# Patient Record
Sex: FEMALE | Race: WHITE | NOT HISPANIC OR LATINO | Employment: OTHER | URBAN - METROPOLITAN AREA
[De-identification: names, ages, dates, MRNs, and addresses within clinical notes are randomized per-mention and may not be internally consistent; named-entity substitution may affect disease eponyms.]

---

## 2017-02-16 ENCOUNTER — OFFICE VISIT (OUTPATIENT)
Dept: INTERNAL MEDICINE | Facility: CLINIC | Age: 62
End: 2017-02-16
Payer: COMMERCIAL

## 2017-02-16 VITALS
DIASTOLIC BLOOD PRESSURE: 64 MMHG | BODY MASS INDEX: 23.47 KG/M2 | HEIGHT: 61 IN | SYSTOLIC BLOOD PRESSURE: 117 MMHG | HEART RATE: 72 BPM | WEIGHT: 124.31 LBS

## 2017-02-16 DIAGNOSIS — H60.93 OTITIS EXTERNA OF BOTH EARS, UNSPECIFIED CHRONICITY, UNSPECIFIED TYPE: Primary | ICD-10-CM

## 2017-02-16 PROCEDURE — 3074F SYST BP LT 130 MM HG: CPT | Mod: S$GLB,,, | Performed by: PHYSICIAN ASSISTANT

## 2017-02-16 PROCEDURE — 99213 OFFICE O/P EST LOW 20 MIN: CPT | Mod: S$GLB,,, | Performed by: PHYSICIAN ASSISTANT

## 2017-02-16 PROCEDURE — 3078F DIAST BP <80 MM HG: CPT | Mod: S$GLB,,, | Performed by: PHYSICIAN ASSISTANT

## 2017-02-16 PROCEDURE — 99999 PR PBB SHADOW E&M-EST. PATIENT-LVL III: CPT | Mod: PBBFAC,,, | Performed by: PHYSICIAN ASSISTANT

## 2017-02-16 RX ORDER — NEOMYCIN SULFATE, POLYMYXIN B SULFATE, HYDROCORTISONE 3.5; 10000; 1 MG/ML; [USP'U]/ML; MG/ML
4 SOLUTION/ DROPS AURICULAR (OTIC) 4 TIMES DAILY
Qty: 10 ML | Refills: 0 | Status: SHIPPED | OUTPATIENT
Start: 2017-02-16 | End: 2017-02-26

## 2017-02-16 NOTE — MR AVS SNAPSHOT
[facility] Internal Medicine  [address]  [facility] [address]  Phone: [phone]  Fax: [phone]                  Rehana Bentonartie   2017 10:30 AM   Office Visit    Description:  Female : 1955   Provider:  Katelynn Celestin PA-C   Department:  [facility] Internal Medicine           Reason for Visit     Ear Drainage           Diagnoses this Visit        Comments    Otitis externa of both ears, unspecified chronicity, unspecified type    -  Primary            To Do List           Future Appointments        Provider Department Dept Phone    3/24/2017 4:00 PM Dawna Maradiaga MD [facility] - Dermatology [phone]    3/31/2017 8:00 AM Bernie Valverde MD [facility] Internal Medicine [phone]      Goals (5 Years of Data)     None       These Medications        Disp Refills Start End    neomycin-polymyxin-hydrocortisone (CORTISPORIN) otic solution 10 mL 0 2017    Place 4 drops into both ears 4 (four) times daily. - Both Ears    Pharmacy: [facility] - [place]IN [place]GDEN - [address] [place] [address].  #: [phone]         [facility] On Call     [facility] On Call Nurse Care Line -  Assistance  Registered nurses in the [facility] On Call Center provide clinical advisement, health education, appointment booking, and other advisory services.  Call for this free service at [phone].             Medications           Message regarding Medications     Verify the changes and/or additions to your medication regime listed below are the same as discussed with your clinician today.  If any of these changes or additions are incorrect, please notify your healthcare provider.        START taking these NEW medications        Refills    neomycin-polymyxin-hydrocortisone (CORTISPORIN) otic solution 0    Sig: Place 4 drops into both ears 4 (four) times daily.    Class: Normal    Route: Both Ears           Verify that the below list of medications is an accurate  "representation of the medications you are currently taking.  If none reported, the list may be blank. If incorrect, please contact your healthcare provider. Carry this list with you in case of emergency.           Current Medications     citalopram (CELEXA) 20 MG tablet Take 1 tablet (20 mg total) by mouth once daily.    fluocinolone (SYNALAR) 0.01 % external solution Apply topically 2 (two) times daily.    levothyroxine (SYNTHROID) 88 MCG tablet Take 1 tablet (88 mcg total) by mouth once daily.    lisinopril (PRINIVIL,ZESTRIL) 5 MG tablet Take 1 tablet (5 mg total) by mouth once daily.    meclizine (ANTIVERT) 25 mg tablet take 1 tablet by mouth twice a day if needed for dizziness    ondansetron (ZOFRAN-ODT) 4 MG TbDL Take 1 tablet (4 mg total) by mouth every 8 (eight) hours as needed.    triamterene-hydrochlorothiazide 37.5-25 mg (DYAZIDE) 37.5-25 mg per capsule Take 1 capsule by mouth every morning.    valacyclovir (VALTREX) 1000 MG tablet Take 2 pills po bid x 2 days prn first signs of outbreak    neomycin-polymyxin-hydrocortisone (CORTISPORIN) otic solution Place 4 drops into both ears 4 (four) times daily.           Clinical Reference Information           Your Vitals Were     BP Pulse Height Weight Last Period BMI    117/64 (BP Location: Left arm, Patient Position: Sitting, BP Method: Manual) 72 5' 1" (1.549 m) 56.4 kg (124 lb 5.4 oz) (LMP Unknown) 23.49 kg/m2      Blood Pressure          Most Recent Value    BP  117/64      Allergies as of 2/16/2017     No Known Allergies      Immunizations Administered on Date of Encounter - 2/16/2017     None      Instructions      External Ear Infection (Adult)    External otitis (also called swimmers ear) is an infection in the ear canal. It is often caused by bacteria or fungus. It can occur a few days after water gets trapped in the ear canal (from swimming or bathing). It can also occur after cleaning too deeply in the ear canal with a cotton swab or other object. " Sometimes, hair care products get into the ear canal and cause this problem.  Symptoms can include pain, fever, itching, redness, drainage, or swelling of the ear canal. Temporary hearing loss may also occur.  Home care  · Do not try to clean the ear canal. This can push pus and bacteria deeper into the canal.  · Use prescribed ear drops as directed. These help reduce swelling and fight the infection. If an ear wick was placed in the ear canal, apply drops right onto the end of the wick. The wick will draw the medication into the ear canal even if it is swollen closed.  · A cotton ball may be loosely placed in the outer ear to absorb any drainage.  · You may use acetaminophen or ibuprofen to control pain, unless another medication was prescribed. Note: If you have chronic liver or kidney disease or ever had a stomach ulcer or GI bleeding, talk to your health care provider before taking any of these medications.  · Do not allow water to get into your ear when bathing. Also, avoid swimming until the infection has cleared.  Prevention  · Keep your ears dry. This helps lower the risk of infection. Dry your ears with a towel or hair dryer after getting wet. Also, use ear plugs when swimming.  · Do not stick any objects in the ear to remove wax.  · If you feel water trapped in your ear, use ear drops right away. You can get these drops over the counter at most drugstores. They work by removing water from the ear canal.  Follow-up care  Follow up with your health care provider in one week, or as advised.  When to seek medical advice  Call your health care provider right away if any of these occur:  · Ear pain becomes worse or doesnt improve after 3 days of treatment  · Redness or swelling of the outer ear occurs or gets worse  · Headache  · Painful or stiff neck  · Drowsiness or confusion  · Fever of 100.4ºF (38ºC) or higher, or as directed by your health care provider  · Seizure  Date Last Reviewed: 3/22/2015  ©  4501-0004 [employer]. [address] [place] [address], [place] [address]. All rights reserved. This information is not intended as a substitute for professional medical care. Always follow your healthcare professional's instructions.             Language Assistance Services     ATTENTION: Language assistance services are available, free of charge. Please call [phone].      ATENCIÓN: Si habla español, tiene a torres disposición servicios gratuitos de asistencia lingüística. Llame al [phone].     FERMÍN Ý: N?u b?n nói Ti?ng Vi?t, có các d?ch v? h? tr? ngôn ng? mi?n phí dành cho b?n. G?i s? [phone].         [facility] complies with applicable Federal civil rights laws and does not discriminate on the basis of race, color, national origin, age, disability, or sex.

## 2017-02-16 NOTE — PROGRESS NOTES
Subjective:       Patient ID: Rehana Polanco is a 61 y.o. female.        Chief Complaint: Ear Drainage    HPI Comments: Rehana Polanco is an established patient of Bernie Valverde MD here today for urgent care visit.    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      Itchy ears with clear to yellow drainage.  Saw dermatology previously for this.  Given Synalar.  Gets crusting and itching.  This started 2-3 weeks ago.  The discharge is new.  Previously just with pruritus of the ears.  Ear pain on right but now feeling better.  No fever.  No URI symptoms.           Review of Systems   Constitutional: Negative for chills, diaphoresis, fatigue and fever.   HENT: Positive for ear discharge. Negative for congestion and sore throat.    Eyes: Negative for visual disturbance.   Respiratory: Negative for cough, chest tightness and shortness of breath.    Cardiovascular: Negative for chest pain, palpitations and leg swelling.   Gastrointestinal: Negative for abdominal pain, blood in stool, constipation, diarrhea, nausea and vomiting.   Genitourinary: Negative for dysuria, frequency, hematuria and urgency.   Musculoskeletal: Negative for arthralgias and back pain.   Skin: Negative for rash.   Neurological: Negative for dizziness, syncope, weakness and headaches.   Psychiatric/Behavioral: Negative for dysphoric mood and sleep disturbance. The patient is not nervous/anxious.        Objective:      Physical Exam   Constitutional: She appears well-developed and well-nourished.   HENT:   Head: Normocephalic.   Right Ear: External ear normal. There is swelling (mild). Right ear drainage: yellow crusting outer canal. Tympanic membrane is not perforated and not erythematous.   Left Ear: External ear normal. There is swelling (mild). Left ear drainage: yellow crusting outer canal. Tympanic membrane is not perforated and not erythematous.   Mouth/Throat: Oropharynx is clear and moist.  "  Eyes: Pupils are equal, round, and reactive to light.   Cardiovascular: Normal rate, regular rhythm and normal heart sounds.  Exam reveals no gallop and no friction rub.    No murmur heard.  Pulmonary/Chest: Effort normal and breath sounds normal. No respiratory distress.   Abdominal: Soft. Normal appearance. There is no tenderness.   Musculoskeletal: She exhibits no edema.   Neurological: She is alert.   Skin: Skin is warm and dry.   Psychiatric: She has a normal mood and affect.   Nursing note and vitals reviewed.      Assessment:       1. Otitis externa of both ears, unspecified chronicity, unspecified type        Plan:       Rehana was seen today for ear drainage.    Diagnoses and all orders for this visit:    Otitis externa of both ears, unspecified chronicity, unspecified type  -     neomycin-polymyxin-hydrocortisone (CORTISPORIN) otic solution; Place 4 drops into both ears 4 (four) times daily.    If not resolving, will refer to ENT.  No water in the ear for the next 1-2 weeks.    Pt has been given instructions populated from [employer] database and has verbalized understanding of the after visit summary and information contained wherein.    Follow up with a primary care provider. May go to ER for acute shortness of breath, lightheadedness, fever, or any other emergent complaints or changes in condition.    "This note will be shared with the patient"    Future Appointments  Date Time Provider Department Center   3/24/2017 4:00 PM Dawna Maradiaga MD [facility] DERM [address]   3/31/2017 8:00 AM Bernie Valverde MD [facility]               "

## 2017-02-16 NOTE — PATIENT INSTRUCTIONS

## 2017-03-16 DIAGNOSIS — E03.9 HYPOTHYROIDISM: ICD-10-CM

## 2017-03-16 RX ORDER — LEVOTHYROXINE SODIUM 88 UG/1
TABLET ORAL
Qty: 90 TABLET | Refills: 3 | Status: SHIPPED | OUTPATIENT
Start: 2017-03-16 | End: 2018-03-09 | Stop reason: SDUPTHER

## 2017-03-18 DIAGNOSIS — I10 ESSENTIAL HYPERTENSION: ICD-10-CM

## 2017-03-20 RX ORDER — TRIAMTERENE AND HYDROCHLOROTHIAZIDE 37.5; 25 MG/1; MG/1
CAPSULE ORAL
Qty: 90 CAPSULE | Refills: 1 | Status: SHIPPED | OUTPATIENT
Start: 2017-03-20 | End: 2017-09-16 | Stop reason: SDUPTHER

## 2017-03-31 ENCOUNTER — OFFICE VISIT (OUTPATIENT)
Dept: INTERNAL MEDICINE | Facility: CLINIC | Age: 62
End: 2017-03-31
Payer: COMMERCIAL

## 2017-03-31 ENCOUNTER — HOSPITAL ENCOUNTER (OUTPATIENT)
Dept: RADIOLOGY | Facility: HOSPITAL | Age: 62
Discharge: HOME | End: 2017-03-31
Attending: INTERNAL MEDICINE
Payer: COMMERCIAL

## 2017-03-31 ENCOUNTER — TELEPHONE (OUTPATIENT)
Dept: INTERNAL MEDICINE | Facility: CLINIC | Age: 62
End: 2017-03-31

## 2017-03-31 VITALS
SYSTOLIC BLOOD PRESSURE: 104 MMHG | BODY MASS INDEX: 22.86 KG/M2 | HEART RATE: 70 BPM | HEIGHT: 61 IN | TEMPERATURE: 98 F | WEIGHT: 121.06 LBS | DIASTOLIC BLOOD PRESSURE: 80 MMHG | OXYGEN SATURATION: 96 %

## 2017-03-31 DIAGNOSIS — H60.92 OTITIS EXTERNA OF LEFT EAR, UNSPECIFIED CHRONICITY, UNSPECIFIED TYPE: Primary | ICD-10-CM

## 2017-03-31 DIAGNOSIS — M79.671 RIGHT FOOT PAIN: ICD-10-CM

## 2017-03-31 DIAGNOSIS — I10 ESSENTIAL HYPERTENSION: ICD-10-CM

## 2017-03-31 DIAGNOSIS — E03.9 HYPOTHYROIDISM, UNSPECIFIED TYPE: ICD-10-CM

## 2017-03-31 DIAGNOSIS — Z00.00 ANNUAL PHYSICAL EXAM: Primary | ICD-10-CM

## 2017-03-31 DIAGNOSIS — H60.92 OTITIS EXTERNA OF LEFT EAR, UNSPECIFIED CHRONICITY, UNSPECIFIED TYPE: ICD-10-CM

## 2017-03-31 DIAGNOSIS — F41.9 ANXIETY: ICD-10-CM

## 2017-03-31 DIAGNOSIS — F32.A DEPRESSION, UNSPECIFIED DEPRESSION TYPE: ICD-10-CM

## 2017-03-31 DIAGNOSIS — Z23 NEED FOR TDAP VACCINATION: ICD-10-CM

## 2017-03-31 DIAGNOSIS — Z12.31 ENCOUNTER FOR SCREENING MAMMOGRAM FOR BREAST CANCER: ICD-10-CM

## 2017-03-31 PROCEDURE — 73630 X-RAY EXAM OF FOOT: CPT | Mod: TC,RT

## 2017-03-31 PROCEDURE — 90471 IMMUNIZATION ADMIN: CPT | Mod: S$GLB,,, | Performed by: INTERNAL MEDICINE

## 2017-03-31 PROCEDURE — 99396 PREV VISIT EST AGE 40-64: CPT | Mod: 25,S$GLB,, | Performed by: INTERNAL MEDICINE

## 2017-03-31 PROCEDURE — 90715 TDAP VACCINE 7 YRS/> IM: CPT | Mod: S$GLB,,, | Performed by: INTERNAL MEDICINE

## 2017-03-31 PROCEDURE — 3079F DIAST BP 80-89 MM HG: CPT | Mod: S$GLB,,, | Performed by: INTERNAL MEDICINE

## 2017-03-31 PROCEDURE — 73630 X-RAY EXAM OF FOOT: CPT | Mod: 26,RT,, | Performed by: RADIOLOGY

## 2017-03-31 PROCEDURE — 3074F SYST BP LT 130 MM HG: CPT | Mod: S$GLB,,, | Performed by: INTERNAL MEDICINE

## 2017-03-31 PROCEDURE — 99999 PR PBB SHADOW E&M-EST. PATIENT-LVL IV: CPT | Mod: PBBFAC,,, | Performed by: INTERNAL MEDICINE

## 2017-03-31 RX ORDER — CITALOPRAM 20 MG/1
20 TABLET, FILM COATED ORAL DAILY
Qty: 90 TABLET | Refills: 3 | Status: SHIPPED | OUTPATIENT
Start: 2017-03-31 | End: 2018-03-08 | Stop reason: SDUPTHER

## 2017-03-31 RX ORDER — NEOMYCIN SULFATE, POLYMYXIN B SULFATE, HYDROCORTISONE 3.5; 10000; 1 MG/ML; [USP'U]/ML; MG/ML
3 SOLUTION/ DROPS AURICULAR (OTIC) EVERY 6 HOURS
Qty: 10 ML | Refills: 0 | Status: SHIPPED | OUTPATIENT
Start: 2017-03-31 | End: 2017-04-10

## 2017-03-31 RX ORDER — MELOXICAM 15 MG/1
15 TABLET ORAL DAILY
Qty: 30 TABLET | Refills: 0 | Status: SHIPPED | OUTPATIENT
Start: 2017-03-31 | End: 2018-03-08

## 2017-03-31 NOTE — PROGRESS NOTES
INTERNAL MEDICINE ANNUAL VISIT NOTE      CHIEF COMPLAINT     Chief Complaint   Patient presents with    Annual Exam     HPI     Rehana Polanco is a 61 y.o. C female who presents for annual exam.    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 On synthroid 88mc gdaily.     Anxiety/depression - on celexa 20mg daily. Reports stable on celexa.     HTN - triamterene-HCTZ 37.5-25mg daily.    C/o L ear being crusty. Given synalar 11/16/16 w/ derm - didn't help w/ the ear. Seen w/ Katelynn Celestin 2/16/17 - improved on the cortisporin x 7-10 days. However after stopping, worsening. Previously w/ draining in the L ear. Itchy ears all the time. No ear pain. No tinnitus or hearing loss.     Walks 10k steps every day at work. Starting to inc upper body regimens. Over the last month, lost about 3lbs.     L foot used to have pain, which improved w/ cortisone. R foot by the balls of the foot pain after she wore some low heels in Feb. Became consistent pain over the course of the pain. Iced the area and takes occasional ibuprofen (helps when she takes it). Tried different shoes. Worst when she wakes up in the morning (feels like she's walking on a stone all the time) but improves when she starts walking.     Past Medical History:  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    Hypertension     Hypothyroidism     hypothyroidism    Insomnia     previously on restoril    Vertigo        Past Surgical History:  Past Surgical History:   Procedure Laterality Date    LYMPH NODE BIOPSY      NASAL SINUS SURGERY      PARATHYROIDECTOMY      TUBAL LIGATION         Allergies:  Review of patient's allergies indicates:  No Known Allergies    Home Medications:  Prior to Admission medications    Medication Sig Start Date End Date  Taking? Authorizing Provider   citalopram (CELEXA) 20 MG tablet Take 1 tablet (20 mg total) by mouth once daily. 3/9/16  Yes Bernie Valverde MD   SYNTHROID 88 mcg tablet take 1 tablet by mouth once daily 3/16/17  Yes Bernie Valverde MD   triamterene-hydrochlorothiazide 37.5-25 mg (DYAZIDE) 37.5-25 mg per capsule take 1 capsule by mouth every morning 3/20/17  Yes Bernie Valverde MD   fluocinolone (SYNALAR) 0.01 % external solution Apply topically 2 (two) times daily. 11/16/16   Dawna Maradiaga MD   meclizine (ANTIVERT) 25 mg tablet take 1 tablet by mouth twice a day if needed for dizziness 6/15/16   Bernie Valverde MD   ondansetron (ZOFRAN-ODT) 4 MG TbDL Take 1 tablet (4 mg total) by mouth every 8 (eight) hours as needed. 9/21/16   Bernie Valverde MD   valacyclovir (VALTREX) 1000 MG tablet Take 2 pills po bid x 2 days prn first signs of outbreak 6/22/16   Dawna Maradiaga MD       Family History:  Family History   Problem Relation Age of Onset    Heart disease Mother 67     MI    Depression Mother     Heart disease Father 56     CAD    Hypertension Father     No Known Problems Sister     Hyperlipidemia Sister     Hypertension Sister     Melanoma Neg Hx        Social History:  Social History   Substance Use Topics    Smoking status: Former Smoker     Packs/day: 0.50     Years: 10.00     Quit date: 3/17/1989    Smokeless tobacco: Never Used    Alcohol use 4.8 oz/week     8 Glasses of wine per week      Comment: soc       Review of Systems:  Review of Systems Comprehensive review of systems otherwise negative. See history/subjective section for more details.    Health Maintainence:   Immunizations:   TDap is not up to date, Influenza is up to date, Zostavax is UTD.       Cancer Screening:  PAP: is up to date. 1/2017  Mammogram: is up to date.   Colonoscopy: is up to date.   Screening:  Hepatitis C is up to date in pts born between 4035-7758.     PHYSICAL EXAM     /80 (BP Location: Left arm, Patient Position: Sitting, BP Method: Manual)  " Pulse 70  Temp 97.8 °F (36.6 °C) (Oral)   Ht 5' 1" (1.549 m)  Wt 54.9 kg (121 lb 0.5 oz)  LMP  (LMP Unknown)  SpO2 96%  BMI 22.87 kg/m2    GEN - A+OX4, NAD   HEENT - PERRL, EOMI, OP clear. R ext auditory canal w/ some scaliness. L TM w/ some erythema, thick drainage and scaliness. TM on the L appears normal.   Neck - No cervical LAD. Thyroid scar is well healed.   CV - RRR, no m/r   Chest - CTAB, no wheezing or rhonchi  Abd - S/NT/ND/+BS.   Ext - 2+BDP and radial pulses. No LE edema.   Neuro - PERRL, EOMI, no nystagmus, eyebrow raise, facial sensation, hearing, m of mastication, smile, palatal raise, shoulder shrug, tongue protrusion symmetric and intact. 5/5 BUE and BLE strength. Sensation to light touch intact throughout. 2+ DTRs. Normal gait.   MSK - no spinal tenderness to palpation. R foot by the base of 2nd and 3rd MTP is painful on palpation.   Skin - No rash.      LABS     Labs reviewed.     ASSESSMENT/PLAN     Rehana Polanco is a 61 y.o. female with  Rehana was seen today for annual exam.    Diagnoses and all orders for this visit:    Annual physical exam  -     CBC auto differential; Future; Expected date: 3/31/17  -     Comprehensive metabolic panel; Future; Expected date: 3/31/17  -     Lipid panel; Future; Expected date: 3/31/17  -     TSH; Future; Expected date: 3/31/17  -     T4, free; Future; Expected date: 3/31/17  -     Vitamin D; Future; Expected date: 3/31/17    Depression, unspecified depression type - controlled. No SI/HI.  -     Comprehensive metabolic panel; Future; Expected date: 3/31/17  -     citalopram (CELEXA) 20 MG tablet; Take 1 tablet (20 mg total) by mouth once daily.    Anxiety - controlled.   -     Comprehensive metabolic panel; Future; Expected date: 3/31/17  -     citalopram (CELEXA) 20 MG tablet; Take 1 tablet (20 mg total) by mouth once daily.    Essential hypertension - Stable and controlled. Continue current medications.  -     Comprehensive metabolic panel; " Future; Expected date: 3/31/17  -     Lipid panel; Future; Expected date: 3/31/17    Hypothyroidism, unspecified type - on synthroid 88mcg daily.   -     TSH; Future; Expected date: 3/31/17  -     T4, free; Future; Expected date: 3/31/17    Encounter for screening mammogram for breast cancer  -     Mammo Digital Screening Bilateral With CAD; Future; Expected date: 3/31/17    Need for Tdap vaccination  -     Tdap Vaccine    Right foot pain - mobic 15mg daily. If no improvement, then consider referral to ortho.  -     X-Ray Foot Complete 3 view Right; Future; Expected date: 3/31/17  -     meloxicam (MOBIC) 15 MG tablet; Take 1 tablet (15 mg total) by mouth once daily.    Otitis externa of left ear, unspecified chronicity, unspecified type - refer to ENT. Drop of mineral oil prn on the R.  -     ciprofloxacin-hydrocortisone 0.2-1% (CIPRO HC OTIC) otic suspension; 3 drops twice daily into L ear x 10 days.  -     Ambulatory Referral to ENT    RTC in 12 months, sooner if needed and depending on labs.    Bernie Valverde MD  Department of Internal Medicine - [facility]  8:10 AM

## 2017-03-31 NOTE — MR AVS SNAPSHOT
[facility]  [address]  [place] [address]  Phone: [phone]  Fax: [phone]                  Rehana Polanco   3/31/2017 8:00 AM   Office Visit    Description:  Female : 1955   Provider:  Bernie Valverde MD   Department:  [facility]           Reason for Visit     Annual Exam           Diagnoses this Visit        Comments    Annual physical exam    -  Primary     Depression, unspecified depression type         Anxiety         Essential hypertension         Hypothyroidism, unspecified type         Encounter for screening mammogram for breast cancer         Need for Tdap vaccination         Right foot pain         Otitis externa of left ear, unspecified chronicity, unspecified type                To Do List           Goals (5 Years of Data)     None      Follow-Up and Disposition     Return in about 1 year (around 3/31/2018).       These Medications        Disp Refills Start End    citalopram (CELEXA) 20 MG tablet 90 tablet 3 3/31/2017     Take 1 tablet (20 mg total) by mouth once daily. - Oral    [address]  [facility] [address] Ph #: [phone]       meloxicam (MOBIC) 15 MG tablet 30 tablet 0 3/31/2017     Take 1 tablet (15 mg total) by mouth once daily. - Oral    Pharmacy: [address] [place] [address] Ph #: [phone]       ciprofloxacin-hydrocortisone 0.2-1% (CIPRO HC OTIC) otic suspension 10 mL 0 3/31/2017     3 drops twice daily into L ear x 10 days.    Pharmacy: [address] [facility] [address]. Ph #: [phone]         [facility] On Call     [facility] On Call Nurse Care Line -  Assistance  Unless otherwise directed by your provider, please contact [facility] On-Call, our nurse care line that is available for  assistance.     Registered nurses in the [facility] On Call Center provide: appointment  scheduling, clinical advisement, health education, and other advisory services.  Call: [phone] (toll free)               Medications           Message regarding Medications     Verify the changes and/or additions to your medication regime listed below are the same as discussed with your clinician today.  If any of these changes or additions are incorrect, please notify your healthcare provider.        START taking these NEW medications        Refills    meloxicam (MOBIC) 15 MG tablet 0    Sig: Take 1 tablet (15 mg total) by mouth once daily.    Class: Normal    Route: Oral    ciprofloxacin-hydrocortisone 0.2-1% (CIPRO HC OTIC) otic suspension 0    Sig: 3 drops twice daily into L ear x 10 days.    Class: Normal      STOP taking these medications     influenza vaccine 60 mcg/0.5 mL injection     fluocinolone (SYNALAR) 0.01 % external solution Apply topically 2 (two) times daily.           Verify that the below list of medications is an accurate representation of the medications you are currently taking.  If none reported, the list may be blank. If incorrect, please contact your healthcare provider. Carry this list with you in case of emergency.           Current Medications     citalopram (CELEXA) 20 MG tablet Take 1 tablet (20 mg total) by mouth once daily.    SYNTHROID 88 mcg tablet take 1 tablet by mouth once daily    triamterene-hydrochlorothiazide 37.5-25 mg (DYAZIDE) 37.5-25 mg per capsule take 1 capsule by mouth every morning    ciprofloxacin-hydrocortisone 0.2-1% (CIPRO HC OTIC) otic suspension 3 drops twice daily into L ear x 10 days.    meclizine (ANTIVERT) 25 mg tablet take 1 tablet by mouth twice a day if needed for dizziness    meloxicam (MOBIC) 15 MG tablet Take 1 tablet (15 mg total) by mouth once daily.    ondansetron (ZOFRAN-ODT) 4 MG TbDL Take 1 tablet (4 mg total) by mouth every 8 (eight) hours as needed.    valacyclovir (VALTREX) 1000 MG tablet Take 2 pills po bid x 2 days prn first signs of  "outbreak           Clinical Reference Information           Your Vitals Were     BP Pulse Temp Height Weight Last Period    104/80 (BP Location: Left arm, Patient Position: Sitting, BP Method: Manual) 70 97.8 °F (36.6 °C) (Oral) 5' 1" (1.549 m) 54.9 kg (121 lb 0.5 oz) (LMP Unknown)    SpO2 BMI             96% 22.87 kg/m2         Blood Pressure          Most Recent Value    BP  104/80      Allergies as of 3/31/2017     No Known Allergies      Immunizations Administered on Date of Encounter - 3/31/2017     Name Date Dose VIS Date Route    TDAP  Incomplete 0.5 mL 2/24/2015 Intramuscular      Orders Placed During Today's Visit      Normal Orders This Visit    Ambulatory Referral to ENT     Tdap Vaccine     Future Labs/Procedures Expected by Expires    CBC auto differential  3/31/2017 5/30/2018    Comprehensive metabolic panel  3/31/2017 5/30/2018    Lipid panel  3/31/2017 5/30/2018    Mammo Digital Screening Bilateral With CAD  3/31/2017 5/31/2018    T4, free  3/31/2017 5/30/2018    TSH  3/31/2017 5/30/2018    Vitamin D  3/31/2017 5/30/2018    X-Ray Foot Complete 3 view Right  3/31/2017 3/31/2018      Language Assistance Services     ATTENTION: Language assistance services are available, free of charge. Please call [phone].      ATENCIÓN: Si habla español, tiene a torres disposición servicios gratuitos de asistencia lingüística. Llame al [phone].     CHÚ Ý: N?u b?n nói Ti?ng Vi?t, có các d?ch v? h? tr? ngôn ng? mi?n phí dành cho b?n. G?i s? [phone].         [facility] complies with applicable Federal civil rights laws and does not discriminate on the basis of race, color, national origin, age, disability, or sex.        "

## 2017-03-31 NOTE — TELEPHONE ENCOUNTER
----- Message from Roni Orta sent at 3/31/2017  9:25 AM CDT -----  Contact: self [phone]  [facility] pharmacy stated hey don't have medication ciprofloxacin-hydrocortisone 0.2-1% (CIPRO HC OTIC) otic suspension in stock , wondering  if MD can call in something else  . Please call and advise, Thanks  !

## 2017-04-07 ENCOUNTER — PATIENT MESSAGE (OUTPATIENT)
Dept: INTERNAL MEDICINE | Facility: CLINIC | Age: 62
End: 2017-04-07

## 2017-04-12 ENCOUNTER — HOSPITAL ENCOUNTER (OUTPATIENT)
Dept: RADIOLOGY | Facility: HOSPITAL | Age: 62
Discharge: HOME | End: 2017-04-12
Attending: INTERNAL MEDICINE
Payer: COMMERCIAL

## 2017-04-12 DIAGNOSIS — Z12.31 ENCOUNTER FOR SCREENING MAMMOGRAM FOR BREAST CANCER: ICD-10-CM

## 2017-04-12 PROCEDURE — 77067 SCR MAMMO BI INCL CAD: CPT | Mod: TC

## 2017-04-12 PROCEDURE — 77067 SCR MAMMO BI INCL CAD: CPT | Mod: 26,,, | Performed by: RADIOLOGY

## 2017-05-02 ENCOUNTER — PATIENT MESSAGE (OUTPATIENT)
Dept: INTERNAL MEDICINE | Facility: CLINIC | Age: 62
End: 2017-05-02

## 2017-05-03 DIAGNOSIS — I10 ESSENTIAL HYPERTENSION: ICD-10-CM

## 2017-05-03 RX ORDER — LISINOPRIL 5 MG/1
TABLET ORAL
Qty: 90 TABLET | Refills: 3 | OUTPATIENT
Start: 2017-05-03

## 2017-05-03 NOTE — TELEPHONE ENCOUNTER
Please call pharmacy to let them know that pt is on triamterene-hctz and not lisinopril. This is discontinued in our chart over a yr ago. Please confirm w/ pt.

## 2017-05-20 DIAGNOSIS — I10 ESSENTIAL HYPERTENSION: ICD-10-CM

## 2017-05-20 RX ORDER — LISINOPRIL 5 MG/1
TABLET ORAL
Qty: 90 TABLET | Refills: 3 | Status: SHIPPED | OUTPATIENT
Start: 2017-05-20 | End: 2018-03-08 | Stop reason: SDUPTHER

## 2017-08-28 ENCOUNTER — OFFICE VISIT (OUTPATIENT)
Dept: DERMATOLOGY | Facility: CLINIC | Age: 62
End: 2017-08-28
Payer: COMMERCIAL

## 2017-08-28 DIAGNOSIS — L98.8 RHYTIDES: ICD-10-CM

## 2017-08-28 DIAGNOSIS — L81.4 LENTIGINES: ICD-10-CM

## 2017-08-28 DIAGNOSIS — L57.0 ACTINIC KERATOSIS: Primary | ICD-10-CM

## 2017-08-28 DIAGNOSIS — Z85.828 PERSONAL HISTORY OF MALIGNANT NEOPLASM OF SKIN: ICD-10-CM

## 2017-08-28 PROCEDURE — 17000 DESTRUCT PREMALG LESION: CPT | Mod: S$GLB,,, | Performed by: DERMATOLOGY

## 2017-08-28 PROCEDURE — 99999 PR PBB SHADOW E&M-EST. PATIENT-LVL II: CPT | Mod: PBBFAC,,, | Performed by: DERMATOLOGY

## 2017-08-28 PROCEDURE — 3008F BODY MASS INDEX DOCD: CPT | Mod: S$GLB,,, | Performed by: DERMATOLOGY

## 2017-08-28 PROCEDURE — 99213 OFFICE O/P EST LOW 20 MIN: CPT | Mod: 25,S$GLB,, | Performed by: DERMATOLOGY

## 2017-08-28 RX ORDER — IBUPROFEN AND FAMOTIDINE 800; 26.6 MG/1; MG/1
1 TABLET, COATED ORAL 3 TIMES DAILY
Refills: 3 | COMMUNITY
Start: 2017-07-15 | End: 2019-06-11

## 2017-08-28 RX ORDER — DICLOFENAC SODIUM 30 MG/G
GEL TOPICAL
COMMUNITY
Start: 2017-07-17 | End: 2019-06-11

## 2017-08-28 RX ORDER — TRETINOIN 0.25 MG/G
CREAM TOPICAL NIGHTLY
Qty: 45 G | Refills: 1 | Status: SHIPPED | OUTPATIENT
Start: 2017-08-28 | End: 2023-01-13 | Stop reason: ALTCHOICE

## 2017-08-28 RX ORDER — LIDOCAINE 50 MG/G
OINTMENT TOPICAL
COMMUNITY
Start: 2017-07-17 | End: 2019-06-11

## 2017-08-28 NOTE — PROGRESS NOTES
Subjective:       Patient ID:  Rehana Polanco is a 62 y.o. female who presents for   Chief Complaint   Patient presents with    Skin Check     hands, x months, scaly, no tx    Spot     face, inquiring about retina     Spot  - Initial  Affected locations: left hand and right hand  Duration: at least several months.  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   Skin: Positive for activity-related sunscreen use. Negative for daily sunscreen use and recent sunburn.   Hematologic/Lymphatic: Does not bruise/bleed easily.        Objective:    Physical Exam   Constitutional: She appears well-developed and well-nourished. No distress.   Neurological: She is alert and oriented to person, place, and time. She is not disoriented.   Psychiatric: She has a normal mood and affect.   Skin:   Areas Examined (abnormalities noted in diagram):   Head / Face Inspection Performed  Neck Inspection Performed  Chest / Axilla Inspection Performed  Back Inspection Performed  RUE Inspected  LUE Inspection Performed                       Diagram Legend     Erythematous scaling macule/papule c/w actinic keratosis       Vascular papule c/w angioma      Pigmented verrucoid papule/plaque c/w seborrheic keratosis      Yellow umbilicated papule c/w sebaceous hyperplasia      Irregularly shaped tan macule c/w lentigo     1-2 mm smooth white papules consistent with Milia      Movable subcutaneous cyst with punctum c/w epidermal inclusion cyst       Subcutaneous movable cyst c/w pilar cyst      Firm pink to brown papule c/w dermatofibroma      Pedunculated fleshy papule(s) c/w skin tag(s)      Evenly pigmented macule c/w junctional nevus     Mildly variegated pigmented, slightly irregular-bordered macule c/w mildly atypical nevus      Flesh colored to evenly pigmented papule c/w intradermal nevus       Pink pearly papule/plaque c/w basal cell carcinoma      Erythematous hyperkeratotic cursted plaque c/w SCC      Surgical scar with no sign of skin cancer recurrence      Open and closed comedones      Inflammatory papules and pustules      Verrucoid papule consistent consistent with wart     Erythematous eczematous patches and plaques     Dystrophic onycholytic nail with subungual debris c/w onychomycosis     Umbilicated papule    Erythematous-base heme-crusted tan verrucoid plaque consistent with inflamed seborrheic keratosis     Erythematous Silvery Scaling Plaque c/w Psoriasis     See annotation      Assessment / Plan:        Actinic keratosis  Cryosurgery Procedure Note    Verbal consent from the patient is obtained and the patient is aware of the precancerous quality and need for treatment of these lesions. Liquid nitrogen cryosurgery is applied to the 3 actinic keratoses, as detailed in the physical exam, to produce a freeze injury. The patient is aware that blisters may form and is instructed on wound care with gentle cleansing and use of vaseline ointment to keep moist until healed. The patient is supplied a handout on cryosurgery and is instructed to call if lesions do not completely resolve.    Lentigines  These are benign hyperpigmented sun induced lesions. Daily sun protection will reduce the number of new lesions.    Personal history of malignant neoplasm of skin  Area(s) of previous NMSC evaluated with no signs of recurrence.  Upper body skin examination performed today including at least 6 points as noted in physical examination. No lesions  suspicious for malignancy noted.    Rhytides  -     tretinoin (RETIN-A) 0.025 % cream; Apply topically every evening.  Dispense: 45 g; Refill: 1  Discussed using pea-sized drop to entire face qhs.  Start applying every 2-3 nights then gradually increase to nightly application as tolerated.  May notice mild redness and irritation         No Follow-up on file.

## 2017-09-16 DIAGNOSIS — I10 ESSENTIAL HYPERTENSION: ICD-10-CM

## 2017-09-16 RX ORDER — TRIAMTERENE AND HYDROCHLOROTHIAZIDE 37.5; 25 MG/1; MG/1
CAPSULE ORAL
Qty: 90 CAPSULE | Refills: 1 | Status: SHIPPED | OUTPATIENT
Start: 2017-09-16 | End: 2018-03-08 | Stop reason: SDUPTHER

## 2018-01-06 ENCOUNTER — PATIENT MESSAGE (OUTPATIENT)
Dept: DERMATOLOGY | Facility: CLINIC | Age: 63
End: 2018-01-06

## 2018-01-09 ENCOUNTER — PATIENT MESSAGE (OUTPATIENT)
Dept: DERMATOLOGY | Facility: CLINIC | Age: 63
End: 2018-01-09

## 2018-01-10 ENCOUNTER — HOSPITAL ENCOUNTER (OUTPATIENT)
Dept: RADIOLOGY | Facility: HOSPITAL | Age: 63
Discharge: HOME | End: 2018-01-10
Attending: NURSE PRACTITIONER
Payer: COMMERCIAL

## 2018-01-10 ENCOUNTER — OFFICE VISIT (OUTPATIENT)
Dept: INTERNAL MEDICINE | Facility: CLINIC | Age: 63
End: 2018-01-10
Payer: COMMERCIAL

## 2018-01-10 VITALS
DIASTOLIC BLOOD PRESSURE: 50 MMHG | SYSTOLIC BLOOD PRESSURE: 120 MMHG | OXYGEN SATURATION: 98 % | HEART RATE: 87 BPM | HEIGHT: 61 IN | WEIGHT: 128.75 LBS | TEMPERATURE: 98 F | BODY MASS INDEX: 24.31 KG/M2

## 2018-01-10 DIAGNOSIS — R05.9 COUGH: ICD-10-CM

## 2018-01-10 DIAGNOSIS — R53.83 FATIGUE, UNSPECIFIED TYPE: ICD-10-CM

## 2018-01-10 DIAGNOSIS — R05.9 COUGH: Primary | ICD-10-CM

## 2018-01-10 PROCEDURE — 71046 X-RAY EXAM CHEST 2 VIEWS: CPT | Mod: 26,,, | Performed by: RADIOLOGY

## 2018-01-10 PROCEDURE — 99999 PR PBB SHADOW E&M-EST. PATIENT-LVL V: CPT | Mod: PBBFAC,,, | Performed by: NURSE PRACTITIONER

## 2018-01-10 PROCEDURE — 99214 OFFICE O/P EST MOD 30 MIN: CPT | Mod: S$GLB,,, | Performed by: NURSE PRACTITIONER

## 2018-01-10 PROCEDURE — 71046 X-RAY EXAM CHEST 2 VIEWS: CPT | Mod: TC

## 2018-01-10 RX ORDER — DOXYCYCLINE 100 MG/1
100 CAPSULE ORAL EVERY 12 HOURS
Qty: 20 CAPSULE | Refills: 0 | Status: SHIPPED | OUTPATIENT
Start: 2018-01-10 | End: 2018-01-20

## 2018-01-10 RX ORDER — ALBUTEROL SULFATE 90 UG/1
2 AEROSOL, METERED RESPIRATORY (INHALATION) EVERY 4 HOURS PRN
Qty: 1 INHALER | Refills: 11 | Status: SHIPPED | OUTPATIENT
Start: 2018-01-10 | End: 2018-12-05 | Stop reason: SDUPTHER

## 2018-01-10 NOTE — PATIENT INSTRUCTIONS
Take Doxycycline twice a day for 10 days    Use inhaler with spacer for cough    I will send you a message with your xray results    To ER for any shortness of breath or fever

## 2018-01-10 NOTE — PROGRESS NOTES
"Subjective:       Patient ID: Rehana Polanco is a 62 y.o. female.    Chief Complaint: Cough and Nasal Congestion    Pt here c/o cough for a month.  Pt states that she had the flu about a month ago, took Tamiflu.  Cough has lingered.  Has nasal congestion.  Still tired.  Deneis n/v/d no fever past week        Cough   This is a new problem. The current episode started more than 1 month ago. The problem has been waxing and waning. The problem occurs hourly. The cough is non-productive. Associated symptoms include ear congestion, headaches, nasal congestion, postnasal drip and shortness of breath. Pertinent negatives include no chest pain, chills, myalgias or rash. The symptoms are aggravated by lying down. She has tried OTC cough suppressant for the symptoms. The treatment provided mild relief. Her past medical history is significant for bronchitis.     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    Hypertension     Hypothyroidism     hypothyroidism    Insomnia     previously on restoril    Vertigo      Past Surgical History:   Procedure Laterality Date    LYMPH NODE BIOPSY      NASAL SINUS SURGERY      PARATHYROIDECTOMY      TUBAL LIGATION       Social History     Social History Narrative    No narrative on file     Family History   Problem Relation Age of Onset    Heart disease Mother 67     MI    Depression Mother     Heart disease Father 56     CAD    Hypertension Father     No Known Problems Sister     Hyperlipidemia Sister     Hypertension Sister     Melanoma Neg Hx      Vitals:    01/10/18 1317   BP: (!) 120/50   Pulse: 87   Temp: 97.7 °F (36.5 °C)   SpO2: 98%   Weight: 58.4 kg (128 lb 12 oz)   Height: 5' 1" (1.549 m)   PainSc: 0-No pain     Outpatient Encounter Prescriptions as of 1/10/2018 " "  Medication Sig Dispense Refill    citalopram (CELEXA) 20 MG tablet Take 1 tablet (20 mg total) by mouth once daily. 90 tablet 3    diclofenac sodium (SOLARAZE) 3 % gel       DUEXIS 800-26.6 mg Tab Take 1 tablet by mouth 3 (three) times daily.  3    lidocaine (XYLOCAINE) 5 % Oint ointment       lisinopril (PRINIVIL,ZESTRIL) 5 MG tablet take 1 tablet by mouth once daily 90 tablet 3    meclizine (ANTIVERT) 25 mg tablet take 1 tablet by mouth twice a day if needed for dizziness 15 tablet 0    meloxicam (MOBIC) 15 MG tablet Take 1 tablet (15 mg total) by mouth once daily. 30 tablet 0    ondansetron (ZOFRAN-ODT) 4 MG TbDL Take 1 tablet (4 mg total) by mouth every 8 (eight) hours as needed. 10 tablet 0    SYNTHROID 88 mcg tablet take 1 tablet by mouth once daily 90 tablet 3    tretinoin (RETIN-A) 0.025 % cream Apply topically every evening. 45 g 1    triamterene-hydrochlorothiazide 37.5-25 mg (DYAZIDE) 37.5-25 mg per capsule take 1 capsule by mouth every morning 90 capsule 1    valacyclovir (VALTREX) 1000 MG tablet Take 2 pills po bid x 2 days prn first signs of outbreak 8 tablet 5    albuterol 90 mcg/actuation inhaler Inhale 2 puffs into the lungs every 4 (four) hours as needed for Wheezing. Use with spacer 1 Inhaler 11    doxycycline (VIBRAMYCIN) 100 MG Cap Take 1 capsule (100 mg total) by mouth every 12 (twelve) hours. 20 capsule 0    inhalation spacing device Use with inhaler dispense with mouthpiece 1 Device 1     No facility-administered encounter medications on file as of 1/10/2018.      Wt Readings from Last 3 Encounters:   01/10/18 58.4 kg (128 lb 12 oz)   03/31/17 54.9 kg (121 lb 0.5 oz)   02/16/17 56.4 kg (124 lb 5.4 oz)     Last 3 sets of Vitals    Vitals - 1 value per visit 3/31/2017 8/28/2017 1/10/2018   SYSTOLIC 104 - 120   DIASTOLIC 80 - 50   PULSE 70 - 87   TEMPERATURE 97.8 - 97.7   RESPIRATIONS - - -   SPO2 96 - 98   Weight (lb) 121.03 - 128.75   Weight (kg) 54.9 - 58.4   HEIGHT 5' 1" - " "5' 1"   BODY MASS INDEX 22.87 - 24.33   VISIT REPORT - - -   Pain Score  5 0 0   Some recent data might be hidden     No results found for: CBC  Review of Systems   Constitutional: Positive for fatigue. Negative for chills.   HENT: Positive for congestion and postnasal drip. Negative for drooling.    Respiratory: Positive for cough and shortness of breath.    Cardiovascular: Negative for chest pain.   Gastrointestinal: Negative for abdominal pain.   Musculoskeletal: Negative for myalgias, neck pain and neck stiffness.   Skin: Negative for rash.   Neurological: Positive for headaches.   Psychiatric/Behavioral: Negative for sleep disturbance.       Objective:      Physical Exam   Constitutional: She is oriented to person, place, and time. She appears well-developed and well-nourished. No distress.   HENT:   Head: Normocephalic and atraumatic.   Right Ear: External ear normal.   Left Ear: External ear normal.   Nose: Mucosal edema and rhinorrhea present. Right sinus exhibits maxillary sinus tenderness and frontal sinus tenderness. Left sinus exhibits maxillary sinus tenderness and frontal sinus tenderness.   Mouth/Throat: Uvula is midline, oropharynx is clear and moist and mucous membranes are normal. No tonsillar exudate.   Eyes: Conjunctivae are normal. Pupils are equal, round, and reactive to light. Right eye exhibits no discharge. Left eye exhibits no discharge.   Neck: Normal range of motion.   Cardiovascular: Normal rate, regular rhythm, normal heart sounds and intact distal pulses.    Pulmonary/Chest: Effort normal and breath sounds normal. No stridor. No respiratory distress.   Reactive cough noted     Lymphadenopathy:     She has no cervical adenopathy.   Neurological: She is alert and oriented to person, place, and time.   Skin: Skin is warm and dry. Capillary refill takes less than 2 seconds. No rash noted. She is not diaphoretic. No erythema. No pallor.   Psychiatric: She has a normal mood and affect. Her " behavior is normal. Judgment and thought content normal.   Nursing note and vitals reviewed.      Assessment:       1. Cough    2. Fatigue, unspecified type        Plan:       Rehana was seen today for cough and nasal congestion.    Diagnoses and all orders for this visit:    Cough  -     albuterol 90 mcg/actuation inhaler; Inhale 2 puffs into the lungs every 4 (four) hours as needed for Wheezing. Use with spacer  -     inhalation spacing device; Use with inhaler dispense with mouthpiece  -     doxycycline (VIBRAMYCIN) 100 MG Cap; Take 1 capsule (100 mg total) by mouth every 12 (twelve) hours.  -     X-Ray Chest PA And Lateral; Future    Fatigue, unspecified type  -     doxycycline (VIBRAMYCIN) 100 MG Cap; Take 1 capsule (100 mg total) by mouth every 12 (twelve) hours.      Patient Instructions   Take Doxycycline twice a day for 10 days    Use inhaler with spacer for cough    I will send you a message with your xray results    To ER for any shortness of breath or fever

## 2018-03-08 ENCOUNTER — OFFICE VISIT (OUTPATIENT)
Dept: INTERNAL MEDICINE | Facility: CLINIC | Age: 63
End: 2018-03-08
Payer: COMMERCIAL

## 2018-03-08 ENCOUNTER — LAB VISIT (OUTPATIENT)
Dept: LAB | Facility: HOSPITAL | Age: 63
End: 2018-03-08
Attending: INTERNAL MEDICINE
Payer: COMMERCIAL

## 2018-03-08 VITALS
BODY MASS INDEX: 24.19 KG/M2 | DIASTOLIC BLOOD PRESSURE: 60 MMHG | SYSTOLIC BLOOD PRESSURE: 110 MMHG | HEART RATE: 68 BPM | WEIGHT: 128.13 LBS | HEIGHT: 61 IN

## 2018-03-08 DIAGNOSIS — I10 ESSENTIAL HYPERTENSION: ICD-10-CM

## 2018-03-08 DIAGNOSIS — Z00.00 ANNUAL PHYSICAL EXAM: Primary | ICD-10-CM

## 2018-03-08 DIAGNOSIS — E03.9 HYPOTHYROIDISM, UNSPECIFIED TYPE: ICD-10-CM

## 2018-03-08 DIAGNOSIS — Z00.00 ANNUAL PHYSICAL EXAM: ICD-10-CM

## 2018-03-08 DIAGNOSIS — F41.9 ANXIETY: ICD-10-CM

## 2018-03-08 DIAGNOSIS — F32.A DEPRESSION, UNSPECIFIED DEPRESSION TYPE: ICD-10-CM

## 2018-03-08 LAB
ALBUMIN SERPL BCP-MCNC: 4.2 G/DL
ALP SERPL-CCNC: 93 U/L
ALT SERPL W/O P-5'-P-CCNC: 18 U/L
ANION GAP SERPL CALC-SCNC: 11 MMOL/L
AST SERPL-CCNC: 20 U/L
BASOPHILS # BLD AUTO: 0.03 K/UL
BASOPHILS NFR BLD: 0.7 %
BILIRUB SERPL-MCNC: 1.1 MG/DL
BILIRUB UR QL STRIP: NEGATIVE
BUN SERPL-MCNC: 16 MG/DL
CALCIUM SERPL-MCNC: 9.5 MG/DL
CHLORIDE SERPL-SCNC: 103 MMOL/L
CHOLEST SERPL-MCNC: 269 MG/DL
CHOLEST/HDLC SERPL: 3.3 {RATIO}
CLARITY UR REFRACT.AUTO: CLEAR
CO2 SERPL-SCNC: 26 MMOL/L
COLOR UR AUTO: YELLOW
CREAT SERPL-MCNC: 0.7 MG/DL
DIFFERENTIAL METHOD: ABNORMAL
EOSINOPHIL # BLD AUTO: 0.2 K/UL
EOSINOPHIL NFR BLD: 5.3 %
ERYTHROCYTE [DISTWIDTH] IN BLOOD BY AUTOMATED COUNT: 13.6 %
EST. GFR  (AFRICAN AMERICAN): >60 ML/MIN/1.73 M^2
EST. GFR  (NON AFRICAN AMERICAN): >60 ML/MIN/1.73 M^2
ESTIMATED AVG GLUCOSE: 103 MG/DL
GLUCOSE SERPL-MCNC: 101 MG/DL
GLUCOSE UR QL STRIP: NEGATIVE
HBA1C MFR BLD HPLC: 5.2 %
HCT VFR BLD AUTO: 39.7 %
HDLC SERPL-MCNC: 82 MG/DL
HDLC SERPL: 30.5 %
HGB BLD-MCNC: 12.5 G/DL
HGB UR QL STRIP: NEGATIVE
KETONES UR QL STRIP: NEGATIVE
LDLC SERPL CALC-MCNC: 162.8 MG/DL
LEUKOCYTE ESTERASE UR QL STRIP: NEGATIVE
LYMPHOCYTES # BLD AUTO: 1.3 K/UL
LYMPHOCYTES NFR BLD: 32 %
MCH RBC QN AUTO: 29.9 PG
MCHC RBC AUTO-ENTMCNC: 31.5 G/DL
MCV RBC AUTO: 95 FL
MICROSCOPIC COMMENT: NORMAL
MONOCYTES # BLD AUTO: 0.3 K/UL
MONOCYTES NFR BLD: 7 %
NEUTROPHILS # BLD AUTO: 2.3 K/UL
NEUTROPHILS NFR BLD: 54.8 %
NITRITE UR QL STRIP: NEGATIVE
NONHDLC SERPL-MCNC: 187 MG/DL
PH UR STRIP: 6 [PH] (ref 5–8)
PLATELET # BLD AUTO: 247 K/UL
PMV BLD AUTO: 11.4 FL
POTASSIUM SERPL-SCNC: 4.2 MMOL/L
PROT SERPL-MCNC: 7.1 G/DL
PROT UR QL STRIP: NEGATIVE
RBC # BLD AUTO: 4.18 M/UL
SODIUM SERPL-SCNC: 140 MMOL/L
SP GR UR STRIP: 1.01 (ref 1–1.03)
SQUAMOUS #/AREA URNS AUTO: 1 /HPF
T4 FREE SERPL-MCNC: 1.2 NG/DL
TRIGL SERPL-MCNC: 121 MG/DL
TSH SERPL DL<=0.005 MIU/L-ACNC: 1.39 UIU/ML
URN SPEC COLLECT METH UR: NORMAL
UROBILINOGEN UR STRIP-ACNC: NEGATIVE EU/DL
WBC # BLD AUTO: 4.16 K/UL

## 2018-03-08 PROCEDURE — 36415 COLL VENOUS BLD VENIPUNCTURE: CPT

## 2018-03-08 PROCEDURE — 80053 COMPREHEN METABOLIC PANEL: CPT

## 2018-03-08 PROCEDURE — 84443 ASSAY THYROID STIM HORMONE: CPT

## 2018-03-08 PROCEDURE — 83036 HEMOGLOBIN GLYCOSYLATED A1C: CPT

## 2018-03-08 PROCEDURE — 81001 URINALYSIS AUTO W/SCOPE: CPT

## 2018-03-08 PROCEDURE — 80061 LIPID PANEL: CPT

## 2018-03-08 PROCEDURE — 99396 PREV VISIT EST AGE 40-64: CPT | Mod: S$GLB,,, | Performed by: INTERNAL MEDICINE

## 2018-03-08 PROCEDURE — 84439 ASSAY OF FREE THYROXINE: CPT

## 2018-03-08 PROCEDURE — 99999 PR PBB SHADOW E&M-EST. PATIENT-LVL III: CPT | Mod: PBBFAC,,, | Performed by: INTERNAL MEDICINE

## 2018-03-08 PROCEDURE — 85025 COMPLETE CBC W/AUTO DIFF WBC: CPT

## 2018-03-08 RX ORDER — CITALOPRAM 20 MG/1
20 TABLET, FILM COATED ORAL DAILY
Qty: 90 TABLET | Refills: 3 | Status: SHIPPED | OUTPATIENT
Start: 2018-03-08 | End: 2019-05-30 | Stop reason: SDUPTHER

## 2018-03-08 RX ORDER — LISINOPRIL 5 MG/1
5 TABLET ORAL DAILY
Qty: 90 TABLET | Refills: 3 | Status: SHIPPED | OUTPATIENT
Start: 2018-03-08 | End: 2019-05-30 | Stop reason: SDUPTHER

## 2018-03-08 RX ORDER — TRIAMTERENE AND HYDROCHLOROTHIAZIDE 37.5; 25 MG/1; MG/1
1 CAPSULE ORAL EVERY MORNING
Qty: 90 CAPSULE | Refills: 3 | Status: SHIPPED | OUTPATIENT
Start: 2018-03-08 | End: 2019-03-18 | Stop reason: SDUPTHER

## 2018-03-08 NOTE — PROGRESS NOTES
"INTERNAL MEDICINE ANNUAL VISIT NOTE      CHIEF COMPLAINT     Chief Complaint   Patient presents with    Hypertension    Hypothyroidism   annual    HPI     Rehana Polanco is a 62 y.o. C female who presents for her annual.    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 On synthroid 88mcg daily.   TSH 3/31/17 WNL    Anxiety/depression - on celexa 20mg daily. Reports stable on celexa.      HTN - triamterene-HCTZ 37.5-25mg daily, lisinopril 5mg daily.    B feet pain. Podiatrist is Dr. Maria - at [facility]. Reports may be so bad that she'll need surgery soon - uncertain if it's bad enough for her to go through with it. Unfortunately due to this, exercise on a hiatus. On diclofenac gel, duexis about once a week- helps.    Pt reports drinking "tons of water" every day but then doesn't urinate as much as she drinks.     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 Authorizing Provider   albuterol 90 mcg/actuation inhaler Inhale 2 puffs into the lungs every 4 (four) hours as needed for Wheezing. Use with spacer 1/10/18  Yes RUSH Guy   citalopram (CELEXA) 20 MG tablet Take 1 tablet (20 mg total) by mouth once daily. 3/31/17  Yes Bernie Valverde MD   diclofenac sodium (SOLARAZE) 3 % gel  7/17/17  Yes Historical Provider, MD "   inhalation spacing device Use with inhaler dispense with mouthpiece 1/10/18  Yes Joanna Dalal, DANNIEP-C   lidocaine (XYLOCAINE) 5 % Oint ointment  7/17/17  Yes Historical Provider, MD   lisinopril (PRINIVIL,ZESTRIL) 5 MG tablet take 1 tablet by mouth once daily 5/20/17  Yes Bernie Valverde MD   meclizine (ANTIVERT) 25 mg tablet take 1 tablet by mouth twice a day if needed for dizziness 6/15/16  Yes Bernie Valverde MD   ondansetron (ZOFRAN-ODT) 4 MG TbDL Take 1 tablet (4 mg total) by mouth every 8 (eight) hours as needed. 9/21/16  Yes Bernie Valverde MD   SYNTHROID 88 mcg tablet take 1 tablet by mouth once daily 3/16/17  Yes Bernie Valverde MD   tretinoin (RETIN-A) 0.025 % cream Apply topically every evening. 8/28/17  Yes Dawna Maradiaga MD   triamterene-hydrochlorothiazide 37.5-25 mg (DYAZIDE) 37.5-25 mg per capsule take 1 capsule by mouth every morning 9/16/17  Yes Bernie Valverde MD   valacyclovir (VALTREX) 1000 MG tablet Take 2 pills po bid x 2 days prn first signs of outbreak 6/22/16  Yes Dawna Maradiaga MD   meloxicam (MOBIC) 15 MG tablet Take 1 tablet (15 mg total) by mouth once daily. 3/31/17 3/8/18 Yes Bernie Valverde MD   DUEXIS 800-26.6 mg Tab Take 1 tablet by mouth 3 (three) times daily. 7/15/17   Historical Provider, MD       Family History:  Family History   Problem Relation Age of Onset    Heart disease Mother 67     MI    Depression Mother     Heart disease Father 56     CAD    Hypertension Father     No Known Problems Sister     Hyperlipidemia Sister     Hypertension Sister     Melanoma Neg Hx        Social History:  Social History   Substance Use Topics    Smoking status: Former Smoker     Packs/day: 0.50     Years: 10.00     Quit date: 3/17/1989    Smokeless tobacco: Never Used    Alcohol use 4.8 oz/week     8 Glasses of wine per week      Comment: soc       Review of Systems:  Review of Systems   Constitutional: Positive for activity change and unexpected weight change.   HENT: Positive for rhinorrhea. Negative for  "hearing loss and trouble swallowing.    Eyes: Negative for discharge and visual disturbance.   Respiratory: Negative for chest tightness and wheezing.    Cardiovascular: Negative for chest pain and palpitations.   Gastrointestinal: Negative for blood in stool, constipation, diarrhea and vomiting.   Endocrine: Negative for polydipsia and polyuria.   Genitourinary: Positive for difficulty urinating. Negative for dysuria, hematuria and menstrual problem.   Musculoskeletal: Positive for arthralgias. Negative for joint swelling and neck pain.   Neurological: Negative for weakness and headaches.   Psychiatric/Behavioral: Negative for confusion and dysphoric mood.       Health Maintainence:   Td 3/31/17  Flu - declined  Pap 1/16/17  Hep C screening 7/8/05  Zostavax 11/29/16  MMG - 4/13/17 - repeat in 2 yrs.  Cscope 2010    PHYSICAL EXAM     /60 (BP Location: Left arm, Patient Position: Sitting, BP Method: Medium (Manual))   Pulse 68   Ht 5' 1" (1.549 m)   Wt 58.1 kg (128 lb 1.6 oz)   LMP  (LMP Unknown)   BMI 24.20 kg/m²     GEN - A+OX4, NAD   HEENT - PERRL, EOMI, OP clear. MMM.   Neck - No thyromegaly or cervical LAD. No thyroid masses felt.  CV - RRR, no m/r   Chest - CTAB, no wheezing or rhonchi  Abd - S/NT/ND/+BS.   Ext - 2+BDP and radial pulses. No LE edema.   Neuro - 5/5 BUE and BLE strength. Sensation to light touch intact throughout. 3+ DTRs. Normal gait.   MSK - No spinal tenderness to palpation. Normal gait.   Skin - No rash.    LABS     Previous labs reviewed.    ASSESSMENT/PLAN     Rehana Polanco is a 62 y.o. female with  Rehana was seen today for hypertension and hypothyroidism.    Diagnoses and all orders for this visit:    Annual physical exam  -     Comprehensive metabolic panel; Future; Expected date: 03/08/2018  -     CBC auto differential; Future; Expected date: 03/08/2018  -     Hemoglobin A1c; Future; Expected date: 03/08/2018  -     Lipid panel; Future; Expected date: 03/08/2018  - "     TSH; Future; Expected date: 03/08/2018  -     T4, free; Future; Expected date: 03/08/2018  -     Urinalysis  -     Urinalysis Microscopic    Anxiety  -     citalopram (CELEXA) 20 MG tablet; Take 1 tablet (20 mg total) by mouth once daily.    Depression, unspecified depression type  -     citalopram (CELEXA) 20 MG tablet; Take 1 tablet (20 mg total) by mouth once daily.    Essential hypertension - Stable and controlled. Continue current medications.  -     Comprehensive metabolic panel; Future; Expected date: 03/08/2018  -     triamterene-hydrochlorothiazide 37.5-25 mg (DYAZIDE) 37.5-25 mg per capsule; Take 1 capsule by mouth every morning.  -     lisinopril (PRINIVIL,ZESTRIL) 5 MG tablet; Take 1 tablet (5 mg total) by mouth once daily.    Hypothyroidism, unspecified type - will refill synthroid if TFT WNL.  -     TSH; Future; Expected date: 03/08/2018  -     T4, free; Future; Expected date: 03/08/2018    RTC in 12 months, sooner if needed and depending on labs.    Bernie Valverde MD  Department of Internal Medicine - [facility]  7:14 AM

## 2018-03-09 ENCOUNTER — TELEPHONE (OUTPATIENT)
Dept: INTERNAL MEDICINE | Facility: CLINIC | Age: 63
End: 2018-03-09

## 2018-03-09 DIAGNOSIS — E78.5 HYPERLIPIDEMIA, UNSPECIFIED HYPERLIPIDEMIA TYPE: Primary | ICD-10-CM

## 2018-03-09 DIAGNOSIS — E03.9 HYPOTHYROIDISM, UNSPECIFIED TYPE: ICD-10-CM

## 2018-03-09 RX ORDER — LEVOTHYROXINE SODIUM 88 UG/1
88 TABLET ORAL DAILY
Qty: 90 TABLET | Refills: 3 | Status: SHIPPED | OUTPATIENT
Start: 2018-03-09 | End: 2019-06-11

## 2018-03-09 RX ORDER — PRAVASTATIN SODIUM 20 MG/1
20 TABLET ORAL DAILY
Qty: 90 TABLET | Refills: 3 | Status: SHIPPED | OUTPATIENT
Start: 2018-03-09 | End: 2019-03-18 | Stop reason: SDUPTHER

## 2018-03-09 NOTE — TELEPHONE ENCOUNTER
Please call and let lissette cuba that her cholesterol is higher than previously. HDL, good cholesterol, is still high but her bad cholesterol, LDL, is also trending upwards from a few years ago. Given the family history of heart disease and the current cholesterol levels, does she want to be started on a low dose cholesterol medicine such as pravastatin 20mg daily? If she is ok w/ it, let me know and I'll send it it. Alternatively, she can work on her diet and I'll put orders in to repeat fasting labs in 2 mo.    Blood counts, kidney and thyroid functions are normal. Total bilirubin is very mildly elevated but that's stable and chronic since 2016. We'll cont to monitor w/ yearly blood work. I'll send in the refill for synthroid at current dose.    Urine is normal.

## 2018-03-09 NOTE — TELEPHONE ENCOUNTER
Pt willing to try pravastatin     Pt asking if she needs to come back sooner than 1yr    Pt checked results on portal, reviewed again, pt expressed understanding, no questions.

## 2018-03-17 DIAGNOSIS — E03.9 HYPOTHYROIDISM: ICD-10-CM

## 2018-03-18 RX ORDER — LEVOTHYROXINE SODIUM 88 UG/1
TABLET ORAL
Qty: 90 TABLET | Refills: 3 | Status: SHIPPED | OUTPATIENT
Start: 2018-03-18 | End: 2019-03-18 | Stop reason: SDUPTHER

## 2018-03-27 ENCOUNTER — OFFICE VISIT (OUTPATIENT)
Dept: OPTOMETRY | Facility: CLINIC | Age: 63
End: 2018-03-27
Payer: COMMERCIAL

## 2018-03-27 DIAGNOSIS — I10 ESSENTIAL HYPERTENSION: ICD-10-CM

## 2018-03-27 DIAGNOSIS — H52.13 MYOPIA WITH ASTIGMATISM AND PRESBYOPIA, BILATERAL: ICD-10-CM

## 2018-03-27 DIAGNOSIS — H52.4 MYOPIA WITH ASTIGMATISM AND PRESBYOPIA, BILATERAL: ICD-10-CM

## 2018-03-27 DIAGNOSIS — H25.13 NUCLEAR SCLEROSIS OF BOTH EYES: Primary | ICD-10-CM

## 2018-03-27 DIAGNOSIS — H04.129 DRY EYE SYNDROME: ICD-10-CM

## 2018-03-27 DIAGNOSIS — H52.203 MYOPIA WITH ASTIGMATISM AND PRESBYOPIA, BILATERAL: ICD-10-CM

## 2018-03-27 PROCEDURE — 92015 DETERMINE REFRACTIVE STATE: CPT | Mod: S$GLB,,, | Performed by: OPTOMETRIST

## 2018-03-27 PROCEDURE — 92004 COMPRE OPH EXAM NEW PT 1/>: CPT | Mod: S$GLB,,, | Performed by: OPTOMETRIST

## 2018-03-27 PROCEDURE — 99999 PR PBB SHADOW E&M-EST. PATIENT-LVL II: CPT | Mod: PBBFAC,,, | Performed by: OPTOMETRIST

## 2018-03-27 NOTE — PROGRESS NOTES
HPI     Patient's last dilated exam was:2013 [place]    Pt here for annual eye exam. C/O blurred vision when driving at night and   during the day in bright lights. Sees halos around lights at night.   Patient denies flashes, floaters, pain and double vision. Systane OU PRN.   blood pressure is well controlled on 2 daily medications.       Last edited by Sara Rodriguez, PCT on 3/27/2018  8:10 AM.   (History)            Assessment /Plan     For exam results, see Encounter Report.    Nuclear sclerosis of both eyes    Essential hypertension    Dry eye syndrome    Myopia with astigmatism and presbyopia, bilateral            1.  Educated on cataracts and affects on vision.  Monitor.  2.  No retinopathy--monitor yearly.  BP control..  3.  Recommend Systane bid OU and Gel drops at bed time.  Discussed prescriptions drops.  4.  Bifocal rx given            RTC 1 year for routine exam.

## 2018-04-18 ENCOUNTER — OFFICE VISIT (OUTPATIENT)
Dept: DERMATOLOGY | Facility: CLINIC | Age: 63
End: 2018-04-18
Payer: COMMERCIAL

## 2018-04-18 DIAGNOSIS — L57.0 ACTINIC KERATOSIS: ICD-10-CM

## 2018-04-18 DIAGNOSIS — Z85.828 PERSONAL HISTORY OF MALIGNANT NEOPLASM OF SKIN: ICD-10-CM

## 2018-04-18 DIAGNOSIS — L82.1 SEBORRHEIC KERATOSES: Primary | ICD-10-CM

## 2018-04-18 PROCEDURE — 17003 DESTRUCT PREMALG LES 2-14: CPT | Mod: S$GLB,,, | Performed by: DERMATOLOGY

## 2018-04-18 PROCEDURE — 17000 DESTRUCT PREMALG LESION: CPT | Mod: S$GLB,,, | Performed by: DERMATOLOGY

## 2018-04-18 PROCEDURE — 99213 OFFICE O/P EST LOW 20 MIN: CPT | Mod: 25,S$GLB,, | Performed by: DERMATOLOGY

## 2018-04-18 PROCEDURE — 99999 PR PBB SHADOW E&M-EST. PATIENT-LVL II: CPT | Mod: PBBFAC,,, | Performed by: DERMATOLOGY

## 2018-04-18 NOTE — PROGRESS NOTES
Subjective:       Patient ID:  Rehana Polanco is a 62 y.o. 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  Negative for fever, chills and fatigue.   Skin: Positive for daily sunscreen use and activity-related sunscreen use.   Hematologic/Lymphatic: Does not bruise/bleed easily.        Objective:    Physical Exam   Constitutional: She appears well-developed and well-nourished. No distress.   Neurological: She is alert and oriented to person, place, and time. She is not disoriented.   Psychiatric: She has a normal mood and affect.   Skin:   Areas Examined (abnormalities noted in diagram):   Head / Face Inspection Performed  Neck Inspection Performed  RUE Inspected  LUE Inspection Performed                   Diagram Legend     Erythematous scaling macule/papule c/w actinic keratosis       Vascular papule c/w angioma      Pigmented verrucoid papule/plaque c/w seborrheic keratosis      Yellow umbilicated papule c/w sebaceous hyperplasia      Irregularly shaped tan macule c/w lentigo     1-2 mm smooth white papules consistent with Milia      Movable subcutaneous cyst with punctum c/w epidermal inclusion cyst      Subcutaneous movable cyst c/w pilar cyst      Firm pink to brown papule c/w dermatofibroma      Pedunculated  fleshy papule(s) c/w skin tag(s)      Evenly pigmented macule c/w junctional nevus     Mildly variegated pigmented, slightly irregular-bordered macule c/w mildly atypical nevus      Flesh colored to evenly pigmented papule c/w intradermal nevus       Pink pearly papule/plaque c/w basal cell carcinoma      Erythematous hyperkeratotic cursted plaque c/w SCC      Surgical scar with no sign of skin cancer recurrence      Open and closed comedones      Inflammatory papules and pustules      Verrucoid papule consistent consistent with wart     Erythematous eczematous patches and plaques     Dystrophic onycholytic nail with subungual debris c/w onychomycosis     Umbilicated papule    Erythematous-base heme-crusted tan verrucoid plaque consistent with inflamed seborrheic keratosis     Erythematous Silvery Scaling Plaque c/w Psoriasis     See annotation      Assessment / Plan:        Seborrheic keratoses  These are benign inherited growths without a malignant potential. Reassurance given to patient. No treatment is necessary.   Discussed LN    H/o BCC on L cheek  Continue regular skin check and daily sun protection while outdoors    Actinic keratosis  Cryosurgery Procedure Note    Verbal consent from the patient is obtained and the patient is aware of the precancerous quality and need for treatment of these lesions. Liquid nitrogen cryosurgery is applied to the 1 actinic keratosis (1 AK vs porokeratosis), as detailed in the physical exam, to produce a freeze injury. The patient is aware that blisters may form and is instructed on wound care with gentle cleansing and use of vaseline ointment to keep moist until healed. The patient is supplied a handout on cryosurgery and is instructed to call if lesions do not completely resolve.         Follow-up in about 6 months (around 10/18/2018).

## 2018-05-07 ENCOUNTER — PATIENT MESSAGE (OUTPATIENT)
Dept: INTERNAL MEDICINE | Facility: CLINIC | Age: 63
End: 2018-05-07

## 2018-05-09 ENCOUNTER — LAB VISIT (OUTPATIENT)
Dept: LAB | Facility: HOSPITAL | Age: 63
End: 2018-05-09
Attending: INTERNAL MEDICINE
Payer: COMMERCIAL

## 2018-05-09 DIAGNOSIS — E78.5 HYPERLIPIDEMIA, UNSPECIFIED HYPERLIPIDEMIA TYPE: ICD-10-CM

## 2018-05-09 LAB
ALBUMIN SERPL BCP-MCNC: 4.2 G/DL
ALP SERPL-CCNC: 93 U/L
ALT SERPL W/O P-5'-P-CCNC: 15 U/L
ANION GAP SERPL CALC-SCNC: 12 MMOL/L
AST SERPL-CCNC: 20 U/L
BILIRUB SERPL-MCNC: 0.8 MG/DL
BUN SERPL-MCNC: 15 MG/DL
CALCIUM SERPL-MCNC: 9.6 MG/DL
CHLORIDE SERPL-SCNC: 103 MMOL/L
CHOLEST SERPL-MCNC: 216 MG/DL
CHOLEST/HDLC SERPL: 2.8 {RATIO}
CO2 SERPL-SCNC: 24 MMOL/L
CREAT SERPL-MCNC: 0.7 MG/DL
EST. GFR  (AFRICAN AMERICAN): >60 ML/MIN/1.73 M^2
EST. GFR  (NON AFRICAN AMERICAN): >60 ML/MIN/1.73 M^2
GLUCOSE SERPL-MCNC: 103 MG/DL
HDLC SERPL-MCNC: 76 MG/DL
HDLC SERPL: 35.2 %
LDLC SERPL CALC-MCNC: 113.6 MG/DL
NONHDLC SERPL-MCNC: 140 MG/DL
POTASSIUM SERPL-SCNC: 4.2 MMOL/L
PROT SERPL-MCNC: 7.1 G/DL
SODIUM SERPL-SCNC: 139 MMOL/L
TRIGL SERPL-MCNC: 132 MG/DL

## 2018-05-09 PROCEDURE — 80061 LIPID PANEL: CPT

## 2018-05-09 PROCEDURE — 80053 COMPREHEN METABOLIC PANEL: CPT

## 2018-05-09 PROCEDURE — 36415 COLL VENOUS BLD VENIPUNCTURE: CPT

## 2018-05-10 ENCOUNTER — PATIENT MESSAGE (OUTPATIENT)
Dept: INTERNAL MEDICINE | Facility: CLINIC | Age: 63
End: 2018-05-10

## 2018-10-08 ENCOUNTER — OFFICE VISIT (OUTPATIENT)
Dept: INTERNAL MEDICINE | Facility: CLINIC | Age: 63
End: 2018-10-08
Payer: COMMERCIAL

## 2018-10-08 ENCOUNTER — LAB VISIT (OUTPATIENT)
Dept: LAB | Facility: HOSPITAL | Age: 63
End: 2018-10-08
Attending: INTERNAL MEDICINE
Payer: COMMERCIAL

## 2018-10-08 VITALS
DIASTOLIC BLOOD PRESSURE: 64 MMHG | WEIGHT: 131.38 LBS | HEART RATE: 74 BPM | TEMPERATURE: 98 F | SYSTOLIC BLOOD PRESSURE: 116 MMHG | HEIGHT: 61 IN | BODY MASS INDEX: 24.8 KG/M2

## 2018-10-08 DIAGNOSIS — I10 ESSENTIAL HYPERTENSION: ICD-10-CM

## 2018-10-08 DIAGNOSIS — E78.49 OTHER HYPERLIPIDEMIA: Primary | ICD-10-CM

## 2018-10-08 DIAGNOSIS — E89.0 POSTOPERATIVE HYPOTHYROIDISM: ICD-10-CM

## 2018-10-08 DIAGNOSIS — F41.9 ANXIETY: ICD-10-CM

## 2018-10-08 DIAGNOSIS — E78.49 OTHER HYPERLIPIDEMIA: ICD-10-CM

## 2018-10-08 LAB
ALBUMIN SERPL BCP-MCNC: 4.4 G/DL
ALP SERPL-CCNC: 119 U/L
ALT SERPL W/O P-5'-P-CCNC: 15 U/L
ANION GAP SERPL CALC-SCNC: 10 MMOL/L
AST SERPL-CCNC: 18 U/L
BILIRUB SERPL-MCNC: 0.8 MG/DL
BUN SERPL-MCNC: 14 MG/DL
CALCIUM SERPL-MCNC: 10 MG/DL
CHLORIDE SERPL-SCNC: 100 MMOL/L
CHOLEST SERPL-MCNC: 221 MG/DL
CHOLEST/HDLC SERPL: 2.8 {RATIO}
CO2 SERPL-SCNC: 28 MMOL/L
CREAT SERPL-MCNC: 0.8 MG/DL
EST. GFR  (AFRICAN AMERICAN): >60 ML/MIN/1.73 M^2
EST. GFR  (NON AFRICAN AMERICAN): >60 ML/MIN/1.73 M^2
GLUCOSE SERPL-MCNC: 106 MG/DL
HDLC SERPL-MCNC: 80 MG/DL
HDLC SERPL: 36.2 %
LDLC SERPL CALC-MCNC: 124.2 MG/DL
NONHDLC SERPL-MCNC: 141 MG/DL
POTASSIUM SERPL-SCNC: 4.2 MMOL/L
PROT SERPL-MCNC: 7.5 G/DL
SODIUM SERPL-SCNC: 138 MMOL/L
TRIGL SERPL-MCNC: 84 MG/DL
TSH SERPL DL<=0.005 MIU/L-ACNC: 0.67 UIU/ML

## 2018-10-08 PROCEDURE — 3078F DIAST BP <80 MM HG: CPT | Mod: CPTII,S$GLB,, | Performed by: INTERNAL MEDICINE

## 2018-10-08 PROCEDURE — 36415 COLL VENOUS BLD VENIPUNCTURE: CPT

## 2018-10-08 PROCEDURE — 3074F SYST BP LT 130 MM HG: CPT | Mod: CPTII,S$GLB,, | Performed by: INTERNAL MEDICINE

## 2018-10-08 PROCEDURE — 80053 COMPREHEN METABOLIC PANEL: CPT

## 2018-10-08 PROCEDURE — 99999 PR PBB SHADOW E&M-EST. PATIENT-LVL III: CPT | Mod: PBBFAC,,, | Performed by: INTERNAL MEDICINE

## 2018-10-08 PROCEDURE — 84443 ASSAY THYROID STIM HORMONE: CPT

## 2018-10-08 PROCEDURE — 80061 LIPID PANEL: CPT

## 2018-10-08 PROCEDURE — 3008F BODY MASS INDEX DOCD: CPT | Mod: CPTII,S$GLB,, | Performed by: INTERNAL MEDICINE

## 2018-10-08 PROCEDURE — 99214 OFFICE O/P EST MOD 30 MIN: CPT | Mod: S$GLB,,, | Performed by: INTERNAL MEDICINE

## 2018-10-08 RX ORDER — CITALOPRAM 10 MG/1
TABLET ORAL
COMMUNITY
End: 2019-06-11

## 2018-10-08 NOTE — PROGRESS NOTES
"Subjective:       Patient ID: Rehana Polanco is a 63 y.o. female.    Chief Complaint: Follow-up    HPI   Hypercalcemia-->parathryroidectomy-->hypothyroidism. On synthroid 88mcg daily.   TSH and FT4 WNL 3/2018.    Anxiety/depression - on celexa 20mg daily.   Went back to the gym 3 days a week since May.  Trying to watch what she's eating - 1500 calories. Doesn't feel like clothes fit differently. Walking about 8000 steps a day.  Helped w/ energy but not weight loss.      HTN - triamterene-HCTZ 37.5-25mg daily, lisinopril 5mg daily.    HLD - pravastatin 20mg daily.    Review of Systems   Constitutional: Negative for chills and fever.        Occasional hot flashes.    HENT: Negative.    Eyes: Negative for visual disturbance.   Respiratory: Negative.    Cardiovascular: Negative.    Gastrointestinal: Negative.    Endocrine: Negative for cold intolerance and heat intolerance.   Genitourinary: Negative.    Musculoskeletal: Negative for arthralgias and myalgias.   Neurological: Negative.    Psychiatric/Behavioral: Negative for dysphoric mood. The patient is not nervous/anxious.        Objective:      Physical Exam    /64 (BP Location: Left arm, Patient Position: Sitting, BP Method: Medium (Manual))   Pulse 74   Temp 98.3 °F (36.8 °C)   Ht 5' 1" (1.549 m)   Wt 59.6 kg (131 lb 6.4 oz)   LMP  (LMP Unknown)   BMI 24.83 kg/m²     GEN - A+OX4, NAD   HEENT - PERRL, EOMI, OP clear. MMM.   Neck - No thyromegaly or cervical LAD. No thyroid masses felt.  CV - RRR, no m/r   Chest - CTAB, no wheezing or rhonchi  Abd - S/NT/ND/+BS.   Ext - 2+BDP and radial pulses. No LE edema.   Neuro - 5/5 BUE and BLE strength. Sensation to light touch intact throughout. 3+ DTRs. Normal gait.   MSK - No spinal tenderness to palpation. Normal gait.   Skin - No rash.    Labs reviewed.    Assessment/Plan     Rehana was seen today for follow-up.    Diagnoses and all orders for this visit:    Other hyperlipidemia  -     Comprehensive " metabolic panel; Future  -     Lipid panel; Future    Postoperative hypothyroidism - cont synthroid (brand name).  -     TSH; Future    Essential hypertension - Stable and controlled. Continue current medications.  -     TSH; Future    Anxiety - controlled on current meds.  -     TSH; Future      Follow-up in about 6 months (around 4/8/2019).      Bernie Valverde MD  Department of Internal Medicine - [facility]  6:48 AM

## 2018-12-05 ENCOUNTER — OFFICE VISIT (OUTPATIENT)
Dept: INTERNAL MEDICINE | Facility: CLINIC | Age: 63
End: 2018-12-05
Payer: COMMERCIAL

## 2018-12-05 VITALS
OXYGEN SATURATION: 98 % | DIASTOLIC BLOOD PRESSURE: 68 MMHG | WEIGHT: 134.25 LBS | BODY MASS INDEX: 25.34 KG/M2 | SYSTOLIC BLOOD PRESSURE: 128 MMHG | HEIGHT: 61 IN | HEART RATE: 104 BPM

## 2018-12-05 DIAGNOSIS — R09.81 SINUS CONGESTION: ICD-10-CM

## 2018-12-05 DIAGNOSIS — R05.9 COUGH: Primary | ICD-10-CM

## 2018-12-05 PROCEDURE — 99999 PR PBB SHADOW E&M-EST. PATIENT-LVL III: CPT | Mod: PBBFAC,,, | Performed by: PHYSICIAN ASSISTANT

## 2018-12-05 PROCEDURE — 3074F SYST BP LT 130 MM HG: CPT | Mod: CPTII,S$GLB,, | Performed by: PHYSICIAN ASSISTANT

## 2018-12-05 PROCEDURE — 3078F DIAST BP <80 MM HG: CPT | Mod: CPTII,S$GLB,, | Performed by: PHYSICIAN ASSISTANT

## 2018-12-05 PROCEDURE — 99213 OFFICE O/P EST LOW 20 MIN: CPT | Mod: S$GLB,,, | Performed by: PHYSICIAN ASSISTANT

## 2018-12-05 PROCEDURE — 3008F BODY MASS INDEX DOCD: CPT | Mod: CPTII,S$GLB,, | Performed by: PHYSICIAN ASSISTANT

## 2018-12-05 RX ORDER — BENZONATATE 100 MG/1
100 CAPSULE ORAL 3 TIMES DAILY PRN
Qty: 20 CAPSULE | Refills: 0 | Status: SHIPPED | OUTPATIENT
Start: 2018-12-05 | End: 2018-12-15

## 2018-12-05 RX ORDER — ALBUTEROL SULFATE 90 UG/1
2 AEROSOL, METERED RESPIRATORY (INHALATION) EVERY 4 HOURS PRN
Qty: 1 INHALER | Refills: 1 | Status: SHIPPED | OUTPATIENT
Start: 2018-12-05 | End: 2019-01-03 | Stop reason: SDUPTHER

## 2018-12-05 RX ORDER — AMOXICILLIN AND CLAVULANATE POTASSIUM 875; 125 MG/1; MG/1
1 TABLET, FILM COATED ORAL EVERY 12 HOURS
Qty: 20 TABLET | Refills: 0 | Status: SHIPPED | OUTPATIENT
Start: 2018-12-05 | End: 2018-12-15

## 2018-12-05 NOTE — PROGRESS NOTES
"Subjective:       Patient ID: Rehana Polanco is a 63 y.o. female.    Chief Complaint: URI and Sore Throat    HPI     C/o sinus congestion, cough, scratchy throat, pnd and sinus pressure for the past 3 weeks. Seen outside ENT started on Flonase which has helped. Notes hx of sinus surgery. Denies any fevers, cp, sob or wheezing. She is a former smoker. She also request of refill of albuterol that she typically uses with significant relief from bronchitis.     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    Hypertension     Hypothyroidism     hypothyroidism    Insomnia     previously on restoril    Vertigo      Review of patient's allergies indicates:  No Known Allergies    Social History     Tobacco Use    Smoking status: Former Smoker     Packs/day: 0.50     Years: 10.00     Pack years: 5.00     Last attempt to quit: 3/17/1989     Years since quittin.7    Smokeless tobacco: Never Used   Substance Use Topics    Alcohol use: Yes     Alcohol/week: 4.8 oz     Types: 8 Glasses of wine per week     Comment: soc    Drug use: No         Review of Systems   Constitutional: Negative for chills, diaphoresis, fatigue and fever.   HENT: Positive for congestion, postnasal drip, sinus pressure and sinus pain. Negative for ear discharge.    Respiratory: Positive for cough. Negative for shortness of breath and wheezing.    Cardiovascular: Negative for chest pain and leg swelling.   Gastrointestinal: Negative for abdominal pain, nausea and vomiting.   Genitourinary: Negative for dysuria and hematuria.   Musculoskeletal: Negative for arthralgias.   Skin: Negative for rash.   Neurological: Negative for weakness and headaches.       Objective:  /68   Pulse 104   Ht 5' 1" (1.549 m)   Wt 60.9 kg (134 lb 4.2 oz)   LMP  " (LMP Unknown)   SpO2 98%   BMI 25.37 kg/m²         Physical Exam   Constitutional: She is oriented to person, place, and time. She appears well-developed and well-nourished. No distress.   HENT:   Head: Normocephalic and atraumatic.   Right Ear: Tympanic membrane, external ear and ear canal normal.   Left Ear: Tympanic membrane, external ear and ear canal normal.   Nose: Mucosal edema present. Left sinus exhibits maxillary sinus tenderness.   Mouth/Throat: Oropharynx is clear and moist and mucous membranes are normal. No posterior oropharyngeal edema or posterior oropharyngeal erythema.   Eyes: Pupils are equal, round, and reactive to light.   Cardiovascular: Normal rate and regular rhythm. Exam reveals no friction rub.   No murmur heard.  Pulmonary/Chest: Effort normal and breath sounds normal. She has no wheezes. She has no rales.   Abdominal: Soft. Bowel sounds are normal. There is no tenderness.   Musculoskeletal: Normal range of motion.   Neurological: She is alert and oriented to person, place, and time.   Skin: Skin is warm and dry. Capillary refill takes less than 2 seconds. No rash noted. She is not diaphoretic.   Psychiatric: She has a normal mood and affect.   Vitals reviewed.      Assessment:       1. Cough    2. Sinus congestion        Plan:         Rehana was seen today for uri and sore throat.    Diagnoses and all orders for this visit:    Cough  -     benzonatate (TESSALON) 100 MG capsule; Take 1 capsule (100 mg total) by mouth 3 (three) times daily as needed.  -     albuterol (PROVENTIL/VENTOLIN HFA) 90 mcg/actuation inhaler; Inhale 2 puffs into the lungs every 4 (four) hours as needed for Wheezing. Use with spacer    Sinus congestion  -     amoxicillin-clavulanate 875-125mg (AUGMENTIN) 875-125 mg per tablet; Take 1 tablet by mouth every 12 (twelve) hours. for 10 days      -ADD OTC histamine  -Continue Flonase  -Meds as above as well for symptomatic relief  -Will treat with anbx given duration  of symptoms an hx of sinus disease  -RTC if symptoms worsen or fail to improve and PCP f/u prn    Anna Blanco PA-C

## 2019-01-03 DIAGNOSIS — R05.9 COUGH: ICD-10-CM

## 2019-01-03 RX ORDER — ALBUTEROL SULFATE 90 UG/1
AEROSOL, METERED RESPIRATORY (INHALATION)
Qty: 18 G | Refills: 0 | Status: SHIPPED | OUTPATIENT
Start: 2019-01-03 | End: 2020-01-16

## 2019-02-12 ENCOUNTER — PATIENT MESSAGE (OUTPATIENT)
Dept: INTERNAL MEDICINE | Facility: CLINIC | Age: 64
End: 2019-02-12

## 2019-02-12 DIAGNOSIS — Z12.31 ENCOUNTER FOR SCREENING MAMMOGRAM FOR BREAST CANCER: Primary | ICD-10-CM

## 2019-02-14 ENCOUNTER — HOSPITAL ENCOUNTER (OUTPATIENT)
Dept: RADIOLOGY | Facility: HOSPITAL | Age: 64
Discharge: HOME | End: 2019-02-14
Attending: INTERNAL MEDICINE
Payer: COMMERCIAL

## 2019-02-14 DIAGNOSIS — Z12.31 ENCOUNTER FOR SCREENING MAMMOGRAM FOR BREAST CANCER: ICD-10-CM

## 2019-02-14 PROCEDURE — 77067 SCR MAMMO BI INCL CAD: CPT | Mod: TC

## 2019-02-14 PROCEDURE — 77067 SCR MAMMO BI INCL CAD: CPT | Mod: 26,,, | Performed by: RADIOLOGY

## 2019-02-14 PROCEDURE — 77067 MAMMO DIGITAL SCREENING BILAT WITH CAD: ICD-10-PCS | Mod: 26,,, | Performed by: RADIOLOGY

## 2019-03-01 ENCOUNTER — OFFICE VISIT (OUTPATIENT)
Dept: DERMATOLOGY | Facility: CLINIC | Age: 64
End: 2019-03-01
Payer: COMMERCIAL

## 2019-03-01 DIAGNOSIS — Z12.83 SKIN CANCER SCREENING: ICD-10-CM

## 2019-03-01 DIAGNOSIS — D18.00 ANGIOMA: ICD-10-CM

## 2019-03-01 DIAGNOSIS — L81.4 LENTIGINES: ICD-10-CM

## 2019-03-01 DIAGNOSIS — L57.0 AK (ACTINIC KERATOSIS): Primary | ICD-10-CM

## 2019-03-01 DIAGNOSIS — L82.1 SK (SEBORRHEIC KERATOSIS): ICD-10-CM

## 2019-03-01 PROCEDURE — 99999 PR PBB SHADOW E&M-EST. PATIENT-LVL II: ICD-10-PCS | Mod: PBBFAC,,, | Performed by: DERMATOLOGY

## 2019-03-01 PROCEDURE — 99213 OFFICE O/P EST LOW 20 MIN: CPT | Mod: 25,S$GLB,, | Performed by: DERMATOLOGY

## 2019-03-01 PROCEDURE — 99213 PR OFFICE/OUTPT VISIT, EST, LEVL III, 20-29 MIN: ICD-10-PCS | Mod: 25,S$GLB,, | Performed by: DERMATOLOGY

## 2019-03-01 PROCEDURE — 99999 PR PBB SHADOW E&M-EST. PATIENT-LVL II: CPT | Mod: PBBFAC,,, | Performed by: DERMATOLOGY

## 2019-03-01 PROCEDURE — 17000 PR DESTRUCTION(LASER SURGERY,CRYOSURGERY,CHEMOSURGERY),PREMALIGNANT LESIONS,FIRST LESION: ICD-10-PCS | Mod: S$GLB,,, | Performed by: DERMATOLOGY

## 2019-03-01 PROCEDURE — 17000 DESTRUCT PREMALG LESION: CPT | Mod: S$GLB,,, | Performed by: DERMATOLOGY

## 2019-03-01 NOTE — PROGRESS NOTES
Subjective:       Patient ID:  Rehana Polanco is a 63 y.o. female who presents for   Chief Complaint   Patient presents with    Skin Check     ubse     Spot  - Initial  Affected locations: left hand and right arm  Duration: 6 months  Signs / symptoms: scaling, rough and itching  Aggravated by: nothing  Relieving factors/Treatments tried: nothing      Pt had a BCC excised from her upper lip via Mohs in the '90's.  Interested in upper body skin check today.    Review of Systems   Constitutional: Positive for night sweats. Negative for fever, chills, weight loss, weight gain, fatigue and malaise.   Skin: Positive for daily sunscreen use and activity-related sunscreen use. Negative for recent sunburn.   Hematologic/Lymphatic: Does not bruise/bleed easily.        Objective:    Physical Exam   Constitutional: She appears well-developed and well-nourished. No distress.   Neurological: She is alert and oriented to person, place, and time. She is not disoriented.   Psychiatric: She has a normal mood and affect.   Skin:   Areas Examined (abnormalities noted in diagram):   Head / Face Inspection Performed  Neck Inspection Performed  Chest / Axilla Inspection Performed  Abdomen Inspection Performed  Back Inspection Performed  RUE Inspected  LUE Inspection Performed                       Diagram Legend     Erythematous scaling macule/papule c/w actinic keratosis       Vascular papule c/w angioma      Pigmented verrucoid papule/plaque c/w seborrheic keratosis      Yellow umbilicated papule c/w sebaceous hyperplasia      Irregularly shaped tan macule c/w lentigo     1-2 mm smooth white papules consistent with Milia      Movable subcutaneous cyst with punctum c/w epidermal inclusion cyst      Subcutaneous movable cyst c/w pilar cyst      Firm pink to brown papule c/w dermatofibroma      Pedunculated fleshy papule(s) c/w skin tag(s)      Evenly pigmented macule c/w junctional nevus     Mildly variegated pigmented, slightly  irregular-bordered macule c/w mildly atypical nevus      Flesh colored to evenly pigmented papule c/w intradermal nevus       Pink pearly papule/plaque c/w basal cell carcinoma      Erythematous hyperkeratotic cursted plaque c/w SCC      Surgical scar with no sign of skin cancer recurrence      Open and closed comedones      Inflammatory papules and pustules      Verrucoid papule consistent consistent with wart     Erythematous eczematous patches and plaques     Dystrophic onycholytic nail with subungual debris c/w onychomycosis     Umbilicated papule    Erythematous-base heme-crusted tan verrucoid plaque consistent with inflamed seborrheic keratosis     Erythematous Silvery Scaling Plaque c/w Psoriasis     See annotation      Assessment / Plan:        AK (actinic keratosis)  Cryosurgery Procedure Note    Verbal consent from the patient is obtained including, but not limited to, risk of hypopigmentation/hyperpigmentation, scar, recurrence of lesion. The patient is aware of the precancerous quality and need for treatment of these lesions. Liquid nitrogen cryosurgery is applied to the 1 actinic keratoses, as detailed in the physical exam, to produce a freeze injury. The patient is aware that blisters may form and is instructed on wound care with gentle cleansing and use of vaseline ointment to keep moist until healed. The patient is supplied a handout on cryosurgery and is instructed to call if lesions do not completely resolve.    Skin cancer screening  Upper body skin examination performed today including at least 6 points as noted in physical examination. No lesions suspicious for malignancy noted.  Patient instructed in importance of daily broad spectrum sunscreen use with spf at least 30. Sun avoidance and topical protection/protective clothing discussed.    Angioma  This is a benign vascular lesion. Reassurance given. No treatment required. Treatment of benign, asymptomatic lesions may be considered  cosmetic.    Lentigines  These are benign sun spots which should be monitored for changes. Patient instructed in importance of daily broad spectrum sunscreen use with spf at least 30. Sun avoidance and topical protection/protective clothing discussed.    SK (seborrheic keratosis)  These are benign inherited growths without a malignant potential. Reassurance given to patient. No treatment is necessary.   Treatment of benign, asymptomatic lesions may be considered cosmetic.  Warned about risk of hypo- or hyperpigmentation with treatment and risk of recurrence.    Follow-up in about 6 months (around 9/1/2019) for skin check or sooner for any concerns.

## 2019-03-01 NOTE — LETTER
March 1, 2019      Bernie Valverde MD  [address]  [facility] [address]           [facility]  [address]  [facility] [address]  Phone: [phone]  Fax: [phone]          Patient: Rehana Polanco   MR Number: [id number]   YOB: 1955   Date of Visit: 3/1/2019       Dear Dr. Bernie Valverde:    Thank you for referring Rehana Polanco to me for evaluation. Attached you will find relevant portions of my assessment and plan of care.    If you have questions, please do not hesitate to call me. I look forward to following Rehana Polanco along with you.    Sincerely,    Ary Galan MD    Enclosure  CC:  No Recipients    If you would like to receive this communication electronically, please contact [email or web address] or [phone] to request more information on [employer] Link access.    For providers and/or their staff who would like to refer a patient to [facility], please contact us through our one-stop-shop provider referral line, [facility], at [phone].    If you feel you have received this communication in error or would no longer like to receive these types of communications, please e-mail [email or web address]

## 2019-03-01 NOTE — PATIENT INSTRUCTIONS
Summer Sun Protection      The [facility] Department of Dermatology would like to remind you of the importance of sun protection all year round and particularly during the summer when the suns rays are the strongest. It has been proven that both acute and chronic sun exposure damages our cells and leads to skin cancer. Beyond skin cancer, the sun causes 90% of the symptoms of pre-mature skin aging, including wrinkles, lentigines (brown spots), and thin, easily bruised skin. Proper sun protection can help prevent these unwanted conditions.    Many patients report that the dont go in the sun. It has been shown that the average person receives 18 hours of incidental sun exposure per week during activities such as walking through parking lots, driving, or sitting next to windows. This accumulates to several bad sunburns per year!    In choosing sunscreen, you want one that protects against both UVA and UVB rays. It is recommended that you use one of SPF 30 or higher. It is important to apply the sunscreen about 20 minutes prior to sun exposure. Most sunscreens are chemical sunscreens and a reaction must take place in the skin so that they are effective. If they are applied and then you are immediately exposed to the sun or start sweating, this reaction has not had time to take place and you are therefore unprotected. Sunscreen needs to be reapplied every 2 hours if you are participating in water sports or sweating. We recommend Elta MD or Neutrogena Ultra Sheer Dry Touch SPF 55 for daily use; however there are many options and it is most important for you to find one that you will use on a consistent basis.    If you have sensitive skin, you may do best with a sunscreen that contains only physical blockers such as titanium dioxide or zinc oxide. These are typically thicker and harder to apply, however they afford very good protection. Neutrogena Sensitive Skin, Blue Lizard Sensitive Skin (pink top) or Neutrogena Pure  and Free are popular ones.     Aside from sunscreen, clothes with UV protection, wide brimmed hats, and sunglasses are other means of sun protection that we recommend.                        [facility] - DERMATOLOGY  [address]  [place] [address]  Dept: [phone]  Dept Fax: [phone]                                                                               CRYOSURGERY      Your doctor has used a method called cryosurgery to treat your skin condition. Cryosurgery refers to the use of very cold substances to treat a variety of skin conditions such as warts, pre-skin cancers, molluscum contagiosum, sun spots, and several benign growths. The substance we use in cryosurgery is liquid nitrogen and is so cold (-195 degrees Celsius) that is burns when administered.     Following treatment in the office, the skin may immediately burn and become red. You may find the area around the lesion is affected as well. It is sometimes necessary to treat not only the lesion, but a small area of the surrounding normal skin to achieve a good response.     A blister, and even a blood filled blister, may form after treatment.   This is a normal response. If the blister is painful, it is acceptable to sterilize a needle and with rubbing alcohol and gently pop the blister. It is important that you gently wash the area with soap and warm water as the blister fluid may contain wart virus if a wart was treated. Do no remove the roof of the blister.     The area treated can take anywhere from 1-3 weeks to heal. Healing time depends on the kind skin lesion treated, the location, and how aggressively the lesion was treated. It is recommended that the areas treated are covered with Vaseline or bacitracin ointment and a band-aid. If a band-aid is not practical, just ointment applied several times per day will do. Keeping these areas moist will speed the healing time.    Treatment with liquid  nitrogen can leave a scar. In dark skin, it may be a light or dark scar, in light skin it may be a white or pink scar. These will generally fade with time, but may never go away completely.     If you have any concerns after your treatment, please feel free to call the office.       [facility] [place] [address]/ [phone] FAX/ [email or web address]

## 2019-03-18 DIAGNOSIS — E03.9 HYPOTHYROIDISM: ICD-10-CM

## 2019-03-18 DIAGNOSIS — I10 ESSENTIAL HYPERTENSION: ICD-10-CM

## 2019-03-18 RX ORDER — TRIAMTERENE AND HYDROCHLOROTHIAZIDE 37.5; 25 MG/1; MG/1
1 CAPSULE ORAL EVERY MORNING
Qty: 90 CAPSULE | Refills: 3 | Status: SHIPPED | OUTPATIENT
Start: 2019-03-18 | End: 2020-01-16 | Stop reason: SDUPTHER

## 2019-03-18 RX ORDER — PRAVASTATIN SODIUM 20 MG/1
TABLET ORAL
Qty: 90 TABLET | Refills: 3 | Status: SHIPPED | OUTPATIENT
Start: 2019-03-18 | End: 2019-06-29

## 2019-03-18 RX ORDER — LEVOTHYROXINE SODIUM 88 UG/1
TABLET ORAL
Qty: 90 TABLET | Refills: 3 | Status: SHIPPED | OUTPATIENT
Start: 2019-03-18 | End: 2020-01-16

## 2019-05-28 ENCOUNTER — PATIENT OUTREACH (OUTPATIENT)
Dept: ADMINISTRATIVE | Facility: HOSPITAL | Age: 64
End: 2019-05-28

## 2019-05-28 NOTE — PROGRESS NOTES
There are no preventive care reminders to display for this patient.    Pre-visit chart check complete.

## 2019-05-30 DIAGNOSIS — F32.A DEPRESSION, UNSPECIFIED DEPRESSION TYPE: ICD-10-CM

## 2019-05-30 DIAGNOSIS — F41.9 ANXIETY: ICD-10-CM

## 2019-05-30 DIAGNOSIS — I10 ESSENTIAL HYPERTENSION: ICD-10-CM

## 2019-05-30 RX ORDER — CITALOPRAM 20 MG/1
TABLET, FILM COATED ORAL
Qty: 90 TABLET | Refills: 0 | Status: SHIPPED | OUTPATIENT
Start: 2019-05-30 | End: 2019-08-29 | Stop reason: SDUPTHER

## 2019-05-30 RX ORDER — LISINOPRIL 5 MG/1
TABLET ORAL
Qty: 90 TABLET | Refills: 0 | Status: SHIPPED | OUTPATIENT
Start: 2019-05-30 | End: 2019-06-11

## 2019-06-03 ENCOUNTER — OFFICE VISIT (OUTPATIENT)
Dept: INTERNAL MEDICINE | Facility: CLINIC | Age: 64
End: 2019-06-03
Payer: COMMERCIAL

## 2019-06-03 VITALS
SYSTOLIC BLOOD PRESSURE: 138 MMHG | HEART RATE: 85 BPM | TEMPERATURE: 98 F | OXYGEN SATURATION: 98 % | DIASTOLIC BLOOD PRESSURE: 78 MMHG | WEIGHT: 133.19 LBS | BODY MASS INDEX: 25.16 KG/M2

## 2019-06-03 DIAGNOSIS — J40 BRONCHITIS: Primary | ICD-10-CM

## 2019-06-03 PROCEDURE — 99213 PR OFFICE/OUTPT VISIT, EST, LEVL III, 20-29 MIN: ICD-10-PCS | Mod: S$GLB,,, | Performed by: PHYSICIAN ASSISTANT

## 2019-06-03 PROCEDURE — 99999 PR PBB SHADOW E&M-EST. PATIENT-LVL IV: CPT | Mod: PBBFAC,,, | Performed by: PHYSICIAN ASSISTANT

## 2019-06-03 PROCEDURE — 3008F BODY MASS INDEX DOCD: CPT | Mod: CPTII,S$GLB,, | Performed by: PHYSICIAN ASSISTANT

## 2019-06-03 PROCEDURE — 3008F PR BODY MASS INDEX (BMI) DOCUMENTED: ICD-10-PCS | Mod: CPTII,S$GLB,, | Performed by: PHYSICIAN ASSISTANT

## 2019-06-03 PROCEDURE — 3075F PR MOST RECENT SYSTOLIC BLOOD PRESS GE 130-139MM HG: ICD-10-PCS | Mod: CPTII,S$GLB,, | Performed by: PHYSICIAN ASSISTANT

## 2019-06-03 PROCEDURE — 3078F DIAST BP <80 MM HG: CPT | Mod: CPTII,S$GLB,, | Performed by: PHYSICIAN ASSISTANT

## 2019-06-03 PROCEDURE — 3078F PR MOST RECENT DIASTOLIC BLOOD PRESSURE < 80 MM HG: ICD-10-PCS | Mod: CPTII,S$GLB,, | Performed by: PHYSICIAN ASSISTANT

## 2019-06-03 PROCEDURE — 99213 OFFICE O/P EST LOW 20 MIN: CPT | Mod: S$GLB,,, | Performed by: PHYSICIAN ASSISTANT

## 2019-06-03 PROCEDURE — 99999 PR PBB SHADOW E&M-EST. PATIENT-LVL IV: ICD-10-PCS | Mod: PBBFAC,,, | Performed by: PHYSICIAN ASSISTANT

## 2019-06-03 PROCEDURE — 3075F SYST BP GE 130 - 139MM HG: CPT | Mod: CPTII,S$GLB,, | Performed by: PHYSICIAN ASSISTANT

## 2019-06-03 RX ORDER — BENZONATATE 200 MG/1
200 CAPSULE ORAL 2 TIMES DAILY PRN
Qty: 20 CAPSULE | Refills: 0 | Status: SHIPPED | OUTPATIENT
Start: 2019-06-03 | End: 2019-06-10

## 2019-06-03 RX ORDER — PROMETHAZINE HYDROCHLORIDE AND DEXTROMETHORPHAN HYDROBROMIDE 6.25; 15 MG/5ML; MG/5ML
5 SYRUP ORAL
Qty: 118 ML | Refills: 0 | Status: SHIPPED | OUTPATIENT
Start: 2019-06-03 | End: 2019-06-11

## 2019-06-03 RX ORDER — PREDNISONE 20 MG/1
40 TABLET ORAL DAILY
Qty: 10 TABLET | Refills: 0 | Status: SHIPPED | OUTPATIENT
Start: 2019-06-03 | End: 2019-06-08

## 2019-06-03 NOTE — PROGRESS NOTES
Subjective:       Patient ID: Rehana Polanco is a 63 y.o. female.    Chief Complaint: Cough and Nasal Congestion    HPI     Established pt of Bernie Valverde MD     C/o cough (dry), congestion, pnd, clear nasal drainage. Onset about 5 days ago. No fevers, cp, or sob. Using tessalon and albuterol prn at home with mild relief.     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   HENT: Positive for congestion, postnasal drip and rhinorrhea. Negative for ear pain, sinus pressure, sinus pain and sore throat.    Respiratory: Positive for cough. Negative for shortness of breath.    Cardiovascular: Negative for chest pain and leg swelling.   Gastrointestinal: Negative for diarrhea, nausea and vomiting.   Skin: Negative for rash.   Neurological: Negative for weakness and headaches.       Objective: /78   Pulse 85   Temp 98.4 °F (36.9 °C)   Wt 60.4 kg (133 lb 2.5 oz)   LMP  (LMP Unknown)   SpO2 98%   BMI 25.16 kg/m²         Physical Exam   Constitutional: She appears well-developed and well-nourished. No distress.   HENT:   Head: Normocephalic and atraumatic.   Right Ear:  Tympanic membrane, external ear and ear canal normal.   Left Ear: Tympanic membrane, external ear and ear canal normal.   Nose: Right sinus exhibits no maxillary sinus tenderness and no frontal sinus tenderness. Left sinus exhibits no maxillary sinus tenderness and no frontal sinus tenderness.   Mouth/Throat: Oropharynx is clear and moist and mucous membranes are normal. No oropharyngeal exudate or posterior oropharyngeal erythema.   Cardiovascular: Normal rate and regular rhythm. Exam reveals no friction rub.   No murmur heard.  Pulmonary/Chest: Effort normal and breath sounds normal. She has no wheezes. She has no rales.   Abdominal: Soft. Bowel sounds are normal. There is no tenderness.   Lymphadenopathy:     She has no cervical adenopathy.   Neurological: She is alert.   Skin: Skin is warm and dry. No rash noted.   Vitals reviewed.      Assessment:       1. Bronchitis        Plan:         Rehana was seen today for cough and nasal congestion.    Diagnoses and all orders for this visit:    Bronchitis  -     predniSONE (DELTASONE) 20 MG tablet; Take 2 tablets (40 mg total) by mouth once daily. for 5 days  -     benzonatate (TESSALON) 200 MG capsule; Take 1 capsule (200 mg total) by mouth 2 (two) times daily as needed for Cough.  -     promethazine-dextromethorphan (PROMETHAZINE-DM) 6.25-15 mg/5 mL Syrp; Take 5 mLs by mouth every 6 to 8 hours as needed.  -continue Flonase, OTC antihistamine and albuterol prn  -[facility] if symptoms worsen or fall to improve      Future Appointments   Date Time Provider Department Center   6/11/2019  7:00 AM Bernie Valverde MD [facility] Bl[facility] PA-C

## 2019-06-03 NOTE — PATIENT INSTRUCTIONS
Acute Bronchitis  Your healthcare provider has told you that you have acute bronchitis. Bronchitis is infection or inflammation of the bronchial tubes (airways in the lungs). Normally, air moves easily in and out of the airways. Bronchitis narrows the airways, making it harder for air to flow in and out of the lungs. This causes symptoms such as shortness of breath, coughing up yellow or green mucus, and wheezing. Bronchitis can be acute or chronic. Acute means the condition comes on quickly and goes away in a short time, usually within 3 to 10 days. Chronic means a condition lasts a long time and often comes back.    What causes acute bronchitis?  Acute bronchitis almost always starts as a viral respiratory infection, such as a cold or the flu. Certain factors make it more likely for a cold or flu to turn into bronchitis. These include being very young, being elderly, having a heart or lung problem, or having a weak immune system. Cigarette smoking also makes bronchitis more likely.  When bronchitis develops, the airways become swollen. The airways may also become infected with bacteria. This is known as a secondary infection.  Diagnosing acute bronchitis  Your healthcare provider will examine you and ask about your symptoms and health history. You may also have a sputum culture to test the fluid in your lungs. Chest X-rays may be done to look for infection in the lungs.  Treating acute bronchitis  Bronchitis usually clears up as the cold or flu goes away. You can help feel better faster by doing the following:  · Take medicine as directed. You may be told to take ibuprofen or other over-the-counter medicines. These help relieve inflammation in your bronchial tubes. Your healthcare provider may prescribe an inhaler to help open up the bronchial tubes. Most of the time, acute bronchitis is caused by a viral infection. Antibiotics are usually not prescribed for viral infections.  · Drink plenty of fluids, such as  water, juice, or warm soup. Fluids loosen mucus so that you can cough it up. This helps you breathe more easily. Fluids also prevent dehydration.  · Make sure you get plenty of rest.  · Do not smoke. Do not allow anyone else to smoke in your home.  Recovery and follow-up  Follow up with your doctor as you are told. You will likely feel better in a week or two. But a dry cough can linger beyond that time. Let your doctor know if you still have symptoms (other than a dry cough) after 2 weeks, or if youre prone to getting bronchial infections. Take steps to protect yourself from future infections. These steps include stopping smoking and avoiding tobacco smoke, washing your hands often, and getting a yearly flu shot.  When to call your healthcare provider  Call the healthcare provider if you have any of the following:  · Fever of 100.4°F (38.0°C) or higher, or as advised  · Symptoms that get worse, or new symptoms  · Trouble breathing  · Symptoms that dont start to improve within a week, or within 3 days of taking antibiotics   Date Last Reviewed: 12/1/2016  © 7400-3218 [employer]. [address] [place] [address], [place] [address]. All rights reserved. This information is not intended as a substitute for professional medical care. Always follow your healthcare professional's instructions.

## 2019-06-11 ENCOUNTER — OFFICE VISIT (OUTPATIENT)
Dept: INTERNAL MEDICINE | Facility: CLINIC | Age: 64
End: 2019-06-11
Payer: COMMERCIAL

## 2019-06-11 VITALS
SYSTOLIC BLOOD PRESSURE: 124 MMHG | HEART RATE: 78 BPM | TEMPERATURE: 99 F | BODY MASS INDEX: 25.48 KG/M2 | WEIGHT: 134.94 LBS | HEIGHT: 61 IN | DIASTOLIC BLOOD PRESSURE: 66 MMHG

## 2019-06-11 DIAGNOSIS — E89.0 POSTOPERATIVE HYPOTHYROIDISM: ICD-10-CM

## 2019-06-11 DIAGNOSIS — Z00.00 ANNUAL PHYSICAL EXAM: Primary | ICD-10-CM

## 2019-06-11 DIAGNOSIS — I10 ESSENTIAL HYPERTENSION: ICD-10-CM

## 2019-06-11 DIAGNOSIS — F32.A DEPRESSION, UNSPECIFIED DEPRESSION TYPE: ICD-10-CM

## 2019-06-11 DIAGNOSIS — F41.9 ANXIETY: ICD-10-CM

## 2019-06-11 DIAGNOSIS — E78.49 OTHER HYPERLIPIDEMIA: ICD-10-CM

## 2019-06-11 PROCEDURE — 3078F DIAST BP <80 MM HG: CPT | Mod: CPTII,S$GLB,, | Performed by: INTERNAL MEDICINE

## 2019-06-11 PROCEDURE — 3074F SYST BP LT 130 MM HG: CPT | Mod: CPTII,S$GLB,, | Performed by: INTERNAL MEDICINE

## 2019-06-11 PROCEDURE — 99396 PR PREVENTIVE VISIT,EST,40-64: ICD-10-PCS | Mod: S$GLB,,, | Performed by: INTERNAL MEDICINE

## 2019-06-11 PROCEDURE — 99999 PR PBB SHADOW E&M-EST. PATIENT-LVL III: CPT | Mod: PBBFAC,,, | Performed by: INTERNAL MEDICINE

## 2019-06-11 PROCEDURE — 3078F PR MOST RECENT DIASTOLIC BLOOD PRESSURE < 80 MM HG: ICD-10-PCS | Mod: CPTII,S$GLB,, | Performed by: INTERNAL MEDICINE

## 2019-06-11 PROCEDURE — 99396 PREV VISIT EST AGE 40-64: CPT | Mod: S$GLB,,, | Performed by: INTERNAL MEDICINE

## 2019-06-11 PROCEDURE — 3074F PR MOST RECENT SYSTOLIC BLOOD PRESSURE < 130 MM HG: ICD-10-PCS | Mod: CPTII,S$GLB,, | Performed by: INTERNAL MEDICINE

## 2019-06-11 PROCEDURE — 99999 PR PBB SHADOW E&M-EST. PATIENT-LVL III: ICD-10-PCS | Mod: PBBFAC,,, | Performed by: INTERNAL MEDICINE

## 2019-06-11 RX ORDER — CANDESARTAN 8 MG/1
8 TABLET ORAL DAILY
Qty: 90 TABLET | Refills: 3 | Status: SHIPPED | OUTPATIENT
Start: 2019-06-11 | End: 2020-01-16 | Stop reason: SDUPTHER

## 2019-06-11 NOTE — PROGRESS NOTES
INTERNAL MEDICINE ANNUAL VISIT NOTE      CHIEF COMPLAINT     ANNUAL    HPI     Rehana Polanco is a 63 y.o. C female who presents for her annual.    Had bronchitis and seen in UC last week. Given prednisone, tessalon, promethazine-DM. Feeling better but still coughing. Hasn't had to use inhaler in the last few days or the cough syrup. No fever/chill.    Anxiety/depression - celexa 20mg daily.     HTN - lisinopril 5mg daily, triamterene-HCTZ 37.5-25mg daily    HLD - pravastatin 20mg daily.    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    Hypertension     Hypothyroidism     hypothyroidism    Insomnia     previously on restoril    Vertigo        Past Surgical History:  Past Surgical History:   Procedure Laterality Date    BREAST BIOPSY      LYMPH NODE BIOPSY      NASAL SINUS SURGERY      PARATHYROIDECTOMY      TUBAL LIGATION         Allergies:  Review of patient's allergies indicates:  No Known Allergies    MEDS reviewed    Family History:  Family History   Problem Relation Age of Onset    Heart disease Mother 67        MI    Depression Mother     Heart disease Father 56        CAD    Hypertension Father     No Known Problems Sister     Hyperlipidemia Sister     Hypertension Sister     Melanoma Neg Hx        Social History:  Social History     Tobacco Use    Smoking status: Former Smoker     Packs/day: 0.50     Years: 10.00     Pack years: 5.00     Last attempt to quit: 3/17/1989     Years since quittin.2    Smokeless tobacco: Never Used   Substance Use Topics    Alcohol use: Yes     Alcohol/week: 4.8 oz     Types: 8 Glasses of wine per week     Comment: soc    Drug use: No       Review of  "Systems:  Review of Systems   Constitutional: Negative for activity change and unexpected weight change.   HENT: Positive for rhinorrhea. Negative for hearing loss and trouble swallowing.    Eyes: Negative for discharge and visual disturbance.   Respiratory: Negative for chest tightness and wheezing.    Cardiovascular: Negative for chest pain and palpitations.   Gastrointestinal: Negative for blood in stool, constipation, diarrhea and vomiting.   Endocrine: Negative for polydipsia and polyuria.   Genitourinary: Negative for difficulty urinating, dysuria, hematuria and menstrual problem.   Musculoskeletal: Positive for arthralgias. Negative for joint swelling and neck pain.   Neurological: Negative for weakness and headaches.   Psychiatric/Behavioral: Negative for confusion and dysphoric mood.       Health Maintainence:   Td 3/31/17  Flu - 9/12/18  Pap 1/16/17  Hep C screening 7/8/05  Zostavax 11/29/16  MMG - 2/14/19  Cscope 2010  Shingles - will get from pharmacy.    PHYSICAL EXAM     /66 (BP Location: Left arm, Patient Position: Sitting, BP Method: Medium (Manual))   Pulse 78   Temp 98.6 °F (37 °C)   Ht 5' 1" (1.549 m)   Wt 61.2 kg (134 lb 14.7 oz)   LMP  (LMP Unknown)   BMI 25.49 kg/m²     GEN - A+OX4, NAD   HEENT - PERRL, EOMI, OP clear. MMM.   Neck - No thyromegaly or cervical LAD. No thyroid masses felt.  CV - RRR, no m/r   Chest - CTAB, no wheezing or rhonchi  Abd - S/NT/ND/+BS.   Ext - 2+BDP and radial pulses. No LE edema.   Neuro - PERRL, EOMI, no nystagmus, eyebrow raise, facial sensation, hearing, m of mastication, smile, palatal raise, shoulder shrug, tongue protrusion symmetric and intact. 5/5 BUE and BLE strength. Sensation to light touch intact throughout. 2+ DTRs. Normal gait.   MSK - No spinal tenderness to palpation. Normal gait.   Skin - No rash.    LABS     Previous labs reviewed.    ASSESSMENT/PLAN     Rehana Polanco is a 63 y.o. female with  Rehana was seen today for annual " exam.    Diagnoses and all orders for this visit:    Annual physical exam  -     CBC auto differential; Future; Expected date: 06/11/2019  -     Comprehensive metabolic panel; Future; Expected date: 06/11/2019  -     Hemoglobin A1c; Future; Expected date: 06/11/2019  -     Lipid panel; Future; Expected date: 06/11/2019  -     TSH; Future; Expected date: 06/11/2019  -     T4, free; Future; Expected date: 06/11/2019    Essential hypertension - stop lisinopril. Start candesartan. Pt to check BP daily (works at [employer]) and send log w/ BP readings.  -     Comprehensive metabolic panel; Future; Expected date: 06/11/2019  -     candesartan (ATACAND) 8 MG tablet; Take 1 tablet (8 mg total) by mouth once daily.    Other hyperlipidemia - cont pravastatin.   -     Comprehensive metabolic panel; Future; Expected date: 06/11/2019  -     Lipid panel; Future; Expected date: 06/11/2019    Postoperative hypothyroidism - cont synthroid 88mcg daily.   -     TSH; Future; Expected date: 06/11/2019  -     T4, free; Future; Expected date: 06/11/2019    Anxiety/Depression - doing well on celexa.     Bronchitis resolving.   shingrix at outside pharmacy.   Labs in 2 weeks. Just finished prednisone on Friday.     RTC in 12 months, sooner if needed and depending on labs.    Bernie Valverde MD  Department of Internal Medicine - [facility] Sudheer Dany  7:01 AM

## 2019-06-26 ENCOUNTER — LAB VISIT (OUTPATIENT)
Dept: LAB | Facility: HOSPITAL | Age: 64
End: 2019-06-26
Attending: INTERNAL MEDICINE
Payer: COMMERCIAL

## 2019-06-26 DIAGNOSIS — I10 ESSENTIAL HYPERTENSION: ICD-10-CM

## 2019-06-26 DIAGNOSIS — Z00.00 ANNUAL PHYSICAL EXAM: ICD-10-CM

## 2019-06-26 DIAGNOSIS — E89.0 POSTOPERATIVE HYPOTHYROIDISM: ICD-10-CM

## 2019-06-26 DIAGNOSIS — E78.49 OTHER HYPERLIPIDEMIA: ICD-10-CM

## 2019-06-26 LAB
ALBUMIN SERPL BCP-MCNC: 4.3 G/DL (ref 3.5–5.2)
ALP SERPL-CCNC: 111 U/L (ref 55–135)
ALT SERPL W/O P-5'-P-CCNC: 20 U/L (ref 10–44)
ANION GAP SERPL CALC-SCNC: 11 MMOL/L (ref 8–16)
AST SERPL-CCNC: 23 U/L (ref 10–40)
BASOPHILS # BLD AUTO: 0.03 K/UL (ref 0–0.2)
BASOPHILS NFR BLD: 0.8 % (ref 0–1.9)
BILIRUB SERPL-MCNC: 0.8 MG/DL (ref 0.1–1)
BUN SERPL-MCNC: 14 MG/DL (ref 8–23)
CALCIUM SERPL-MCNC: 10.1 MG/DL (ref 8.7–10.5)
CHLORIDE SERPL-SCNC: 101 MMOL/L (ref 95–110)
CHOLEST SERPL-MCNC: 226 MG/DL (ref 120–199)
CHOLEST/HDLC SERPL: 3.2 {RATIO} (ref 2–5)
CO2 SERPL-SCNC: 26 MMOL/L (ref 23–29)
CREAT SERPL-MCNC: 0.7 MG/DL (ref 0.5–1.4)
DIFFERENTIAL METHOD: ABNORMAL
EOSINOPHIL # BLD AUTO: 0.1 K/UL (ref 0–0.5)
EOSINOPHIL NFR BLD: 2.8 % (ref 0–8)
ERYTHROCYTE [DISTWIDTH] IN BLOOD BY AUTOMATED COUNT: 13.7 % (ref 11.5–14.5)
EST. GFR  (AFRICAN AMERICAN): >60 ML/MIN/1.73 M^2
EST. GFR  (NON AFRICAN AMERICAN): >60 ML/MIN/1.73 M^2
ESTIMATED AVG GLUCOSE: 114 MG/DL (ref 68–131)
GLUCOSE SERPL-MCNC: 104 MG/DL (ref 70–110)
HBA1C MFR BLD HPLC: 5.6 % (ref 4–5.6)
HCT VFR BLD AUTO: 40.1 % (ref 37–48.5)
HDLC SERPL-MCNC: 71 MG/DL (ref 40–75)
HDLC SERPL: 31.4 % (ref 20–50)
HGB BLD-MCNC: 12.6 G/DL (ref 12–16)
LDLC SERPL CALC-MCNC: 127.4 MG/DL (ref 63–159)
LYMPHOCYTES # BLD AUTO: 1.3 K/UL (ref 1–4.8)
LYMPHOCYTES NFR BLD: 33.1 % (ref 18–48)
MCH RBC QN AUTO: 30.3 PG (ref 27–31)
MCHC RBC AUTO-ENTMCNC: 31.4 G/DL (ref 32–36)
MCV RBC AUTO: 96 FL (ref 82–98)
MONOCYTES # BLD AUTO: 0.3 K/UL (ref 0.3–1)
MONOCYTES NFR BLD: 8 % (ref 4–15)
NEUTROPHILS # BLD AUTO: 2.2 K/UL (ref 1.8–7.7)
NEUTROPHILS NFR BLD: 55.3 % (ref 38–73)
NONHDLC SERPL-MCNC: 155 MG/DL
PLATELET # BLD AUTO: 229 K/UL (ref 150–350)
PMV BLD AUTO: 11.2 FL (ref 9.2–12.9)
POTASSIUM SERPL-SCNC: 4 MMOL/L (ref 3.5–5.1)
PROT SERPL-MCNC: 7.3 G/DL (ref 6–8.4)
RBC # BLD AUTO: 4.16 M/UL (ref 4–5.4)
SODIUM SERPL-SCNC: 138 MMOL/L (ref 136–145)
T4 FREE SERPL-MCNC: 1.08 NG/DL (ref 0.71–1.51)
TRIGL SERPL-MCNC: 138 MG/DL (ref 30–150)
TSH SERPL DL<=0.005 MIU/L-ACNC: 1.97 UIU/ML (ref 0.4–4)
WBC # BLD AUTO: 3.99 K/UL (ref 3.9–12.7)

## 2019-06-26 PROCEDURE — 80061 LIPID PANEL: CPT

## 2019-06-26 PROCEDURE — 84439 ASSAY OF FREE THYROXINE: CPT

## 2019-06-26 PROCEDURE — 83036 HEMOGLOBIN GLYCOSYLATED A1C: CPT

## 2019-06-26 PROCEDURE — 85025 COMPLETE CBC W/AUTO DIFF WBC: CPT

## 2019-06-26 PROCEDURE — 36415 COLL VENOUS BLD VENIPUNCTURE: CPT

## 2019-06-26 PROCEDURE — 80053 COMPREHEN METABOLIC PANEL: CPT

## 2019-06-26 PROCEDURE — 84443 ASSAY THYROID STIM HORMONE: CPT

## 2019-06-28 ENCOUNTER — TELEPHONE (OUTPATIENT)
Dept: INTERNAL MEDICINE | Facility: CLINIC | Age: 64
End: 2019-06-28

## 2019-06-28 NOTE — TELEPHONE ENCOUNTER
Spoke with pt, notified of results. She says she does take 20mg everyday. Advised pt to take 40mg and that New RX would be sent.    Pt wants to update PCP that Candesartan seems to be working. she says her BP has been more regulated but Pulse has been a little higher then normal in 90's. (FYI)

## 2019-06-28 NOTE — TELEPHONE ENCOUNTER
Please call and notify pt:    Blood counts, liver, kidney and thyroid functions are normal.  No diabetes.   Cholesterol still high. Please confirm pravastatin 20mg daily. If not, then rec compliance. If so, rec going up to 40mg daily.

## 2019-06-29 RX ORDER — PRAVASTATIN SODIUM 40 MG/1
40 TABLET ORAL DAILY
Qty: 90 TABLET | Refills: 3 | Status: SHIPPED | OUTPATIENT
Start: 2019-06-29 | End: 2020-01-16 | Stop reason: SDUPTHER

## 2019-08-29 ENCOUNTER — TELEPHONE (OUTPATIENT)
Dept: INTERNAL MEDICINE | Facility: CLINIC | Age: 64
End: 2019-08-29

## 2019-08-29 DIAGNOSIS — F41.9 ANXIETY: ICD-10-CM

## 2019-08-29 DIAGNOSIS — I10 ESSENTIAL HYPERTENSION: ICD-10-CM

## 2019-08-29 DIAGNOSIS — F32.A DEPRESSION, UNSPECIFIED DEPRESSION TYPE: ICD-10-CM

## 2019-08-29 RX ORDER — LISINOPRIL 5 MG/1
TABLET ORAL
Qty: 90 TABLET | Refills: 0 | OUTPATIENT
Start: 2019-08-29

## 2019-08-29 RX ORDER — CITALOPRAM 20 MG/1
TABLET, FILM COATED ORAL
Qty: 90 TABLET | Refills: 0 | Status: SHIPPED | OUTPATIENT
Start: 2019-08-29 | End: 2019-11-30 | Stop reason: SDUPTHER

## 2019-08-30 NOTE — TELEPHONE ENCOUNTER
Please notify the patient that I have refilled citalopram, but I did not refill lisinopril as this medication was discontinued in the past.  She was switched to candesartan.  Please see if she needs a refill on candesartan.

## 2019-11-13 ENCOUNTER — OFFICE VISIT (OUTPATIENT)
Dept: DERMATOLOGY | Facility: CLINIC | Age: 64
End: 2019-11-13
Payer: COMMERCIAL

## 2019-11-13 DIAGNOSIS — D18.01 CHERRY ANGIOMA: ICD-10-CM

## 2019-11-13 DIAGNOSIS — L57.0 AK (ACTINIC KERATOSIS): Primary | ICD-10-CM

## 2019-11-13 PROCEDURE — 99499 UNLISTED E&M SERVICE: CPT | Mod: S$GLB,,, | Performed by: DERMATOLOGY

## 2019-11-13 PROCEDURE — 99999 PR PBB SHADOW E&M-EST. PATIENT-LVL II: ICD-10-PCS | Mod: PBBFAC,,, | Performed by: DERMATOLOGY

## 2019-11-13 PROCEDURE — 99499 NO LOS: ICD-10-PCS | Mod: S$GLB,,, | Performed by: DERMATOLOGY

## 2019-11-13 PROCEDURE — 17000 PR DESTRUCTION(LASER SURGERY,CRYOSURGERY,CHEMOSURGERY),PREMALIGNANT LESIONS,FIRST LESION: ICD-10-PCS | Mod: S$GLB,,, | Performed by: DERMATOLOGY

## 2019-11-13 PROCEDURE — 17000 DESTRUCT PREMALG LESION: CPT | Mod: S$GLB,,, | Performed by: DERMATOLOGY

## 2019-11-13 PROCEDURE — 99999 PR PBB SHADOW E&M-EST. PATIENT-LVL II: CPT | Mod: PBBFAC,,, | Performed by: DERMATOLOGY

## 2019-11-13 NOTE — PROGRESS NOTES
"  Subjective:       Patient ID:  Rehana Polanco is a 64 y.o. female who presents for   Chief Complaint   Patient presents with    Spot     Patient is here today for a "mole" check.   Pt has a history of  moderate sun exposure in the past.   Pt recalls several blistering sunburns in the past- Yes  Pt has history of tanning bed use- No  Pt has  had moles removed in the past- Yes  Pt has history of melanoma in first degree relatives-  No    Pt presents today for spot on right side of nose, bleeds at times, x 6-8 months, Tx none    Had BCC excised from upper lip via Mohs in the '90's.      Review of Systems   Constitutional: Negative for fever, chills, weight loss, weight gain, fatigue, night sweats and malaise.   Skin: Positive for daily sunscreen use, activity-related sunscreen use and wears hat. Negative for recent sunburn.   Hematologic/Lymphatic: Does not bruise/bleed easily.        Objective:    Physical Exam   Constitutional: She appears well-developed and well-nourished.   Neurological: She is alert and oriented to person, place, and time.   Psychiatric: She has a normal mood and affect.   Skin:   Areas Examined (abnormalities noted in diagram):   Head / Face Inspection Performed            Pt declined skin exam of any other areas today.    Diagram Legend     Erythematous scaling macule/papule c/w actinic keratosis       Vascular papule c/w angioma      Pigmented verrucoid papule/plaque c/w seborrheic keratosis      Yellow umbilicated papule c/w sebaceous hyperplasia      Irregularly shaped tan macule c/w lentigo     1-2 mm smooth white papules consistent with Milia      Movable subcutaneous cyst with punctum c/w epidermal inclusion cyst      Subcutaneous movable cyst c/w pilar cyst      Firm pink to brown papule c/w dermatofibroma      Pedunculated fleshy papule(s) c/w skin tag(s)      Evenly pigmented macule c/w junctional nevus     Mildly variegated pigmented, slightly irregular-bordered macule c/w " mildly atypical nevus      Flesh colored to evenly pigmented papule c/w intradermal nevus       Pink pearly papule/plaque c/w basal cell carcinoma      Erythematous hyperkeratotic cursted plaque c/w SCC      Surgical scar with no sign of skin cancer recurrence      Open and closed comedones      Inflammatory papules and pustules      Verrucoid papule consistent consistent with wart     Erythematous eczematous patches and plaques     Dystrophic onycholytic nail with subungual debris c/w onychomycosis     Umbilicated papule    Erythematous-base heme-crusted tan verrucoid plaque consistent with inflamed seborrheic keratosis     Erythematous Silvery Scaling Plaque c/w Psoriasis     See annotation      Assessment / Plan:        AK (actinic keratosis)  Cryosurgery Procedure Note    Verbal consent from the patient is obtained including, but not limited to, risk of hypopigmentation/hyperpigmentation, scar, recurrence of lesion. The patient is aware of the precancerous quality and need for treatment of these lesions. Liquid nitrogen cryosurgery is applied to the 1 actinic keratosis, as detailed in the physical exam, to produce a freeze injury. The patient is aware that blisters may form and is instructed on wound care with gentle cleansing and use of vaseline ointment to keep moist until healed. The patient is supplied a handout on cryosurgery and is instructed to call if lesions do not completely resolve.    Cherry angioma  This is a benign vascular lesion. Reassurance given. No treatment required. Treatment of benign, asymptomatic lesions may be considered cosmetic.    Follow up in about 6 months (around 5/13/2020) for skin check or sooner for any concerns.

## 2019-11-13 NOTE — PATIENT INSTRUCTIONS
CRYOSURGERY      Your doctor has used a method called cryosurgery to treat your skin condition. Cryosurgery refers to the use of very cold substances to treat a variety of skin conditions such as warts, pre-skin cancers, molluscum contagiosum, sun spots, and several benign growths. The substance we use in cryosurgery is liquid nitrogen and is so cold (-195 degrees Celsius) that is burns when administered.     Following treatment in the office, the skin may immediately burn and become red. You may find the area around the lesion is affected as well. It is sometimes necessary to treat not only the lesion, but a small area of the surrounding normal skin to achieve a good response.     A blister, and even a blood filled blister, may form after treatment.   This is a normal response. If the blister is painful, it is acceptable to sterilize a needle and with rubbing alcohol and gently pop the blister. It is important that you gently wash the area with soap and warm water as the blister fluid may contain wart virus if a wart was treated. Do no remove the roof of the blister.     The area treated can take anywhere from 1-3 weeks to heal. Healing time depends on the kind skin lesion treated, the location, and how aggressively the lesion was treated. It is recommended that the areas treated are covered with Vaseline or bacitracin ointment and a band-aid. If a band-aid is not practical, just ointment applied several times per day will do. Keeping these areas moist will speed the healing time.    Treatment with liquid nitrogen can leave a scar. In dark skin, it may be a light or dark scar, in light skin it may be a white or pink scar. These will generally fade with time, but may never go away completely.     If you have any concerns after your treatment, please feel free to call the office.       [address] [place] [address]/ [phone] FAX/ [email or web address]    Summer Sun Protection      The  [facility] would like to remind you of the importance of sun protection all year round and particularly during the summer when the suns rays are the strongest. It has been proven that both acute and chronic sun exposure damages our cells and leads to skin cancer. Beyond skin cancer, the sun causes 90% of the symptoms of pre-mature skin aging, including wrinkles, lentigines (brown spots), and thin, easily bruised skin. Proper sun protection can help prevent these unwanted conditions.    Many patients report that the dont go in the sun. It has been shown that the average person receives 18 hours of incidental sun exposure per week during activities such as walking through parking lots, driving, or sitting next to windows. This accumulates to several bad sunburns per year!    In choosing sunscreen, you want one that protects against both UVA and UVB rays. It is recommended that you use one of SPF 30 or higher. It is important to apply the sunscreen about 20 minutes prior to sun exposure. Most sunscreens are chemical sunscreens and a reaction must take place in the skin so that they are effective. If they are applied and then you are immediately exposed to the sun or start sweating, this reaction has not had time to take place and you are therefore unprotected. Sunscreen needs to be reapplied every 2 hours if you are participating in water sports or sweating. We recommend Elta MD or Neutrogena Ultra Sheer Dry Touch SPF 55 for daily use; however there are many options and it is most important for you to find one that you will use on a consistent basis.    If you have sensitive skin, you may do best with a sunscreen that contains only physical blockers such as titanium dioxide or zinc oxide. These are typically thicker and harder to apply, however they afford very good protection. Neutrogena Sensitive Skin, Blue Lizard Sensitive Skin (pink top) or Neutrogena Pure and Free are popular ones.      Aside from sunscreen, clothes with UV protection, wide brimmed hats, and sunglasses are other means of sun protection that we recommend.                        [facility]  [address]  [facility] [address]  Dept: [phone]  Dept Fax: [phone]

## 2019-11-30 DIAGNOSIS — F41.9 ANXIETY: ICD-10-CM

## 2019-11-30 DIAGNOSIS — F32.A DEPRESSION, UNSPECIFIED DEPRESSION TYPE: ICD-10-CM

## 2019-12-03 RX ORDER — CITALOPRAM 20 MG/1
TABLET, FILM COATED ORAL
Qty: 90 TABLET | Refills: 0 | Status: SHIPPED | OUTPATIENT
Start: 2019-12-03 | End: 2020-01-16 | Stop reason: SDUPTHER

## 2019-12-03 NOTE — PROGRESS NOTES
Refill Routing Note    Medication(s) are not appropriate for refill Non-participating provider      Appointments     Date Provider   Last Visit   Visit date not found Bennie Posada MD   Next Visit   Visit date not found Bennie Posada MD      Automatic Epic Protocol Generated Data  Requested Prescriptions   Pending Prescriptions Disp Refills    citalopram (CELEXA) 20 MG tablet [Pharmacy Med Name: CITALOPRAM 20MG TABLETS] 90 tablet 0     Sig: TAKE 1 TABLET BY MOUTH EVERY DAY       Psychiatry:  Antidepressants - SSRI Passed - 11/30/2019  3:15 AM        Passed - Patient is at least 18 years old        Passed - Last BP in normal range within 360 days     BP Readings from Last 3 Encounters:   06/11/19 124/66   06/03/19 138/78   12/05/18 128/68              Passed - Office visit in past 6 months or future 90 days     Recent Outpatient Visits            2 weeks ago AK (actinic keratosis)    [facility] Ary Galan MD    5 months ago Annual physical exam    [facility] Bernie Valverde MD    6 months ago Bronchitis    [facility] Anna Blanco PA-C    9 months ago AK (actinic keratosis)    [facility] Ary Galan MD    12 months ago Cough    [facility] Anna Blanco PA-C          Future Appointments              In 3 weeks MELISSA Mack - Optometry, [facility]    In 1 month MD Call[facility] - Internal Medicine, [facility]

## 2020-01-15 NOTE — PROGRESS NOTES
"Subjective:       Patient ID: Rehana Polanco is a 64 y.o. female.    Chief Complaint: Follow-up    HPI   Pt reports will be moving to [place] w/ son and DIL.     Anxiety/depression - celexa 20mg daily.   A little bit anxious about moving but reports thinks will be a good move.      HTN - triamterene-HCTZ 37.5-25mg daily  At last visit, stopped on lisinopril and start candesartan 8mg daily. BP doing well.      HLD - pravastatin 20mg daily.     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    Review of Systems  Comprehensive review of systems otherwise negative. See history/subjective section for more details.    Objective:      Physical Exam    /74 (BP Location: Left arm, Patient Position: Sitting, BP Method: Medium (Manual))   Pulse 74   Temp 98.2 °F (36.8 °C)   Ht 5' 1" (1.549 m)   Wt 60.9 kg (134 lb 4.2 oz)   LMP  (LMP Unknown)   BMI 25.37 kg/m²     GEN - A+OX4, NAD   HEENT - PERRL, EOMI, OP clear. MMM.   Neck - No thyromegaly or cervical LAD. No thyroid masses felt.  CV - RRR, no m/r   Chest - CTAB, no wheezing or rhonchi  Abd - S/NT/ND/+BS.   Ext - 2+BDP and radial pulses. No C/C/E.  Skin - No rash.    PREVIOUS LABS REVIEWED.    Assessment/Plan     Rehana was seen today for follow-up.    Diagnoses and all orders for this visit:    Essential hypertension - Stable and controlled. Continue current medications.  -     triamterene-hydrochlorothiazide 37.5-25 mg (DYAZIDE) 37.5-25 mg per capsule; Take 1 capsule by mouth every morning.  -     candesartan (ATACAND) 8 MG tablet; Take 1 tablet (8 mg total) by mouth once daily.    Anxiety  -     citalopram (CELEXA) 20 MG tablet; Take 1 tablet (20 mg total) by mouth once daily.    Depression, unspecified depression type  -     citalopram (CELEXA) 20 MG tablet; Take 1 tablet (20 mg total) by mouth once daily.    Postoperative hypothyroidism  -     levothyroxine (SYNTHROID) 88 MCG tablet; Take 1 tablet (88 mcg total) by mouth " before breakfast.  -     TSH; Future  -     T4, free; Future    Other hyperlipidemia  -     pravastatin (PRAVACHOL) 40 MG tablet; Take 1 tablet (40 mg total) by mouth once daily.  -     Lipid panel; Future  -     Comprehensive metabolic panel; Future    Encounter for FIT (fecal immunochemical test) screening  -     Fecal Immunochemical Test (iFOBT); Future      Follow up if symptoms worsen or fail to improve.      Bernie Valverde MD  Department of Internal Medicine - [facility]  2:32 PM

## 2020-01-16 ENCOUNTER — LAB VISIT (OUTPATIENT)
Dept: LAB | Facility: HOSPITAL | Age: 65
End: 2020-01-16
Attending: INTERNAL MEDICINE
Payer: COMMERCIAL

## 2020-01-16 ENCOUNTER — OFFICE VISIT (OUTPATIENT)
Dept: INTERNAL MEDICINE | Facility: CLINIC | Age: 65
End: 2020-01-16
Payer: COMMERCIAL

## 2020-01-16 VITALS
BODY MASS INDEX: 25.34 KG/M2 | HEIGHT: 61 IN | DIASTOLIC BLOOD PRESSURE: 74 MMHG | SYSTOLIC BLOOD PRESSURE: 122 MMHG | WEIGHT: 134.25 LBS | TEMPERATURE: 98 F | HEART RATE: 74 BPM

## 2020-01-16 DIAGNOSIS — E78.49 OTHER HYPERLIPIDEMIA: ICD-10-CM

## 2020-01-16 DIAGNOSIS — I10 ESSENTIAL HYPERTENSION: Primary | ICD-10-CM

## 2020-01-16 DIAGNOSIS — E89.0 POSTOPERATIVE HYPOTHYROIDISM: ICD-10-CM

## 2020-01-16 DIAGNOSIS — Z12.11 ENCOUNTER FOR FIT (FECAL IMMUNOCHEMICAL TEST) SCREENING: ICD-10-CM

## 2020-01-16 DIAGNOSIS — F32.A DEPRESSION, UNSPECIFIED DEPRESSION TYPE: ICD-10-CM

## 2020-01-16 DIAGNOSIS — F41.9 ANXIETY: ICD-10-CM

## 2020-01-16 LAB
ALBUMIN SERPL BCP-MCNC: 4.4 G/DL (ref 3.5–5.2)
ALP SERPL-CCNC: 103 U/L (ref 55–135)
ALT SERPL W/O P-5'-P-CCNC: 13 U/L (ref 10–44)
ANION GAP SERPL CALC-SCNC: 8 MMOL/L (ref 8–16)
AST SERPL-CCNC: 17 U/L (ref 10–40)
BILIRUB SERPL-MCNC: 0.8 MG/DL (ref 0.1–1)
BUN SERPL-MCNC: 15 MG/DL (ref 8–23)
CALCIUM SERPL-MCNC: 9.4 MG/DL (ref 8.7–10.5)
CHLORIDE SERPL-SCNC: 101 MMOL/L (ref 95–110)
CHOLEST SERPL-MCNC: 209 MG/DL (ref 120–199)
CHOLEST/HDLC SERPL: 2.8 {RATIO} (ref 2–5)
CO2 SERPL-SCNC: 29 MMOL/L (ref 23–29)
CREAT SERPL-MCNC: 0.8 MG/DL (ref 0.5–1.4)
EST. GFR  (AFRICAN AMERICAN): >60 ML/MIN/1.73 M^2
EST. GFR  (NON AFRICAN AMERICAN): >60 ML/MIN/1.73 M^2
GLUCOSE SERPL-MCNC: 100 MG/DL (ref 70–110)
HDLC SERPL-MCNC: 75 MG/DL (ref 40–75)
HDLC SERPL: 35.9 % (ref 20–50)
LDLC SERPL CALC-MCNC: 105.8 MG/DL (ref 63–159)
NONHDLC SERPL-MCNC: 134 MG/DL
POTASSIUM SERPL-SCNC: 3.9 MMOL/L (ref 3.5–5.1)
PROT SERPL-MCNC: 7.3 G/DL (ref 6–8.4)
SODIUM SERPL-SCNC: 138 MMOL/L (ref 136–145)
T4 FREE SERPL-MCNC: 1.27 NG/DL (ref 0.71–1.51)
TRIGL SERPL-MCNC: 141 MG/DL (ref 30–150)
TSH SERPL DL<=0.005 MIU/L-ACNC: 1.36 UIU/ML (ref 0.4–4)

## 2020-01-16 PROCEDURE — 36415 COLL VENOUS BLD VENIPUNCTURE: CPT

## 2020-01-16 PROCEDURE — 3078F PR MOST RECENT DIASTOLIC BLOOD PRESSURE < 80 MM HG: ICD-10-PCS | Mod: CPTII,S$GLB,, | Performed by: INTERNAL MEDICINE

## 2020-01-16 PROCEDURE — 99999 PR PBB SHADOW E&M-EST. PATIENT-LVL III: ICD-10-PCS | Mod: PBBFAC,,, | Performed by: INTERNAL MEDICINE

## 2020-01-16 PROCEDURE — 99214 OFFICE O/P EST MOD 30 MIN: CPT | Mod: S$GLB,,, | Performed by: INTERNAL MEDICINE

## 2020-01-16 PROCEDURE — 3008F BODY MASS INDEX DOCD: CPT | Mod: CPTII,S$GLB,, | Performed by: INTERNAL MEDICINE

## 2020-01-16 PROCEDURE — 3008F PR BODY MASS INDEX (BMI) DOCUMENTED: ICD-10-PCS | Mod: CPTII,S$GLB,, | Performed by: INTERNAL MEDICINE

## 2020-01-16 PROCEDURE — 84439 ASSAY OF FREE THYROXINE: CPT

## 2020-01-16 PROCEDURE — 99214 PR OFFICE/OUTPT VISIT, EST, LEVL IV, 30-39 MIN: ICD-10-PCS | Mod: S$GLB,,, | Performed by: INTERNAL MEDICINE

## 2020-01-16 PROCEDURE — 3078F DIAST BP <80 MM HG: CPT | Mod: CPTII,S$GLB,, | Performed by: INTERNAL MEDICINE

## 2020-01-16 PROCEDURE — 3074F PR MOST RECENT SYSTOLIC BLOOD PRESSURE < 130 MM HG: ICD-10-PCS | Mod: CPTII,S$GLB,, | Performed by: INTERNAL MEDICINE

## 2020-01-16 PROCEDURE — 84443 ASSAY THYROID STIM HORMONE: CPT

## 2020-01-16 PROCEDURE — 80061 LIPID PANEL: CPT

## 2020-01-16 PROCEDURE — 99999 PR PBB SHADOW E&M-EST. PATIENT-LVL III: CPT | Mod: PBBFAC,,, | Performed by: INTERNAL MEDICINE

## 2020-01-16 PROCEDURE — 3074F SYST BP LT 130 MM HG: CPT | Mod: CPTII,S$GLB,, | Performed by: INTERNAL MEDICINE

## 2020-01-16 PROCEDURE — 80053 COMPREHEN METABOLIC PANEL: CPT

## 2020-01-16 RX ORDER — PRAVASTATIN SODIUM 40 MG/1
40 TABLET ORAL DAILY
Qty: 90 TABLET | Refills: 3 | Status: SHIPPED | OUTPATIENT
Start: 2020-01-16 | End: 2020-07-10

## 2020-01-16 RX ORDER — LEVOTHYROXINE SODIUM 88 UG/1
88 TABLET ORAL
Qty: 90 TABLET | Refills: 3 | Status: SHIPPED | OUTPATIENT
Start: 2020-01-16 | End: 2020-06-11

## 2020-01-16 RX ORDER — CANDESARTAN 8 MG/1
8 TABLET ORAL DAILY
Qty: 90 TABLET | Refills: 3 | Status: SHIPPED | OUTPATIENT
Start: 2020-01-16 | End: 2020-06-11

## 2020-01-16 RX ORDER — TRIAMTERENE AND HYDROCHLOROTHIAZIDE 37.5; 25 MG/1; MG/1
1 CAPSULE ORAL EVERY MORNING
Qty: 90 CAPSULE | Refills: 3 | Status: SHIPPED | OUTPATIENT
Start: 2020-01-16 | End: 2020-03-04

## 2020-01-16 RX ORDER — CITALOPRAM 20 MG/1
20 TABLET, FILM COATED ORAL DAILY
Qty: 90 TABLET | Refills: 3 | Status: SHIPPED | OUTPATIENT
Start: 2020-01-16 | End: 2020-03-04

## 2020-01-17 ENCOUNTER — PATIENT MESSAGE (OUTPATIENT)
Dept: INTERNAL MEDICINE | Facility: CLINIC | Age: 65
End: 2020-01-17

## 2020-01-17 ENCOUNTER — TELEPHONE (OUTPATIENT)
Dept: INTERNAL MEDICINE | Facility: CLINIC | Age: 65
End: 2020-01-17

## 2020-01-17 NOTE — TELEPHONE ENCOUNTER
Pt stated she saw and the results online and she had messaged Dr. Valverde earlier and she received a response.

## 2020-02-05 ENCOUNTER — TELEPHONE (OUTPATIENT)
Dept: INTERNAL MEDICINE | Facility: CLINIC | Age: 65
End: 2020-02-05

## 2020-02-05 DIAGNOSIS — Z12.31 ENCOUNTER FOR SCREENING MAMMOGRAM FOR BREAST CANCER: Primary | ICD-10-CM

## 2020-02-05 NOTE — TELEPHONE ENCOUNTER
----- Message from Neli Dorado sent at 2/5/2020  7:42 AM CST -----  Contact: Pt   Pt calling in to schedule mammogram but she needs an order.   Please contact PT once order has been place    Thanks

## 2020-02-17 ENCOUNTER — HOSPITAL ENCOUNTER (OUTPATIENT)
Dept: RADIOLOGY | Facility: HOSPITAL | Age: 65
Discharge: HOME | End: 2020-02-17
Attending: INTERNAL MEDICINE
Payer: COMMERCIAL

## 2020-02-17 DIAGNOSIS — Z12.31 ENCOUNTER FOR SCREENING MAMMOGRAM FOR BREAST CANCER: ICD-10-CM

## 2020-02-17 PROCEDURE — 77067 MAMMO DIGITAL SCREENING BILAT WITH TOMOSYNTHESIS_CAD: ICD-10-PCS | Mod: 26,,, | Performed by: RADIOLOGY

## 2020-02-17 PROCEDURE — 77067 SCR MAMMO BI INCL CAD: CPT | Mod: 26,,, | Performed by: RADIOLOGY

## 2020-02-17 PROCEDURE — 77063 MAMMO DIGITAL SCREENING BILAT WITH TOMOSYNTHESIS_CAD: ICD-10-PCS | Mod: 26,,, | Performed by: RADIOLOGY

## 2020-02-17 PROCEDURE — 77067 SCR MAMMO BI INCL CAD: CPT | Mod: TC

## 2020-02-17 PROCEDURE — 77063 BREAST TOMOSYNTHESIS BI: CPT | Mod: 26,,, | Performed by: RADIOLOGY

## 2020-03-04 DIAGNOSIS — I10 ESSENTIAL HYPERTENSION: ICD-10-CM

## 2020-03-04 DIAGNOSIS — F32.A DEPRESSION, UNSPECIFIED DEPRESSION TYPE: ICD-10-CM

## 2020-03-04 DIAGNOSIS — F41.9 ANXIETY: ICD-10-CM

## 2020-03-04 RX ORDER — CITALOPRAM 20 MG/1
TABLET, FILM COATED ORAL
Qty: 90 TABLET | Refills: 3 | Status: SHIPPED | OUTPATIENT
Start: 2020-03-04 | End: 2021-01-11

## 2020-03-04 RX ORDER — TRIAMTERENE AND HYDROCHLOROTHIAZIDE 37.5; 25 MG/1; MG/1
1 CAPSULE ORAL EVERY MORNING
Qty: 90 CAPSULE | Refills: 3 | Status: SHIPPED | OUTPATIENT
Start: 2020-03-04 | End: 2020-03-19

## 2020-03-19 DIAGNOSIS — I10 ESSENTIAL HYPERTENSION: ICD-10-CM

## 2020-03-19 RX ORDER — TRIAMTERENE AND HYDROCHLOROTHIAZIDE 37.5; 25 MG/1; MG/1
1 CAPSULE ORAL EVERY MORNING
Qty: 90 CAPSULE | Refills: 3 | Status: SHIPPED | OUTPATIENT
Start: 2020-03-19 | End: 2021-03-22

## 2020-04-13 ENCOUNTER — PATIENT MESSAGE (OUTPATIENT)
Dept: INTERNAL MEDICINE | Facility: CLINIC | Age: 65
End: 2020-04-13

## 2020-06-08 ENCOUNTER — OFFICE VISIT (OUTPATIENT)
Dept: OBSTETRICS AND GYNECOLOGY | Facility: CLINIC | Age: 65
End: 2020-06-08
Attending: OBSTETRICS & GYNECOLOGY
Payer: MEDICAID

## 2020-06-08 VITALS
BODY MASS INDEX: 26.19 KG/M2 | DIASTOLIC BLOOD PRESSURE: 70 MMHG | WEIGHT: 138.69 LBS | SYSTOLIC BLOOD PRESSURE: 118 MMHG | HEIGHT: 61 IN

## 2020-06-08 DIAGNOSIS — N76.0 VAGINITIS AND VULVOVAGINITIS: Primary | ICD-10-CM

## 2020-06-08 DIAGNOSIS — N89.8 VAGINAL DRYNESS: ICD-10-CM

## 2020-06-08 PROCEDURE — 99213 OFFICE O/P EST LOW 20 MIN: CPT | Mod: PBBFAC | Performed by: OBSTETRICS & GYNECOLOGY

## 2020-06-08 PROCEDURE — 87529 HSV DNA AMP PROBE: CPT

## 2020-06-08 PROCEDURE — 87661 TRICHOMONAS VAGINALIS AMPLIF: CPT

## 2020-06-08 PROCEDURE — 99203 PR OFFICE/OUTPT VISIT, NEW, LEVL III, 30-44 MIN: ICD-10-PCS | Mod: S$PBB,,, | Performed by: OBSTETRICS & GYNECOLOGY

## 2020-06-08 PROCEDURE — 99999 PR PBB SHADOW E&M-EST. PATIENT-LVL III: CPT | Mod: PBBFAC,,, | Performed by: OBSTETRICS & GYNECOLOGY

## 2020-06-08 PROCEDURE — 99203 OFFICE O/P NEW LOW 30 MIN: CPT | Mod: S$PBB,,, | Performed by: OBSTETRICS & GYNECOLOGY

## 2020-06-08 PROCEDURE — 99999 PR PBB SHADOW E&M-EST. PATIENT-LVL III: ICD-10-PCS | Mod: PBBFAC,,, | Performed by: OBSTETRICS & GYNECOLOGY

## 2020-06-08 PROCEDURE — 87481 CANDIDA DNA AMP PROBE: CPT | Mod: 59

## 2020-06-08 RX ORDER — VALACYCLOVIR HYDROCHLORIDE 1 G/1
1000 TABLET, FILM COATED ORAL DAILY
Qty: 5 TABLET | Status: SHIPPED | OUTPATIENT
Start: 2020-06-08 | End: 2020-06-13

## 2020-06-08 NOTE — PROGRESS NOTES
HISTORY OF PRESENT ILLNESS:    Rehana Polanco is a 64 y.o. female, , No LMP recorded (lmp unknown). Patient is postmenopausal.,  presents for a problem visit, complaining of vaginal itching and burning x 2 weeks.  No relief from monistat.  Hx hsv - usually outbreaks are brief.    + vaginal dryness - uising replens and dove   about 12 years ago.  No HRT.    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    Hypertension     Hypothyroidism     hypothyroidism    Insomnia     previously on restoril    Vertigo        Past Surgical History:   Procedure Laterality Date    BREAST BIOPSY      LYMPH NODE BIOPSY      NASAL SINUS SURGERY      PARATHYROIDECTOMY      TUBAL LIGATION         MEDICATIONS AND ALLERGIES:      Current Outpatient Medications:     candesartan (ATACAND) 8 MG tablet, Take 1 tablet (8 mg total) by mouth once daily., Disp: 90 tablet, Rfl: 3    citalopram (CELEXA) 20 MG tablet, TAKE 1 TABLET BY MOUTH EVERY DAY, Disp: 90 tablet, Rfl: 3    levothyroxine (SYNTHROID) 88 MCG tablet, Take 1 tablet (88 mcg total) by mouth before breakfast., Disp: 90 tablet, Rfl: 3    pravastatin (PRAVACHOL) 40 MG tablet, Take 1 tablet (40 mg total) by mouth once daily., Disp: 90 tablet, Rfl: 3    tretinoin (RETIN-A) 0.025 % cream, Apply topically every evening., Disp: 45 g, Rfl: 1    triamterene-hydrochlorothiazide 37.5-25 mg (DYAZIDE) 37.5-25 mg per capsule, TAKE 1 CAPSULE BY MOUTH EVERY MORNING, Disp: 90 capsule, Rfl: 3    valacyclovir (VALTREX) 1000 MG tablet, Take 2 pills po bid x 2 days prn first signs of outbreak, Disp: 8 tablet, Rfl: 5    valACYclovir (VALTREX) 1000 MG tablet, Take 1 tablet (1,000 mg total) by mouth once daily. for 5 days, Disp: 5 tablet, Rfl: prn    Review of patient's allergies  indicates:  No Known Allergies    Family History   Problem Relation Age of Onset    Heart disease Mother 67        MI    Depression Mother     Heart disease Father 56        CAD    Hypertension Father     No Known Problems Sister     Hyperlipidemia Sister     Hypertension Sister     Melanoma Neg Hx        Social History     Socioeconomic History    Marital status:      Spouse name: Not on file    Number of children: Not on file    Years of education: Not on file    Highest education level: Not on file   Occupational History    Occupation: Care Coordinator     Comment: [employer]   Social Needs    Financial resource strain: Not on file    Food insecurity:     Worry: Not on file     Inability: Not on file    Transportation needs:     Medical: Not on file     Non-medical: Not on file   Tobacco Use    Smoking status: Former Smoker     Packs/day: 0.50     Years: 10.00     Pack years: 5.00     Last attempt to quit: 3/17/1989     Years since quittin.2    Smokeless tobacco: Never Used   Substance and Sexual Activity    Alcohol use: Yes     Alcohol/week: 8.0 standard drinks     Types: 8 Glasses of wine per week     Comment: soc    Drug use: No    Sexual activity: Not on file   Lifestyle    Physical activity:     Days per week: Not on file     Minutes per session: Not on file    Stress: Not on file   Relationships    Social connections:     Talks on phone: Not on file     Gets together: Not on file     Attends Episcopal service: Not on file     Active member of club or organization: Not on file     Attends meetings of clubs or organizations: Not on file     Relationship status: Not on file   Other Topics Concern    Are you pregnant or think you may be? Not Asked    Breast-feeding Not Asked   Social History Narrative    Not on file       ROS:  GENERAL: No weight changes. No swelling. No fatigue. No fever.  CARDIOVASCULAR: No chest pain. No shortness of breath. No leg cramps.  "  NEUROLOGICAL: No headaches. No vision changes.  BREASTS: No pain. No lumps. No discharge.  ABDOMEN: No pain. No nausea. No vomiting. No diarrhea. No constipation.  REPRODUCTIVE: No abnormal bleeding.   VULVA: see above  VAGINA: see above  URINARY: No incontinence. No nocturia. No frequency. No dysuria.    /70   Ht 5' 1" (1.549 m)   Wt 62.9 kg (138 lb 10.7 oz)   LMP  (LMP Unknown)   BMI 26.20 kg/m²     PE:  APPEARANCE: Well nourished, well developed, in no acute distress.  ABDOMEN: Soft. No tenderness or masses. No hepatosplenomegaly. No hernias.  BREASTS, FUNDOSCOPIC, RECTAL DEFERRED  PELVIC: External female genitalia with multiple ulcerated lesions on bilateral labia majora, c/w hsv,  Normal vagina and cervix.  EXTREMITIES: No edema      DIAGNOSIS & PLAN  1. Vaginitis and vulvovaginitis  Vaginosis Screen by DNA Probe    HSV by Rapid PCR, Non-Blood [facility]; Vagina    valACYclovir (VALTREX) 1000 MG tablet   2. Vaginal dryness         COUNSELING:  Discussed starting vaginal ert when infection clears  Follow-up for annual  "

## 2020-06-09 LAB
BACTERIAL VAGINOSIS DNA: NEGATIVE
CANDIDA GLABRATA DNA: NEGATIVE
CANDIDA KRUSEI DNA: NEGATIVE
CANDIDA RRNA VAG QL PROBE: NEGATIVE
T VAGINALIS RRNA GENITAL QL PROBE: NEGATIVE

## 2020-06-10 LAB
HSV1 DNA SPEC QL NAA+PROBE: NEGATIVE
HSV2 DNA SPEC QL NAA+PROBE: POSITIVE
SPECIMEN SOURCE: ABNORMAL

## 2020-06-11 ENCOUNTER — PATIENT MESSAGE (OUTPATIENT)
Dept: INTERNAL MEDICINE | Facility: CLINIC | Age: 65
End: 2020-06-11

## 2020-06-18 ENCOUNTER — PATIENT MESSAGE (OUTPATIENT)
Dept: INTERNAL MEDICINE | Facility: CLINIC | Age: 65
End: 2020-06-18

## 2020-07-21 ENCOUNTER — PATIENT MESSAGE (OUTPATIENT)
Dept: INTERNAL MEDICINE | Facility: CLINIC | Age: 65
End: 2020-07-21

## 2020-07-21 DIAGNOSIS — Z12.11 COLON CANCER SCREENING: Primary | ICD-10-CM

## 2020-08-12 ENCOUNTER — OFFICE VISIT (OUTPATIENT)
Dept: INTERNAL MEDICINE | Facility: CLINIC | Age: 65
End: 2020-08-12
Payer: MEDICAID

## 2020-08-12 VITALS
WEIGHT: 137.81 LBS | HEIGHT: 61 IN | BODY MASS INDEX: 26.02 KG/M2 | DIASTOLIC BLOOD PRESSURE: 78 MMHG | HEART RATE: 80 BPM | OXYGEN SATURATION: 99 % | TEMPERATURE: 99 F | SYSTOLIC BLOOD PRESSURE: 138 MMHG

## 2020-08-12 DIAGNOSIS — N39.0 URINARY TRACT INFECTION WITHOUT HEMATURIA, SITE UNSPECIFIED: Primary | ICD-10-CM

## 2020-08-12 LAB
BACTERIA #/AREA URNS AUTO: ABNORMAL /HPF
BILIRUB UR QL STRIP: NEGATIVE
CLARITY UR REFRACT.AUTO: CLEAR
COLOR UR AUTO: ABNORMAL
GLUCOSE UR QL STRIP: NEGATIVE
HGB UR QL STRIP: NEGATIVE
KETONES UR QL STRIP: NEGATIVE
LEUKOCYTE ESTERASE UR QL STRIP: ABNORMAL
MICROSCOPIC COMMENT: ABNORMAL
NITRITE UR QL STRIP: NEGATIVE
PH UR STRIP: 6 [PH] (ref 5–8)
PROT UR QL STRIP: NEGATIVE
RBC #/AREA URNS AUTO: 0 /HPF (ref 0–4)
SP GR UR STRIP: 1 (ref 1–1.03)
URN SPEC COLLECT METH UR: ABNORMAL
WBC #/AREA URNS AUTO: 2 /HPF (ref 0–5)

## 2020-08-12 PROCEDURE — 99213 OFFICE O/P EST LOW 20 MIN: CPT | Mod: S$PBB,,, | Performed by: STUDENT IN AN ORGANIZED HEALTH CARE EDUCATION/TRAINING PROGRAM

## 2020-08-12 PROCEDURE — 99999 PR PBB SHADOW E&M-EST. PATIENT-LVL IV: CPT | Mod: PBBFAC,,, | Performed by: STUDENT IN AN ORGANIZED HEALTH CARE EDUCATION/TRAINING PROGRAM

## 2020-08-12 PROCEDURE — 99214 OFFICE O/P EST MOD 30 MIN: CPT | Mod: PBBFAC | Performed by: STUDENT IN AN ORGANIZED HEALTH CARE EDUCATION/TRAINING PROGRAM

## 2020-08-12 PROCEDURE — 99999 PR PBB SHADOW E&M-EST. PATIENT-LVL IV: ICD-10-PCS | Mod: PBBFAC,,, | Performed by: STUDENT IN AN ORGANIZED HEALTH CARE EDUCATION/TRAINING PROGRAM

## 2020-08-12 PROCEDURE — 81001 URINALYSIS AUTO W/SCOPE: CPT

## 2020-08-12 PROCEDURE — 99213 PR OFFICE/OUTPT VISIT, EST, LEVL III, 20-29 MIN: ICD-10-PCS | Mod: S$PBB,,, | Performed by: STUDENT IN AN ORGANIZED HEALTH CARE EDUCATION/TRAINING PROGRAM

## 2020-08-12 RX ORDER — NITROFURANTOIN (MACROCRYSTALS) 100 MG/1
100 CAPSULE ORAL EVERY 12 HOURS
Qty: 10 CAPSULE | Refills: 0 | Status: SHIPPED | OUTPATIENT
Start: 2020-08-12 | End: 2020-08-17

## 2020-08-12 NOTE — PROGRESS NOTES
Subjective     Chief Complaint: UTI    History of Present Illness:  Ms. Rehana Polanco is a 65 y.o. female presents with a UTI for the last three days in the setting of a new sexual partner.  She states that she has had fullness, frequency, foul smell, pain with urination that is squeezing and 6/10 in nature. Has not had her usual burning sensation.     This is her 1st UTI in the last three years. Previously treated with TMP-SMX and ciprofloxacin    Review of Systems   Constitutional: Negative for chills and fever.   HENT: Negative for ear discharge and ear pain.    Eyes: Negative for blurred vision and double vision.   Respiratory: Negative for cough and shortness of breath.    Cardiovascular: Negative for chest pain and palpitations.   Gastrointestinal: Negative for abdominal pain, constipation, diarrhea, nausea and vomiting.   Genitourinary: Positive for dysuria and frequency.        Fullness, foul smell   Musculoskeletal: Negative for myalgias and neck pain.   Skin: Negative for itching and rash.   Neurological: Negative for tingling and headaches.       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    Hypertension     Hypothyroidism     hypothyroidism    Insomnia     previously on restoril    Vertigo        Past Surgical History:   Procedure Laterality Date    BREAST BIOPSY      LYMPH NODE BIOPSY      NASAL SINUS SURGERY      PARATHYROIDECTOMY      TUBAL LIGATION         Family History   Problem Relation Age of Onset    Heart disease Mother 67        MI    Depression Mother     Heart disease Father 56        CAD    Hypertension Father     No Known Problems Sister     Hyperlipidemia Sister     Hypertension Sister     Melanoma Neg Hx        Social History     Socioeconomic  History    Marital status:      Spouse name: Not on file    Number of children: Not on file    Years of education: Not on file    Highest education level: Not on file   Occupational History    Occupation: Care Coordinator     Comment: [employer]   Social Needs    Financial resource strain: Not on file    Food insecurity     Worry: Not on file     Inability: Not on file    Transportation needs     Medical: Not on file     Non-medical: Not on file   Tobacco Use    Smoking status: Former Smoker     Packs/day: 0.50     Years: 10.00     Pack years: 5.00     Quit date: 3/17/1989     Years since quittin.4    Smokeless tobacco: Never Used   Substance and Sexual Activity    Alcohol use: Yes     Alcohol/week: 8.0 standard drinks     Types: 8 Glasses of wine per week     Comment: soc    Drug use: No    Sexual activity: Not on file   Lifestyle    Physical activity     Days per week: Not on file     Minutes per session: Not on file    Stress: Not on file   Relationships    Social connections     Talks on phone: Not on file     Gets together: Not on file     Attends Church service: Not on file     Active member of club or organization: Not on file     Attends meetings of clubs or organizations: Not on file     Relationship status: Not on file   Other Topics Concern    Are you pregnant or think you may be? Not Asked    Breast-feeding Not Asked   Social History Narrative    Not on file       MEDICATIONS & ALLERGIES:     Current Outpatient Medications on File Prior to Visit   Medication Sig    candesartan (ATACAND) 8 MG tablet TAKE 1 TABLET(8 MG) BY MOUTH EVERY DAY    citalopram (CELEXA) 20 MG tablet TAKE 1 TABLET BY MOUTH EVERY DAY    levothyroxine (SYNTHROID) 88 MCG tablet TAKE 1 TABLET BY MOUTH EVERY DAY    pravastatin (PRAVACHOL) 40 MG tablet TAKE 1 TABLET(40 MG) BY MOUTH EVERY DAY    tretinoin (RETIN-A) 0.025 % cream Apply topically every evening.    triamterene-hydrochlorothiazide  "37.5-25 mg (DYAZIDE) 37.5-25 mg per capsule TAKE 1 CAPSULE BY MOUTH EVERY MORNING    valacyclovir (VALTREX) 1000 MG tablet Take 2 pills po bid x 2 days prn first signs of outbreak     No current facility-administered medications on file prior to visit.        Review of patient's allergies indicates:  No Known Allergies    OBJECTIVE:     Vital Signs:  Vitals:    08/12/20 1445 08/12/20 1453   BP: 138/78    BP Location: Left arm    Patient Position: Sitting    BP Method: Medium (Manual)    Pulse: 80    Temp:  98.8 °F (37.1 °C)   SpO2: 99%    Weight: 62.5 kg (137 lb 12.6 oz)    Height: 5' 1" (1.549 m)        Body mass index is 26.03 kg/m².     Physical Exam   Constitutional: She is oriented to person, place, and time and well-developed, well-nourished, and in no distress. No distress.   HENT:   Head: Normocephalic and atraumatic.   Mouth/Throat: No oropharyngeal exudate.   Eyes: Right eye exhibits no discharge. Left eye exhibits no discharge. No scleral icterus.   Neck: Normal range of motion. Neck supple. No JVD present. No tracheal deviation present.   Cardiovascular: Normal rate and regular rhythm.   No murmur heard.  Pulmonary/Chest: Effort normal and breath sounds normal. She has no wheezes. She has no rales.   Abdominal: Soft. Bowel sounds are normal. She exhibits no distension. There is abdominal tenderness (negative for CVA tenderness, does have suprapubic tenderness).   Musculoskeletal: Normal range of motion.         General: No tenderness or edema.   Lymphadenopathy:     She has no cervical adenopathy.   Neurological: She is alert and oriented to person, place, and time. No cranial nerve deficit.   Skin: Skin is warm and dry. No rash noted. She is not diaphoretic. No erythema.       Laboratory  No results found for this or any previous visit (from the past 24 hour(s)).    Diagnostic Results:      Health Maintenance Due   Topic Date Due    Colorectal Cancer Screening  01/01/2020    Cervical Cancer Screening "  01/16/2020         ASSESSMENT & PLAN:   Ms. Rehana Polanco is a 65 y.o. female presents with a UTI.  5 days of 100 mg bid Macrobid. Given instructions to return to clinic or ED if symptoms worsen or do not resolve    Urinary tract infection without hematuria, site unspecified  -     URINALYSIS    Other orders  -     nitrofurantoin (MACRODANTIN) 100 MG capsule; Take 1 capsule (100 mg total) by mouth every 12 (twelve) hours. for 5 days  Dispense: 10 capsule; Refill: 0            Discussed with Dr. Acevedo  - staff attestation to follow      Tan Ratliff MD  Internal Medicine PGY2  [facility]  [address] [place] [id number] [phone]

## 2020-08-13 ENCOUNTER — TELEPHONE (OUTPATIENT)
Dept: INTERNAL MEDICINE | Facility: CLINIC | Age: 65
End: 2020-08-13

## 2020-08-25 NOTE — PROGRESS NOTES
I have reviewed the notes, assessments, and/or procedures performed by Dr. Ratliff, I concur with his documentation of Rehana Polanco.

## 2020-09-01 ENCOUNTER — OFFICE VISIT (OUTPATIENT)
Dept: FAMILY MEDICINE | Facility: CLINIC | Age: 65
End: 2020-09-01
Payer: MEDICAID

## 2020-09-01 DIAGNOSIS — R30.0 DYSURIA: Primary | ICD-10-CM

## 2020-09-01 PROCEDURE — 99213 PR OFFICE/OUTPT VISIT, EST, LEVL III, 20-29 MIN: ICD-10-PCS | Mod: 95,,, | Performed by: NURSE PRACTITIONER

## 2020-09-01 PROCEDURE — 99213 OFFICE O/P EST LOW 20 MIN: CPT | Mod: 95,,, | Performed by: NURSE PRACTITIONER

## 2020-09-01 RX ORDER — PHENAZOPYRIDINE HYDROCHLORIDE 100 MG/1
100 TABLET, FILM COATED ORAL 3 TIMES DAILY PRN
Qty: 30 TABLET | Refills: 0 | Status: SHIPPED | OUTPATIENT
Start: 2020-09-01 | End: 2020-09-11

## 2020-09-01 NOTE — PROGRESS NOTES
Subjective:       Patient ID: Rehana Polanco is a 65 y.o. female.    Chief Complaint: Urinary Tract Infection    ###URGENT CARE VISIT###    Patient is a 65 year old white female that complains of UTI symptoms that began today.  She reports she just had a UTI treated on 8/12/2020 with Macrobid for 5 days.  No urine culture was completed.  See notes below:    Dysuria   This is a recurrent problem. The current episode started today (started today but had symptoms 2 weeks ago treated with Macrobid by another provider). The problem occurs every urination. The problem has been unchanged. The quality of the pain is described as burning. The pain is at a severity of 8/10. The pain is moderate. There has been no fever. She is sexually active. There is no history of pyelonephritis. Associated symptoms include frequency, hematuria, hesitancy and urgency. Pertinent negatives include no chills, discharge, flank pain, nausea, possible pregnancy, sweats, vomiting, weight loss, constipation, rash or withholding. She has tried increased fluids (cranberry juice) for the symptoms. The treatment provided no relief. Her past medical history is significant for hypertension, recurrent UTIs and STD. There is no history of diabetes insipidus, diabetes mellitus, genitourinary reflux, kidney stones, a single kidney, urinary stasis or a urological procedure.     The patient location is: [place], [place]  The chief complaint leading to consultation is: dysuria    Visit type: audiovisual    Face to Face time with patient: 15  15 minutes of total time spent on the encounter, which includes face to face time and non-face to face time preparing to see the patient (eg, review of tests), Obtaining and/or reviewing separately obtained history, Documenting clinical information in the electronic or other health record, Independently interpreting results (not separately reported) and communicating results to the patient/family/caregiver, or  Care coordination (not separately reported).         Each patient to whom he or she provides medical services by telemedicine is:  (1) informed of the relationship between the physician and patient and the respective role of any other health care provider with respect to management of the patient; and (2) notified that he or she may decline to receive medical services by telemedicine and may withdraw from such care at any time.    Notes:   Current Outpatient Medications   Medication Sig Dispense Refill    candesartan (ATACAND) 8 MG tablet TAKE 1 TABLET(8 MG) BY MOUTH EVERY DAY 90 tablet 3    citalopram (CELEXA) 20 MG tablet TAKE 1 TABLET BY MOUTH EVERY DAY 90 tablet 3    levothyroxine (SYNTHROID) 88 MCG tablet TAKE 1 TABLET BY MOUTH EVERY DAY 90 tablet 3    pravastatin (PRAVACHOL) 40 MG tablet TAKE 1 TABLET(40 MG) BY MOUTH EVERY DAY 90 tablet 3    triamterene-hydrochlorothiazide 37.5-25 mg (DYAZIDE) 37.5-25 mg per capsule TAKE 1 CAPSULE BY MOUTH EVERY MORNING 90 capsule 3    valacyclovir (VALTREX) 1000 MG tablet Take 2 pills po bid x 2 days prn first signs of outbreak 8 tablet 5    tretinoin (RETIN-A) 0.025 % cream Apply topically every evening. (Patient not taking: Reported on 9/1/2020) 45 g 1     No current facility-administered medications for this visit.        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    Hypertension     Hypothyroidism     hypothyroidism    Insomnia     previously on restoril    Vertigo        Past Surgical History:   Procedure Laterality Date    BREAST BIOPSY      LYMPH NODE BIOPSY      NASAL SINUS SURGERY      PARATHYROIDECTOMY      TUBAL LIGATION         Family History   Problem Relation Age of Onset    Heart disease Mother 67        MI    Depression Mother     Heart disease  Father 56        CAD    Hypertension Father     No Known Problems Sister     Hyperlipidemia Sister     Hypertension Sister     Melanoma Neg Hx        Social History     Socioeconomic History    Marital status:      Spouse name: Not on file    Number of children: Not on file    Years of education: Not on file    Highest education level: Not on file   Occupational History    Occupation: Care Coordinator     Comment: [employer]   Social Needs    Financial resource strain: Not on file    Food insecurity     Worry: Not on file     Inability: Not on file    Transportation needs     Medical: Not on file     Non-medical: Not on file   Tobacco Use    Smoking status: Former Smoker     Packs/day: 0.50     Years: 10.00     Pack years: 5.00     Quit date: 3/17/1989     Years since quittin.4    Smokeless tobacco: Never Used   Substance and Sexual Activity    Alcohol use: Yes     Alcohol/week: 8.0 standard drinks     Types: 8 Glasses of wine per week     Comment: soc    Drug use: No    Sexual activity: Not on file   Lifestyle    Physical activity     Days per week: Not on file     Minutes per session: Not on file    Stress: Not on file   Relationships    Social connections     Talks on phone: Not on file     Gets together: Not on file     Attends Faith service: Not on file     Active member of club or organization: Not on file     Attends meetings of clubs or organizations: Not on file     Relationship status: Not on file   Other Topics Concern    Are you pregnant or think you may be? Not Asked    Breast-feeding Not Asked   Social History Narrative    Not on file       Review of Systems   Constitutional: Negative for chills and weight loss.   Gastrointestinal: Negative for constipation, nausea and vomiting.   Genitourinary: Positive for dysuria, frequency, hematuria, hesitancy and urgency. Negative for flank pain.   Skin: Negative for rash.         Objective:     There were no vitals  filed for this visit.       Physical Exam  Constitutional:       General: She is not in acute distress.     Appearance: She is well-developed. She is not diaphoretic.   HENT:      Head: Normocephalic and atraumatic.   Eyes:      General: No scleral icterus.        Right eye: No discharge.         Left eye: No discharge.      Conjunctiva/sclera: Conjunctivae normal.      Pupils: Pupils are equal, round, and reactive to light.   Pulmonary:      Effort: Pulmonary effort is normal. No respiratory distress.   Neurological:      Mental Status: She is alert and oriented to person, place, and time.   Psychiatric:         Behavior: Behavior normal.         Thought Content: Thought content normal.         Judgment: Judgment normal.           Assessment:         ICD-10-CM ICD-9-CM   1. Dysuria  R30.0 788.1       Plan:       Dysuria  -  Advised patient that since she was just on antibiotics (macrobid) for UTI less than 1 month ago, I want to get Urinalysis with Urine Culture BEFORE prescribing another antibiotic.  She will go get urine sample today.  In meantime, will treat the urinary symptoms with Pyridium for symptom relief.  Patient verbalizes understanding.  -     URINALYSIS; Future; Expected date: 09/01/2020  -     Urine culture; Future; Expected date: 09/01/2020  -     phenazopyridine (PYRIDIUM) 100 MG tablet; Take 1 tablet (100 mg total) by mouth 3 (three) times daily as needed for Pain.  Dispense: 30 tablet; Refill: 0      Follow up if symptoms worsen or fail to improve.     Patient's Medications   New Prescriptions    PHENAZOPYRIDINE (PYRIDIUM) 100 MG TABLET    Take 1 tablet (100 mg total) by mouth 3 (three) times daily as needed for Pain.   Previous Medications    CANDESARTAN (ATACAND) 8 MG TABLET    TAKE 1 TABLET(8 MG) BY MOUTH EVERY DAY    CITALOPRAM (CELEXA) 20 MG TABLET    TAKE 1 TABLET BY MOUTH EVERY DAY    LEVOTHYROXINE (SYNTHROID) 88 MCG TABLET    TAKE 1 TABLET BY MOUTH EVERY DAY    PRAVASTATIN (PRAVACHOL) 40  MG TABLET    TAKE 1 TABLET(40 MG) BY MOUTH EVERY DAY    TRETINOIN (RETIN-A) 0.025 % CREAM    Apply topically every evening.    TRIAMTERENE-HYDROCHLOROTHIAZIDE 37.5-25 MG (DYAZIDE) 37.5-25 MG PER CAPSULE    TAKE 1 CAPSULE BY MOUTH EVERY MORNING    VALACYCLOVIR (VALTREX) 1000 MG TABLET    Take 2 pills po bid x 2 days prn first signs of outbreak   Modified Medications    No medications on file   Discontinued Medications    No medications on file

## 2020-09-02 ENCOUNTER — TELEPHONE (OUTPATIENT)
Dept: FAMILY MEDICINE | Facility: CLINIC | Age: 65
End: 2020-09-02

## 2020-09-02 RX ORDER — CIPROFLOXACIN 250 MG/1
250 TABLET, FILM COATED ORAL EVERY 12 HOURS
Qty: 14 TABLET | Refills: 0 | Status: SHIPPED | OUTPATIENT
Start: 2020-09-02 | End: 2020-09-09

## 2020-09-02 NOTE — TELEPHONE ENCOUNTER
Urinalysis showed large amount of leukocytes with blood present.  Microscopic showed large amount WBCs.  Spoke with patient on phone and will start Cipro 250 mg twice daily for 7 days.  Urine was already sent off for culture.  Patient verbalizes understanding.

## 2020-09-04 ENCOUNTER — TELEPHONE (OUTPATIENT)
Dept: FAMILY MEDICINE | Facility: CLINIC | Age: 65
End: 2020-09-04

## 2020-09-04 DIAGNOSIS — R31.9 URINARY TRACT INFECTION WITH HEMATURIA, SITE UNSPECIFIED: Primary | ICD-10-CM

## 2020-09-04 DIAGNOSIS — N39.0 URINARY TRACT INFECTION WITH HEMATURIA, SITE UNSPECIFIED: Primary | ICD-10-CM

## 2020-09-04 RX ORDER — AMOXICILLIN AND CLAVULANATE POTASSIUM 875; 125 MG/1; MG/1
1 TABLET, FILM COATED ORAL 2 TIMES DAILY
Qty: 20 TABLET | Refills: 0 | Status: SHIPPED | OUTPATIENT
Start: 2020-09-04 | End: 2020-09-11

## 2020-09-04 NOTE — TELEPHONE ENCOUNTER
STaff - call patient - advise patient that the urine culture shows that the bacteria from UTI is RESISTANT to the Cipro that was prescribed so STOP that antibiotic and change to Augmentin twice daily for 7 days.  Prescription sent to pharmacy.  Advise patient that should she continue to get recurrent UTIs =- she should see her PCP for further management.

## 2020-09-04 NOTE — TELEPHONE ENCOUNTER
Spoke with patient. Informed patient of recent urine culture results and NP Dalia's recommendations. Pt verbalizes understanding.

## 2020-09-21 ENCOUNTER — PATIENT MESSAGE (OUTPATIENT)
Dept: INTERNAL MEDICINE | Facility: CLINIC | Age: 65
End: 2020-09-21

## 2020-09-21 DIAGNOSIS — Z00.00 ANNUAL PHYSICAL EXAM: Primary | ICD-10-CM

## 2020-09-21 DIAGNOSIS — Z01.810 PREOP CARDIOVASCULAR EXAM: ICD-10-CM

## 2020-09-23 ENCOUNTER — IMMUNIZATION (OUTPATIENT)
Dept: INTERNAL MEDICINE | Facility: CLINIC | Age: 65
End: 2020-09-23
Payer: MEDICAID

## 2020-09-23 PROCEDURE — 90471 IMMUNIZATION ADMIN: CPT | Mod: PBBFAC

## 2020-09-24 ENCOUNTER — HOSPITAL ENCOUNTER (OUTPATIENT)
Dept: CARDIOLOGY | Facility: CLINIC | Age: 65
Discharge: HOME | End: 2020-09-24
Attending: INTERNAL MEDICINE
Payer: MEDICAID

## 2020-09-24 DIAGNOSIS — Z00.00 ANNUAL PHYSICAL EXAM: ICD-10-CM

## 2020-09-24 DIAGNOSIS — Z01.810 PREOP CARDIOVASCULAR EXAM: ICD-10-CM

## 2020-09-24 PROCEDURE — 93010 ELECTROCARDIOGRAM REPORT: CPT | Mod: S$PBB,,, | Performed by: INTERNAL MEDICINE

## 2020-09-24 PROCEDURE — 93005 ELECTROCARDIOGRAM TRACING: CPT | Mod: PBBFAC | Performed by: INTERNAL MEDICINE

## 2020-09-24 PROCEDURE — 93010 EKG 12-LEAD: ICD-10-PCS | Mod: S$PBB,,, | Performed by: INTERNAL MEDICINE

## 2020-10-05 ENCOUNTER — PATIENT MESSAGE (OUTPATIENT)
Dept: ADMINISTRATIVE | Facility: HOSPITAL | Age: 65
End: 2020-10-05

## 2020-10-06 ENCOUNTER — OFFICE VISIT (OUTPATIENT)
Dept: INTERNAL MEDICINE | Facility: CLINIC | Age: 65
End: 2020-10-06
Payer: MEDICAID

## 2020-10-06 VITALS
WEIGHT: 135.56 LBS | SYSTOLIC BLOOD PRESSURE: 124 MMHG | HEART RATE: 75 BPM | HEIGHT: 61 IN | OXYGEN SATURATION: 99 % | BODY MASS INDEX: 25.59 KG/M2 | DIASTOLIC BLOOD PRESSURE: 64 MMHG

## 2020-10-06 DIAGNOSIS — F41.9 ANXIETY: ICD-10-CM

## 2020-10-06 DIAGNOSIS — H25.9 AGE-RELATED CATARACT OF BOTH EYES, UNSPECIFIED AGE-RELATED CATARACT TYPE: Primary | ICD-10-CM

## 2020-10-06 DIAGNOSIS — E78.49 OTHER HYPERLIPIDEMIA: ICD-10-CM

## 2020-10-06 DIAGNOSIS — Z01.810 PREOP CARDIOVASCULAR EXAM: ICD-10-CM

## 2020-10-06 DIAGNOSIS — E89.0 POSTOPERATIVE HYPOTHYROIDISM: ICD-10-CM

## 2020-10-06 DIAGNOSIS — I10 ESSENTIAL HYPERTENSION: ICD-10-CM

## 2020-10-06 PROCEDURE — 99999 PR PBB SHADOW E&M-EST. PATIENT-LVL IV: CPT | Mod: PBBFAC,,, | Performed by: INTERNAL MEDICINE

## 2020-10-06 PROCEDURE — 99214 OFFICE O/P EST MOD 30 MIN: CPT | Mod: PBBFAC | Performed by: INTERNAL MEDICINE

## 2020-10-06 PROCEDURE — 99214 OFFICE O/P EST MOD 30 MIN: CPT | Mod: S$PBB,,, | Performed by: INTERNAL MEDICINE

## 2020-10-06 PROCEDURE — 99214 PR OFFICE/OUTPT VISIT, EST, LEVL IV, 30-39 MIN: ICD-10-PCS | Mod: S$PBB,,, | Performed by: INTERNAL MEDICINE

## 2020-10-06 PROCEDURE — 99999 PR PBB SHADOW E&M-EST. PATIENT-LVL IV: ICD-10-PCS | Mod: PBBFAC,,, | Performed by: INTERNAL MEDICINE

## 2020-10-06 NOTE — PROGRESS NOTES
"Subjective:       Patient ID: Rehana Polanco is a 65 y.o. female.    Chief Complaint: Pre-op Exam    HPI   Plan for cataract surgery for Oct 14th for the [facility] on Oct 28 w/ Dr. Brown.   Here for preop.     HTN - triamterene-hctz, candesartan 8mg qd.    HLD - pravastatin 40mg qd.   .8.    Hypothyroidism - synthroid 88mcg daily.  TFT WNL 9/24/20    Mood doing well on celexa. Met back up through FB w/ elementary school [employer] in May 2020. Engaged and getting  in Dec.     Review of Systems   Constitutional: Negative for chills and fever.   HENT: Negative for congestion, postnasal drip, rhinorrhea and sore throat.    Eyes: Positive for visual disturbance.   Respiratory: Negative for cough, shortness of breath and wheezing.    Cardiovascular: Negative for chest pain, palpitations and leg swelling.   Gastrointestinal: Negative for abdominal pain, constipation, diarrhea, nausea and vomiting.   Genitourinary: Negative for dysuria and frequency.   Musculoskeletal: Negative for arthralgias and back pain.   Skin: Negative for rash.   Neurological: Negative for dizziness, light-headedness, numbness and headaches.   Psychiatric/Behavioral: Negative for dysphoric mood. The patient is not nervous/anxious.          Objective:      Physical Exam    /64 (BP Location: Left arm, Patient Position: Sitting, BP Method: Medium (Manual))   Pulse 75   Ht 5' 1" (1.549 m)   Wt 61.5 kg (135 lb 9.3 oz)   LMP  (LMP Unknown)   SpO2 99%   BMI 25.62 kg/m²     GEN - A+OX4, NAD   HEENT - PERRL, EOMI, OP clear. TM normal.   Neck - No thyromegaly or cervical LAD. No thyroid masses felt.  CV - RRR, no m/r   Chest - CTAB, no wheezing or rhonchi  Abd - S/NT/ND/+BS.   Ext - 2+BDP and radial pulses. No C/C/E.  Neuro - PERRL, EOMI, no nystagmus, eyebrow raise, facial sensation, hearing, m of mastication, smile, palatal raise, shoulder shrug, tongue protrusion symmetric and intact. 5/5 BUE and BLE strength. Sensation to " light touch intact throughout. 3+ DTRs. Normal gait.   Skin - No rash.    Labs and EKG reviewed.     Assessment/Plan     Rehana was seen today for pre-op exam.    Diagnoses and all orders for this visit:    Age-related cataract of both eyes, unspecified age-related cataract type - filled out preop form for Dr. Brown. Will fax a copy along w/ EKG and labs and will give her a copy also. Medically optimized.     Preop cardiovascular exam - as above.     Essential hypertension - Stable and controlled. Continue current medications.    Other hyperlipidemia - cont pravastatin.     Anxiety - cont celexa. Doing well.     Postoperative hypothyroidism - cont synthroid.       Follow up if symptoms worsen or fail to improve.      Bernie Valverde MD  Department of Internal Medicine - [facility]  9:15 AM

## 2020-11-16 ENCOUNTER — PATIENT OUTREACH (OUTPATIENT)
Dept: ADMINISTRATIVE | Facility: OTHER | Age: 65
End: 2020-11-16

## 2020-11-16 NOTE — PROGRESS NOTES
Health Maintenance Due   Topic Date Due    Colorectal Cancer Screening  01/01/2020    Cervical Cancer Screening  01/16/2020     Updates were requested from care everywhere.  Chart was reviewed for overdue Proactive [facility] Encounters ([facility]) topics (CRS, Breast Cancer Screening, Eye exam)  Health Maintenance has been updated.

## 2020-12-11 ENCOUNTER — PATIENT MESSAGE (OUTPATIENT)
Dept: OTHER | Facility: OTHER | Age: 65
End: 2020-12-11

## 2021-01-04 ENCOUNTER — PATIENT MESSAGE (OUTPATIENT)
Dept: ADMINISTRATIVE | Facility: HOSPITAL | Age: 66
End: 2021-01-04

## 2021-01-07 ENCOUNTER — PATIENT OUTREACH (OUTPATIENT)
Dept: ADMINISTRATIVE | Facility: HOSPITAL | Age: 66
End: 2021-01-07

## 2021-01-07 ENCOUNTER — PATIENT MESSAGE (OUTPATIENT)
Dept: ADMINISTRATIVE | Facility: HOSPITAL | Age: 66
End: 2021-01-07

## 2021-01-09 DIAGNOSIS — F32.A DEPRESSION, UNSPECIFIED DEPRESSION TYPE: ICD-10-CM

## 2021-01-09 DIAGNOSIS — F41.9 ANXIETY: ICD-10-CM

## 2021-01-11 ENCOUNTER — PATIENT OUTREACH (OUTPATIENT)
Dept: ADMINISTRATIVE | Facility: OTHER | Age: 66
End: 2021-01-11

## 2021-01-11 RX ORDER — CITALOPRAM 20 MG/1
TABLET, FILM COATED ORAL
Qty: 90 TABLET | Refills: 3 | Status: SHIPPED | OUTPATIENT
Start: 2021-01-11 | End: 2022-01-13

## 2021-01-13 ENCOUNTER — OFFICE VISIT (OUTPATIENT)
Dept: OBSTETRICS AND GYNECOLOGY | Facility: CLINIC | Age: 66
End: 2021-01-13
Payer: COMMERCIAL

## 2021-01-13 VITALS
BODY MASS INDEX: 26.73 KG/M2 | DIASTOLIC BLOOD PRESSURE: 82 MMHG | SYSTOLIC BLOOD PRESSURE: 140 MMHG | WEIGHT: 141.56 LBS | HEIGHT: 61 IN

## 2021-01-13 DIAGNOSIS — Z12.11 SPECIAL SCREENING FOR MALIGNANT NEOPLASM OF COLON: Primary | ICD-10-CM

## 2021-01-13 DIAGNOSIS — Z12.31 SCREENING MAMMOGRAM, ENCOUNTER FOR: ICD-10-CM

## 2021-01-13 DIAGNOSIS — Z01.419 ENCOUNTER FOR ANNUAL ROUTINE GYNECOLOGICAL EXAMINATION: Primary | ICD-10-CM

## 2021-01-13 DIAGNOSIS — Z12.4 PAP SMEAR FOR CERVICAL CANCER SCREENING: ICD-10-CM

## 2021-01-13 PROCEDURE — 99213 OFFICE O/P EST LOW 20 MIN: CPT | Mod: PBBFAC,PO | Performed by: OBSTETRICS & GYNECOLOGY

## 2021-01-13 PROCEDURE — 99999 PR PBB SHADOW E&M-EST. PATIENT-LVL III: ICD-10-PCS | Mod: PBBFAC,,, | Performed by: OBSTETRICS & GYNECOLOGY

## 2021-01-13 PROCEDURE — 87624 HPV HI-RISK TYP POOLED RSLT: CPT

## 2021-01-13 PROCEDURE — 99999 PR PBB SHADOW E&M-EST. PATIENT-LVL III: CPT | Mod: PBBFAC,,, | Performed by: OBSTETRICS & GYNECOLOGY

## 2021-01-13 PROCEDURE — 99396 PREV VISIT EST AGE 40-64: CPT | Mod: S$PBB,,, | Performed by: OBSTETRICS & GYNECOLOGY

## 2021-01-13 PROCEDURE — 99396 PR PREVENTIVE VISIT,EST,40-64: ICD-10-PCS | Mod: S$PBB,,, | Performed by: OBSTETRICS & GYNECOLOGY

## 2021-01-13 PROCEDURE — 88142 CYTOPATH C/V THIN LAYER: CPT

## 2021-01-13 RX ORDER — VALACYCLOVIR HYDROCHLORIDE 500 MG/1
500 TABLET, FILM COATED ORAL DAILY
Qty: 90 TABLET | Refills: 4 | Status: SHIPPED | OUTPATIENT
Start: 2021-01-13 | End: 2022-02-14

## 2021-01-20 ENCOUNTER — PATIENT MESSAGE (OUTPATIENT)
Dept: INTERNAL MEDICINE | Facility: CLINIC | Age: 66
End: 2021-01-20

## 2021-01-25 LAB
HPV HR 12 DNA SPEC QL NAA+PROBE: NEGATIVE
HPV16 AG SPEC QL: NEGATIVE
HPV18 DNA SPEC QL NAA+PROBE: NEGATIVE

## 2021-02-08 LAB
FINAL PATHOLOGIC DIAGNOSIS: NORMAL
Lab: NORMAL

## 2021-02-09 ENCOUNTER — PATIENT MESSAGE (OUTPATIENT)
Dept: OBSTETRICS AND GYNECOLOGY | Facility: HOSPITAL | Age: 66
End: 2021-02-09

## 2021-02-23 ENCOUNTER — PATIENT MESSAGE (OUTPATIENT)
Dept: INTERNAL MEDICINE | Facility: CLINIC | Age: 66
End: 2021-02-23

## 2021-02-24 ENCOUNTER — TELEPHONE (OUTPATIENT)
Dept: GASTROENTEROLOGY | Facility: CLINIC | Age: 66
End: 2021-02-24

## 2021-03-20 ENCOUNTER — IMMUNIZATION (OUTPATIENT)
Dept: PRIMARY CARE CLINIC | Facility: CLINIC | Age: 66
End: 2021-03-20

## 2021-03-20 DIAGNOSIS — Z23 NEED FOR VACCINATION: Primary | ICD-10-CM

## 2021-03-20 PROCEDURE — 0001A PR IMMUNIZ ADMIN, SARS-COV-2 COVID-19 VACC, 30MCG/0.3ML, 1ST DOSE: ICD-10-PCS | Mod: CV19,S$GLB,, | Performed by: INTERNAL MEDICINE

## 2021-03-20 PROCEDURE — 0001A PR IMMUNIZ ADMIN, SARS-COV-2 COVID-19 VACC, 30MCG/0.3ML, 1ST DOSE: CPT | Mod: CV19,S$GLB,, | Performed by: INTERNAL MEDICINE

## 2021-03-20 PROCEDURE — 91300 PR SARS-COV- 2 COVID-19 VACCINE, NO PRSV, 30MCG/0.3ML, IM: CPT | Mod: S$GLB,,, | Performed by: INTERNAL MEDICINE

## 2021-03-20 PROCEDURE — 91300 PR SARS-COV- 2 COVID-19 VACCINE, NO PRSV, 30MCG/0.3ML, IM: ICD-10-PCS | Mod: S$GLB,,, | Performed by: INTERNAL MEDICINE

## 2021-03-20 RX ADMIN — Medication 0.3 ML: at 08:03

## 2021-03-31 ENCOUNTER — HOSPITAL ENCOUNTER (OUTPATIENT)
Dept: RADIOLOGY | Facility: HOSPITAL | Age: 66
Discharge: HOME | End: 2021-03-31
Attending: OBSTETRICS & GYNECOLOGY
Payer: COMMERCIAL

## 2021-03-31 VITALS — BODY MASS INDEX: 26.06 KG/M2 | HEIGHT: 61 IN | WEIGHT: 138 LBS

## 2021-03-31 DIAGNOSIS — Z01.419 ENCOUNTER FOR ANNUAL ROUTINE GYNECOLOGICAL EXAMINATION: ICD-10-CM

## 2021-03-31 DIAGNOSIS — Z12.31 SCREENING MAMMOGRAM, ENCOUNTER FOR: ICD-10-CM

## 2021-03-31 PROCEDURE — 77063 MAMMO DIGITAL SCREENING BILAT WITH TOMO: ICD-10-PCS | Mod: 26,,, | Performed by: RADIOLOGY

## 2021-03-31 PROCEDURE — 77067 MAMMO DIGITAL SCREENING BILAT WITH TOMO: ICD-10-PCS | Mod: 26,,, | Performed by: RADIOLOGY

## 2021-03-31 PROCEDURE — 77067 SCR MAMMO BI INCL CAD: CPT | Mod: 26,,, | Performed by: RADIOLOGY

## 2021-03-31 PROCEDURE — 77063 BREAST TOMOSYNTHESIS BI: CPT | Mod: 26,,, | Performed by: RADIOLOGY

## 2021-03-31 PROCEDURE — 77067 SCR MAMMO BI INCL CAD: CPT | Mod: TC

## 2021-04-11 ENCOUNTER — IMMUNIZATION (OUTPATIENT)
Dept: PRIMARY CARE CLINIC | Facility: CLINIC | Age: 66
End: 2021-04-11
Payer: COMMERCIAL

## 2021-04-11 DIAGNOSIS — Z23 NEED FOR VACCINATION: Primary | ICD-10-CM

## 2021-04-11 PROCEDURE — 91300 PR SARS-COV- 2 COVID-19 VACCINE, NO PRSV, 30MCG/0.3ML, IM: ICD-10-PCS | Mod: S$GLB,,, | Performed by: INTERNAL MEDICINE

## 2021-04-11 PROCEDURE — 0002A PR IMMUNIZ ADMIN, SARS-COV-2 COVID-19 VACC, 30MCG/0.3ML, 2ND DOSE: CPT | Mod: CV19,S$GLB,, | Performed by: INTERNAL MEDICINE

## 2021-04-11 PROCEDURE — 0002A PR IMMUNIZ ADMIN, SARS-COV-2 COVID-19 VACC, 30MCG/0.3ML, 2ND DOSE: ICD-10-PCS | Mod: CV19,S$GLB,, | Performed by: INTERNAL MEDICINE

## 2021-04-11 PROCEDURE — 91300 PR SARS-COV- 2 COVID-19 VACCINE, NO PRSV, 30MCG/0.3ML, IM: CPT | Mod: S$GLB,,, | Performed by: INTERNAL MEDICINE

## 2021-04-11 RX ADMIN — Medication 0.3 ML: at 08:04

## 2021-05-07 ENCOUNTER — PATIENT OUTREACH (OUTPATIENT)
Dept: ADMINISTRATIVE | Facility: OTHER | Age: 66
End: 2021-05-07

## 2021-05-07 DIAGNOSIS — Z12.11 ENCOUNTER FOR FIT (FECAL IMMUNOCHEMICAL TEST) SCREENING: Primary | ICD-10-CM

## 2021-05-13 ENCOUNTER — OFFICE VISIT (OUTPATIENT)
Dept: SPORTS MEDICINE | Facility: CLINIC | Age: 66
End: 2021-05-13
Payer: COMMERCIAL

## 2021-05-13 ENCOUNTER — HOSPITAL ENCOUNTER (OUTPATIENT)
Dept: RADIOLOGY | Facility: HOSPITAL | Age: 66
Discharge: HOME | End: 2021-05-13
Attending: PHYSICIAN ASSISTANT
Payer: COMMERCIAL

## 2021-05-13 VITALS
BODY MASS INDEX: 26.43 KG/M2 | HEIGHT: 61 IN | HEART RATE: 91 BPM | WEIGHT: 140 LBS | SYSTOLIC BLOOD PRESSURE: 132 MMHG | DIASTOLIC BLOOD PRESSURE: 73 MMHG

## 2021-05-13 DIAGNOSIS — M25.511 ACUTE PAIN OF RIGHT SHOULDER: Primary | ICD-10-CM

## 2021-05-13 DIAGNOSIS — M75.41 IMPINGEMENT SYNDROME OF RIGHT SHOULDER: ICD-10-CM

## 2021-05-13 DIAGNOSIS — M25.511 RIGHT SHOULDER PAIN, UNSPECIFIED CHRONICITY: ICD-10-CM

## 2021-05-13 PROCEDURE — 3008F PR BODY MASS INDEX (BMI) DOCUMENTED: ICD-10-PCS | Mod: CPTII,S$GLB,, | Performed by: PHYSICIAN ASSISTANT

## 2021-05-13 PROCEDURE — 99999 PR PBB SHADOW E&M-EST. PATIENT-LVL IV: CPT | Mod: PBBFAC,,, | Performed by: PHYSICIAN ASSISTANT

## 2021-05-13 PROCEDURE — 73030 X-RAY EXAM OF SHOULDER: CPT | Mod: 26,RT,, | Performed by: RADIOLOGY

## 2021-05-13 PROCEDURE — 3008F BODY MASS INDEX DOCD: CPT | Mod: CPTII,S$GLB,, | Performed by: PHYSICIAN ASSISTANT

## 2021-05-13 PROCEDURE — 99999 PR PBB SHADOW E&M-EST. PATIENT-LVL IV: ICD-10-PCS | Mod: PBBFAC,,, | Performed by: PHYSICIAN ASSISTANT

## 2021-05-13 PROCEDURE — 1126F PR PAIN SEVERITY QUANTIFIED, NO PAIN PRESENT: ICD-10-PCS | Mod: S$GLB,,, | Performed by: PHYSICIAN ASSISTANT

## 2021-05-13 PROCEDURE — 99203 PR OFFICE/OUTPT VISIT, NEW, LEVL III, 30-44 MIN: ICD-10-PCS | Mod: 25,S$GLB,, | Performed by: PHYSICIAN ASSISTANT

## 2021-05-13 PROCEDURE — 99203 OFFICE O/P NEW LOW 30 MIN: CPT | Mod: 25,S$GLB,, | Performed by: PHYSICIAN ASSISTANT

## 2021-05-13 PROCEDURE — 20610 PR DRAIN/INJECT LARGE JOINT/BURSA: ICD-10-PCS | Mod: RT,S$GLB,, | Performed by: PHYSICIAN ASSISTANT

## 2021-05-13 PROCEDURE — 73030 X-RAY EXAM OF SHOULDER: CPT | Mod: TC,RT

## 2021-05-13 PROCEDURE — 73030 XR SHOULDER COMPLETE 2 OR MORE VIEWS RIGHT: ICD-10-PCS | Mod: 26,RT,, | Performed by: RADIOLOGY

## 2021-05-13 PROCEDURE — 20610 DRAIN/INJ JOINT/BURSA W/O US: CPT | Mod: RT,S$GLB,, | Performed by: PHYSICIAN ASSISTANT

## 2021-05-13 PROCEDURE — 1126F AMNT PAIN NOTED NONE PRSNT: CPT | Mod: S$GLB,,, | Performed by: PHYSICIAN ASSISTANT

## 2021-05-13 RX ORDER — TRIAMCINOLONE ACETONIDE 40 MG/ML
40 INJECTION, SUSPENSION INTRA-ARTICULAR; INTRAMUSCULAR
Status: COMPLETED | OUTPATIENT
Start: 2021-05-13 | End: 2021-05-13

## 2021-05-13 RX ADMIN — TRIAMCINOLONE ACETONIDE 40 MG: 40 INJECTION, SUSPENSION INTRA-ARTICULAR; INTRAMUSCULAR at 10:05

## 2021-07-01 ENCOUNTER — HOSPITAL ENCOUNTER (OUTPATIENT)
Dept: RADIOLOGY | Facility: HOSPITAL | Age: 66
Discharge: HOME | End: 2021-07-01
Attending: INTERNAL MEDICINE
Payer: MEDICARE

## 2021-07-01 ENCOUNTER — OFFICE VISIT (OUTPATIENT)
Dept: INTERNAL MEDICINE | Facility: CLINIC | Age: 66
End: 2021-07-01
Payer: MEDICARE

## 2021-07-01 VITALS
DIASTOLIC BLOOD PRESSURE: 80 MMHG | SYSTOLIC BLOOD PRESSURE: 135 MMHG | WEIGHT: 136 LBS | BODY MASS INDEX: 25.68 KG/M2 | HEIGHT: 61 IN

## 2021-07-01 DIAGNOSIS — B96.89 ACUTE BACTERIAL SINUSITIS: Primary | ICD-10-CM

## 2021-07-01 DIAGNOSIS — J18.9 PNEUMONIA DUE TO INFECTIOUS ORGANISM, UNSPECIFIED LATERALITY, UNSPECIFIED PART OF LUNG: ICD-10-CM

## 2021-07-01 DIAGNOSIS — J01.90 ACUTE BACTERIAL SINUSITIS: Primary | ICD-10-CM

## 2021-07-01 PROCEDURE — 71046 X-RAY EXAM CHEST 2 VIEWS: CPT | Mod: TC

## 2021-07-01 PROCEDURE — 3008F PR BODY MASS INDEX (BMI) DOCUMENTED: ICD-10-PCS | Mod: CPTII,S$GLB,, | Performed by: INTERNAL MEDICINE

## 2021-07-01 PROCEDURE — 96372 THER/PROPH/DIAG INJ SC/IM: CPT | Mod: S$GLB,,, | Performed by: INTERNAL MEDICINE

## 2021-07-01 PROCEDURE — 99214 PR OFFICE/OUTPT VISIT, EST, LEVL IV, 30-39 MIN: ICD-10-PCS | Mod: 25,S$GLB,, | Performed by: INTERNAL MEDICINE

## 2021-07-01 PROCEDURE — 1126F PR PAIN SEVERITY QUANTIFIED, NO PAIN PRESENT: ICD-10-PCS | Mod: S$GLB,,, | Performed by: INTERNAL MEDICINE

## 2021-07-01 PROCEDURE — 1126F AMNT PAIN NOTED NONE PRSNT: CPT | Mod: S$GLB,,, | Performed by: INTERNAL MEDICINE

## 2021-07-01 PROCEDURE — 96372 PR INJECTION,THERAP/PROPH/DIAG2ST, IM OR SUBCUT: ICD-10-PCS | Mod: S$GLB,,, | Performed by: INTERNAL MEDICINE

## 2021-07-01 PROCEDURE — 71046 X-RAY EXAM CHEST 2 VIEWS: CPT | Mod: 26,,, | Performed by: RADIOLOGY

## 2021-07-01 PROCEDURE — 71046 XR CHEST PA AND LATERAL: ICD-10-PCS | Mod: 26,,, | Performed by: RADIOLOGY

## 2021-07-01 PROCEDURE — 99999 PR PBB SHADOW E&M-EST. PATIENT-LVL III: CPT | Mod: PBBFAC,,, | Performed by: INTERNAL MEDICINE

## 2021-07-01 PROCEDURE — 3008F BODY MASS INDEX DOCD: CPT | Mod: CPTII,S$GLB,, | Performed by: INTERNAL MEDICINE

## 2021-07-01 PROCEDURE — 99999 PR PBB SHADOW E&M-EST. PATIENT-LVL III: ICD-10-PCS | Mod: PBBFAC,,, | Performed by: INTERNAL MEDICINE

## 2021-07-01 PROCEDURE — 99214 OFFICE O/P EST MOD 30 MIN: CPT | Mod: 25,S$GLB,, | Performed by: INTERNAL MEDICINE

## 2021-07-01 RX ORDER — CEFTRIAXONE 500 MG/1
500 INJECTION, POWDER, FOR SOLUTION INTRAMUSCULAR; INTRAVENOUS
Status: COMPLETED | OUTPATIENT
Start: 2021-07-01 | End: 2021-07-01

## 2021-07-01 RX ORDER — DOXYCYCLINE 100 MG/1
100 CAPSULE ORAL 2 TIMES DAILY
Qty: 20 CAPSULE | Refills: 0 | Status: SHIPPED | OUTPATIENT
Start: 2021-07-01 | End: 2021-08-20

## 2021-07-01 RX ORDER — ALBUTEROL SULFATE 90 UG/1
AEROSOL, METERED RESPIRATORY (INHALATION)
COMMUNITY
Start: 2021-03-07 | End: 2023-01-13 | Stop reason: ALTCHOICE

## 2021-07-01 RX ORDER — BENZONATATE 100 MG/1
100 CAPSULE ORAL 3 TIMES DAILY PRN
Qty: 21 CAPSULE | Refills: 1 | Status: SHIPPED | OUTPATIENT
Start: 2021-07-01 | End: 2021-08-20

## 2021-07-01 RX ADMIN — CEFTRIAXONE 500 MG: 500 INJECTION, POWDER, FOR SOLUTION INTRAMUSCULAR; INTRAVENOUS at 11:07

## 2021-07-02 ENCOUNTER — TELEPHONE (OUTPATIENT)
Dept: INTERNAL MEDICINE | Facility: CLINIC | Age: 66
End: 2021-07-02

## 2021-07-02 ENCOUNTER — PATIENT MESSAGE (OUTPATIENT)
Dept: INTERNAL MEDICINE | Facility: CLINIC | Age: 66
End: 2021-07-02

## 2021-07-06 ENCOUNTER — PATIENT MESSAGE (OUTPATIENT)
Dept: INTERNAL MEDICINE | Facility: CLINIC | Age: 66
End: 2021-07-06

## 2021-07-06 ENCOUNTER — TELEPHONE (OUTPATIENT)
Dept: INTERNAL MEDICINE | Facility: CLINIC | Age: 66
End: 2021-07-06

## 2021-07-07 ENCOUNTER — PATIENT MESSAGE (OUTPATIENT)
Dept: ADMINISTRATIVE | Facility: HOSPITAL | Age: 66
End: 2021-07-07

## 2021-07-08 ENCOUNTER — DOCUMENTATION ONLY (OUTPATIENT)
Dept: ADMINISTRATIVE | Facility: HOSPITAL | Age: 66
End: 2021-07-08

## 2021-07-08 ENCOUNTER — PATIENT OUTREACH (OUTPATIENT)
Dept: ADMINISTRATIVE | Facility: HOSPITAL | Age: 66
End: 2021-07-08

## 2021-08-17 ENCOUNTER — PATIENT OUTREACH (OUTPATIENT)
Dept: ADMINISTRATIVE | Facility: OTHER | Age: 66
End: 2021-08-17

## 2021-08-20 ENCOUNTER — OFFICE VISIT (OUTPATIENT)
Dept: INTERNAL MEDICINE | Facility: CLINIC | Age: 66
End: 2021-08-20
Payer: MEDICARE

## 2021-08-20 VITALS
BODY MASS INDEX: 25.86 KG/M2 | HEIGHT: 61 IN | HEART RATE: 83 BPM | DIASTOLIC BLOOD PRESSURE: 70 MMHG | OXYGEN SATURATION: 98 % | WEIGHT: 137 LBS | TEMPERATURE: 98 F | RESPIRATION RATE: 16 BRPM | SYSTOLIC BLOOD PRESSURE: 130 MMHG

## 2021-08-20 DIAGNOSIS — I70.0 AORTIC ATHEROSCLEROSIS: ICD-10-CM

## 2021-08-20 DIAGNOSIS — Z78.0 POSTMENOPAUSAL ESTROGEN DEFICIENCY: ICD-10-CM

## 2021-08-20 DIAGNOSIS — E03.9 HYPOTHYROIDISM, UNSPECIFIED TYPE: Primary | ICD-10-CM

## 2021-08-20 DIAGNOSIS — I10 BENIGN ESSENTIAL HYPERTENSION: ICD-10-CM

## 2021-08-20 DIAGNOSIS — Z12.11 COLON CANCER SCREENING: ICD-10-CM

## 2021-08-20 DIAGNOSIS — E78.5 HYPERLIPIDEMIA, UNSPECIFIED HYPERLIPIDEMIA TYPE: ICD-10-CM

## 2021-08-20 PROCEDURE — 1160F RVW MEDS BY RX/DR IN RCRD: CPT | Mod: CPTII,S$GLB,, | Performed by: INTERNAL MEDICINE

## 2021-08-20 PROCEDURE — 3008F BODY MASS INDEX DOCD: CPT | Mod: CPTII,S$GLB,, | Performed by: INTERNAL MEDICINE

## 2021-08-20 PROCEDURE — 99999 PR PBB SHADOW E&M-EST. PATIENT-LVL III: ICD-10-PCS | Mod: PBBFAC,,, | Performed by: INTERNAL MEDICINE

## 2021-08-20 PROCEDURE — 99214 OFFICE O/P EST MOD 30 MIN: CPT | Mod: S$GLB,,, | Performed by: INTERNAL MEDICINE

## 2021-08-20 PROCEDURE — 3075F SYST BP GE 130 - 139MM HG: CPT | Mod: CPTII,S$GLB,, | Performed by: INTERNAL MEDICINE

## 2021-08-20 PROCEDURE — 1160F PR REVIEW ALL MEDS BY PRESCRIBER/CLIN PHARMACIST DOCUMENTED: ICD-10-PCS | Mod: CPTII,S$GLB,, | Performed by: INTERNAL MEDICINE

## 2021-08-20 PROCEDURE — 3075F PR MOST RECENT SYSTOLIC BLOOD PRESS GE 130-139MM HG: ICD-10-PCS | Mod: CPTII,S$GLB,, | Performed by: INTERNAL MEDICINE

## 2021-08-20 PROCEDURE — 99214 PR OFFICE/OUTPT VISIT, EST, LEVL IV, 30-39 MIN: ICD-10-PCS | Mod: S$GLB,,, | Performed by: INTERNAL MEDICINE

## 2021-08-20 PROCEDURE — 1159F MED LIST DOCD IN RCRD: CPT | Mod: CPTII,S$GLB,, | Performed by: INTERNAL MEDICINE

## 2021-08-20 PROCEDURE — 3008F PR BODY MASS INDEX (BMI) DOCUMENTED: ICD-10-PCS | Mod: CPTII,S$GLB,, | Performed by: INTERNAL MEDICINE

## 2021-08-20 PROCEDURE — 3078F DIAST BP <80 MM HG: CPT | Mod: CPTII,S$GLB,, | Performed by: INTERNAL MEDICINE

## 2021-08-20 PROCEDURE — 99499 RISK ADDL DX/OHS AUDIT: ICD-10-PCS | Mod: S$GLB,,, | Performed by: INTERNAL MEDICINE

## 2021-08-20 PROCEDURE — 99499 UNLISTED E&M SERVICE: CPT | Mod: S$GLB,,, | Performed by: INTERNAL MEDICINE

## 2021-08-20 PROCEDURE — 1159F PR MEDICATION LIST DOCUMENTED IN MEDICAL RECORD: ICD-10-PCS | Mod: CPTII,S$GLB,, | Performed by: INTERNAL MEDICINE

## 2021-08-20 PROCEDURE — 3078F PR MOST RECENT DIASTOLIC BLOOD PRESSURE < 80 MM HG: ICD-10-PCS | Mod: CPTII,S$GLB,, | Performed by: INTERNAL MEDICINE

## 2021-08-20 PROCEDURE — 99999 PR PBB SHADOW E&M-EST. PATIENT-LVL III: CPT | Mod: PBBFAC,,, | Performed by: INTERNAL MEDICINE

## 2021-08-20 RX ORDER — PRAVASTATIN SODIUM 80 MG/1
80 TABLET ORAL DAILY
Qty: 90 TABLET | Refills: 3 | Status: SHIPPED | OUTPATIENT
Start: 2021-08-20 | End: 2021-09-30

## 2021-09-20 ENCOUNTER — HOSPITAL ENCOUNTER (OUTPATIENT)
Dept: RADIOLOGY | Facility: CLINIC | Age: 66
Discharge: HOME | End: 2021-09-20
Attending: INTERNAL MEDICINE
Payer: MEDICARE

## 2021-09-20 DIAGNOSIS — Z78.0 POSTMENOPAUSAL ESTROGEN DEFICIENCY: ICD-10-CM

## 2021-09-20 PROCEDURE — 77080 DXA BONE DENSITY AXIAL: CPT | Mod: TC,HCNC

## 2021-09-20 PROCEDURE — 77080 DXA BONE DENSITY AXIAL: CPT | Mod: 26,HCNC,, | Performed by: INTERNAL MEDICINE

## 2021-09-20 PROCEDURE — 77080 DEXA BONE DENSITY SPINE HIP: ICD-10-PCS | Mod: 26,HCNC,, | Performed by: INTERNAL MEDICINE

## 2021-09-23 ENCOUNTER — PATIENT MESSAGE (OUTPATIENT)
Dept: INTERNAL MEDICINE | Facility: CLINIC | Age: 66
End: 2021-09-23

## 2021-09-25 ENCOUNTER — TELEPHONE (OUTPATIENT)
Dept: INTERNAL MEDICINE | Facility: CLINIC | Age: 66
End: 2021-09-25

## 2021-09-25 DIAGNOSIS — R20.2 PARESTHESIA: ICD-10-CM

## 2021-09-25 DIAGNOSIS — D64.9 NORMOCYTIC ANEMIA: Primary | ICD-10-CM

## 2021-09-27 ENCOUNTER — PATIENT MESSAGE (OUTPATIENT)
Dept: INTERNAL MEDICINE | Facility: CLINIC | Age: 66
End: 2021-09-27

## 2021-09-28 ENCOUNTER — LAB VISIT (OUTPATIENT)
Dept: LAB | Facility: HOSPITAL | Age: 66
End: 2021-09-28
Attending: INTERNAL MEDICINE
Payer: MEDICARE

## 2021-09-28 DIAGNOSIS — R20.2 PARESTHESIA: ICD-10-CM

## 2021-09-28 DIAGNOSIS — D64.9 NORMOCYTIC ANEMIA: ICD-10-CM

## 2021-09-28 LAB
BASOPHILS # BLD AUTO: 0.03 K/UL (ref 0–0.2)
BASOPHILS NFR BLD: 0.6 % (ref 0–1.9)
DIFFERENTIAL METHOD: ABNORMAL
EOSINOPHIL # BLD AUTO: 0.1 K/UL (ref 0–0.5)
EOSINOPHIL NFR BLD: 1.7 % (ref 0–8)
ERYTHROCYTE [DISTWIDTH] IN BLOOD BY AUTOMATED COUNT: 12.7 % (ref 11.5–14.5)
FERRITIN SERPL-MCNC: 113 NG/ML (ref 20–300)
FOLATE SERPL-MCNC: 12.8 NG/ML (ref 4–24)
HCT VFR BLD AUTO: 38.1 % (ref 37–48.5)
HGB BLD-MCNC: 12.3 G/DL (ref 12–16)
IMM GRANULOCYTES # BLD AUTO: 0.03 K/UL (ref 0–0.04)
IMM GRANULOCYTES NFR BLD AUTO: 0.6 % (ref 0–0.5)
IRON SERPL-MCNC: 91 UG/DL (ref 30–160)
LYMPHOCYTES # BLD AUTO: 1 K/UL (ref 1–4.8)
LYMPHOCYTES NFR BLD: 21.5 % (ref 18–48)
MCH RBC QN AUTO: 31.7 PG (ref 27–31)
MCHC RBC AUTO-ENTMCNC: 32.3 G/DL (ref 32–36)
MCV RBC AUTO: 98 FL (ref 82–98)
MONOCYTES # BLD AUTO: 0.3 K/UL (ref 0.3–1)
MONOCYTES NFR BLD: 7.2 % (ref 4–15)
NEUTROPHILS # BLD AUTO: 3.2 K/UL (ref 1.8–7.7)
NEUTROPHILS NFR BLD: 68.4 % (ref 38–73)
NRBC BLD-RTO: 0 /100 WBC
PLATELET # BLD AUTO: 228 K/UL (ref 150–450)
PMV BLD AUTO: 11.1 FL (ref 9.2–12.9)
RBC # BLD AUTO: 3.88 M/UL (ref 4–5.4)
SATURATED IRON: 20 % (ref 20–50)
TOTAL IRON BINDING CAPACITY: 460 UG/DL (ref 250–450)
TRANSFERRIN SERPL-MCNC: 311 MG/DL (ref 200–375)
VIT B12 SERPL-MCNC: 451 PG/ML (ref 210–950)
WBC # BLD AUTO: 4.7 K/UL (ref 3.9–12.7)

## 2021-09-28 PROCEDURE — 82607 VITAMIN B-12: CPT | Mod: HCNC | Performed by: INTERNAL MEDICINE

## 2021-09-28 PROCEDURE — 84466 ASSAY OF TRANSFERRIN: CPT | Mod: HCNC | Performed by: INTERNAL MEDICINE

## 2021-09-28 PROCEDURE — 85025 COMPLETE CBC W/AUTO DIFF WBC: CPT | Mod: HCNC | Performed by: INTERNAL MEDICINE

## 2021-09-28 PROCEDURE — 36415 COLL VENOUS BLD VENIPUNCTURE: CPT | Mod: HCNC | Performed by: INTERNAL MEDICINE

## 2021-09-28 PROCEDURE — 82746 ASSAY OF FOLIC ACID SERUM: CPT | Mod: HCNC | Performed by: INTERNAL MEDICINE

## 2021-09-28 PROCEDURE — 82728 ASSAY OF FERRITIN: CPT | Mod: HCNC | Performed by: INTERNAL MEDICINE

## 2021-09-30 ENCOUNTER — PATIENT MESSAGE (OUTPATIENT)
Dept: INTERNAL MEDICINE | Facility: CLINIC | Age: 66
End: 2021-09-30

## 2021-09-30 RX ORDER — ROSUVASTATIN CALCIUM 40 MG/1
40 TABLET, COATED ORAL NIGHTLY
Qty: 90 TABLET | Refills: 3 | Status: SHIPPED | OUTPATIENT
Start: 2021-09-30 | End: 2022-10-10

## 2021-10-04 ENCOUNTER — PATIENT MESSAGE (OUTPATIENT)
Dept: ADMINISTRATIVE | Facility: HOSPITAL | Age: 66
End: 2021-10-04

## 2021-10-15 ENCOUNTER — DOCUMENTATION ONLY (OUTPATIENT)
Dept: ADMINISTRATIVE | Facility: HOSPITAL | Age: 66
End: 2021-10-15

## 2021-10-15 ENCOUNTER — TELEPHONE (OUTPATIENT)
Dept: ADMINISTRATIVE | Facility: HOSPITAL | Age: 66
End: 2021-10-15

## 2021-10-15 ENCOUNTER — PATIENT MESSAGE (OUTPATIENT)
Dept: ADMINISTRATIVE | Facility: HOSPITAL | Age: 66
End: 2021-10-15

## 2021-10-16 ENCOUNTER — IMMUNIZATION (OUTPATIENT)
Dept: INTERNAL MEDICINE | Facility: CLINIC | Age: 66
End: 2021-10-16
Payer: MEDICARE

## 2021-10-16 DIAGNOSIS — Z23 NEED FOR VACCINATION: Primary | ICD-10-CM

## 2021-10-16 PROCEDURE — G0008 ADMIN INFLUENZA VIRUS VAC: HCPCS | Mod: HCNC,S$GLB,, | Performed by: INTERNAL MEDICINE

## 2021-10-16 PROCEDURE — 0003A COVID-19, MRNA, LNP-S, PF, 30 MCG/0.3 ML DOSE VACCINE: CPT | Mod: HCNC,CV19,PBBFAC | Performed by: INTERNAL MEDICINE

## 2021-10-16 PROCEDURE — 90694 FLU VACCINE - QUADRIVALENT - ADJUVANTED: ICD-10-PCS | Mod: HCNC,S$GLB,, | Performed by: INTERNAL MEDICINE

## 2021-10-16 PROCEDURE — G0008 FLU VACCINE - QUADRIVALENT - ADJUVANTED: ICD-10-PCS | Mod: HCNC,S$GLB,, | Performed by: INTERNAL MEDICINE

## 2021-10-16 PROCEDURE — 91300 COVID-19, MRNA, LNP-S, PF, 30 MCG/0.3 ML DOSE VACCINE: CPT | Mod: HCNC,PBBFAC | Performed by: INTERNAL MEDICINE

## 2021-10-16 PROCEDURE — 90694 VACC AIIV4 NO PRSRV 0.5ML IM: CPT | Mod: HCNC,S$GLB,, | Performed by: INTERNAL MEDICINE

## 2021-10-21 ENCOUNTER — PATIENT MESSAGE (OUTPATIENT)
Dept: ADMINISTRATIVE | Facility: HOSPITAL | Age: 66
End: 2021-10-21
Payer: MEDICARE

## 2021-10-21 ENCOUNTER — LAB VISIT (OUTPATIENT)
Dept: LAB | Facility: HOSPITAL | Age: 66
End: 2021-10-21
Attending: INTERNAL MEDICINE
Payer: MEDICARE

## 2021-10-21 DIAGNOSIS — Z12.11 ENCOUNTER FOR FIT (FECAL IMMUNOCHEMICAL TEST) SCREENING: ICD-10-CM

## 2021-10-21 PROCEDURE — 82274 ASSAY TEST FOR BLOOD FECAL: CPT | Mod: HCNC | Performed by: INTERNAL MEDICINE

## 2021-10-25 LAB — HEMOCCULT STL QL IA: NEGATIVE

## 2021-11-18 ENCOUNTER — PATIENT MESSAGE (OUTPATIENT)
Dept: INTERNAL MEDICINE | Facility: CLINIC | Age: 66
End: 2021-11-18
Payer: MEDICARE

## 2021-11-30 ENCOUNTER — PATIENT MESSAGE (OUTPATIENT)
Dept: OBSTETRICS AND GYNECOLOGY | Facility: CLINIC | Age: 66
End: 2021-11-30
Payer: MEDICARE

## 2021-11-30 ENCOUNTER — PATIENT MESSAGE (OUTPATIENT)
Dept: DERMATOLOGY | Facility: CLINIC | Age: 66
End: 2021-11-30
Payer: MEDICARE

## 2021-12-01 ENCOUNTER — PATIENT MESSAGE (OUTPATIENT)
Dept: OBSTETRICS AND GYNECOLOGY | Facility: CLINIC | Age: 66
End: 2021-12-01
Payer: MEDICARE

## 2021-12-01 ENCOUNTER — TELEPHONE (OUTPATIENT)
Dept: OBSTETRICS AND GYNECOLOGY | Facility: CLINIC | Age: 66
End: 2021-12-01
Payer: MEDICARE

## 2021-12-01 RX ORDER — VALACYCLOVIR HYDROCHLORIDE 1 G/1
1000 TABLET, FILM COATED ORAL EVERY 12 HOURS
Qty: 6 TABLET | Refills: 2 | Status: SHIPPED | OUTPATIENT
Start: 2021-12-01 | End: 2021-12-04

## 2021-12-07 ENCOUNTER — PATIENT OUTREACH (OUTPATIENT)
Dept: ADMINISTRATIVE | Facility: OTHER | Age: 66
End: 2021-12-07
Payer: MEDICARE

## 2021-12-08 ENCOUNTER — OFFICE VISIT (OUTPATIENT)
Dept: OBSTETRICS AND GYNECOLOGY | Facility: CLINIC | Age: 66
End: 2021-12-08
Payer: MEDICARE

## 2021-12-08 VITALS
WEIGHT: 141.56 LBS | DIASTOLIC BLOOD PRESSURE: 80 MMHG | BODY MASS INDEX: 26.74 KG/M2 | SYSTOLIC BLOOD PRESSURE: 122 MMHG

## 2021-12-08 DIAGNOSIS — N95.2 VAGINAL ATROPHY: ICD-10-CM

## 2021-12-08 DIAGNOSIS — N89.8 VAGINAL IRRITATION: Primary | ICD-10-CM

## 2021-12-08 PROCEDURE — 99999 PR PBB SHADOW E&M-EST. PATIENT-LVL III: CPT | Mod: PBBFAC,HCNC,, | Performed by: NURSE PRACTITIONER

## 2021-12-08 PROCEDURE — 99999 PR PBB SHADOW E&M-EST. PATIENT-LVL III: ICD-10-PCS | Mod: PBBFAC,HCNC,, | Performed by: NURSE PRACTITIONER

## 2021-12-08 PROCEDURE — 99213 PR OFFICE/OUTPT VISIT, EST, LEVL III, 20-29 MIN: ICD-10-PCS | Mod: HCNC,S$GLB,, | Performed by: NURSE PRACTITIONER

## 2021-12-08 PROCEDURE — 4010F PR ACE/ARB THEARPY RXD/TAKEN: ICD-10-PCS | Mod: HCNC,CPTII,S$GLB, | Performed by: NURSE PRACTITIONER

## 2021-12-08 PROCEDURE — 99213 OFFICE O/P EST LOW 20 MIN: CPT | Mod: HCNC,S$GLB,, | Performed by: NURSE PRACTITIONER

## 2021-12-08 PROCEDURE — 87661 TRICHOMONAS VAGINALIS AMPLIF: CPT | Mod: HCNC,59 | Performed by: NURSE PRACTITIONER

## 2021-12-08 PROCEDURE — 87481 CANDIDA DNA AMP PROBE: CPT | Mod: 59,HCNC | Performed by: NURSE PRACTITIONER

## 2021-12-08 PROCEDURE — 4010F ACE/ARB THERAPY RXD/TAKEN: CPT | Mod: HCNC,CPTII,S$GLB, | Performed by: NURSE PRACTITIONER

## 2021-12-08 RX ORDER — ESTRADIOL 0.1 MG/G
1 CREAM VAGINAL DAILY
Qty: 42.5 G | Refills: 1 | Status: SHIPPED | OUTPATIENT
Start: 2021-12-08 | End: 2022-11-02

## 2021-12-08 RX ORDER — CLOTRIMAZOLE AND BETAMETHASONE DIPROPIONATE 10; .64 MG/G; MG/G
CREAM TOPICAL
Qty: 15 G | Refills: 1 | Status: SHIPPED | OUTPATIENT
Start: 2021-12-08 | End: 2022-12-08

## 2022-01-03 NOTE — PROGRESS NOTES
Subjective:      Patient ID: Rehana Whitten is a 66 y.o. female.    Chief Complaint: No chief complaint on file.    HPI:   Follow up from 9/2021 clinic visit.   New to this provider. Very sweet lady.   Fasting today for follow up labs.   No medical concerns voiced during visit today.   Plans on following up with Dr. Valverde in the future at new location.     Review of Systems:  Eyes: denies visual changes at this time denies floaters   ENT: no nasal congestion or sore throat  Respiratory: no cough or shorness of breath  Cardiovascular: no chest pain or palpitations  Gastrointestinal: no nausea or vomiting, no abdominal pain or change in bowel habits  Genitourinary: no hematuria or dysuria; denies frequency  Hematologic/Lymphatic: no easy bruising or lymphadenopathy  Musculoskeletal: no arthralgias or myalgias  Neurological: no seizures or tremors  Endocrine: no heat or cold intolerance    Objective:   Vitals:  There were no vitals filed for this visit.  Wt Readings from Last 3 Encounters:   12/08/21 0838 64.2 kg (141 lb 8.6 oz)   08/20/21 0800 62.1 kg (137 lb)   07/01/21 1027 61.7 kg (136 lb)     There is no height or weight on file to calculate BMI.   Wt Readings from Last 3 Encounters:   01/04/22 65.3 kg (143 lb 15.4 oz)   12/08/21 64.2 kg (141 lb 8.6 oz)   08/20/21 62.1 kg (137 lb)     Temp Readings from Last 3 Encounters:   08/20/21 97.9 °F (36.6 °C) (Oral)   08/12/20 98.8 °F (37.1 °C)   01/16/20 98.2 °F (36.8 °C)     BP Readings from Last 3 Encounters:   01/04/22 122/78   12/08/21 122/80   08/20/21 130/70     Pulse Readings from Last 3 Encounters:   01/04/22 74   08/20/21 83   05/13/21 91       Physical Exam:  General: Well developed, well nourished. No distress.  HEENT: Head is normocephalic, atraumatic; ears are normal.   Eyes: Clear conjunctiva.  Neck: Supple, symmetrical neck; trachea midline.  Cardiovascular: Heart with regular rate and rhythm. No murmurs, gallops or rubs  Extremities: No LE edema.  Pulses 2+ and symmetric.   Abdomen: Abdomen is soft, non-tender non-distended with normal bowel sounds.  Skin: Skin color, texture, turgor normal. No rashes.  Musculoskeletal: Normal gait.   Neurologic: Normal strength and tone. No focal numbness or weakness.   Psychiatric: Not depressed.      Laboratory:  Lab Results   Component Value Date    WBC 4.70 09/28/2021    HGB 12.3 09/28/2021    HCT 38.1 09/28/2021     09/28/2021    CHOL 200 (H) 09/20/2021    TRIG 140 09/20/2021    HDL 64 09/20/2021    ALT 22 09/20/2021    AST 20 09/20/2021     09/20/2021    K 4.0 09/20/2021     09/20/2021    CREATININE 0.7 09/20/2021    BUN 17 09/20/2021    CO2 25 09/20/2021    TSH 2.129 09/20/2021    INR 0.9 08/08/2005    HGBA1C 5.6 06/26/2019       Assessment and Plan:      Follow up from 9/2021 appt with Dr. Valverde.       Uncontrolled Lipid Panel:   Other hyperlipidemia  -     Comprehensive Metabolic Panel; Future; Expected date: 01/04/2022  -     Lipid Panel; Future; Expected date: 01/04/2022  Lab Results   Component Value Date    CHOL 200 (H) 09/20/2021    CHOL 204 (H) 09/24/2020    CHOL 209 (H) 01/16/2020     Lab Results   Component Value Date    HDL 64 09/20/2021    HDL 66 09/24/2020    HDL 75 01/16/2020     Lab Results   Component Value Date    LDLCALC 108.0 09/20/2021    LDLCALC 108.8 09/24/2020    LDLCALC 105.8 01/16/2020     Lab Results   Component Value Date    TRIG 140 09/20/2021    TRIG 146 09/24/2020    TRIG 141 01/16/2020     Lab Results   Component Value Date    CHOLHDL 32.0 09/20/2021    CHOLHDL 32.4 09/24/2020    CHOLHDL 35.9 01/16/2020   *Pravachl stopped and started on Crestor 40 daily in 9/2021  ` check fasting Lipid panel and LFTs today      Incidental notations made to Iron deficiency anemia, unspecified iron deficiency anemia type in 9/2021: not on supplements; denies any active signs of symptoms of bleeding.   Check Iron stores and CBC today to surveillance.   Last TIBC: 460 with Sat Iron level:  20  -     Iron and TIBC; Future; Expected date: 01/04/2022  -     Ferritin; Future; Expected date: 01/04/2022  -     CBC Auto Differential; Future; Expected date: 01/04/2022    *Follow up with Dr Valverde at new locations as scheduled or in 3 months.     Future Appointments   Date Time Provider Department Center   1/4/2022 11:10 AM LAB, APPOINTMENT [facility]TH[facility] LAB IM [facility]   1/19/2022  2:00 PM Ary Galan MD [facility] DERM [facility]        Medication List          Accurate as of January 4, 2022 10:52 AM. If you have any questions, ask your nurse or doctor.            CONTINUE taking these medications    albuterol 90 mcg/actuation inhaler  Commonly known as: PROVENTIL/VENTOLIN HFA     candesartan 8 MG tablet  Commonly known as: ATACAND  TAKE 1 TABLET(8 MG) BY MOUTH EVERY DAY     citalopram 20 MG tablet  Commonly known as: CeleXA  TAKE 1 TABLET BY MOUTH EVERY DAY     clotrimazole-betamethasone 1-0.05% cream  Commonly known as: LOTRISONE  Apply to affected area 2 times daily     estradioL 0.01 % (0.1 mg/gram) vaginal cream  Commonly known as: ESTRACE  Place 1 g vaginally once daily.     levothyroxine 88 MCG tablet  Commonly known as: SYNTHROID  TAKE 1 TABLET BY MOUTH EVERY DAY     rosuvastatin 40 MG Tab  Commonly known as: CRESTOR  Take 1 tablet (40 mg total) by mouth every evening.     tretinoin 0.025 % cream  Commonly known as: RETIN-A  Apply topically every evening.     triamterene-hydrochlorothiazide 37.5-25 mg 37.5-25 mg per capsule  Commonly known as: DYAZIDE  TAKE 1 CAPSULE BY MOUTH EVERY MORNING     valACYclovir 500 MG tablet  Commonly known as: VALTREX  Take 1 tablet (500 mg total) by mouth once daily.          Signing Physician:  IRENE Magaña

## 2022-01-04 ENCOUNTER — OFFICE VISIT (OUTPATIENT)
Dept: INTERNAL MEDICINE | Facility: CLINIC | Age: 67
End: 2022-01-04
Payer: MEDICARE

## 2022-01-04 ENCOUNTER — LAB VISIT (OUTPATIENT)
Dept: LAB | Facility: HOSPITAL | Age: 67
End: 2022-01-04
Attending: INTERNAL MEDICINE
Payer: MEDICARE

## 2022-01-04 VITALS
BODY MASS INDEX: 27.18 KG/M2 | HEART RATE: 74 BPM | WEIGHT: 143.94 LBS | DIASTOLIC BLOOD PRESSURE: 78 MMHG | HEIGHT: 61 IN | OXYGEN SATURATION: 97 % | SYSTOLIC BLOOD PRESSURE: 122 MMHG | RESPIRATION RATE: 18 BRPM

## 2022-01-04 DIAGNOSIS — E78.49 OTHER HYPERLIPIDEMIA: ICD-10-CM

## 2022-01-04 DIAGNOSIS — D50.9 IRON DEFICIENCY ANEMIA, UNSPECIFIED IRON DEFICIENCY ANEMIA TYPE: ICD-10-CM

## 2022-01-04 DIAGNOSIS — E78.49 OTHER HYPERLIPIDEMIA: Primary | ICD-10-CM

## 2022-01-04 LAB
ALBUMIN SERPL BCP-MCNC: 4.4 G/DL (ref 3.5–5.2)
ALP SERPL-CCNC: 131 U/L (ref 55–135)
ALT SERPL W/O P-5'-P-CCNC: 31 U/L (ref 10–44)
ANION GAP SERPL CALC-SCNC: 11 MMOL/L (ref 8–16)
AST SERPL-CCNC: 28 U/L (ref 10–40)
BASOPHILS # BLD AUTO: 0.05 K/UL (ref 0–0.2)
BASOPHILS NFR BLD: 1 % (ref 0–1.9)
BILIRUB SERPL-MCNC: 1.2 MG/DL (ref 0.1–1)
BUN SERPL-MCNC: 12 MG/DL (ref 8–23)
CALCIUM SERPL-MCNC: 10.1 MG/DL (ref 8.7–10.5)
CHLORIDE SERPL-SCNC: 99 MMOL/L (ref 95–110)
CHOLEST SERPL-MCNC: 170 MG/DL (ref 120–199)
CHOLEST/HDLC SERPL: 2.9 {RATIO} (ref 2–5)
CO2 SERPL-SCNC: 26 MMOL/L (ref 23–29)
CREAT SERPL-MCNC: 0.7 MG/DL (ref 0.5–1.4)
DIFFERENTIAL METHOD: ABNORMAL
EOSINOPHIL # BLD AUTO: 0.1 K/UL (ref 0–0.5)
EOSINOPHIL NFR BLD: 2 % (ref 0–8)
ERYTHROCYTE [DISTWIDTH] IN BLOOD BY AUTOMATED COUNT: 13.2 % (ref 11.5–14.5)
EST. GFR  (AFRICAN AMERICAN): >60 ML/MIN/1.73 M^2
EST. GFR  (NON AFRICAN AMERICAN): >60 ML/MIN/1.73 M^2
FERRITIN SERPL-MCNC: 145 NG/ML (ref 20–300)
GLUCOSE SERPL-MCNC: 107 MG/DL (ref 70–110)
HCT VFR BLD AUTO: 40.6 % (ref 37–48.5)
HDLC SERPL-MCNC: 59 MG/DL (ref 40–75)
HDLC SERPL: 34.7 % (ref 20–50)
HGB BLD-MCNC: 12.7 G/DL (ref 12–16)
IMM GRANULOCYTES # BLD AUTO: 0.04 K/UL (ref 0–0.04)
IMM GRANULOCYTES NFR BLD AUTO: 0.8 % (ref 0–0.5)
IRON SERPL-MCNC: 79 UG/DL (ref 30–160)
LDLC SERPL CALC-MCNC: 77.6 MG/DL (ref 63–159)
LYMPHOCYTES # BLD AUTO: 1.2 K/UL (ref 1–4.8)
LYMPHOCYTES NFR BLD: 22.7 % (ref 18–48)
MCH RBC QN AUTO: 31.3 PG (ref 27–31)
MCHC RBC AUTO-ENTMCNC: 31.3 G/DL (ref 32–36)
MCV RBC AUTO: 100 FL (ref 82–98)
MONOCYTES # BLD AUTO: 0.4 K/UL (ref 0.3–1)
MONOCYTES NFR BLD: 7 % (ref 4–15)
NEUTROPHILS # BLD AUTO: 3.4 K/UL (ref 1.8–7.7)
NEUTROPHILS NFR BLD: 66.5 % (ref 38–73)
NONHDLC SERPL-MCNC: 111 MG/DL
NRBC BLD-RTO: 0 /100 WBC
PLATELET # BLD AUTO: 253 K/UL (ref 150–450)
PMV BLD AUTO: 11.1 FL (ref 9.2–12.9)
POTASSIUM SERPL-SCNC: 4.3 MMOL/L (ref 3.5–5.1)
PROT SERPL-MCNC: 7.6 G/DL (ref 6–8.4)
RBC # BLD AUTO: 4.06 M/UL (ref 4–5.4)
SATURATED IRON: 17 % (ref 20–50)
SODIUM SERPL-SCNC: 136 MMOL/L (ref 136–145)
TOTAL IRON BINDING CAPACITY: 475 UG/DL (ref 250–450)
TRANSFERRIN SERPL-MCNC: 321 MG/DL (ref 200–375)
TRIGL SERPL-MCNC: 167 MG/DL (ref 30–150)
WBC # BLD AUTO: 5.12 K/UL (ref 3.9–12.7)

## 2022-01-04 PROCEDURE — 3078F PR MOST RECENT DIASTOLIC BLOOD PRESSURE < 80 MM HG: ICD-10-PCS | Mod: HCNC,CPTII,S$GLB, | Performed by: NURSE PRACTITIONER

## 2022-01-04 PROCEDURE — 84466 ASSAY OF TRANSFERRIN: CPT | Mod: HCNC | Performed by: NURSE PRACTITIONER

## 2022-01-04 PROCEDURE — 36415 COLL VENOUS BLD VENIPUNCTURE: CPT | Mod: HCNC | Performed by: NURSE PRACTITIONER

## 2022-01-04 PROCEDURE — 1126F PR PAIN SEVERITY QUANTIFIED, NO PAIN PRESENT: ICD-10-PCS | Mod: HCNC,CPTII,S$GLB, | Performed by: NURSE PRACTITIONER

## 2022-01-04 PROCEDURE — 80061 LIPID PANEL: CPT | Mod: HCNC | Performed by: NURSE PRACTITIONER

## 2022-01-04 PROCEDURE — 85025 COMPLETE CBC W/AUTO DIFF WBC: CPT | Mod: HCNC | Performed by: NURSE PRACTITIONER

## 2022-01-04 PROCEDURE — 80053 COMPREHEN METABOLIC PANEL: CPT | Mod: HCNC | Performed by: NURSE PRACTITIONER

## 2022-01-04 PROCEDURE — 3074F PR MOST RECENT SYSTOLIC BLOOD PRESSURE < 130 MM HG: ICD-10-PCS | Mod: HCNC,CPTII,S$GLB, | Performed by: NURSE PRACTITIONER

## 2022-01-04 PROCEDURE — 99214 OFFICE O/P EST MOD 30 MIN: CPT | Mod: HCNC,S$GLB,, | Performed by: NURSE PRACTITIONER

## 2022-01-04 PROCEDURE — 3288F PR FALLS RISK ASSESSMENT DOCUMENTED: ICD-10-PCS | Mod: HCNC,CPTII,S$GLB, | Performed by: NURSE PRACTITIONER

## 2022-01-04 PROCEDURE — 3008F BODY MASS INDEX DOCD: CPT | Mod: HCNC,CPTII,S$GLB, | Performed by: NURSE PRACTITIONER

## 2022-01-04 PROCEDURE — 3078F DIAST BP <80 MM HG: CPT | Mod: HCNC,CPTII,S$GLB, | Performed by: NURSE PRACTITIONER

## 2022-01-04 PROCEDURE — 1101F PR PT FALLS ASSESS DOC 0-1 FALLS W/OUT INJ PAST YR: ICD-10-PCS | Mod: HCNC,CPTII,S$GLB, | Performed by: NURSE PRACTITIONER

## 2022-01-04 PROCEDURE — 99999 PR PBB SHADOW E&M-EST. PATIENT-LVL IV: CPT | Mod: PBBFAC,HCNC,, | Performed by: NURSE PRACTITIONER

## 2022-01-04 PROCEDURE — 3288F FALL RISK ASSESSMENT DOCD: CPT | Mod: HCNC,CPTII,S$GLB, | Performed by: NURSE PRACTITIONER

## 2022-01-04 PROCEDURE — 1159F PR MEDICATION LIST DOCUMENTED IN MEDICAL RECORD: ICD-10-PCS | Mod: HCNC,CPTII,S$GLB, | Performed by: NURSE PRACTITIONER

## 2022-01-04 PROCEDURE — 3008F PR BODY MASS INDEX (BMI) DOCUMENTED: ICD-10-PCS | Mod: HCNC,CPTII,S$GLB, | Performed by: NURSE PRACTITIONER

## 2022-01-04 PROCEDURE — 1101F PT FALLS ASSESS-DOCD LE1/YR: CPT | Mod: HCNC,CPTII,S$GLB, | Performed by: NURSE PRACTITIONER

## 2022-01-04 PROCEDURE — 1126F AMNT PAIN NOTED NONE PRSNT: CPT | Mod: HCNC,CPTII,S$GLB, | Performed by: NURSE PRACTITIONER

## 2022-01-04 PROCEDURE — 99999 PR PBB SHADOW E&M-EST. PATIENT-LVL IV: ICD-10-PCS | Mod: PBBFAC,HCNC,, | Performed by: NURSE PRACTITIONER

## 2022-01-04 PROCEDURE — 1159F MED LIST DOCD IN RCRD: CPT | Mod: HCNC,CPTII,S$GLB, | Performed by: NURSE PRACTITIONER

## 2022-01-04 PROCEDURE — 99214 PR OFFICE/OUTPT VISIT, EST, LEVL IV, 30-39 MIN: ICD-10-PCS | Mod: HCNC,S$GLB,, | Performed by: NURSE PRACTITIONER

## 2022-01-04 PROCEDURE — 3074F SYST BP LT 130 MM HG: CPT | Mod: HCNC,CPTII,S$GLB, | Performed by: NURSE PRACTITIONER

## 2022-01-04 PROCEDURE — 82728 ASSAY OF FERRITIN: CPT | Mod: HCNC | Performed by: NURSE PRACTITIONER

## 2022-01-10 DIAGNOSIS — F41.9 ANXIETY: ICD-10-CM

## 2022-01-10 DIAGNOSIS — F32.A DEPRESSION, UNSPECIFIED DEPRESSION TYPE: ICD-10-CM

## 2022-01-10 NOTE — TELEPHONE ENCOUNTER
No new care gaps identified.  Powered by [employer]. Reference number: [id number].   1/10/2022 3:11:46 AM CST   No

## 2022-01-13 RX ORDER — CITALOPRAM 20 MG/1
TABLET, FILM COATED ORAL
Qty: 90 TABLET | Refills: 2 | Status: SHIPPED | OUTPATIENT
Start: 2022-01-13 | End: 2022-08-08 | Stop reason: SDUPTHER

## 2022-01-13 NOTE — TELEPHONE ENCOUNTER
Refill Authorization Note   Rehana Whitten  is requesting a refill authorization.  Brief Assessment and Rationale for Refill:  Approve     Medication Therapy Plan:       Medication Reconciliation Completed: No   Comments:   --->Care Gap information included below if applicable.   Orders Placed This Encounter    citalopram (CELEXA) 20 MG tablet      Requested Prescriptions   Signed Prescriptions Disp Refills    citalopram (CELEXA) 20 MG tablet 90 tablet 2     Sig: TAKE 1 TABLET BY MOUTH EVERY DAY       Psychiatry:  Antidepressants - SSRI Passed - 1/10/2022  3:11 AM        Passed - Patient is at least 18 years old        Passed - Valid encounter within last 15 months     Recent Visits  Date Type Provider Dept   08/20/21 Office Visit Bernie Valverde MD [facility] Internal Medicine   10/06/20 Office Visit Bernie Valverde MD [facility] Internal Medicine   Showing recent visits within past 720 days and meeting all other requirements  Future Appointments  No visits were found meeting these conditions.  Showing future appointments within next 150 days and meeting all other requirements      Future Appointments              In 6 days [facility] Dermatology [address], [facility]    In 4 weeks Bernie Valverde MD [facility] [place]                    Appointments  past 12m or future 3m with PCP    Date Provider   Last Visit   8/20/2021 Bernie Valverde MD   Next Visit   2/10/2022 Bernie Valverde MD   ED visits in past 90 days: 0     Note composed:2:58 PM 01/13/2022

## 2022-01-17 ENCOUNTER — TELEPHONE (OUTPATIENT)
Dept: PRIMARY CARE CLINIC | Facility: CLINIC | Age: 67
End: 2022-01-17
Payer: MEDICARE

## 2022-01-17 NOTE — TELEPHONE ENCOUNTER
Incorrectly scheduled. Please push back appt by 20 min and make the appt prior to her 40 min also. Thanks!

## 2022-01-22 DIAGNOSIS — E89.0 POSTOPERATIVE HYPOTHYROIDISM: ICD-10-CM

## 2022-01-22 DIAGNOSIS — I10 ESSENTIAL HYPERTENSION: ICD-10-CM

## 2022-01-22 NOTE — TELEPHONE ENCOUNTER
No new care gaps identified.  Powered by [employer]. Reference number: [id number].   1/22/2022 5:28:50 AM CST

## 2022-01-30 RX ORDER — CANDESARTAN 8 MG/1
TABLET ORAL
Qty: 90 TABLET | Refills: 3 | Status: SHIPPED | OUTPATIENT
Start: 2022-01-30 | End: 2023-01-12

## 2022-01-30 RX ORDER — LEVOTHYROXINE SODIUM 88 UG/1
TABLET ORAL
Qty: 90 TABLET | Refills: 3 | Status: SHIPPED | OUTPATIENT
Start: 2022-01-30 | End: 2023-01-12

## 2022-02-10 ENCOUNTER — LAB VISIT (OUTPATIENT)
Dept: LAB | Facility: HOSPITAL | Age: 67
End: 2022-02-10
Attending: INTERNAL MEDICINE
Payer: MEDICARE

## 2022-02-10 ENCOUNTER — OFFICE VISIT (OUTPATIENT)
Dept: PRIMARY CARE CLINIC | Facility: CLINIC | Age: 67
End: 2022-02-10
Payer: MEDICARE

## 2022-02-10 VITALS
HEART RATE: 74 BPM | WEIGHT: 142 LBS | BODY MASS INDEX: 26.81 KG/M2 | DIASTOLIC BLOOD PRESSURE: 88 MMHG | TEMPERATURE: 98 F | OXYGEN SATURATION: 99 % | SYSTOLIC BLOOD PRESSURE: 138 MMHG | HEIGHT: 61 IN

## 2022-02-10 DIAGNOSIS — R17 ELEVATED BILIRUBIN: ICD-10-CM

## 2022-02-10 DIAGNOSIS — E78.5 HYPERLIPIDEMIA, UNSPECIFIED HYPERLIPIDEMIA TYPE: Primary | ICD-10-CM

## 2022-02-10 DIAGNOSIS — E89.0 POSTOPERATIVE HYPOTHYROIDISM: ICD-10-CM

## 2022-02-10 DIAGNOSIS — Z12.31 ENCOUNTER FOR SCREENING MAMMOGRAM FOR MALIGNANT NEOPLASM OF BREAST: ICD-10-CM

## 2022-02-10 DIAGNOSIS — E78.5 HYPERLIPIDEMIA, UNSPECIFIED HYPERLIPIDEMIA TYPE: ICD-10-CM

## 2022-02-10 DIAGNOSIS — I10 ESSENTIAL HYPERTENSION: ICD-10-CM

## 2022-02-10 DIAGNOSIS — I70.0 AORTIC ATHEROSCLEROSIS: ICD-10-CM

## 2022-02-10 LAB
ALBUMIN SERPL BCP-MCNC: 4.4 G/DL (ref 3.5–5.2)
ALP SERPL-CCNC: 129 U/L (ref 55–135)
ALT SERPL W/O P-5'-P-CCNC: 23 U/L (ref 10–44)
AST SERPL-CCNC: 23 U/L (ref 10–40)
BILIRUB DIRECT SERPL-MCNC: 0.3 MG/DL (ref 0.1–0.3)
BILIRUB DIRECT SERPL-MCNC: 0.3 MG/DL (ref 0.1–0.3)
BILIRUB SERPL-MCNC: 0.9 MG/DL (ref 0.1–1)
PROT SERPL-MCNC: 7.4 G/DL (ref 6–8.4)
TSH SERPL DL<=0.005 MIU/L-ACNC: 1.23 UIU/ML (ref 0.4–4)

## 2022-02-10 PROCEDURE — 1160F RVW MEDS BY RX/DR IN RCRD: CPT | Mod: HCNC,CPTII,S$GLB, | Performed by: INTERNAL MEDICINE

## 2022-02-10 PROCEDURE — 99499 RISK ADDL DX/OHS AUDIT: ICD-10-PCS | Mod: S$GLB,,, | Performed by: INTERNAL MEDICINE

## 2022-02-10 PROCEDURE — 1101F PT FALLS ASSESS-DOCD LE1/YR: CPT | Mod: HCNC,CPTII,S$GLB, | Performed by: INTERNAL MEDICINE

## 2022-02-10 PROCEDURE — 99999 PR PBB SHADOW E&M-EST. PATIENT-LVL IV: ICD-10-PCS | Mod: PBBFAC,HCNC,, | Performed by: INTERNAL MEDICINE

## 2022-02-10 PROCEDURE — 4010F ACE/ARB THERAPY RXD/TAKEN: CPT | Mod: HCNC,CPTII,S$GLB, | Performed by: INTERNAL MEDICINE

## 2022-02-10 PROCEDURE — 1159F PR MEDICATION LIST DOCUMENTED IN MEDICAL RECORD: ICD-10-PCS | Mod: HCNC,CPTII,S$GLB, | Performed by: INTERNAL MEDICINE

## 2022-02-10 PROCEDURE — 90732 PPSV23 VACC 2 YRS+ SUBQ/IM: CPT | Mod: HCNC,S$GLB,, | Performed by: INTERNAL MEDICINE

## 2022-02-10 PROCEDURE — 36415 COLL VENOUS BLD VENIPUNCTURE: CPT | Mod: HCNC,PN | Performed by: INTERNAL MEDICINE

## 2022-02-10 PROCEDURE — 1101F PR PT FALLS ASSESS DOC 0-1 FALLS W/OUT INJ PAST YR: ICD-10-PCS | Mod: HCNC,CPTII,S$GLB, | Performed by: INTERNAL MEDICINE

## 2022-02-10 PROCEDURE — G0009 PNEUMOCOCCAL POLYSACCHARIDE VACCINE 23-VALENT =>2YO SQ IM: ICD-10-PCS | Mod: HCNC,S$GLB,, | Performed by: INTERNAL MEDICINE

## 2022-02-10 PROCEDURE — G0009 ADMIN PNEUMOCOCCAL VACCINE: HCPCS | Mod: HCNC,S$GLB,, | Performed by: INTERNAL MEDICINE

## 2022-02-10 PROCEDURE — 3008F PR BODY MASS INDEX (BMI) DOCUMENTED: ICD-10-PCS | Mod: HCNC,CPTII,S$GLB, | Performed by: INTERNAL MEDICINE

## 2022-02-10 PROCEDURE — 3079F DIAST BP 80-89 MM HG: CPT | Mod: HCNC,CPTII,S$GLB, | Performed by: INTERNAL MEDICINE

## 2022-02-10 PROCEDURE — 1126F PR PAIN SEVERITY QUANTIFIED, NO PAIN PRESENT: ICD-10-PCS | Mod: HCNC,CPTII,S$GLB, | Performed by: INTERNAL MEDICINE

## 2022-02-10 PROCEDURE — 84443 ASSAY THYROID STIM HORMONE: CPT | Mod: HCNC | Performed by: INTERNAL MEDICINE

## 2022-02-10 PROCEDURE — 4010F PR ACE/ARB THEARPY RXD/TAKEN: ICD-10-PCS | Mod: HCNC,CPTII,S$GLB, | Performed by: INTERNAL MEDICINE

## 2022-02-10 PROCEDURE — 99999 PR PBB SHADOW E&M-EST. PATIENT-LVL IV: CPT | Mod: PBBFAC,HCNC,, | Performed by: INTERNAL MEDICINE

## 2022-02-10 PROCEDURE — 1160F PR REVIEW ALL MEDS BY PRESCRIBER/CLIN PHARMACIST DOCUMENTED: ICD-10-PCS | Mod: HCNC,CPTII,S$GLB, | Performed by: INTERNAL MEDICINE

## 2022-02-10 PROCEDURE — 99214 PR OFFICE/OUTPT VISIT, EST, LEVL IV, 30-39 MIN: ICD-10-PCS | Mod: HCNC,25,S$GLB, | Performed by: INTERNAL MEDICINE

## 2022-02-10 PROCEDURE — 1126F AMNT PAIN NOTED NONE PRSNT: CPT | Mod: HCNC,CPTII,S$GLB, | Performed by: INTERNAL MEDICINE

## 2022-02-10 PROCEDURE — 3008F BODY MASS INDEX DOCD: CPT | Mod: HCNC,CPTII,S$GLB, | Performed by: INTERNAL MEDICINE

## 2022-02-10 PROCEDURE — 80076 HEPATIC FUNCTION PANEL: CPT | Mod: HCNC | Performed by: INTERNAL MEDICINE

## 2022-02-10 PROCEDURE — 3079F PR MOST RECENT DIASTOLIC BLOOD PRESSURE 80-89 MM HG: ICD-10-PCS | Mod: HCNC,CPTII,S$GLB, | Performed by: INTERNAL MEDICINE

## 2022-02-10 PROCEDURE — 90732 PNEUMOCOCCAL POLYSACCHARIDE VACCINE 23-VALENT =>2YO SQ IM: ICD-10-PCS | Mod: HCNC,S$GLB,, | Performed by: INTERNAL MEDICINE

## 2022-02-10 PROCEDURE — 99214 OFFICE O/P EST MOD 30 MIN: CPT | Mod: HCNC,25,S$GLB, | Performed by: INTERNAL MEDICINE

## 2022-02-10 PROCEDURE — 3288F PR FALLS RISK ASSESSMENT DOCUMENTED: ICD-10-PCS | Mod: HCNC,CPTII,S$GLB, | Performed by: INTERNAL MEDICINE

## 2022-02-10 PROCEDURE — 99499 UNLISTED E&M SERVICE: CPT | Mod: S$GLB,,, | Performed by: INTERNAL MEDICINE

## 2022-02-10 PROCEDURE — 1159F MED LIST DOCD IN RCRD: CPT | Mod: HCNC,CPTII,S$GLB, | Performed by: INTERNAL MEDICINE

## 2022-02-10 PROCEDURE — 3288F FALL RISK ASSESSMENT DOCD: CPT | Mod: HCNC,CPTII,S$GLB, | Performed by: INTERNAL MEDICINE

## 2022-02-10 PROCEDURE — 3075F PR MOST RECENT SYSTOLIC BLOOD PRESS GE 130-139MM HG: ICD-10-PCS | Mod: HCNC,CPTII,S$GLB, | Performed by: INTERNAL MEDICINE

## 2022-02-10 PROCEDURE — 3075F SYST BP GE 130 - 139MM HG: CPT | Mod: HCNC,CPTII,S$GLB, | Performed by: INTERNAL MEDICINE

## 2022-02-10 NOTE — PROGRESS NOTES
"Subjective:       Patient ID: Rehana Whitten is a 66 y.o. female.    Chief Complaint: Follow-up    HPI   MMG 3/31/21 neg.  DEXA 9/20/21 osteopenia.  Pap neg 1/13/21 Dr. Valdovinos.  FITKIT neg 10/25/21  Has an 8 week old puppy that's a puppy.   Due for pneumovax 23.   Had all 3 COVID vaccines and flu vaccine already.  $100 shingles vaccine so holding off on it.      HTN - triamterene-hctz, candesartan 8mg qd.  Occasionally checks BP at home and about 130s/70s.      HLD - pravastatin 40mg qd. Tolerating well.   Family history of heart disease (mom MI 67 and dad w/ CAD at 56). Sister w/ HLD.   Aortic plaque on CXR.  LDL 77.6 1/4/22. TG mildly elevated 167.     Hypothyroidism - synthroid 88mcg daily.  TFT WNL 9/24/20     Anxiety/depression - celexa 20mg daily.  Some good days and some bad days. Recently because of some of the stressors, more aware of mortality.     T bili mildly elevated 1/4/22, which is new.     Review of Systems  Comprehensive review of systems otherwise negative. See history/subjective section for more details.    Objective:      Physical Exam    /88 (BP Location: Left arm, Patient Position: Sitting, BP Method: Large (Manual))   Pulse 74   Temp 97.6 °F (36.4 °C) (Oral)   Ht 5' 1" (1.549 m)   Wt 64.4 kg (141 lb 15.6 oz)   LMP  (LMP Unknown)   SpO2 99%   BMI 26.83 kg/m²     GEN - A+OX4, NAD   HEENT - PERRL, EOMI, OP clear  Neck - No thyromegaly or cervical LAD. No thyroid masses felt.  CV - RRR, no m/r   Chest - CTAB, no wheezing or rhonchi  Abd - S/NT/ND/+BS.   Ext - 2+BDP and radial pulses. No C/C/E.  Neuro - 3+ DTRs. Normal gait.   Skin - No rash.    Previous labs reviewed.    Assessment/Plan     Rehana was seen today for follow-up.    Diagnoses and all orders for this visit:    Hyperlipidemia, unspecified hyperlipidemia type - cont pravastatin.   -     Hepatic Function Panel; Future    Essential hypertension - Stable and controlled. Continue current " medications.    Postoperative hypothyroidism - cont synthroid 88  -     TSH; Future    Encounter for screening mammogram for malignant neoplasm of breast  -     Mammo Digital Screening Bilat w/ Jeancarlos; Future    Aortic atherosclerosis - cont pravastatin.     Elevated bilirubin  -     Bilirubin, Direct; Future    pneumovax 23 today. Labs today. MMG due after 3/31.   No need for iron supplements.   Discussed very normal to feel blue at times but discussed MDD signs and symptoms and pt will let me know if depression worsens. Also discussed role of LCSW if needed.     Follow up in about 6 months (around 8/10/2022).      Bernie Valverde MD  Department of Internal Medicine - [facility]  10:25 AM

## 2022-03-07 ENCOUNTER — TELEPHONE (OUTPATIENT)
Dept: PRIMARY CARE CLINIC | Facility: CLINIC | Age: 67
End: 2022-03-07
Payer: MEDICARE

## 2022-03-07 NOTE — TELEPHONE ENCOUNTER
----- Message from Romy Mendes MA sent at 2/10/2022 10:54 AM CST -----  Regardin month follow up  Please call to book for August 6 month follow up

## 2022-03-07 NOTE — TELEPHONE ENCOUNTER
Pt will be calling back she is on the road at the moment   Terrence Mederos made aware sbar is ready to view, Jerel Tovar will be primary nurse

## 2022-05-03 ENCOUNTER — HOSPITAL ENCOUNTER (OUTPATIENT)
Dept: RADIOLOGY | Facility: HOSPITAL | Age: 67
Discharge: HOME | End: 2022-05-03
Attending: INTERNAL MEDICINE
Payer: MEDICARE

## 2022-05-03 VITALS — BODY MASS INDEX: 26.62 KG/M2 | HEIGHT: 61 IN | WEIGHT: 141 LBS

## 2022-05-03 DIAGNOSIS — Z12.31 ENCOUNTER FOR SCREENING MAMMOGRAM FOR MALIGNANT NEOPLASM OF BREAST: ICD-10-CM

## 2022-05-03 PROCEDURE — 77063 MAMMO DIGITAL SCREENING BILAT WITH TOMO: ICD-10-PCS | Mod: 26,,, | Performed by: RADIOLOGY

## 2022-05-03 PROCEDURE — 77063 BREAST TOMOSYNTHESIS BI: CPT | Mod: 26,,, | Performed by: RADIOLOGY

## 2022-05-03 PROCEDURE — 77067 SCR MAMMO BI INCL CAD: CPT | Mod: 26,,, | Performed by: RADIOLOGY

## 2022-05-03 PROCEDURE — 77067 SCR MAMMO BI INCL CAD: CPT | Mod: TC

## 2022-05-03 PROCEDURE — 77067 MAMMO DIGITAL SCREENING BILAT WITH TOMO: ICD-10-PCS | Mod: 26,,, | Performed by: RADIOLOGY

## 2022-06-01 ENCOUNTER — OFFICE VISIT (OUTPATIENT)
Dept: DERMATOLOGY | Facility: CLINIC | Age: 67
End: 2022-06-01
Payer: MEDICARE

## 2022-06-01 DIAGNOSIS — D18.01 CHERRY ANGIOMA: ICD-10-CM

## 2022-06-01 DIAGNOSIS — L57.0 AK (ACTINIC KERATOSIS): ICD-10-CM

## 2022-06-01 DIAGNOSIS — L81.4 LENTIGINES: ICD-10-CM

## 2022-06-01 DIAGNOSIS — Z85.828 HISTORY OF NONMELANOMA SKIN CANCER: ICD-10-CM

## 2022-06-01 DIAGNOSIS — L82.1 SK (SEBORRHEIC KERATOSIS): ICD-10-CM

## 2022-06-01 DIAGNOSIS — Z12.83 SKIN CANCER SCREENING: Primary | ICD-10-CM

## 2022-06-01 PROCEDURE — 1160F PR REVIEW ALL MEDS BY PRESCRIBER/CLIN PHARMACIST DOCUMENTED: ICD-10-PCS | Mod: CPTII,S$GLB,, | Performed by: DERMATOLOGY

## 2022-06-01 PROCEDURE — 17000 PR DESTRUCTION(LASER SURGERY,CRYOSURGERY,CHEMOSURGERY),PREMALIGNANT LESIONS,FIRST LESION: ICD-10-PCS | Mod: S$GLB,,, | Performed by: DERMATOLOGY

## 2022-06-01 PROCEDURE — 1101F PR PT FALLS ASSESS DOC 0-1 FALLS W/OUT INJ PAST YR: ICD-10-PCS | Mod: CPTII,S$GLB,, | Performed by: DERMATOLOGY

## 2022-06-01 PROCEDURE — 1159F MED LIST DOCD IN RCRD: CPT | Mod: CPTII,S$GLB,, | Performed by: DERMATOLOGY

## 2022-06-01 PROCEDURE — 1160F RVW MEDS BY RX/DR IN RCRD: CPT | Mod: CPTII,S$GLB,, | Performed by: DERMATOLOGY

## 2022-06-01 PROCEDURE — 17003 DESTRUCT PREMALG LES 2-14: CPT | Mod: S$GLB,,, | Performed by: DERMATOLOGY

## 2022-06-01 PROCEDURE — 99999 PR PBB SHADOW E&M-EST. PATIENT-LVL III: ICD-10-PCS | Mod: PBBFAC,,, | Performed by: DERMATOLOGY

## 2022-06-01 PROCEDURE — 4010F ACE/ARB THERAPY RXD/TAKEN: CPT | Mod: CPTII,S$GLB,, | Performed by: DERMATOLOGY

## 2022-06-01 PROCEDURE — 99999 PR PBB SHADOW E&M-EST. PATIENT-LVL III: CPT | Mod: PBBFAC,,, | Performed by: DERMATOLOGY

## 2022-06-01 PROCEDURE — 17000 DESTRUCT PREMALG LESION: CPT | Mod: S$GLB,,, | Performed by: DERMATOLOGY

## 2022-06-01 PROCEDURE — 17003 DESTRUCTION, PREMALIGNANT LESIONS; SECOND THROUGH 14 LESIONS: ICD-10-PCS | Mod: S$GLB,,, | Performed by: DERMATOLOGY

## 2022-06-01 PROCEDURE — 1101F PT FALLS ASSESS-DOCD LE1/YR: CPT | Mod: CPTII,S$GLB,, | Performed by: DERMATOLOGY

## 2022-06-01 PROCEDURE — 1159F PR MEDICATION LIST DOCUMENTED IN MEDICAL RECORD: ICD-10-PCS | Mod: CPTII,S$GLB,, | Performed by: DERMATOLOGY

## 2022-06-01 PROCEDURE — 1126F AMNT PAIN NOTED NONE PRSNT: CPT | Mod: CPTII,S$GLB,, | Performed by: DERMATOLOGY

## 2022-06-01 PROCEDURE — 99213 PR OFFICE/OUTPT VISIT, EST, LEVL III, 20-29 MIN: ICD-10-PCS | Mod: 25,S$GLB,, | Performed by: DERMATOLOGY

## 2022-06-01 PROCEDURE — 4010F PR ACE/ARB THEARPY RXD/TAKEN: ICD-10-PCS | Mod: CPTII,S$GLB,, | Performed by: DERMATOLOGY

## 2022-06-01 PROCEDURE — 1126F PR PAIN SEVERITY QUANTIFIED, NO PAIN PRESENT: ICD-10-PCS | Mod: CPTII,S$GLB,, | Performed by: DERMATOLOGY

## 2022-06-01 PROCEDURE — 3288F PR FALLS RISK ASSESSMENT DOCUMENTED: ICD-10-PCS | Mod: CPTII,S$GLB,, | Performed by: DERMATOLOGY

## 2022-06-01 PROCEDURE — 99213 OFFICE O/P EST LOW 20 MIN: CPT | Mod: 25,S$GLB,, | Performed by: DERMATOLOGY

## 2022-06-01 PROCEDURE — 3288F FALL RISK ASSESSMENT DOCD: CPT | Mod: CPTII,S$GLB,, | Performed by: DERMATOLOGY

## 2022-06-01 NOTE — PROGRESS NOTES
Subjective:       Patient ID:  Rehana Whitten is a 66 y.o. female who presents for   Chief Complaint   Patient presents with    Skin Check     tbse     History of Present Illness: The patient presents for follow up of skin check.    The patient was last seen on: 11/13/2019 for cryosurgery to actinic keratoses which have resolved.     Other skin complaints: scaly areas on face and hands    Had BCC excised from upper lip via Mohs in the '90's.    Review of Systems   Constitutional: Negative for fever, chills, weight loss, weight gain, fatigue, night sweats and malaise.   Skin: Positive for daily sunscreen use, activity-related sunscreen use and wears hat. Negative for recent sunburn.   Hematologic/Lymphatic: Does not bruise/bleed easily.        Objective:    Physical Exam   Constitutional: She appears well-developed and well-nourished. No distress.   Neurological: She is alert and oriented to person, place, and time. She is not disoriented.   Psychiatric: She has a normal mood and affect.   Skin:   Areas Examined (abnormalities noted in diagram):   Head / Face Inspection Performed  Neck Inspection Performed  Chest / Axilla Inspection Performed  Abdomen Inspection Performed  Back Inspection Performed  RUE Inspected  LUE Inspection Performed                       Diagram Legend     Erythematous scaling macule/papule c/w actinic keratosis       Vascular papule c/w angioma      Pigmented verrucoid papule/plaque c/w seborrheic keratosis      Yellow umbilicated papule c/w sebaceous hyperplasia      Irregularly shaped tan macule c/w lentigo     1-2 mm smooth white papules consistent with Milia      Movable subcutaneous cyst with punctum c/w epidermal inclusion cyst      Subcutaneous movable cyst c/w pilar cyst      Firm pink to brown papule c/w dermatofibroma      Pedunculated fleshy papule(s) c/w skin tag(s)      Evenly pigmented macule c/w junctional nevus     Mildly variegated pigmented, slightly  irregular-bordered macule c/w mildly atypical nevus      Flesh colored to evenly pigmented papule c/w intradermal nevus       Pink pearly papule/plaque c/w basal cell carcinoma      Erythematous hyperkeratotic cursted plaque c/w SCC      Surgical scar with no sign of skin cancer recurrence      Open and closed comedones      Inflammatory papules and pustules      Verrucoid papule consistent consistent with wart     Erythematous eczematous patches and plaques     Dystrophic onycholytic nail with subungual debris c/w onychomycosis     Umbilicated papule    Erythematous-base heme-crusted tan verrucoid plaque consistent with inflamed seborrheic keratosis     Erythematous Silvery Scaling Plaque c/w Psoriasis     See annotation      Assessment / Plan:        Skin cancer screening  History of nonmelanoma skin cancer  Upper body skin examination performed today including at least 6 points as noted in physical examination. No lesions suspicious for malignancy noted.  Patient instructed in importance of daily broad spectrum sunscreen use with spf at least 30. Sun avoidance and topical protection/protective clothing discussed.    SK (seborrheic keratosis)  These are benign inherited growths without a malignant potential. Reassurance given to patient. No treatment is necessary.   Treatment of benign, asymptomatic lesions may be considered cosmetic.  Warned about risk of hypo- or hyperpigmentation with treatment and risk of recurrence.    Lentigines  These are benign sun spots which should be monitored for changes. Patient instructed in importance of daily broad spectrum sunscreen use with spf at least 30. Sun avoidance and topical protection/protective clothing discussed.    AK (actinic keratosis)  Cryosurgery Procedure Note    Verbal consent from the patient is obtained including, but not limited to, risk of hypopigmentation/hyperpigmentation, scar, recurrence of lesion. The patient is aware of the precancerous quality and  need for treatment of these lesions. Liquid nitrogen cryosurgery is applied to the 9 actinic keratoses, as detailed in the physical exam, to produce a freeze injury. The patient is aware that blisters may form and is instructed on wound care with gentle cleansing and use of vaseline ointment to keep moist until healed. The patient is supplied a handout on cryosurgery and is instructed to call if lesions do not completely resolve.    Cherry angioma  This is a benign vascular lesion. Reassurance given. No treatment required. Treatment of benign, asymptomatic lesions may be considered cosmetic.      Follow up in about 1 year (around 6/1/2023) for skin check or sooner for any concerns.

## 2022-06-01 NOTE — PATIENT INSTRUCTIONS
CRYOSURGERY      Your doctor has used a method called cryosurgery to treat your skin condition. Cryosurgery refers to the use of very cold substances to treat a variety of skin conditions such as warts, pre-skin cancers, molluscum contagiosum, sun spots, and several benign growths. The substance we use in cryosurgery is liquid nitrogen and is so cold (-195 degrees Celsius) that is burns when administered.     Following treatment in the office, the skin may immediately burn and become red. You may find the area around the lesion is affected as well. It is sometimes necessary to treat not only the lesion, but a small area of the surrounding normal skin to achieve a good response.     A blister, and even a blood filled blister, may form after treatment.   This is a normal response. If the blister is painful, it is acceptable to sterilize a needle and with rubbing alcohol and gently pop the blister. It is important that you gently wash the area with soap and warm water as the blister fluid may contain wart virus if a wart was treated. Do no remove the roof of the blister.     The area treated can take anywhere from 1-3 weeks to heal. Healing time depends on the kind skin lesion treated, the location, and how aggressively the lesion was treated. It is recommended that the areas treated are covered with Vaseline or bacitracin ointment and a band-aid. If a band-aid is not practical, just ointment applied several times per day will do. Keeping these areas moist will speed the healing time.    Treatment with liquid nitrogen can leave a scar. In dark skin, it may be a light or dark scar, in light skin it may be a white or pink scar. These will generally fade with time, but may never go away completely.     If you have any concerns after your treatment, please feel free to call the office.       [address] [facility] [place] [facility] [place] [address]/ [phone] FAX/ [email or web address] Sun Protection      The [facility] would like to remind you of the importance of sun protection all year round and particularly during the summer when the suns rays are the strongest. It has been proven that both acute and chronic sun exposure damages our cells and leads to skin cancer. Beyond skin cancer, the sun causes 90% of the symptoms of premature skin aging, including wrinkles, lentigines (brown spots), and thin, easily bruised skin. Proper sun protection can help prevent these unwanted conditions.    Many patients report that they dont go in the sun. It has been shown that the average person receives 18 hours of incidental sun exposure per week during activities such as walking through parking lots, driving, or sitting next to windows. This accumulates to several bad sunburns per year!    In choosing sunscreen, you want one that protects against both UVA and UVB rays (broad spectrum). It is recommended that you use one of SPF 30 or higher. It is important to apply the sunscreen about 20 minutes prior to sun exposure. Most sunscreens are chemical sunscreens and a reaction must take place in the skin so that they are effective. If they are applied and then you are immediately exposed to the sun or start sweating, this reaction has not had time to take place and you are therefore unprotected. Sunscreen needs to be reapplied every 2 hours if you are participating in water sports or sweating. We recommend Elta MD or CeraVe sunscreens for daily use; however there are many options and it is most important for you to find one that you will use on a consistent basis.    If you have sensitive skin, you may do best with a sunscreen that contains only physical blockers in the active ingredient section. The only physical blockers available in the [place] currently are titanium dioxide or zinc oxide. These are typically thicker and harder to apply, however they afford very good protection. Neutrogena Sensitive Skin, Blue Lizard  Sensitive Skin (pink top) or Neutrogena Pure and Free are popular ones.     Aside from sunscreen, clothes with UV protection (UPF), wide brimmed hats, and sunglasses are other means of sun protection that we recommend.      Based on a recent study (6/2021) and out of an abundance of caution, we are recommending that you AVOID the following sunscreens as they may contain the carcinogen, benzene:    Spray and gel sunscreens  Any [employer] or [employer] brands as well as Max Block and TopCare brands   Neutrogena Ultra Sheer Dry-touch Water Resistant Sunscreen LOTION SPF 70   Neutrogena Sheer Zinc Dry-touch Face Sunscreen LOTION SPF 50   5.   Aveeno Baby Continuous Protection Sensitive Skin Sunscreen LOTION - Broad Spectrum SPF 50    Please note that Benzene is not an ingredient or the degradation product of any ingredient in any sunscreen. This study suggested that the findings are a result of contamination in the manufacturing process. At this point, we don't know how effectively Benzene gets through the skin, if it gets absorbed systemically, and what effects it may have.     We do know that ultraviolet radiation is a well-established carcinogen. Please use daily sun protection/avoidance and use of at least SPF 30, broad-spectrum sunscreen not listed above.                       [facility]  [facility] - DERMATOLOGY [address]  [facility] [address]  Dept: [phone]  Dept Fax: [phone]

## 2022-07-19 ENCOUNTER — PATIENT MESSAGE (OUTPATIENT)
Dept: RESEARCH | Facility: CLINIC | Age: 67
End: 2022-07-19
Payer: MEDICARE

## 2022-08-08 ENCOUNTER — OFFICE VISIT (OUTPATIENT)
Dept: PRIMARY CARE CLINIC | Facility: CLINIC | Age: 67
End: 2022-08-08
Payer: MEDICARE

## 2022-08-08 VITALS
DIASTOLIC BLOOD PRESSURE: 68 MMHG | WEIGHT: 146.38 LBS | SYSTOLIC BLOOD PRESSURE: 120 MMHG | OXYGEN SATURATION: 97 % | BODY MASS INDEX: 27.64 KG/M2 | HEIGHT: 61 IN | HEART RATE: 78 BPM

## 2022-08-08 DIAGNOSIS — E78.49 OTHER HYPERLIPIDEMIA: ICD-10-CM

## 2022-08-08 DIAGNOSIS — E89.0 POSTOPERATIVE HYPOTHYROIDISM: ICD-10-CM

## 2022-08-08 DIAGNOSIS — I10 ESSENTIAL HYPERTENSION: Primary | ICD-10-CM

## 2022-08-08 DIAGNOSIS — D64.9 NORMOCYTIC ANEMIA: ICD-10-CM

## 2022-08-08 DIAGNOSIS — F33.42 RECURRENT MAJOR DEPRESSIVE DISORDER, IN FULL REMISSION: ICD-10-CM

## 2022-08-08 DIAGNOSIS — N95.2 VAGINAL ATROPHY: ICD-10-CM

## 2022-08-08 DIAGNOSIS — F41.1 GAD (GENERALIZED ANXIETY DISORDER): ICD-10-CM

## 2022-08-08 PROCEDURE — 4010F ACE/ARB THERAPY RXD/TAKEN: CPT | Mod: CPTII,S$GLB,, | Performed by: INTERNAL MEDICINE

## 2022-08-08 PROCEDURE — 3288F FALL RISK ASSESSMENT DOCD: CPT | Mod: CPTII,S$GLB,, | Performed by: INTERNAL MEDICINE

## 2022-08-08 PROCEDURE — 1101F PT FALLS ASSESS-DOCD LE1/YR: CPT | Mod: CPTII,S$GLB,, | Performed by: INTERNAL MEDICINE

## 2022-08-08 PROCEDURE — 99214 PR OFFICE/OUTPT VISIT, EST, LEVL IV, 30-39 MIN: ICD-10-PCS | Mod: S$GLB,,, | Performed by: INTERNAL MEDICINE

## 2022-08-08 PROCEDURE — 4010F PR ACE/ARB THEARPY RXD/TAKEN: ICD-10-PCS | Mod: CPTII,S$GLB,, | Performed by: INTERNAL MEDICINE

## 2022-08-08 PROCEDURE — 99999 PR PBB SHADOW E&M-EST. PATIENT-LVL III: ICD-10-PCS | Mod: PBBFAC,,, | Performed by: INTERNAL MEDICINE

## 2022-08-08 PROCEDURE — 99999 PR PBB SHADOW E&M-EST. PATIENT-LVL III: CPT | Mod: PBBFAC,,, | Performed by: INTERNAL MEDICINE

## 2022-08-08 PROCEDURE — 3008F PR BODY MASS INDEX (BMI) DOCUMENTED: ICD-10-PCS | Mod: CPTII,S$GLB,, | Performed by: INTERNAL MEDICINE

## 2022-08-08 PROCEDURE — 3078F DIAST BP <80 MM HG: CPT | Mod: CPTII,S$GLB,, | Performed by: INTERNAL MEDICINE

## 2022-08-08 PROCEDURE — 1160F PR REVIEW ALL MEDS BY PRESCRIBER/CLIN PHARMACIST DOCUMENTED: ICD-10-PCS | Mod: CPTII,S$GLB,, | Performed by: INTERNAL MEDICINE

## 2022-08-08 PROCEDURE — 99214 OFFICE O/P EST MOD 30 MIN: CPT | Mod: S$GLB,,, | Performed by: INTERNAL MEDICINE

## 2022-08-08 PROCEDURE — 1159F MED LIST DOCD IN RCRD: CPT | Mod: CPTII,S$GLB,, | Performed by: INTERNAL MEDICINE

## 2022-08-08 PROCEDURE — 1126F AMNT PAIN NOTED NONE PRSNT: CPT | Mod: CPTII,S$GLB,, | Performed by: INTERNAL MEDICINE

## 2022-08-08 PROCEDURE — 1160F RVW MEDS BY RX/DR IN RCRD: CPT | Mod: CPTII,S$GLB,, | Performed by: INTERNAL MEDICINE

## 2022-08-08 PROCEDURE — 99499 RISK ADDL DX/OHS AUDIT: ICD-10-PCS | Mod: HCNC,S$GLB,, | Performed by: INTERNAL MEDICINE

## 2022-08-08 PROCEDURE — 3078F PR MOST RECENT DIASTOLIC BLOOD PRESSURE < 80 MM HG: ICD-10-PCS | Mod: CPTII,S$GLB,, | Performed by: INTERNAL MEDICINE

## 2022-08-08 PROCEDURE — 3074F PR MOST RECENT SYSTOLIC BLOOD PRESSURE < 130 MM HG: ICD-10-PCS | Mod: CPTII,S$GLB,, | Performed by: INTERNAL MEDICINE

## 2022-08-08 PROCEDURE — 3008F BODY MASS INDEX DOCD: CPT | Mod: CPTII,S$GLB,, | Performed by: INTERNAL MEDICINE

## 2022-08-08 PROCEDURE — 3074F SYST BP LT 130 MM HG: CPT | Mod: CPTII,S$GLB,, | Performed by: INTERNAL MEDICINE

## 2022-08-08 PROCEDURE — 1126F PR PAIN SEVERITY QUANTIFIED, NO PAIN PRESENT: ICD-10-PCS | Mod: CPTII,S$GLB,, | Performed by: INTERNAL MEDICINE

## 2022-08-08 PROCEDURE — 1101F PR PT FALLS ASSESS DOC 0-1 FALLS W/OUT INJ PAST YR: ICD-10-PCS | Mod: CPTII,S$GLB,, | Performed by: INTERNAL MEDICINE

## 2022-08-08 PROCEDURE — 3288F PR FALLS RISK ASSESSMENT DOCUMENTED: ICD-10-PCS | Mod: CPTII,S$GLB,, | Performed by: INTERNAL MEDICINE

## 2022-08-08 PROCEDURE — 1159F PR MEDICATION LIST DOCUMENTED IN MEDICAL RECORD: ICD-10-PCS | Mod: CPTII,S$GLB,, | Performed by: INTERNAL MEDICINE

## 2022-08-08 PROCEDURE — 99499 UNLISTED E&M SERVICE: CPT | Mod: HCNC,S$GLB,, | Performed by: INTERNAL MEDICINE

## 2022-08-08 RX ORDER — ESTRADIOL 0.05 MG/D
1 FILM, EXTENDED RELEASE TRANSDERMAL WEEKLY
Qty: 4 PATCH | Refills: 11 | Status: SHIPPED | OUTPATIENT
Start: 2022-08-08 | End: 2022-11-02

## 2022-08-08 RX ORDER — PNEUMOCOCCAL VACCINE POLYVALENT 25; 25; 25; 25; 25; 25; 25; 25; 25; 25; 25; 25; 25; 25; 25; 25; 25; 25; 25; 25; 25; 25; 25 UG/.5ML; UG/.5ML; UG/.5ML; UG/.5ML; UG/.5ML; UG/.5ML; UG/.5ML; UG/.5ML; UG/.5ML; UG/.5ML; UG/.5ML; UG/.5ML; UG/.5ML; UG/.5ML; UG/.5ML; UG/.5ML; UG/.5ML; UG/.5ML; UG/.5ML; UG/.5ML; UG/.5ML; UG/.5ML; UG/.5ML
INJECTION, SOLUTION INTRAMUSCULAR; SUBCUTANEOUS
COMMUNITY
Start: 2022-02-10 | End: 2022-08-08

## 2022-08-08 RX ORDER — CITALOPRAM 20 MG/1
10 TABLET, FILM COATED ORAL DAILY
Qty: 90 TABLET | Refills: 2
Start: 2022-08-08 | End: 2022-10-14

## 2022-08-08 NOTE — PROGRESS NOTES
Subjective:       Patient ID: Rehana Whitten is a 67 y.o. female.    Chief Complaint: HLD Follow-up    HPI   MMG 5/3/22 NEG.  DEXA 9/20/21 - OSTEOPENIA.  LOV w/ Dr. Galan 6/1/22 for skin CA screening.  FITKIT 10/25/21 neg.     HTN - triamterene-hctz 37.5-25mg qd, candesartan 8mg qd.  Occasionally checks BP at home and about 130s/70s.      HLD - crestor 40mg qhs (changed from pravastatin) - tolerating the medication well.   Family history of heart disease (mom MI 67 and dad w/ CAD at 56). Sister w/ HLD.   Aortic plaque on CXR 7/1/21  LDL 77.6 1/4/22. TG mildly elevated 167.     Hypothyroidism - synthroid 88mcg daily.  TFT WNL 2/10/22     Anxiety/depression - celexa 20mg daily.  Some good days and some bad days. Recently because of some of the stressors, more aware of mortality.   Depression Patient Health Questionnaire 8/8/2022 1/13/2021   Over the last two weeks how often have you been bothered by little interest or pleasure in doing things Not at all Not at all   Over the last two weeks how often have you been bothered by feeling down, depressed or hopeless Not at all Not at all   PHQ-2 Total Score 0 0   Over the last two weeks how often have you been bothered by trouble falling or staying asleep, or sleeping too much More than half the days -   Over the last two weeks how often have you been bothered by feeling tired or having little energy Not at all -   Over the last two weeks how often have you been bothered by a poor appetite or overeating Not at all -   Over the last two weeks how often have you been bothered by feeling bad about yourself - or that you are a failure or have let yourself or your family down Several days -   Over the last two weeks how often have you been bothered by trouble concentrating on things, such as reading the newspaper or watching television Not at all -   Over the last two weeks how often have you been bothered by moving or speaking so slowly that other people could have  noticed. Or the opposite - being so fidgety or restless that you have been moving around a lot more than usual. Not at all -   Over the last two weeks how often have you been bothered by thoughts that you would be better off dead, or of hurting yourself Not at all -   If you checked off any problems, how difficult have these problems made it for you to do your work, take care of things at home or get along with other people? Not difficult at all -   Total Score 3 -   Interpretation Minimal or None -     GAD7 8/8/2022   1. Feeling nervous, anxious, or on edge? 1   2. Not being able to stop or control worrying? 1   3. Worrying too much about different things? 1   4. Trouble relaxing? 0   5. Being so restless that it is hard to sit still? 0   6. Becoming easily annoyed or irritable? 1   7. Feeling afraid as if something awful might happen? 0   8. If you checked off any problems, how difficult have these problems made it for you to do your work, take care of things at home, or get along with other people? 0   DIXIE-7 Score 4       T bili mildly elevated 1/4/22, which is new.   Repeat labs were normal.    Vaginal dryness and itching. Doesn't want to deal w/ premarin cream - couldn't tolerate. May use lotrisone if needed for severe itching. Still having hot flashes.     Review of Systems   Constitutional: Negative for activity change and unexpected weight change.   HENT: Negative for hearing loss, rhinorrhea and trouble swallowing.    Eyes: Negative for discharge and visual disturbance.   Respiratory: Negative for chest tightness and wheezing.    Cardiovascular: Negative for chest pain and palpitations.   Gastrointestinal: Negative for blood in stool, constipation, diarrhea and vomiting.   Endocrine: Negative for polydipsia and polyuria.   Genitourinary: Negative for difficulty urinating, dysuria, hematuria and menstrual problem.   Musculoskeletal: Negative for arthralgias, joint swelling and neck pain.   Neurological:  "Negative for weakness and headaches.   Psychiatric/Behavioral: Negative for confusion and dysphoric mood.         Objective:      Physical Exam    /68 (BP Location: Left arm, Patient Position: Sitting, BP Method: Large (Manual))   Pulse 78   Ht 5' 1" (1.549 m)   Wt 66.4 kg (146 lb 6.2 oz)   LMP  (LMP Unknown)   SpO2 97%   BMI 27.66 kg/m²     GEN - A+OX4, NAD   HEENT - PERRL, EOMI, OP clear. MMM.   Neck - No thyromegaly or cervical LAD. No thyroid masses felt.  CV - RRR, no m/r   Chest - CTAB, no wheezing or rhonchi  Abd - S/NT/ND/+BS.   Ext - 2+BDP and radial pulses. No C/C/E.  Neuro - 5/5 BUE and BLE strength. Normal gait.   MSK - no spinal tenderness to palpation.   Skin - No rash.    Previous labs reviewed.     Assessment/Plan     Rehana was seen today for follow-up.    Diagnoses and all orders for this visit:    Essential hypertension - Stable and controlled. Continue current medications.  -     Comprehensive Metabolic Panel; Future    Postoperative hypothyroidism - cont synthroid.   -     TSH; Future  -     T4, Free; Future    Other hyperlipidemia - cont crestor 40mg qhs.   -     Comprehensive Metabolic Panel; Future  -     Lipid Panel; Future    Normocytic anemia  -     CBC Auto Differential; Future    Recurrent major depressive disorder, in full remission - wants to try cutting to 1/2 daily as she's doing so well.   -     citalopram (CELEXA) 20 MG tablet; Take 0.5 tablets (10 mg total) by mouth once daily.    DIXIE (generalized anxiety disorder)  -     citalopram (CELEXA) 20 MG tablet; Take 0.5 tablets (10 mg total) by mouth once daily.    Vaginal atrophy - trial of patches.  -     estradiol 0.05 mg/24 hr td ptsw (VIVELLE-DOT) 0.05 mg/24 hr; Place 1 patch onto the skin once a week.    Given ACP documents.     Follow up in about 4 months (around 12/8/2022).      Bernie Valverde MD  Department of Internal Medicine - [facility]  9:39 AM    "

## 2022-08-10 ENCOUNTER — LAB VISIT (OUTPATIENT)
Dept: LAB | Facility: HOSPITAL | Age: 67
End: 2022-08-10
Attending: INTERNAL MEDICINE
Payer: MEDICARE

## 2022-08-10 ENCOUNTER — IMMUNIZATION (OUTPATIENT)
Dept: PHARMACY | Facility: CLINIC | Age: 67
End: 2022-08-10
Payer: MEDICARE

## 2022-08-10 DIAGNOSIS — E78.49 OTHER HYPERLIPIDEMIA: ICD-10-CM

## 2022-08-10 DIAGNOSIS — E89.0 POSTOPERATIVE HYPOTHYROIDISM: ICD-10-CM

## 2022-08-10 DIAGNOSIS — Z23 NEED FOR VACCINATION: Primary | ICD-10-CM

## 2022-08-10 DIAGNOSIS — D64.9 NORMOCYTIC ANEMIA: ICD-10-CM

## 2022-08-10 DIAGNOSIS — I10 ESSENTIAL HYPERTENSION: ICD-10-CM

## 2022-08-10 LAB
ALBUMIN SERPL BCP-MCNC: 4.3 G/DL (ref 3.5–5.2)
ALP SERPL-CCNC: 131 U/L (ref 55–135)
ALT SERPL W/O P-5'-P-CCNC: 20 U/L (ref 10–44)
ANION GAP SERPL CALC-SCNC: 12 MMOL/L (ref 8–16)
AST SERPL-CCNC: 22 U/L (ref 10–40)
BASOPHILS # BLD AUTO: 0.05 K/UL (ref 0–0.2)
BASOPHILS NFR BLD: 1 % (ref 0–1.9)
BILIRUB SERPL-MCNC: 1.1 MG/DL (ref 0.1–1)
BUN SERPL-MCNC: 14 MG/DL (ref 8–23)
CALCIUM SERPL-MCNC: 9.8 MG/DL (ref 8.7–10.5)
CHLORIDE SERPL-SCNC: 100 MMOL/L (ref 95–110)
CHOLEST SERPL-MCNC: 162 MG/DL (ref 120–199)
CHOLEST/HDLC SERPL: 2.7 {RATIO} (ref 2–5)
CO2 SERPL-SCNC: 27 MMOL/L (ref 23–29)
CREAT SERPL-MCNC: 0.7 MG/DL (ref 0.5–1.4)
DIFFERENTIAL METHOD: ABNORMAL
EOSINOPHIL # BLD AUTO: 0.1 K/UL (ref 0–0.5)
EOSINOPHIL NFR BLD: 1.8 % (ref 0–8)
ERYTHROCYTE [DISTWIDTH] IN BLOOD BY AUTOMATED COUNT: 12.7 % (ref 11.5–14.5)
EST. GFR  (NO RACE VARIABLE): >60 ML/MIN/1.73 M^2
GLUCOSE SERPL-MCNC: 106 MG/DL (ref 70–110)
HCT VFR BLD AUTO: 41.7 % (ref 37–48.5)
HDLC SERPL-MCNC: 59 MG/DL (ref 40–75)
HDLC SERPL: 36.4 % (ref 20–50)
HGB BLD-MCNC: 12.8 G/DL (ref 12–16)
IMM GRANULOCYTES # BLD AUTO: 0.02 K/UL (ref 0–0.04)
IMM GRANULOCYTES NFR BLD AUTO: 0.4 % (ref 0–0.5)
LDLC SERPL CALC-MCNC: 76.2 MG/DL (ref 63–159)
LYMPHOCYTES # BLD AUTO: 1.2 K/UL (ref 1–4.8)
LYMPHOCYTES NFR BLD: 23.3 % (ref 18–48)
MCH RBC QN AUTO: 30.7 PG (ref 27–31)
MCHC RBC AUTO-ENTMCNC: 30.7 G/DL (ref 32–36)
MCV RBC AUTO: 100 FL (ref 82–98)
MONOCYTES # BLD AUTO: 0.3 K/UL (ref 0.3–1)
MONOCYTES NFR BLD: 6 % (ref 4–15)
NEUTROPHILS # BLD AUTO: 3.5 K/UL (ref 1.8–7.7)
NEUTROPHILS NFR BLD: 67.5 % (ref 38–73)
NONHDLC SERPL-MCNC: 103 MG/DL
NRBC BLD-RTO: 0 /100 WBC
PLATELET # BLD AUTO: 244 K/UL (ref 150–450)
PMV BLD AUTO: 11.4 FL (ref 9.2–12.9)
POTASSIUM SERPL-SCNC: 4.2 MMOL/L (ref 3.5–5.1)
PROT SERPL-MCNC: 7.4 G/DL (ref 6–8.4)
RBC # BLD AUTO: 4.17 M/UL (ref 4–5.4)
SODIUM SERPL-SCNC: 139 MMOL/L (ref 136–145)
T4 FREE SERPL-MCNC: 1.14 NG/DL (ref 0.71–1.51)
TRIGL SERPL-MCNC: 134 MG/DL (ref 30–150)
TSH SERPL DL<=0.005 MIU/L-ACNC: 1.45 UIU/ML (ref 0.4–4)
WBC # BLD AUTO: 5.14 K/UL (ref 3.9–12.7)

## 2022-08-10 PROCEDURE — 84439 ASSAY OF FREE THYROXINE: CPT | Performed by: INTERNAL MEDICINE

## 2022-08-10 PROCEDURE — 84443 ASSAY THYROID STIM HORMONE: CPT | Performed by: INTERNAL MEDICINE

## 2022-08-10 PROCEDURE — 80053 COMPREHEN METABOLIC PANEL: CPT | Performed by: INTERNAL MEDICINE

## 2022-08-10 PROCEDURE — 36415 COLL VENOUS BLD VENIPUNCTURE: CPT | Performed by: INTERNAL MEDICINE

## 2022-08-10 PROCEDURE — 80061 LIPID PANEL: CPT | Performed by: INTERNAL MEDICINE

## 2022-08-10 PROCEDURE — 85025 COMPLETE CBC W/AUTO DIFF WBC: CPT | Performed by: INTERNAL MEDICINE

## 2022-08-12 ENCOUNTER — PATIENT MESSAGE (OUTPATIENT)
Dept: PRIMARY CARE CLINIC | Facility: CLINIC | Age: 67
End: 2022-08-12
Payer: MEDICARE

## 2022-11-02 ENCOUNTER — OFFICE VISIT (OUTPATIENT)
Dept: PRIMARY CARE CLINIC | Facility: CLINIC | Age: 67
End: 2022-11-02
Payer: MEDICARE

## 2022-11-02 ENCOUNTER — PATIENT MESSAGE (OUTPATIENT)
Dept: PRIMARY CARE CLINIC | Facility: CLINIC | Age: 67
End: 2022-11-02

## 2022-11-02 VITALS
SYSTOLIC BLOOD PRESSURE: 132 MMHG | BODY MASS INDEX: 27.85 KG/M2 | HEIGHT: 61 IN | DIASTOLIC BLOOD PRESSURE: 84 MMHG | HEART RATE: 72 BPM | WEIGHT: 147.5 LBS | OXYGEN SATURATION: 98 %

## 2022-11-02 DIAGNOSIS — N89.8 VAGINAL DRYNESS: Primary | ICD-10-CM

## 2022-11-02 DIAGNOSIS — R10.11 RIGHT UPPER QUADRANT PAIN: ICD-10-CM

## 2022-11-02 DIAGNOSIS — R53.83 FATIGUE, UNSPECIFIED TYPE: ICD-10-CM

## 2022-11-02 DIAGNOSIS — Z12.11 COLON CANCER SCREENING: ICD-10-CM

## 2022-11-02 DIAGNOSIS — F33.42 RECURRENT MAJOR DEPRESSIVE DISORDER, IN FULL REMISSION: ICD-10-CM

## 2022-11-02 PROCEDURE — G0008 ADMIN INFLUENZA VIRUS VAC: HCPCS | Mod: S$GLB,,, | Performed by: INTERNAL MEDICINE

## 2022-11-02 PROCEDURE — 1126F AMNT PAIN NOTED NONE PRSNT: CPT | Mod: CPTII,S$GLB,, | Performed by: INTERNAL MEDICINE

## 2022-11-02 PROCEDURE — 3288F PR FALLS RISK ASSESSMENT DOCUMENTED: ICD-10-PCS | Mod: CPTII,S$GLB,, | Performed by: INTERNAL MEDICINE

## 2022-11-02 PROCEDURE — 99214 PR OFFICE/OUTPT VISIT, EST, LEVL IV, 30-39 MIN: ICD-10-PCS | Mod: 25,S$GLB,, | Performed by: INTERNAL MEDICINE

## 2022-11-02 PROCEDURE — 90694 FLU VACCINE - QUADRIVALENT - ADJUVANTED: ICD-10-PCS | Mod: S$GLB,,, | Performed by: INTERNAL MEDICINE

## 2022-11-02 PROCEDURE — 99999 PR PBB SHADOW E&M-EST. PATIENT-LVL IV: CPT | Mod: PBBFAC,,, | Performed by: INTERNAL MEDICINE

## 2022-11-02 PROCEDURE — 4010F PR ACE/ARB THEARPY RXD/TAKEN: ICD-10-PCS | Mod: CPTII,S$GLB,, | Performed by: INTERNAL MEDICINE

## 2022-11-02 PROCEDURE — 1160F RVW MEDS BY RX/DR IN RCRD: CPT | Mod: CPTII,S$GLB,, | Performed by: INTERNAL MEDICINE

## 2022-11-02 PROCEDURE — 1101F PT FALLS ASSESS-DOCD LE1/YR: CPT | Mod: CPTII,S$GLB,, | Performed by: INTERNAL MEDICINE

## 2022-11-02 PROCEDURE — 3288F FALL RISK ASSESSMENT DOCD: CPT | Mod: CPTII,S$GLB,, | Performed by: INTERNAL MEDICINE

## 2022-11-02 PROCEDURE — 99999 PR PBB SHADOW E&M-EST. PATIENT-LVL IV: ICD-10-PCS | Mod: PBBFAC,,, | Performed by: INTERNAL MEDICINE

## 2022-11-02 PROCEDURE — 3079F DIAST BP 80-89 MM HG: CPT | Mod: CPTII,S$GLB,, | Performed by: INTERNAL MEDICINE

## 2022-11-02 PROCEDURE — 1159F MED LIST DOCD IN RCRD: CPT | Mod: CPTII,S$GLB,, | Performed by: INTERNAL MEDICINE

## 2022-11-02 PROCEDURE — 3008F PR BODY MASS INDEX (BMI) DOCUMENTED: ICD-10-PCS | Mod: CPTII,S$GLB,, | Performed by: INTERNAL MEDICINE

## 2022-11-02 PROCEDURE — 99214 OFFICE O/P EST MOD 30 MIN: CPT | Mod: 25,S$GLB,, | Performed by: INTERNAL MEDICINE

## 2022-11-02 PROCEDURE — 4010F ACE/ARB THERAPY RXD/TAKEN: CPT | Mod: CPTII,S$GLB,, | Performed by: INTERNAL MEDICINE

## 2022-11-02 PROCEDURE — 3075F PR MOST RECENT SYSTOLIC BLOOD PRESS GE 130-139MM HG: ICD-10-PCS | Mod: CPTII,S$GLB,, | Performed by: INTERNAL MEDICINE

## 2022-11-02 PROCEDURE — G0008 FLU VACCINE - QUADRIVALENT - ADJUVANTED: ICD-10-PCS | Mod: S$GLB,,, | Performed by: INTERNAL MEDICINE

## 2022-11-02 PROCEDURE — 1126F PR PAIN SEVERITY QUANTIFIED, NO PAIN PRESENT: ICD-10-PCS | Mod: CPTII,S$GLB,, | Performed by: INTERNAL MEDICINE

## 2022-11-02 PROCEDURE — 1101F PR PT FALLS ASSESS DOC 0-1 FALLS W/OUT INJ PAST YR: ICD-10-PCS | Mod: CPTII,S$GLB,, | Performed by: INTERNAL MEDICINE

## 2022-11-02 PROCEDURE — 1160F PR REVIEW ALL MEDS BY PRESCRIBER/CLIN PHARMACIST DOCUMENTED: ICD-10-PCS | Mod: CPTII,S$GLB,, | Performed by: INTERNAL MEDICINE

## 2022-11-02 PROCEDURE — 3008F BODY MASS INDEX DOCD: CPT | Mod: CPTII,S$GLB,, | Performed by: INTERNAL MEDICINE

## 2022-11-02 PROCEDURE — 1159F PR MEDICATION LIST DOCUMENTED IN MEDICAL RECORD: ICD-10-PCS | Mod: CPTII,S$GLB,, | Performed by: INTERNAL MEDICINE

## 2022-11-02 PROCEDURE — 3079F PR MOST RECENT DIASTOLIC BLOOD PRESSURE 80-89 MM HG: ICD-10-PCS | Mod: CPTII,S$GLB,, | Performed by: INTERNAL MEDICINE

## 2022-11-02 PROCEDURE — 90694 VACC AIIV4 NO PRSRV 0.5ML IM: CPT | Mod: S$GLB,,, | Performed by: INTERNAL MEDICINE

## 2022-11-02 PROCEDURE — 3075F SYST BP GE 130 - 139MM HG: CPT | Mod: CPTII,S$GLB,, | Performed by: INTERNAL MEDICINE

## 2022-11-02 NOTE — PROGRESS NOTES
"Subjective:       Patient ID: Rehana Whitten is a 67 y.o. female.    Chief Complaint: MDD Follow-up    HPI  Vaginal dryness and itching. Couldn't tolerate premarin cream. Still having hot flashes. Vivelle patch rx at last visit. However didn't really help w/ vaginal dryness or itching. Breast tenderness and sometimes w/ cramping also.   Exercising (cardio x 30 min) 4 days in a row and then a day of rest - has been doing this for 2.5 mo. Energy level is not doing well still.   Added magnesium. Didn't help.   TSH WNL 8/2022.   Goes to bed at 8/9pm and wakes up at 6am for the dog. May wake up about 2-3x/night.  snores. Goes back to sleep pretty quickly. Does not wake up refreshed from sleep.   Does not get on scale.     MDD/DIXIE - still taking celexa 20mg daily. Didn't want to do too many things at once.     Reports once every few yrs, may have RUQ pain. Most recent episode, had it for 4 days. Didn't stop her from eating. No nausea/vomiting/diarrhea/constipation. Did not radiate. Did not travel. Not sure if anything worsens it.     Review of Systems  Comprehensive review of systems otherwise negative. See history/subjective section for more details.      Objective:      Physical Exam    /84 (BP Location: Left arm, Patient Position: Sitting, BP Method: Large (Manual))   Pulse 72   Ht 5' 1" (1.549 m)   Wt 66.9 kg (147 lb 7.8 oz)   LMP  (LMP Unknown)   SpO2 98%   BMI 27.87 kg/m²     GEN - A+OX4, NAD   HEENT - PERRL, EOMI, OP clear and small. TM normal.   Neck - No thyromegaly or cervical LAD. No thyroid masses felt.  CV - RRR, no m/r   Chest - CTAB, no wheezing or rhonchi  Abd - S/NT/ND/+BS.   Ext - 2+BDP and radial pulses. No C/C/E.  MSK - no spinal tenderness to palpation. Normal gait.   Skin - No rash.    Previous labs reviewed.    Assessment/Plan     Rehana was seen today for follow-up.    Diagnoses and all orders for this visit:    Vaginal dryness - stop patches.     Colon cancer " screening  -     Cologuard Screening (Multitarget Stool DNA); Future  -     Cologuard Screening (Multitarget Stool DNA)    Right upper quadrant pain - r/o gallstones. Did discuss that even if neg, does not mean she didn't have it b/c gallstones can pass.   -     US Abdomen Complete; Future    Recurrent major depressive disorder, in full remission - cont celexa.     Fatigue, unspecified type - discussed possible DU. Recommended sleep study. Will think about it.    Other orders  -     Influenza (FLUAD) - Quadrivalent (Adjuvanted) *Preferred* (65+) (PF)      Follow up in about 6 months (around 5/2/2023).      Bernie Valverde MD  Department of Internal Medicine - [facility]  2:05 PM

## 2022-11-02 NOTE — PROGRESS NOTES
Pt here for MD visit. Fluad shot ordered   Pt ID by name and . Allergies reviewed.   VIS statement given and reviewed.   Pt agreeable to getting Fluad shot today.  Shot administered to left deltoid.   Pt instructed to remain in clinic for 15 minutes.  No complaints   Pt understands to call clinic or portal message with any questions or concerns

## 2022-11-09 ENCOUNTER — HOSPITAL ENCOUNTER (OUTPATIENT)
Dept: RADIOLOGY | Facility: HOSPITAL | Age: 67
Discharge: HOME | End: 2022-11-09
Attending: INTERNAL MEDICINE
Payer: MEDICARE

## 2022-11-09 ENCOUNTER — TELEPHONE (OUTPATIENT)
Dept: PRIMARY CARE CLINIC | Facility: CLINIC | Age: 67
End: 2022-11-09
Payer: MEDICARE

## 2022-11-09 DIAGNOSIS — R16.0 LIVER MASS: Primary | ICD-10-CM

## 2022-11-09 DIAGNOSIS — R16.0 LIVER MASS: ICD-10-CM

## 2022-11-09 DIAGNOSIS — R10.11 RIGHT UPPER QUADRANT PAIN: ICD-10-CM

## 2022-11-09 PROCEDURE — 74177 CT ABDOMEN PELVIS WITH CONTRAST: ICD-10-PCS | Mod: 26,,, | Performed by: RADIOLOGY

## 2022-11-09 PROCEDURE — 76700 US EXAM ABDOM COMPLETE: CPT | Mod: TC

## 2022-11-09 PROCEDURE — 25500020 PHARM REV CODE 255: Performed by: INTERNAL MEDICINE

## 2022-11-09 PROCEDURE — 74177 CT ABD & PELVIS W/CONTRAST: CPT | Mod: TC

## 2022-11-09 PROCEDURE — 76700 US ABDOMEN COMPLETE: ICD-10-PCS | Mod: 26,,, | Performed by: RADIOLOGY

## 2022-11-09 PROCEDURE — 74177 CT ABD & PELVIS W/CONTRAST: CPT | Mod: 26,,, | Performed by: RADIOLOGY

## 2022-11-09 PROCEDURE — 76700 US EXAM ABDOM COMPLETE: CPT | Mod: 26,,, | Performed by: RADIOLOGY

## 2022-11-09 RX ADMIN — IOHEXOL 100 ML: 350 INJECTION, SOLUTION INTRAVENOUS at 05:11

## 2022-11-09 NOTE — TELEPHONE ENCOUNTER
Large liver mass on US. Spoke w/ pt.     Please help schedule CT liver protocol - she's ok w/ Bertin/Callum holley but doesn't want to go to [employer].

## 2022-11-10 ENCOUNTER — TELEPHONE (OUTPATIENT)
Dept: INTERNAL MEDICINE | Facility: CLINIC | Age: 67
End: 2022-11-10
Payer: MEDICARE

## 2022-11-10 DIAGNOSIS — R16.0 LIVER MASS: Primary | ICD-10-CM

## 2022-11-10 DIAGNOSIS — F41.9 ANXIETY: ICD-10-CM

## 2022-11-10 LAB
CREAT SERPL-MCNC: 0.7 MG/DL (ref 0.5–1.4)
SAMPLE: NORMAL

## 2022-11-10 RX ORDER — ALPRAZOLAM 0.25 MG/1
0.25 TABLET ORAL 2 TIMES DAILY PRN
Qty: 14 TABLET | Refills: 0 | Status: ON HOLD | OUTPATIENT
Start: 2022-11-10 | End: 2022-12-15 | Stop reason: HOSPADM

## 2022-11-10 RX ORDER — ROSUVASTATIN CALCIUM 40 MG/1
40 TABLET, COATED ORAL NIGHTLY
Qty: 90 TABLET | Refills: 3 | Status: SHIPPED | OUTPATIENT
Start: 2022-11-10 | End: 2023-10-26 | Stop reason: SDUPTHER

## 2022-11-10 RX ORDER — ROSUVASTATIN CALCIUM 40 MG/1
TABLET, COATED ORAL
Qty: 90 TABLET | Refills: 3 | OUTPATIENT
Start: 2022-11-10

## 2022-11-10 NOTE — TELEPHONE ENCOUNTER
Spoke w/ pt about CT scan and liver mass. Referred to hepatology.likely needs biopsy. Pt reports felt like her mind is going is circles and anxiety is worse. Will rx xanax at pharmacy to take as needed for anxiety. Do not operate heavy machinery while on meds.     Sea, can you follow up tomorrow to see if hepatology contacted her? I just want to make sure she gets a soon appt w/ them. Thanks!

## 2022-11-11 ENCOUNTER — TELEPHONE (OUTPATIENT)
Dept: PRIMARY CARE CLINIC | Facility: CLINIC | Age: 67
End: 2022-11-11
Payer: MEDICARE

## 2022-11-11 NOTE — TELEPHONE ENCOUNTER
Received message with referral to the [facility].  1st available appt will be on Monday 11/14/22 at 11 am with Dr Rosangela Wan at [id number].    Call placed to the patient at [phone]. No Answer. Left a VM message about the appt.  If you have any questions or this appt will not work for you please call us back at [phone] this is Michaela calling from the [facility] at [facility].

## 2022-11-11 NOTE — TELEPHONE ENCOUNTER
Good morning all, just checking on the status of this patient getting in with your department. Please let me know. Thank you

## 2022-11-11 NOTE — TELEPHONE ENCOUNTER
----- Message from Sanjuanita Toussaint sent at 11/11/2022 11:47 AM CST -----  Regarding: speak with office  Contact: darinel Wheeler is calling to speak with nurse about appt...[phone] (home)

## 2022-11-14 ENCOUNTER — OFFICE VISIT (OUTPATIENT)
Dept: HEPATOLOGY | Facility: CLINIC | Age: 67
End: 2022-11-14
Payer: MEDICARE

## 2022-11-14 ENCOUNTER — TELEPHONE (OUTPATIENT)
Dept: TRANSPLANT | Facility: CLINIC | Age: 67
End: 2022-11-14
Payer: MEDICARE

## 2022-11-14 ENCOUNTER — LAB VISIT (OUTPATIENT)
Dept: LAB | Facility: HOSPITAL | Age: 67
End: 2022-11-14
Attending: INTERNAL MEDICINE
Payer: MEDICARE

## 2022-11-14 VITALS
SYSTOLIC BLOOD PRESSURE: 149 MMHG | BODY MASS INDEX: 27.5 KG/M2 | WEIGHT: 145.63 LBS | RESPIRATION RATE: 17 BRPM | TEMPERATURE: 98 F | OXYGEN SATURATION: 98 % | HEIGHT: 61 IN | HEART RATE: 80 BPM | DIASTOLIC BLOOD PRESSURE: 67 MMHG

## 2022-11-14 DIAGNOSIS — R97.8 OTHER ABNORMAL TUMOR MARKERS: ICD-10-CM

## 2022-11-14 DIAGNOSIS — R16.0 LIVER MASS: ICD-10-CM

## 2022-11-14 LAB — AFP SERPL-MCNC: 5.5 NG/ML (ref 0–8.4)

## 2022-11-14 PROCEDURE — 3077F SYST BP >= 140 MM HG: CPT | Mod: CPTII,S$GLB,, | Performed by: INTERNAL MEDICINE

## 2022-11-14 PROCEDURE — 3077F PR MOST RECENT SYSTOLIC BLOOD PRESSURE >= 140 MM HG: ICD-10-PCS | Mod: CPTII,S$GLB,, | Performed by: INTERNAL MEDICINE

## 2022-11-14 PROCEDURE — 82105 ALPHA-FETOPROTEIN SERUM: CPT | Performed by: INTERNAL MEDICINE

## 2022-11-14 PROCEDURE — 3008F PR BODY MASS INDEX (BMI) DOCUMENTED: ICD-10-PCS | Mod: CPTII,S$GLB,, | Performed by: INTERNAL MEDICINE

## 2022-11-14 PROCEDURE — 99999 PR PBB SHADOW E&M-EST. PATIENT-LVL V: ICD-10-PCS | Mod: PBBFAC,,, | Performed by: INTERNAL MEDICINE

## 2022-11-14 PROCEDURE — 99999 PR PBB SHADOW E&M-EST. PATIENT-LVL V: CPT | Mod: PBBFAC,,, | Performed by: INTERNAL MEDICINE

## 2022-11-14 PROCEDURE — 1160F RVW MEDS BY RX/DR IN RCRD: CPT | Mod: CPTII,S$GLB,, | Performed by: INTERNAL MEDICINE

## 2022-11-14 PROCEDURE — 3078F DIAST BP <80 MM HG: CPT | Mod: CPTII,S$GLB,, | Performed by: INTERNAL MEDICINE

## 2022-11-14 PROCEDURE — 3008F BODY MASS INDEX DOCD: CPT | Mod: CPTII,S$GLB,, | Performed by: INTERNAL MEDICINE

## 2022-11-14 PROCEDURE — 86304 IMMUNOASSAY TUMOR CA 125: CPT | Performed by: INTERNAL MEDICINE

## 2022-11-14 PROCEDURE — 3078F PR MOST RECENT DIASTOLIC BLOOD PRESSURE < 80 MM HG: ICD-10-PCS | Mod: CPTII,S$GLB,, | Performed by: INTERNAL MEDICINE

## 2022-11-14 PROCEDURE — 36415 COLL VENOUS BLD VENIPUNCTURE: CPT | Mod: PN | Performed by: INTERNAL MEDICINE

## 2022-11-14 PROCEDURE — 1160F PR REVIEW ALL MEDS BY PRESCRIBER/CLIN PHARMACIST DOCUMENTED: ICD-10-PCS | Mod: CPTII,S$GLB,, | Performed by: INTERNAL MEDICINE

## 2022-11-14 PROCEDURE — 82378 CARCINOEMBRYONIC ANTIGEN: CPT | Performed by: INTERNAL MEDICINE

## 2022-11-14 PROCEDURE — 1126F PR PAIN SEVERITY QUANTIFIED, NO PAIN PRESENT: ICD-10-PCS | Mod: CPTII,S$GLB,, | Performed by: INTERNAL MEDICINE

## 2022-11-14 PROCEDURE — 1101F PT FALLS ASSESS-DOCD LE1/YR: CPT | Mod: CPTII,S$GLB,, | Performed by: INTERNAL MEDICINE

## 2022-11-14 PROCEDURE — 99204 PR OFFICE/OUTPT VISIT, NEW, LEVL IV, 45-59 MIN: ICD-10-PCS | Mod: S$GLB,,, | Performed by: INTERNAL MEDICINE

## 2022-11-14 PROCEDURE — 99204 OFFICE O/P NEW MOD 45 MIN: CPT | Mod: S$GLB,,, | Performed by: INTERNAL MEDICINE

## 2022-11-14 PROCEDURE — 86301 IMMUNOASSAY TUMOR CA 19-9: CPT | Performed by: INTERNAL MEDICINE

## 2022-11-14 PROCEDURE — 4010F PR ACE/ARB THEARPY RXD/TAKEN: ICD-10-PCS | Mod: CPTII,S$GLB,, | Performed by: INTERNAL MEDICINE

## 2022-11-14 PROCEDURE — 4010F ACE/ARB THERAPY RXD/TAKEN: CPT | Mod: CPTII,S$GLB,, | Performed by: INTERNAL MEDICINE

## 2022-11-14 PROCEDURE — 1101F PR PT FALLS ASSESS DOC 0-1 FALLS W/OUT INJ PAST YR: ICD-10-PCS | Mod: CPTII,S$GLB,, | Performed by: INTERNAL MEDICINE

## 2022-11-14 PROCEDURE — 1126F AMNT PAIN NOTED NONE PRSNT: CPT | Mod: CPTII,S$GLB,, | Performed by: INTERNAL MEDICINE

## 2022-11-14 PROCEDURE — 1159F MED LIST DOCD IN RCRD: CPT | Mod: CPTII,S$GLB,, | Performed by: INTERNAL MEDICINE

## 2022-11-14 PROCEDURE — 3288F FALL RISK ASSESSMENT DOCD: CPT | Mod: CPTII,S$GLB,, | Performed by: INTERNAL MEDICINE

## 2022-11-14 PROCEDURE — 3288F PR FALLS RISK ASSESSMENT DOCUMENTED: ICD-10-PCS | Mod: CPTII,S$GLB,, | Performed by: INTERNAL MEDICINE

## 2022-11-14 PROCEDURE — 1159F PR MEDICATION LIST DOCUMENTED IN MEDICAL RECORD: ICD-10-PCS | Mod: CPTII,S$GLB,, | Performed by: INTERNAL MEDICINE

## 2022-11-14 NOTE — PROGRESS NOTES
HEPATOLOGY CONSULTATION    Referring Physician: Bernie Valverde MD   Current Corresponding Physician: Bernie Valverde MD     Reason for Consultation: Consultation for evaluation of Abnormal Abdominal/Liver Imaging    History of Present Illness: Rehana Whitten is a 67 y.o. female who developed RUQ pain that lasted a few days. To evaluate the source she had an abdo US:    Abdo US 11/9/22: Liver: Normal in size, measuring 15.7 cm.   There is a large heterogeneous mass in the right lobe, measures 7.2 x 5.3 x 5.9 cm.  Neoplastic disease cannot be excluded.  Further assessment is recommended.  The hepatorenal index is 1.5, indicating more than 5% steatosis.    CT abdo pelvis w contrast 11/9/22: There is an irregular 5.3 x 5.8 x 4.8 cm lesion in the inferior right hepatic lobe corresponding to ultrasound.  This lesion demonstrates peripheral arterial enhancement with progressive central contrast enhancement on delayed images.  There is overlying capsular retraction.  There is geographic enhancement in the adjacent liver parenchyma suggesting transient hepatic attenuation differences (AMAURI).  There is a additional hypodensity superior to this lesion in the periphery of the right hepatic lobe measuring 1.6 cm and an additional 0.8 cm lesion in segment 8.  These are too small to completely characterize but demonstrate delayed enhancement.  No biliary ductal dilatation.  Portal vein, splenic vein and SMV are patent. Impression: Irregular right hepatic lobe lesion demonstrating delayed central enhancement and overlying capsular retraction.  Capsular retraction can be seen in cholangiocarcinoma and sclerosing hepatic hemangiomas.  Differential includes metastatic disease and other primary hepatic neoplasms.  Recommend further evaluation with tumor markers and possible tissue sampling.  Two additional small lesions in the liver possibly small hemangiomas although too small to characterize.    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    Hypertension     Hypothyroidism     hypothyroidism    Insomnia     previously on restoril    Liver mass 11/14/2022    Vertigo      Outpatient Encounter Medications as of 11/14/2022   Medication Sig Dispense Refill    candesartan (ATACAND) 8 MG tablet TAKE 1 TABLET(8 MG) BY MOUTH EVERY DAY 90 tablet 3    citalopram (CELEXA) 20 MG tablet TAKE 1 TABLET BY MOUTH EVERY DAY 90 tablet 2    clotrimazole-betamethasone 1-0.05% (LOTRISONE) cream Apply to affected area 2 times daily 15 g 1    levothyroxine (SYNTHROID) 88 MCG tablet TAKE 1 TABLET BY MOUTH EVERY DAY 90 tablet 3    rosuvastatin (CRESTOR) 40 MG Tab Take 1 tablet (40 mg total) by mouth every evening. 90 tablet 3    tretinoin (RETIN-A) 0.025 % cream Apply topically every evening. 45 g 1    triamterene-hydrochlorothiazide 37.5-25 mg (DYAZIDE) 37.5-25 mg per capsule TAKE 1 CAPSULE BY MOUTH EVERY MORNING 90 capsule 3    valACYclovir (VALTREX) 500 MG tablet TAKE 1 TABLET(500 MG) BY MOUTH EVERY DAY 90 tablet 4    albuterol (PROVENTIL/VENTOLIN HFA) 90 mcg/actuation inhaler albuterol sulfate Take 2 puff(s) (inhalation) 4 times per day PRN - Wheezing for 30 days 20210307 HFA aerosol inhaler 4 times per day inhalation 30 days suspended 90 mcg/actuation      ALPRAZolam (XANAX) 0.25 MG tablet Take 1 tablet (0.25 mg total) by mouth 2 (two) times daily as needed for Anxiety. (Patient not taking: Reported on 11/14/2022) 14 tablet 0     No facility-administered encounter medications on file as of 11/14/2022.     Review of patient's  allergies indicates:  No Known Allergies  Family History   Problem Relation Age of Onset    Heart disease Mother 67        MI    Depression Mother     Diabetes Mother     Heart disease Father 56        CAD    Hypertension Father     No Known Problems Sister     Hyperlipidemia Sister     Hypertension Sister     Melanoma Neg Hx        Social History     Socioeconomic History    Marital status:    Occupational History    Occupation: Care Coordinator     Comment: [employer]   Tobacco Use    Smoking status: Former     Packs/day: 0.50     Years: 10.00     Pack years: 5.00     Types: Cigarettes     Quit date: 3/17/1989     Years since quittin.6    Smokeless tobacco: Never   Substance and Sexual Activity    Alcohol use: Yes     Alcohol/week: 4.0 standard drinks     Types: 4 Standard drinks or equivalent per week     Comment: soc    Drug use: No    Sexual activity: Yes     Partners: Male     Birth control/protection: Post-menopausal     Review of Systems   Constitutional: Negative.    HENT: Negative.     Eyes: Negative.    Respiratory: Negative.     Cardiovascular: Negative.    Gastrointestinal: Negative.    Genitourinary: Negative.    Musculoskeletal: Negative.    Skin: Negative.    Neurological: Negative.    Psychiatric/Behavioral: Negative.     Vitals:    22 1040   BP: (!) 149/67   Pulse: 80   Resp: 17   Temp: 97.5 °F (36.4 °C)       Physical Exam  Vitals reviewed.   Constitutional:       Appearance: She is well-developed.   HENT:      Head: Normocephalic and atraumatic.   Eyes:      General: No scleral icterus.     Conjunctiva/sclera: Conjunctivae normal.      Pupils: Pupils are equal, round, and reactive to light.   Neck:      Thyroid: No thyromegaly.   Cardiovascular:      Rate and Rhythm: Normal rate and regular rhythm.      Heart sounds: Normal heart sounds.   Pulmonary:      Effort: Pulmonary effort is normal.      Breath sounds: Normal breath sounds. No rales.   Abdominal:      General: Bowel  sounds are normal. There is no distension.      Palpations: Abdomen is soft. There is no mass.      Tenderness: There is no abdominal tenderness.   Musculoskeletal:         General: Normal range of motion.      Cervical back: Normal range of motion and neck supple.   Skin:     General: Skin is warm and dry.      Findings: No rash.   Neurological:      Mental Status: She is alert and oriented to person, place, and time.       Computed MELD-Na score unavailable. Necessary lab results were not found in the last year.  Computed MELD score unavailable. Necessary lab results were not found in the last year.    Lab Results   Component Value Date     08/10/2022    BUN 14 08/10/2022    CREATININE 0.7 08/10/2022    CALCIUM 9.8 08/10/2022     08/10/2022    K 4.2 08/10/2022     08/10/2022    PROT 7.4 08/10/2022    CO2 27 08/10/2022    ANIONGAP 12 08/10/2022    WBC 5.14 08/10/2022    RBC 4.17 08/10/2022    HGB 12.8 08/10/2022    HCT 41.7 08/10/2022     (H) 08/10/2022    MCH 30.7 08/10/2022    MCHC 30.7 (L) 08/10/2022     Lab Results   Component Value Date    RDW 12.7 08/10/2022     08/10/2022    MPV 11.4 08/10/2022    GRAN 3.5 08/10/2022    GRAN 67.5 08/10/2022    LYMPH 1.2 08/10/2022    LYMPH 23.3 08/10/2022    MONO 0.3 08/10/2022    MONO 6.0 08/10/2022    EOSINOPHIL 1.8 08/10/2022    BASOPHIL 1.0 08/10/2022    EOS 0.1 08/10/2022    BASO 0.05 08/10/2022    APTT 32.1 08/08/2005    CHOL 162 08/10/2022    TRIG 134 08/10/2022    HDL 59 08/10/2022    CHOLHDL 36.4 08/10/2022    TOTALCHOLEST 2.7 08/10/2022    ALBUMIN 4.3 08/10/2022    BILIDIR 0.3 02/10/2022    BILIDIR 0.3 02/10/2022    AST 22 08/10/2022    ALT 20 08/10/2022    ALKPHOS 131 08/10/2022    LABPROT 10.7 08/08/2005    INR 0.9 08/08/2005       Assessment and Plan:  Rehana Whitten is a 67 y.o. female with a liver lesion of unclear etiology. She has no obvious chronic liver disease and thus and HCC is less likely. It has features  that are concerning. I reviewed the films with the radiologist. Will proceed with tumor markers and a liver biopsy. If the tumor markers are diagnostic, will cancel liver biopsy. Also recommending ct chest and appt with Dr Blackmon for possible resection.

## 2022-11-14 NOTE — TELEPHONE ENCOUNTER
Called patient to notify her of appointment scheduled on Thursday, November 17 at 2:00 with Dr. Blackmon. Patient verbalized acknowledgment.

## 2022-11-15 LAB
CANCER AG125 SERPL-ACNC: 26 U/ML (ref 0–30)
CANCER AG19-9 SERPL-ACNC: 19.1 U/ML (ref 0–40)
CEA SERPL-MCNC: <1.7 NG/ML (ref 0–5)

## 2022-11-16 ENCOUNTER — HOSPITAL ENCOUNTER (OUTPATIENT)
Dept: RADIOLOGY | Facility: HOSPITAL | Age: 67
Discharge: HOME | End: 2022-11-16
Attending: INTERNAL MEDICINE
Payer: MEDICARE

## 2022-11-16 ENCOUNTER — PATIENT MESSAGE (OUTPATIENT)
Dept: HEPATOLOGY | Facility: CLINIC | Age: 67
End: 2022-11-16
Payer: MEDICARE

## 2022-11-16 DIAGNOSIS — R16.0 LIVER MASS: ICD-10-CM

## 2022-11-16 PROCEDURE — 71250 CT THORAX DX C-: CPT | Mod: 26,,, | Performed by: RADIOLOGY

## 2022-11-16 PROCEDURE — 71250 CT CHEST WITHOUT CONTRAST: ICD-10-PCS | Mod: 26,,, | Performed by: RADIOLOGY

## 2022-11-16 PROCEDURE — 71250 CT THORAX DX C-: CPT | Mod: TC

## 2022-11-17 ENCOUNTER — EVALUATION (OUTPATIENT)
Dept: TRANSPLANT | Facility: CLINIC | Age: 67
End: 2022-11-17
Payer: MEDICARE

## 2022-11-17 ENCOUNTER — PATIENT MESSAGE (OUTPATIENT)
Dept: PRIMARY CARE CLINIC | Facility: CLINIC | Age: 67
End: 2022-11-17
Payer: MEDICARE

## 2022-11-17 ENCOUNTER — PATIENT MESSAGE (OUTPATIENT)
Dept: HEPATOLOGY | Facility: CLINIC | Age: 67
End: 2022-11-17
Payer: MEDICARE

## 2022-11-17 VITALS
RESPIRATION RATE: 16 BRPM | OXYGEN SATURATION: 99 % | SYSTOLIC BLOOD PRESSURE: 157 MMHG | HEIGHT: 61 IN | BODY MASS INDEX: 27.43 KG/M2 | WEIGHT: 145.31 LBS | HEART RATE: 85 BPM | TEMPERATURE: 97 F | DIASTOLIC BLOOD PRESSURE: 71 MMHG

## 2022-11-17 DIAGNOSIS — Z01.818 PRE-OP TESTING: Primary | ICD-10-CM

## 2022-11-17 DIAGNOSIS — R16.0 LIVER MASS: ICD-10-CM

## 2022-11-17 PROCEDURE — 99214 PR OFFICE/OUTPT VISIT, EST, LEVL IV, 30-39 MIN: ICD-10-PCS | Mod: S$GLB,,, | Performed by: TRANSPLANT SURGERY

## 2022-11-17 PROCEDURE — 99999 PR PBB SHADOW E&M-EST. PATIENT-LVL V: ICD-10-PCS | Mod: PBBFAC,,,

## 2022-11-17 PROCEDURE — 99999 PR PBB SHADOW E&M-EST. PATIENT-LVL V: CPT | Mod: PBBFAC,,,

## 2022-11-17 PROCEDURE — 99214 OFFICE O/P EST MOD 30 MIN: CPT | Mod: S$GLB,,, | Performed by: TRANSPLANT SURGERY

## 2022-11-18 NOTE — PROGRESS NOTES
Liver Transplant / Hepatobiliary Surgery  Clinic Note    Referring Provider: Rosangela Wan MD     Subjective:     Reason for Visit: Liver mass     History of Present Illness: Rehana Whitten is a 67 y.o. female referred for consultation regarding liver mass. she initially presented with vague abdominal pain with US showing liver mass. Subsequent imaging studies demonstrated mass in the right lobe of the liver with satellite nodules concerning for malignancy. There was no evidence of extrahepatic disease on abdominal or chest CT. she currently reports no symptoms.     ROS     Objective:     Physical Exam       MELD-Na score: Computed MELD-Na score unavailable. Necessary lab results were not found in the last year.  Computed MELD score unavailable. Necessary lab results were not found in the last year.     Sodium   Date Value Ref Range Status   08/10/2022 139 136 - 145 mmol/L Final     Potassium   Date Value Ref Range Status   08/10/2022 4.2 3.5 - 5.1 mmol/L Final     Chloride   Date Value Ref Range Status   08/10/2022 100 95 - 110 mmol/L Final     CO2   Date Value Ref Range Status   08/10/2022 27 23 - 29 mmol/L Final     Glucose   Date Value Ref Range Status   08/10/2022 106 70 - 110 mg/dL Final     BUN   Date Value Ref Range Status   08/10/2022 14 8 - 23 mg/dL Final     Creatinine   Date Value Ref Range Status   08/10/2022 0.7 0.5 - 1.4 mg/dL Final     Calcium   Date Value Ref Range Status   08/10/2022 9.8 8.7 - 10.5 mg/dL Final     Total Protein   Date Value Ref Range Status   08/10/2022 7.4 6.0 - 8.4 g/dL Final     Albumin   Date Value Ref Range Status   08/10/2022 4.3 3.5 - 5.2 g/dL Final     Total Bilirubin   Date Value Ref Range Status   08/10/2022 1.1 (H) 0.1 - 1.0 mg/dL Final     Comment:     For infants and newborns, interpretation of results should be based  on gestational age, weight and in agreement with clinical  observations.    Premature Infant recommended reference ranges:  Up to 24  hours.............<8.0 mg/dL  Up to 48 hours............<12.0 mg/dL  3-5 days..................<15.0 mg/dL  6-29 days.................<15.0 mg/dL       Alkaline Phosphatase   Date Value Ref Range Status   08/10/2022 131 55 - 135 U/L Final     AST   Date Value Ref Range Status   08/10/2022 22 10 - 40 U/L Final     ALT   Date Value Ref Range Status   08/10/2022 20 10 - 44 U/L Final     Anion Gap   Date Value Ref Range Status   08/10/2022 12 8 - 16 mmol/L Final     eGFR if    Date Value Ref Range Status   01/04/2022 >60.0 >60 mL/min/1.73 m^2 Final     eGFR if non    Date Value Ref Range Status   01/04/2022 >60.0 >60 mL/min/1.73 m^2 Final     Comment:     Calculation used to obtain the estimated glomerular filtration  rate (eGFR) is the CKD-EPI equation.        Lab Results   Component Value Date    WBC 5.14 08/10/2022    HGB 12.8 08/10/2022    HCT 41.7 08/10/2022     (H) 08/10/2022     08/10/2022       Lab Results   Component Value Date    INR 0.9 08/08/2005       Oncology History    No history exists.           Diagnoses:  Problem List Items Addressed This Visit          GI    Liver mass    Relevant Orders    IR CT Guidance    Case Request Operating Room: EXCISION, LIVER (Right lobe) 4 hours Open (Completed)    CBC auto differential    Comprehensive metabolic panel    Protime-INR     Other Visit Diagnoses       Pre-op testing    -  Primary    Relevant Orders    CBC auto differential    Comprehensive metabolic panel    Protime-INR    X-Ray Chest PA And Lateral    EKG 12-lead    COVID-19 Routine Screening    COVID-19 Routine Screening             Assessment/Plan:     I reviewed available imaging with the patient and her family in clinic today and discussed in detail the diagnosis of liver mass concerning for cancer. Her tumor markers were all within normal limits, making an empiric/radiologic diagnosis difficult. I explained the features were most concerning for intrahepatic  cholangiocarcinoma. We also discussed in detail potential treatment options including liver resection and yttrium-90 transarterial radioembolization (TARE). After discussion with the patient, I recommend starting with a liver biopsy to confirm diagnosis. If the mass is metastatic disease, systemic therapy would be considered first. If it returns cholangiocarcinoma, we will plan to proceed with liver resection, cholecystectomy and lymph node dissection in early December. The risks and benefits of liver resection were discussed in detail, including the risk of death, liver failure, bleeding, infection, bile leak, intrahepatic abscess, cardiovascular complications and inability to perform the procedure due to technical factors or spread of disease.      Tacos Blackmon MD  Liver Transplant / Hepatobiliary Surgery  [facility]

## 2022-11-19 LAB — NONINV COLON CA DNA+OCC BLD SCRN STL QL: NEGATIVE

## 2022-11-30 ENCOUNTER — HOSPITAL ENCOUNTER (OUTPATIENT)
Dept: INTERVENTIONAL RADIOLOGY/VASCULAR | Facility: HOSPITAL | Age: 67
Discharge: HOME | End: 2022-11-30
Attending: TRANSPLANT SURGERY
Payer: MEDICARE

## 2022-11-30 ENCOUNTER — HOSPITAL ENCOUNTER (OUTPATIENT)
Dept: RADIOLOGY | Facility: HOSPITAL | Age: 67
Discharge: HOME | End: 2022-11-30
Attending: TRANSPLANT SURGERY
Payer: MEDICARE

## 2022-11-30 VITALS
HEART RATE: 86 BPM | HEIGHT: 61 IN | WEIGHT: 141 LBS | SYSTOLIC BLOOD PRESSURE: 154 MMHG | DIASTOLIC BLOOD PRESSURE: 69 MMHG | TEMPERATURE: 97 F | OXYGEN SATURATION: 97 % | RESPIRATION RATE: 18 BRPM | BODY MASS INDEX: 26.62 KG/M2

## 2022-11-30 VITALS
HEART RATE: 64 BPM | RESPIRATION RATE: 16 BRPM | OXYGEN SATURATION: 98 % | DIASTOLIC BLOOD PRESSURE: 64 MMHG | SYSTOLIC BLOOD PRESSURE: 140 MMHG

## 2022-11-30 DIAGNOSIS — R16.0 LIVER MASS: ICD-10-CM

## 2022-11-30 LAB
ALBUMIN SERPL BCP-MCNC: 4.7 G/DL (ref 3.5–5.2)
ALP SERPL-CCNC: 118 U/L (ref 55–135)
ALT SERPL W/O P-5'-P-CCNC: 19 U/L (ref 10–44)
ANION GAP SERPL CALC-SCNC: 11 MMOL/L (ref 8–16)
AST SERPL-CCNC: 21 U/L (ref 10–40)
BASOPHILS # BLD AUTO: 0.05 K/UL (ref 0–0.2)
BASOPHILS NFR BLD: 1 % (ref 0–1.9)
BILIRUB SERPL-MCNC: 1 MG/DL (ref 0.1–1)
BUN SERPL-MCNC: 16 MG/DL (ref 8–23)
CALCIUM SERPL-MCNC: 10.2 MG/DL (ref 8.7–10.5)
CHLORIDE SERPL-SCNC: 102 MMOL/L (ref 95–110)
CO2 SERPL-SCNC: 27 MMOL/L (ref 23–29)
CREAT SERPL-MCNC: 0.8 MG/DL (ref 0.5–1.4)
DIFFERENTIAL METHOD: NORMAL
EOSINOPHIL # BLD AUTO: 0.1 K/UL (ref 0–0.5)
EOSINOPHIL NFR BLD: 2 % (ref 0–8)
ERYTHROCYTE [DISTWIDTH] IN BLOOD BY AUTOMATED COUNT: 12.6 % (ref 11.5–14.5)
EST. GFR  (NO RACE VARIABLE): >60 ML/MIN/1.73 M^2
GLUCOSE SERPL-MCNC: 109 MG/DL (ref 70–110)
HCT VFR BLD AUTO: 42.7 % (ref 37–48.5)
HGB BLD-MCNC: 13.7 G/DL (ref 12–16)
IMM GRANULOCYTES # BLD AUTO: 0.02 K/UL (ref 0–0.04)
IMM GRANULOCYTES NFR BLD AUTO: 0.4 % (ref 0–0.5)
INR PPP: 1 (ref 0.8–1.2)
LYMPHOCYTES # BLD AUTO: 1.3 K/UL (ref 1–4.8)
LYMPHOCYTES NFR BLD: 26.4 % (ref 18–48)
MCH RBC QN AUTO: 30.4 PG (ref 27–31)
MCHC RBC AUTO-ENTMCNC: 32.1 G/DL (ref 32–36)
MCV RBC AUTO: 95 FL (ref 82–98)
MONOCYTES # BLD AUTO: 0.3 K/UL (ref 0.3–1)
MONOCYTES NFR BLD: 6.2 % (ref 4–15)
NEUTROPHILS # BLD AUTO: 3.2 K/UL (ref 1.8–7.7)
NEUTROPHILS NFR BLD: 64 % (ref 38–73)
NRBC BLD-RTO: 0 /100 WBC
PLATELET # BLD AUTO: 252 K/UL (ref 150–450)
PMV BLD AUTO: 10.9 FL (ref 9.2–12.9)
POTASSIUM SERPL-SCNC: 3.9 MMOL/L (ref 3.5–5.1)
PROT SERPL-MCNC: 7.9 G/DL (ref 6–8.4)
PROTHROMBIN TIME: 10.3 SEC (ref 9–12.5)
RBC # BLD AUTO: 4.51 M/UL (ref 4–5.4)
SODIUM SERPL-SCNC: 140 MMOL/L (ref 136–145)
WBC # BLD AUTO: 5.03 K/UL (ref 3.9–12.7)

## 2022-11-30 PROCEDURE — 88307 TISSUE EXAM BY PATHOLOGIST: CPT | Mod: 26,,, | Performed by: PATHOLOGY

## 2022-11-30 PROCEDURE — 99152 MOD SED SAME PHYS/QHP 5/>YRS: CPT | Mod: ,,, | Performed by: RADIOLOGY

## 2022-11-30 PROCEDURE — 99152 MOD SED SAME PHYS/QHP 5/>YRS: CPT | Performed by: RADIOLOGY

## 2022-11-30 PROCEDURE — 88307 TISSUE EXAM BY PATHOLOGIST: CPT | Performed by: PATHOLOGY

## 2022-11-30 PROCEDURE — 25000003 PHARM REV CODE 250: Performed by: RADIOLOGY

## 2022-11-30 PROCEDURE — 88333 PATH CONSLTJ SURG CYTO XM 1: CPT | Performed by: PATHOLOGY

## 2022-11-30 PROCEDURE — 80053 COMPREHEN METABOLIC PANEL: CPT | Performed by: TRANSPLANT SURGERY

## 2022-11-30 PROCEDURE — 88333 PR  INTRAOPERATIVE CYTO PATH CONSULT, INITIAL SITE: ICD-10-PCS | Mod: 26,,, | Performed by: PATHOLOGY

## 2022-11-30 PROCEDURE — 88333 PATH CONSLTJ SURG CYTO XM 1: CPT | Mod: 26,,, | Performed by: PATHOLOGY

## 2022-11-30 PROCEDURE — 99153 MOD SED SAME PHYS/QHP EA: CPT

## 2022-11-30 PROCEDURE — 88342 IMHCHEM/IMCYTCHM 1ST ANTB: CPT | Mod: 26,,, | Performed by: PATHOLOGY

## 2022-11-30 PROCEDURE — A4550 SURGICAL TRAYS: HCPCS

## 2022-11-30 PROCEDURE — 88342 CHG IMMUNOCYTOCHEMISTRY: ICD-10-PCS | Mod: 26,,, | Performed by: PATHOLOGY

## 2022-11-30 PROCEDURE — 85025 COMPLETE CBC W/AUTO DIFF WBC: CPT | Performed by: TRANSPLANT SURGERY

## 2022-11-30 PROCEDURE — 88341 IMHCHEM/IMCYTCHM EA ADD ANTB: CPT | Mod: 26,,, | Performed by: PATHOLOGY

## 2022-11-30 PROCEDURE — 99152 MOD SED SAME PHYS/QHP 5/>YRS: CPT

## 2022-11-30 PROCEDURE — 47000 IR BIOPSY LIVER: ICD-10-PCS | Mod: ,,, | Performed by: RADIOLOGY

## 2022-11-30 PROCEDURE — 99153 MOD SED SAME PHYS/QHP EA: CPT | Performed by: RADIOLOGY

## 2022-11-30 PROCEDURE — 76942 IR BIOPSY LIVER: ICD-10-PCS | Mod: 26,,, | Performed by: RADIOLOGY

## 2022-11-30 PROCEDURE — 88342 IMHCHEM/IMCYTCHM 1ST ANTB: CPT | Performed by: PATHOLOGY

## 2022-11-30 PROCEDURE — 63600175 PHARM REV CODE 636 W HCPCS: Performed by: RADIOLOGY

## 2022-11-30 PROCEDURE — 88341 IMHCHEM/IMCYTCHM EA ADD ANTB: CPT | Performed by: PATHOLOGY

## 2022-11-30 PROCEDURE — 47000 NEEDLE BIOPSY OF LIVER PERQ: CPT | Mod: ,,, | Performed by: RADIOLOGY

## 2022-11-30 PROCEDURE — 76942 ECHO GUIDE FOR BIOPSY: CPT | Mod: TC | Performed by: RADIOLOGY

## 2022-11-30 PROCEDURE — 99152 PR MOD CONSCIOUS SEDATION, SAME PHYS, 5+ YRS, FIRST 15 MIN: ICD-10-PCS | Mod: ,,, | Performed by: RADIOLOGY

## 2022-11-30 PROCEDURE — 88341 PR IHC OR ICC EACH ADD'L SINGLE ANTIBODY  STAINPR: ICD-10-PCS | Mod: 26,,, | Performed by: PATHOLOGY

## 2022-11-30 PROCEDURE — 88307 PR  SURG PATH,LEVEL V: ICD-10-PCS | Mod: 26,,, | Performed by: PATHOLOGY

## 2022-11-30 PROCEDURE — 85610 PROTHROMBIN TIME: CPT | Performed by: TRANSPLANT SURGERY

## 2022-11-30 RX ORDER — LIDOCAINE HYDROCHLORIDE 10 MG/ML
INJECTION INFILTRATION; PERINEURAL
Status: COMPLETED | OUTPATIENT
Start: 2022-11-30 | End: 2022-11-30

## 2022-11-30 RX ORDER — FENTANYL CITRATE 50 UG/ML
INJECTION, SOLUTION INTRAMUSCULAR; INTRAVENOUS
Status: COMPLETED | OUTPATIENT
Start: 2022-11-30 | End: 2022-11-30

## 2022-11-30 RX ORDER — SODIUM CHLORIDE 9 MG/ML
INJECTION, SOLUTION INTRAVENOUS
Status: COMPLETED | OUTPATIENT
Start: 2022-11-30 | End: 2022-11-30

## 2022-11-30 RX ORDER — MIDAZOLAM HYDROCHLORIDE 1 MG/ML
INJECTION INTRAMUSCULAR; INTRAVENOUS
Status: COMPLETED | OUTPATIENT
Start: 2022-11-30 | End: 2022-11-30

## 2022-11-30 RX ADMIN — GELATIN ABSORBABLE SPONGE SIZE 100 1 APPLICATOR: MISC at 11:11

## 2022-11-30 RX ADMIN — FENTANYL CITRATE 50 MCG: 50 INJECTION, SOLUTION INTRAMUSCULAR; INTRAVENOUS at 11:11

## 2022-11-30 RX ADMIN — MIDAZOLAM HYDROCHLORIDE 1 MG: 1 INJECTION, SOLUTION INTRAMUSCULAR; INTRAVENOUS at 11:11

## 2022-11-30 RX ADMIN — LIDOCAINE HYDROCHLORIDE 10 ML: 10 INJECTION, SOLUTION INFILTRATION; PERINEURAL at 11:11

## 2022-11-30 RX ADMIN — SODIUM CHLORIDE 500 ML: 0.9 INJECTION, SOLUTION INTRAVENOUS at 11:11

## 2022-11-30 NOTE — PROCEDURES
Interventional Radiology Immediate Post-Procedure Note    Pre-Op Diagnosis: Liver mass  Post-Op Diagnosis: Same    Procedure: US-guided coaxial core needle bx    Procedure performed by: Joao Pulido MD  Assistants: None    Estimated Blood Loss: Minimal  Specimen Removed: Yes    Findings/description of procedure:  18-ga cores x5 taken from mass at the inferior right lobe of liver. Adequacy of specimen confirmed. Tract embolized with Gel-Foam sponge slurry.    No immediate complications. Patient tolerated procedure well. Please see full dictated procedure report for additional details and recommendations.      Joao Pulido MD  [facility]  Pager [phone]

## 2022-11-30 NOTE — DISCHARGE SUMMARY
Interventional Radiology Short Stay Discharge Summary        Admit Date: 11/30/2022  Discharge Date: 11/30/2022      Hospital Course: Uneventful     Discharge Diagnosis: Liver mass s/p bx today     Discharge Condition: Stable     Discharge Disposition: Home     Diet: Resume prior diet     Activity: Activity as tolerated and no driving for today     Follow-up: With referring provider        Andrew Marsala MD [facility]  Pager [phone]

## 2022-11-30 NOTE — H&P
Interventional Radiology Pre-Procedure History & Physical      Chief Complaint/Reason for Referral: Liver mass    History of Present Illness:  Rehana Whitten is a 67 y.o. female who presents with a mass in the inferior right hepatic lobe. Referred for bx.    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 Authorizing Provider   candesartan (ATACAND) 8 MG tablet TAKE 1 TABLET(8 MG) BY MOUTH EVERY DAY 1/30/22  Yes Bernie Valverde MD   citalopram (CELEXA) 20 MG tablet TAKE 1 TABLET BY MOUTH EVERY DAY 10/14/22  Yes Bernie Valverde MD   clotrimazole-betamethasone 1-0.05% (LOTRISONE) cream Apply to affected area 2 times daily 12/8/21 12/8/22 Yes Trina Alcala, NP   levothyroxine (SYNTHROID) 88 MCG tablet TAKE 1 TABLET BY MOUTH EVERY DAY 1/30/22  Yes Bernie Valverde MD   rosuvastatin (CRESTOR) 40 MG Tab Take 1 tablet (40 mg total) by mouth every evening. 11/10/22  Yes Bernie Valverde MD   triamterene-hydrochlorothiazide 37.5-25 mg (DYAZIDE) 37.5-25 mg per capsule TAKE 1 CAPSULE BY MOUTH EVERY MORNING 6/3/22  Yes Bernie Valverde MD   valACYclovir (VALTREX) 500 MG tablet TAKE 1 TABLET(500 MG) BY MOUTH EVERY DAY 2/14/22  Yes Clare Valdovinos MD   albuterol (PROVENTIL/VENTOLIN HFA)  90 mcg/actuation inhaler albuterol sulfate Take 2 puff(s) (inhalation) 4 times per day PRN - Wheezing for 30 days 20210307 HFA aerosol inhaler 4 times per day inhalation 30 days suspended 90 mcg/actuation 3/7/21   Historical Provider   ALPRAZolam (XANAX) 0.25 MG tablet Take 1 tablet (0.25 mg total) by mouth 2 (two) times daily as needed for Anxiety.  Patient not taking: Reported on 11/14/2022 11/10/22 12/10/22  Bernie Valverde MD   tretinoin (RETIN-A) 0.025 % cream Apply topically every evening. 8/28/17   Dawna Maradiaga MD       Anticoagulation/Antiplatelet Meds: no anticoagulation    Review of Systems:   Hematological: no known coagulopathies  Respiratory: no shortness of breath  Cardiovascular: no chest pain  Gastrointestinal: no abdominal pain  Genitourinary: no dysuria  Musculoskeletal: negative  Neurological: no TIA or stroke symptoms     Physical Exam:  Temp: 97.4 °F (36.3 °C) (11/30/22 0921)  Pulse: 86 (11/30/22 0921)  Resp: 18 (11/30/22 0921)  BP: (!) 154/69 (11/30/22 0921)  SpO2: 97 % (11/30/22 0921)    General: WNWD, NAD  HEENT: Normocephalic, sclera anicteric, oropharynx clear  Neck: Supple, no palpable lymphadenopathy  Heart: RRR  Lungs: Symmetric excursions, breathing unlabored  Abd: NTND, soft  Extremities: VILLALBA  Neuro: Gross nonfocal    Laboratory:  Lab Results   Component Value Date    INR 1.0 11/30/2022       Lab Results   Component Value Date    WBC 5.03 11/30/2022    HGB 13.7 11/30/2022    HCT 42.7 11/30/2022    MCV 95 11/30/2022     11/30/2022      Lab Results   Component Value Date     11/30/2022     11/30/2022    K 3.9 11/30/2022     11/30/2022    CO2 27 11/30/2022    BUN 16 11/30/2022    CREATININE 0.8 11/30/2022    CALCIUM 10.2 11/30/2022    ALT 19 11/30/2022    AST 21 11/30/2022    ALBUMIN 4.7 11/30/2022    BILITOT 1.0 11/30/2022    BILIDIR 0.3 02/10/2022    BILIDIR 0.3 02/10/2022       Imaging:  CT AP 11/9/22 reviewed.    Assessment/Plan:  67 y.o. female with a mass in  the inferior right lobe of the liver. Will undergo US-guided coaxial core needle bx today.    Sedation:  Sedation history: have not been any systemic reactions  ASA: 3 / Mallampati: 3  Sedation plan: Up to moderate (Versed, fentanyl)     Risks (including, but not limited to, pain, bleeding, infection, damage to nearby structures, treatment failure/recurrence, and the need for additional procedures), potential benefits, and alternatives were discussed with the patient. All questions were answered to the best of my abilities. The patient wishes to proceed. Written informed consent was obtained.      Joao Pulido MD  [facility]  Pager [phone]

## 2022-11-30 NOTE — H&P
Interventional Radiology Pre-Procedure History & Physical        Chief Complaint/Reason for Referral: Liver mass     History of Present Illness:  Rehana Whitten is a 67 y.o. female who presents with a mass in the inferior right hepatic lobe. Referred for bx.          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-->hypothyroidism    Hypertension      Hypothyroidism       hypothyroidism    Insomnia       previously on restoril    Liver mass 11/14/2022    Vertigo              Past Surgical History:   Procedure Laterality Date    BREAST BIOPSY Right       exc bx benign per pt    BREAST BIOPSY Left       core bx benign per pt    BREAST SURGERY        LYMPH NODE BIOPSY        NASAL SINUS SURGERY        PARATHYROIDECTOMY        TUBAL LIGATION             Allergies:   Review of patient's allergies indicates:  No Known Allergies      Home Meds:           Prior to Admission medications    Medication Sig Start Date End Date Taking? Authorizing Provider   candesartan (ATACAND) 8 MG tablet TAKE 1 TABLET(8 MG) BY MOUTH EVERY DAY 1/30/22   Yes Bernie Valverde MD   citalopram (CELEXA) 20 MG tablet TAKE 1 TABLET BY MOUTH EVERY DAY 10/14/22   Yes Bernie Valverde MD   clotrimazole-betamethasone 1-0.05% (LOTRISONE) cream Apply to affected area 2 times daily 12/8/21 12/8/22 Yes Trina Alcala, NP   levothyroxine (SYNTHROID) 88 MCG tablet TAKE 1 TABLET BY MOUTH EVERY DAY 1/30/22   Yes Bernie Valverde MD   rosuvastatin (CRESTOR) 40 MG Tab Take 1 tablet (40 mg total) by mouth every evening. 11/10/22   Yes Bernie Valverde MD   triamterene-hydrochlorothiazide 37.5-25 mg (DYAZIDE) 37.5-25 mg per capsule TAKE 1 CAPSULE BY MOUTH EVERY MORNING 6/3/22   Yes Bernie Valverde MD   valACYclovir (VALTREX) 500 MG tablet TAKE 1 TABLET(500 MG) BY MOUTH EVERY DAY 2/14/22    Yes Clare Valdovinos MD   albuterol (PROVENTIL/VENTOLIN HFA) 90 mcg/actuation inhaler albuterol sulfate Take 2 puff(s) (inhalation) 4 times per day PRN - Wheezing for 30 days 20210307 HFA aerosol inhaler 4 times per day inhalation 30 days suspended 90 mcg/actuation 3/7/21     Historical Provider   ALPRAZolam (XANAX) 0.25 MG tablet Take 1 tablet (0.25 mg total) by mouth 2 (two) times daily as needed for Anxiety.  Patient not taking: Reported on 11/14/2022 11/10/22 12/10/22   Bernie Valverde MD   tretinoin (RETIN-A) 0.025 % cream Apply topically every evening. 8/28/17     Dawna Maradiaga MD         Anticoagulation/Antiplatelet Meds: no anticoagulation     Review of Systems:   Hematological: no known coagulopathies  Respiratory: no shortness of breath  Cardiovascular: no chest pain  Gastrointestinal: no abdominal pain  Genitourinary: no dysuria  Musculoskeletal: negative  Neurological: no TIA or stroke symptoms      Physical Exam:  Temp: 97.4 °F (36.3 °C) (11/30/22 0921)  Pulse: 86 (11/30/22 0921)  Resp: 18 (11/30/22 0921)  BP: (!) 154/69 (11/30/22 0921)  SpO2: 97 % (11/30/22 0921)     General: WNWD, NAD  HEENT: Normocephalic, sclera anicteric, oropharynx clear  Neck: Supple, no palpable lymphadenopathy  Heart: RRR  Lungs: Symmetric excursions, breathing unlabored  Abd: NTND, soft  Extremities: VILLALBA  Neuro: Gross nonfocal     Laboratory:        Lab Results   Component Value Date     INR 1.0 11/30/2022             Lab Results   Component Value Date     WBC 5.03 11/30/2022     HGB 13.7 11/30/2022     HCT 42.7 11/30/2022     MCV 95 11/30/2022      11/30/2022            Lab Results   Component Value Date      11/30/2022      11/30/2022     K 3.9 11/30/2022      11/30/2022     CO2 27 11/30/2022     BUN 16 11/30/2022     CREATININE 0.8 11/30/2022     CALCIUM 10.2 11/30/2022     ALT 19 11/30/2022     AST 21 11/30/2022     ALBUMIN 4.7 11/30/2022     BILITOT 1.0 11/30/2022     BILIDIR 0.3 02/10/2022     BILIDIR  0.3 02/10/2022         Imaging:  CT AP 11/9/22 reviewed.     Assessment/Plan:  67 y.o. female with a mass in the inferior right lobe of the liver. Will undergo US-guided coaxial core needle bx today.     Sedation:  Sedation history: have not been any systemic reactions  ASA: 3 / Mallampati: 3  Sedation plan: Up to moderate (Versed, fentanyl)      Risks (including, but not limited to, pain, bleeding, infection, damage to nearby structures, treatment failure/recurrence, and the need for additional procedures), potential benefits, and alternatives were discussed with the patient. All questions were answered to the best of my abilities. The patient wishes to proceed. Written informed consent was obtained.        Joao Pulido MD  [facility]  Pager [phone]

## 2022-12-05 LAB
ADEQUACY: NORMAL
COMMENT: NORMAL
FINAL PATHOLOGIC DIAGNOSIS: NORMAL
GROSS: NORMAL
Lab: NORMAL

## 2022-12-06 ENCOUNTER — PATIENT MESSAGE (OUTPATIENT)
Dept: SURGERY | Facility: HOSPITAL | Age: 67
End: 2022-12-06
Payer: MEDICARE

## 2022-12-08 ENCOUNTER — ANESTHESIA EVENT (OUTPATIENT)
Dept: SURGERY | Facility: HOSPITAL | Age: 67
DRG: 415 | End: 2022-12-08
Payer: MEDICARE

## 2022-12-09 ENCOUNTER — HOSPITAL ENCOUNTER (OUTPATIENT)
Dept: RADIOLOGY | Facility: HOSPITAL | Age: 67
Discharge: HOME | DRG: 415 | End: 2022-12-09
Attending: TRANSPLANT SURGERY
Payer: MEDICARE

## 2022-12-09 ENCOUNTER — TELEPHONE (OUTPATIENT)
Dept: TRANSPLANT | Facility: CLINIC | Age: 67
End: 2022-12-09
Payer: MEDICARE

## 2022-12-09 ENCOUNTER — LAB VISIT (OUTPATIENT)
Dept: SURGERY | Facility: CLINIC | Age: 67
DRG: 415 | End: 2022-12-09
Payer: MEDICARE

## 2022-12-09 ENCOUNTER — HOSPITAL ENCOUNTER (OUTPATIENT)
Dept: CARDIOLOGY | Facility: CLINIC | Age: 67
Discharge: HOME | DRG: 415 | End: 2022-12-09
Payer: MEDICARE

## 2022-12-09 DIAGNOSIS — Z20.822 ENCOUNTER FOR LABORATORY TESTING FOR COVID-19 VIRUS: ICD-10-CM

## 2022-12-09 DIAGNOSIS — Z01.818 PRE-OP TESTING: ICD-10-CM

## 2022-12-09 LAB — SARS-COV-2 RNA RESP QL NAA+PROBE: NOT DETECTED

## 2022-12-09 PROCEDURE — 93010 ELECTROCARDIOGRAM REPORT: CPT | Mod: S$GLB,,, | Performed by: INTERNAL MEDICINE

## 2022-12-09 PROCEDURE — U0005 INFEC AGEN DETEC AMPLI PROBE: HCPCS | Performed by: TRANSPLANT SURGERY

## 2022-12-09 PROCEDURE — 71046 X-RAY EXAM CHEST 2 VIEWS: CPT | Mod: TC,FY

## 2022-12-09 PROCEDURE — 93010 EKG 12-LEAD: ICD-10-PCS | Mod: S$GLB,,, | Performed by: INTERNAL MEDICINE

## 2022-12-09 PROCEDURE — 93005 ELECTROCARDIOGRAM TRACING: CPT | Mod: S$GLB,,, | Performed by: TRANSPLANT SURGERY

## 2022-12-09 PROCEDURE — U0003 INFECTIOUS AGENT DETECTION BY NUCLEIC ACID (DNA OR RNA); SEVERE ACUTE RESPIRATORY SYNDROME CORONAVIRUS 2 (SARS-COV-2) (CORONAVIRUS DISEASE [COVID-19]), AMPLIFIED PROBE TECHNIQUE, MAKING USE OF HIGH THROUGHPUT TECHNOLOGIES AS DESCRIBED BY CMS-2020-01-R: HCPCS | Performed by: TRANSPLANT SURGERY

## 2022-12-09 PROCEDURE — 71046 X-RAY EXAM CHEST 2 VIEWS: CPT | Mod: 26,,, | Performed by: RADIOLOGY

## 2022-12-09 PROCEDURE — 71046 XR CHEST PA AND LATERAL: ICD-10-PCS | Mod: 26,,, | Performed by: RADIOLOGY

## 2022-12-09 PROCEDURE — 93005 EKG 12-LEAD: ICD-10-PCS | Mod: S$GLB,,, | Performed by: TRANSPLANT SURGERY

## 2022-12-09 NOTE — TELEPHONE ENCOUNTER
Pre-op instructions called to patient for Right lobe resection with Dr. Blackmon on Monday, 12/12/22 @ 0700 per note from JAQUELIN Perez LPN. Patient did verbalize understanding to all instructions.

## 2022-12-12 ENCOUNTER — TELEPHONE (OUTPATIENT)
Dept: TRANSPLANT | Facility: CLINIC | Age: 67
End: 2022-12-12
Payer: MEDICARE

## 2022-12-12 ENCOUNTER — HOSPITAL ENCOUNTER (INPATIENT)
Facility: HOSPITAL | Age: 67
LOS: 4 days | Discharge: HOME | DRG: 415 | End: 2022-12-16
Attending: TRANSPLANT SURGERY | Admitting: TRANSPLANT SURGERY
Payer: MEDICARE

## 2022-12-12 ENCOUNTER — ANESTHESIA (OUTPATIENT)
Dept: SURGERY | Facility: HOSPITAL | Age: 67
DRG: 415 | End: 2022-12-12
Payer: MEDICARE

## 2022-12-12 DIAGNOSIS — Z90.49 H/O RESECTION OF LIVER: Primary | ICD-10-CM

## 2022-12-12 DIAGNOSIS — R16.0 LIVER MASS: ICD-10-CM

## 2022-12-12 DIAGNOSIS — D62 ACUTE BLOOD LOSS ANEMIA: ICD-10-CM

## 2022-12-12 DIAGNOSIS — E83.39 HYPOPHOSPHATEMIA: ICD-10-CM

## 2022-12-12 DIAGNOSIS — C22.1 CHOLANGIOCARCINOMA: ICD-10-CM

## 2022-12-12 LAB
ABO + RH BLD: NORMAL
ALBUMIN SERPL BCP-MCNC: 3.9 G/DL (ref 3.5–5.2)
ALP SERPL-CCNC: 68 U/L (ref 55–135)
ALT SERPL W/O P-5'-P-CCNC: 542 U/L (ref 10–44)
ANION GAP SERPL CALC-SCNC: 10 MMOL/L (ref 8–16)
AST SERPL-CCNC: 553 U/L (ref 10–40)
BASOPHILS # BLD AUTO: 0.01 K/UL (ref 0–0.2)
BASOPHILS NFR BLD: 0.1 % (ref 0–1.9)
BILIRUB SERPL-MCNC: 1.6 MG/DL (ref 0.1–1)
BLD GP AB SCN CELLS X3 SERPL QL: NORMAL
BUN SERPL-MCNC: 16 MG/DL (ref 8–23)
CALCIUM SERPL-MCNC: 8.1 MG/DL (ref 8.7–10.5)
CHLORIDE SERPL-SCNC: 109 MMOL/L (ref 95–110)
CO2 SERPL-SCNC: 21 MMOL/L (ref 23–29)
CREAT SERPL-MCNC: 0.7 MG/DL (ref 0.5–1.4)
DIFFERENTIAL METHOD: ABNORMAL
EOSINOPHIL # BLD AUTO: 0 K/UL (ref 0–0.5)
EOSINOPHIL NFR BLD: 0 % (ref 0–8)
ERYTHROCYTE [DISTWIDTH] IN BLOOD BY AUTOMATED COUNT: 12.8 % (ref 11.5–14.5)
EST. GFR  (NO RACE VARIABLE): >60 ML/MIN/1.73 M^2
GLUCOSE SERPL-MCNC: 173 MG/DL (ref 70–110)
HCT VFR BLD AUTO: 30.3 % (ref 37–48.5)
HGB BLD-MCNC: 9.8 G/DL (ref 12–16)
IMM GRANULOCYTES # BLD AUTO: 0.02 K/UL (ref 0–0.04)
IMM GRANULOCYTES NFR BLD AUTO: 0.3 % (ref 0–0.5)
LYMPHOCYTES # BLD AUTO: 0.6 K/UL (ref 1–4.8)
LYMPHOCYTES NFR BLD: 7.6 % (ref 18–48)
MAGNESIUM SERPL-MCNC: 1.9 MG/DL (ref 1.6–2.6)
MCH RBC QN AUTO: 30.4 PG (ref 27–31)
MCHC RBC AUTO-ENTMCNC: 32.3 G/DL (ref 32–36)
MCV RBC AUTO: 94 FL (ref 82–98)
MONOCYTES # BLD AUTO: 0.3 K/UL (ref 0.3–1)
MONOCYTES NFR BLD: 4.5 % (ref 4–15)
NEUTROPHILS # BLD AUTO: 6.4 K/UL (ref 1.8–7.7)
NEUTROPHILS NFR BLD: 87.5 % (ref 38–73)
NRBC BLD-RTO: 0 /100 WBC
PHOSPHATE SERPL-MCNC: 2.9 MG/DL (ref 2.7–4.5)
PHOSPHATE SERPL-MCNC: 2.9 MG/DL (ref 2.7–4.5)
PLATELET # BLD AUTO: 178 K/UL (ref 150–450)
PMV BLD AUTO: 11.7 FL (ref 9.2–12.9)
POCT GLUCOSE: 161 MG/DL (ref 70–110)
POTASSIUM SERPL-SCNC: 3.7 MMOL/L (ref 3.5–5.1)
PROT SERPL-MCNC: 5.6 G/DL (ref 6–8.4)
RBC # BLD AUTO: 3.22 M/UL (ref 4–5.4)
SODIUM SERPL-SCNC: 140 MMOL/L (ref 136–145)
WBC # BLD AUTO: 7.36 K/UL (ref 3.9–12.7)

## 2022-12-12 PROCEDURE — 88342 CHG IMMUNOCYTOCHEMISTRY: ICD-10-PCS | Mod: 26,HCNC,, | Performed by: PATHOLOGY

## 2022-12-12 PROCEDURE — 86920 COMPATIBILITY TEST SPIN: CPT | Performed by: TRANSPLANT SURGERY

## 2022-12-12 PROCEDURE — 36620 INSERTION CATHETER ARTERY: CPT | Mod: 59,,, | Performed by: ANESTHESIOLOGY

## 2022-12-12 PROCEDURE — D9220A PRA ANESTHESIA: Mod: CRNA,,, | Performed by: NURSE ANESTHETIST, CERTIFIED REGISTERED

## 2022-12-12 PROCEDURE — 63600175 PHARM REV CODE 636 W HCPCS: Performed by: TRANSPLANT SURGERY

## 2022-12-12 PROCEDURE — 37000008 HC ANESTHESIA 1ST 15 MINUTES: Performed by: TRANSPLANT SURGERY

## 2022-12-12 PROCEDURE — 71000016 HC POSTOP RECOV ADDL HR: Performed by: TRANSPLANT SURGERY

## 2022-12-12 PROCEDURE — 71000033 HC RECOVERY, INTIAL HOUR: Performed by: TRANSPLANT SURGERY

## 2022-12-12 PROCEDURE — 63600175 PHARM REV CODE 636 W HCPCS: Performed by: STUDENT IN AN ORGANIZED HEALTH CARE EDUCATION/TRAINING PROGRAM

## 2022-12-12 PROCEDURE — P9045 ALBUMIN (HUMAN), 5%, 250 ML: HCPCS | Mod: JG | Performed by: ANESTHESIOLOGY

## 2022-12-12 PROCEDURE — 88341 IMHCHEM/IMCYTCHM EA ADD ANTB: CPT | Mod: 26,HCNC,, | Performed by: PATHOLOGY

## 2022-12-12 PROCEDURE — 36620 PR INSERT CATH,ART,PERCUT,SHORTTERM: ICD-10-PCS | Mod: 59,,, | Performed by: ANESTHESIOLOGY

## 2022-12-12 PROCEDURE — 27201423 OPTIME MED/SURG SUP & DEVICES STERILE SUPPLY: Performed by: TRANSPLANT SURGERY

## 2022-12-12 PROCEDURE — 25000003 PHARM REV CODE 250: Performed by: TRANSPLANT SURGERY

## 2022-12-12 PROCEDURE — 94761 N-INVAS EAR/PLS OXIMETRY MLT: CPT

## 2022-12-12 PROCEDURE — 63600175 PHARM REV CODE 636 W HCPCS: Mod: JG | Performed by: ANESTHESIOLOGY

## 2022-12-12 PROCEDURE — 71000039 HC RECOVERY, EACH ADD'L HOUR: Performed by: TRANSPLANT SURGERY

## 2022-12-12 PROCEDURE — 80053 COMPREHEN METABOLIC PANEL: CPT | Performed by: STUDENT IN AN ORGANIZED HEALTH CARE EDUCATION/TRAINING PROGRAM

## 2022-12-12 PROCEDURE — 83735 ASSAY OF MAGNESIUM: CPT | Performed by: STUDENT IN AN ORGANIZED HEALTH CARE EDUCATION/TRAINING PROGRAM

## 2022-12-12 PROCEDURE — 36415 COLL VENOUS BLD VENIPUNCTURE: CPT | Performed by: TRANSPLANT SURGERY

## 2022-12-12 PROCEDURE — D9220A PRA ANESTHESIA: ICD-10-PCS | Mod: CRNA,,, | Performed by: NURSE ANESTHETIST, CERTIFIED REGISTERED

## 2022-12-12 PROCEDURE — 63600175 PHARM REV CODE 636 W HCPCS: Performed by: NURSE ANESTHETIST, CERTIFIED REGISTERED

## 2022-12-12 PROCEDURE — 47130 PR RESEC LIVER,TOTAL RT LOBECTOMY: ICD-10-PCS | Mod: ,,, | Performed by: TRANSPLANT SURGERY

## 2022-12-12 PROCEDURE — 25000003 PHARM REV CODE 250: Performed by: NURSE ANESTHETIST, CERTIFIED REGISTERED

## 2022-12-12 PROCEDURE — 27201037 HC PRESSURE MONITORING SET UP

## 2022-12-12 PROCEDURE — 88305 TISSUE EXAM BY PATHOLOGIST: ICD-10-PCS | Mod: 26,HCNC,, | Performed by: PATHOLOGY

## 2022-12-12 PROCEDURE — 71000015 HC POSTOP RECOV 1ST HR: Performed by: TRANSPLANT SURGERY

## 2022-12-12 PROCEDURE — 37000009 HC ANESTHESIA EA ADD 15 MINS: Performed by: TRANSPLANT SURGERY

## 2022-12-12 PROCEDURE — C1729 CATH, DRAINAGE: HCPCS | Performed by: TRANSPLANT SURGERY

## 2022-12-12 PROCEDURE — 88313 PR  SPECIAL STAINS,GROUP II: ICD-10-PCS | Mod: 26,HCNC,, | Performed by: PATHOLOGY

## 2022-12-12 PROCEDURE — 88313 SPECIAL STAINS GROUP 2: CPT | Mod: 26,HCNC,, | Performed by: PATHOLOGY

## 2022-12-12 PROCEDURE — 88342 IMHCHEM/IMCYTCHM 1ST ANTB: CPT | Mod: HCNC | Performed by: PATHOLOGY

## 2022-12-12 PROCEDURE — 88307 TISSUE EXAM BY PATHOLOGIST: CPT | Mod: 26,HCNC,, | Performed by: PATHOLOGY

## 2022-12-12 PROCEDURE — 88341 PR IHC OR ICC EACH ADD'L SINGLE ANTIBODY  STAINPR: ICD-10-PCS | Mod: 26,HCNC,, | Performed by: PATHOLOGY

## 2022-12-12 PROCEDURE — 36000708 HC OR TIME LEV III 1ST 15 MIN: Performed by: TRANSPLANT SURGERY

## 2022-12-12 PROCEDURE — 36000709 HC OR TIME LEV III EA ADD 15 MIN: Performed by: TRANSPLANT SURGERY

## 2022-12-12 PROCEDURE — 88305 TISSUE EXAM BY PATHOLOGIST: CPT | Mod: HCNC | Performed by: PATHOLOGY

## 2022-12-12 PROCEDURE — D9220A PRA ANESTHESIA: ICD-10-PCS | Mod: ANES,,, | Performed by: ANESTHESIOLOGY

## 2022-12-12 PROCEDURE — 25000003 PHARM REV CODE 250: Performed by: STUDENT IN AN ORGANIZED HEALTH CARE EDUCATION/TRAINING PROGRAM

## 2022-12-12 PROCEDURE — 47130 PARTIAL REMOVAL OF LIVER: CPT | Mod: ,,, | Performed by: TRANSPLANT SURGERY

## 2022-12-12 PROCEDURE — 88341 IMHCHEM/IMCYTCHM EA ADD ANTB: CPT | Mod: HCNC | Performed by: PATHOLOGY

## 2022-12-12 PROCEDURE — 20600001 HC STEP DOWN PRIVATE ROOM

## 2022-12-12 PROCEDURE — 76937 US GUIDE VASCULAR ACCESS: CPT | Mod: 26,,, | Performed by: ANESTHESIOLOGY

## 2022-12-12 PROCEDURE — 88305 TISSUE EXAM BY PATHOLOGIST: CPT | Mod: 26,HCNC,, | Performed by: PATHOLOGY

## 2022-12-12 PROCEDURE — D9220A PRA ANESTHESIA: Mod: ANES,,, | Performed by: ANESTHESIOLOGY

## 2022-12-12 PROCEDURE — 86900 BLOOD TYPING SEROLOGIC ABO: CPT | Performed by: TRANSPLANT SURGERY

## 2022-12-12 PROCEDURE — 36415 COLL VENOUS BLD VENIPUNCTURE: CPT | Performed by: STUDENT IN AN ORGANIZED HEALTH CARE EDUCATION/TRAINING PROGRAM

## 2022-12-12 PROCEDURE — 76937 PR  US GUIDE, VASCULAR ACCESS: ICD-10-PCS | Mod: 26,,, | Performed by: ANESTHESIOLOGY

## 2022-12-12 PROCEDURE — 88307 PR  SURG PATH,LEVEL V: ICD-10-PCS | Mod: 26,HCNC,, | Performed by: PATHOLOGY

## 2022-12-12 PROCEDURE — 88313 SPECIAL STAINS GROUP 2: CPT | Mod: HCNC | Performed by: PATHOLOGY

## 2022-12-12 PROCEDURE — 84100 ASSAY OF PHOSPHORUS: CPT | Performed by: STUDENT IN AN ORGANIZED HEALTH CARE EDUCATION/TRAINING PROGRAM

## 2022-12-12 PROCEDURE — 85025 COMPLETE CBC W/AUTO DIFF WBC: CPT | Performed by: STUDENT IN AN ORGANIZED HEALTH CARE EDUCATION/TRAINING PROGRAM

## 2022-12-12 PROCEDURE — 88307 TISSUE EXAM BY PATHOLOGIST: CPT | Mod: HCNC | Performed by: PATHOLOGY

## 2022-12-12 PROCEDURE — 88342 IMHCHEM/IMCYTCHM 1ST ANTB: CPT | Mod: 26,HCNC,, | Performed by: PATHOLOGY

## 2022-12-12 RX ORDER — VALACYCLOVIR HYDROCHLORIDE 500 MG/1
500 TABLET, FILM COATED ORAL DAILY
Status: DISCONTINUED | OUTPATIENT
Start: 2022-12-13 | End: 2022-12-16 | Stop reason: HOSPADM

## 2022-12-12 RX ORDER — PROCHLORPERAZINE EDISYLATE 5 MG/ML
INJECTION INTRAMUSCULAR; INTRAVENOUS
Status: DISCONTINUED | OUTPATIENT
Start: 2022-12-12 | End: 2022-12-12

## 2022-12-12 RX ORDER — DOCUSATE SODIUM 100 MG/1
100 CAPSULE, LIQUID FILLED ORAL 2 TIMES DAILY
Status: DISCONTINUED | OUTPATIENT
Start: 2022-12-12 | End: 2022-12-16 | Stop reason: HOSPADM

## 2022-12-12 RX ORDER — ONDANSETRON 2 MG/ML
INJECTION INTRAMUSCULAR; INTRAVENOUS
Status: DISCONTINUED | OUTPATIENT
Start: 2022-12-12 | End: 2022-12-12

## 2022-12-12 RX ORDER — DEXAMETHASONE SODIUM PHOSPHATE 4 MG/ML
INJECTION, SOLUTION INTRA-ARTICULAR; INTRALESIONAL; INTRAMUSCULAR; INTRAVENOUS; SOFT TISSUE
Status: DISCONTINUED | OUTPATIENT
Start: 2022-12-12 | End: 2022-12-12

## 2022-12-12 RX ORDER — OXYCODONE AND ACETAMINOPHEN 5; 325 MG/1; MG/1
1 TABLET ORAL EVERY 4 HOURS PRN
Status: DISCONTINUED | OUTPATIENT
Start: 2022-12-12 | End: 2022-12-16 | Stop reason: HOSPADM

## 2022-12-12 RX ORDER — KETAMINE HCL IN 0.9 % NACL 50 MG/5 ML
SYRINGE (ML) INTRAVENOUS
Status: DISCONTINUED | OUTPATIENT
Start: 2022-12-12 | End: 2022-12-12

## 2022-12-12 RX ORDER — CHOLECALCIFEROL (VITAMIN D3) 25 MCG
1000 TABLET ORAL DAILY
COMMUNITY

## 2022-12-12 RX ORDER — MIDAZOLAM HYDROCHLORIDE 1 MG/ML
INJECTION, SOLUTION INTRAMUSCULAR; INTRAVENOUS
Status: DISCONTINUED | OUTPATIENT
Start: 2022-12-12 | End: 2022-12-12

## 2022-12-12 RX ORDER — CITALOPRAM 20 MG/1
20 TABLET, FILM COATED ORAL DAILY
Status: DISCONTINUED | OUTPATIENT
Start: 2022-12-13 | End: 2022-12-16 | Stop reason: HOSPADM

## 2022-12-12 RX ORDER — ALBUMIN HUMAN 50 G/1000ML
25 SOLUTION INTRAVENOUS ONCE
Status: COMPLETED | OUTPATIENT
Start: 2022-12-12 | End: 2022-12-12

## 2022-12-12 RX ORDER — HYDROMORPHONE HYDROCHLORIDE 1 MG/ML
0.2 INJECTION, SOLUTION INTRAMUSCULAR; INTRAVENOUS; SUBCUTANEOUS EVERY 5 MIN PRN
Status: DISCONTINUED | OUTPATIENT
Start: 2022-12-12 | End: 2022-12-12 | Stop reason: HOSPADM

## 2022-12-12 RX ORDER — TRIAMTERENE AND HYDROCHLOROTHIAZIDE 37.5; 25 MG/1; MG/1
1 CAPSULE ORAL EVERY MORNING
Status: DISCONTINUED | OUTPATIENT
Start: 2022-12-12 | End: 2022-12-16 | Stop reason: HOSPADM

## 2022-12-12 RX ORDER — DEXMEDETOMIDINE HYDROCHLORIDE 100 UG/ML
INJECTION, SOLUTION INTRAVENOUS
Status: DISCONTINUED | OUTPATIENT
Start: 2022-12-12 | End: 2022-12-12

## 2022-12-12 RX ORDER — LEVOTHYROXINE SODIUM 88 UG/1
88 TABLET ORAL DAILY
Status: DISCONTINUED | OUTPATIENT
Start: 2022-12-13 | End: 2022-12-12

## 2022-12-12 RX ORDER — PHENYLEPHRINE HCL IN 0.9% NACL 1 MG/10 ML
SYRINGE (ML) INTRAVENOUS
Status: DISCONTINUED
Start: 2022-12-12 | End: 2022-12-12 | Stop reason: WASHOUT

## 2022-12-12 RX ORDER — LEVOTHYROXINE SODIUM 88 UG/1
88 TABLET ORAL
Status: DISCONTINUED | OUTPATIENT
Start: 2022-12-13 | End: 2022-12-16 | Stop reason: HOSPADM

## 2022-12-12 RX ORDER — PROPOFOL 10 MG/ML
VIAL (ML) INTRAVENOUS
Status: DISCONTINUED | OUTPATIENT
Start: 2022-12-12 | End: 2022-12-12

## 2022-12-12 RX ORDER — LANOLIN ALCOHOL/MO/W.PET/CERES
400 CREAM (GRAM) TOPICAL DAILY
Status: DISCONTINUED | OUTPATIENT
Start: 2022-12-12 | End: 2022-12-16 | Stop reason: HOSPADM

## 2022-12-12 RX ORDER — SODIUM CHLORIDE, SODIUM LACTATE, POTASSIUM CHLORIDE, CALCIUM CHLORIDE 600; 310; 30; 20 MG/100ML; MG/100ML; MG/100ML; MG/100ML
125 INJECTION, SOLUTION INTRAVENOUS CONTINUOUS
Status: DISCONTINUED | OUTPATIENT
Start: 2022-12-12 | End: 2022-12-13

## 2022-12-12 RX ORDER — PHENYLEPHRINE HYDROCHLORIDE 10 MG/ML
INJECTION INTRAVENOUS
Status: DISCONTINUED | OUTPATIENT
Start: 2022-12-12 | End: 2022-12-12

## 2022-12-12 RX ORDER — OXYCODONE AND ACETAMINOPHEN 10; 325 MG/1; MG/1
1 TABLET ORAL EVERY 4 HOURS PRN
Status: DISCONTINUED | OUTPATIENT
Start: 2022-12-12 | End: 2022-12-16 | Stop reason: HOSPADM

## 2022-12-12 RX ORDER — HEPARIN SODIUM 5000 [USP'U]/ML
5000 INJECTION, SOLUTION INTRAVENOUS; SUBCUTANEOUS EVERY 8 HOURS
Status: DISCONTINUED | OUTPATIENT
Start: 2022-12-12 | End: 2022-12-13

## 2022-12-12 RX ORDER — HALOPERIDOL 5 MG/ML
INJECTION INTRAMUSCULAR
Status: DISCONTINUED | OUTPATIENT
Start: 2022-12-12 | End: 2022-12-12

## 2022-12-12 RX ORDER — ROCURONIUM BROMIDE 10 MG/ML
INJECTION, SOLUTION INTRAVENOUS
Status: DISCONTINUED | OUTPATIENT
Start: 2022-12-12 | End: 2022-12-12

## 2022-12-12 RX ORDER — SODIUM CHLORIDE 0.9 % (FLUSH) 0.9 %
3 SYRINGE (ML) INJECTION
Status: DISCONTINUED | OUTPATIENT
Start: 2022-12-12 | End: 2022-12-12 | Stop reason: HOSPADM

## 2022-12-12 RX ORDER — LANOLIN ALCOHOL/MO/W.PET/CERES
400 CREAM (GRAM) TOPICAL DAILY
COMMUNITY
End: 2023-02-02 | Stop reason: ALTCHOICE

## 2022-12-12 RX ORDER — FENTANYL CITRATE 50 UG/ML
INJECTION, SOLUTION INTRAMUSCULAR; INTRAVENOUS
Status: DISCONTINUED | OUTPATIENT
Start: 2022-12-12 | End: 2022-12-12

## 2022-12-12 RX ORDER — KETOROLAC TROMETHAMINE 30 MG/ML
15 INJECTION, SOLUTION INTRAMUSCULAR; INTRAVENOUS EVERY 6 HOURS
Status: COMPLETED | OUTPATIENT
Start: 2022-12-12 | End: 2022-12-13

## 2022-12-12 RX ORDER — POTASSIUM CHLORIDE 7.45 MG/ML
INJECTION INTRAVENOUS
Status: DISCONTINUED | OUTPATIENT
Start: 2022-12-12 | End: 2022-12-12

## 2022-12-12 RX ORDER — ONDANSETRON 2 MG/ML
4 INJECTION INTRAMUSCULAR; INTRAVENOUS EVERY 8 HOURS PRN
Status: DISCONTINUED | OUTPATIENT
Start: 2022-12-12 | End: 2022-12-15

## 2022-12-12 RX ORDER — HEPARIN SODIUM 1000 [USP'U]/ML
INJECTION, SOLUTION INTRAVENOUS; SUBCUTANEOUS
Status: DISCONTINUED | OUTPATIENT
Start: 2022-12-12 | End: 2022-12-12 | Stop reason: HOSPADM

## 2022-12-12 RX ORDER — LIDOCAINE HYDROCHLORIDE 20 MG/ML
INJECTION INTRAVENOUS
Status: DISCONTINUED | OUTPATIENT
Start: 2022-12-12 | End: 2022-12-12

## 2022-12-12 RX ORDER — HEPARIN SODIUM 5000 [USP'U]/ML
5000 INJECTION, SOLUTION INTRAVENOUS; SUBCUTANEOUS EVERY 8 HOURS
Status: DISCONTINUED | OUTPATIENT
Start: 2022-12-12 | End: 2022-12-12

## 2022-12-12 RX ORDER — PHENYLEPHRINE HCL IN 0.9% NACL 20MG/250ML
0-5 PLASTIC BAG, INJECTION (ML) INTRAVENOUS CONTINUOUS
Status: ACTIVE | OUTPATIENT
Start: 2022-12-12 | End: 2022-12-12

## 2022-12-12 RX ORDER — BUPIVACAINE HYDROCHLORIDE 2.5 MG/ML
INJECTION, SOLUTION EPIDURAL; INFILTRATION; INTRACAUDAL
Status: DISCONTINUED | OUTPATIENT
Start: 2022-12-12 | End: 2022-12-12 | Stop reason: HOSPADM

## 2022-12-12 RX ORDER — SODIUM CHLORIDE 9 MG/ML
INJECTION, SOLUTION INTRAVENOUS CONTINUOUS
Status: DISCONTINUED | OUTPATIENT
Start: 2022-12-12 | End: 2022-12-12

## 2022-12-12 RX ADMIN — DOCUSATE SODIUM 100 MG: 100 CAPSULE ORAL at 12:12

## 2022-12-12 RX ADMIN — OXYCODONE AND ACETAMINOPHEN 1 TABLET: 10; 325 TABLET ORAL at 12:12

## 2022-12-12 RX ADMIN — ROCURONIUM BROMIDE 50 MG: 10 INJECTION INTRAVENOUS at 07:12

## 2022-12-12 RX ADMIN — SUGAMMADEX 300 MG: 100 INJECTION, SOLUTION INTRAVENOUS at 11:12

## 2022-12-12 RX ADMIN — SODIUM CHLORIDE, SODIUM LACTATE, POTASSIUM CHLORIDE, AND CALCIUM CHLORIDE 125 ML/HR: 600; 310; 30; 20 INJECTION, SOLUTION INTRAVENOUS at 07:12

## 2022-12-12 RX ADMIN — ALBUMIN (HUMAN) 25 G: 12.5 SOLUTION INTRAVENOUS at 12:12

## 2022-12-12 RX ADMIN — SODIUM CHLORIDE: 9 INJECTION, SOLUTION INTRAVENOUS at 06:12

## 2022-12-12 RX ADMIN — Medication 10 MG: at 11:12

## 2022-12-12 RX ADMIN — AMPICILLIN SODIUM AND SULBACTAM SODIUM 3 G: 2; 1 INJECTION, POWDER, FOR SOLUTION INTRAMUSCULAR; INTRAVENOUS at 09:12

## 2022-12-12 RX ADMIN — PHENYLEPHRINE HYDROCHLORIDE 100 MCG: 10 INJECTION INTRAVENOUS at 08:12

## 2022-12-12 RX ADMIN — PHENYLEPHRINE HYDROCHLORIDE 100 MCG: 10 INJECTION INTRAVENOUS at 12:12

## 2022-12-12 RX ADMIN — TRIAMTERENE AND HYDROCHLOROTHIAZIDE 1 CAPSULE: 37.5; 25 CAPSULE ORAL at 03:12

## 2022-12-12 RX ADMIN — POTASSIUM CHLORIDE 10 MEQ: 7.46 INJECTION, SOLUTION INTRAVENOUS at 11:12

## 2022-12-12 RX ADMIN — HALOPERIDOL LACTATE 0.5 MG: 5 INJECTION, SOLUTION INTRAMUSCULAR at 08:12

## 2022-12-12 RX ADMIN — HYDROMORPHONE HYDROCHLORIDE 0.2 MG: 1 INJECTION, SOLUTION INTRAMUSCULAR; INTRAVENOUS; SUBCUTANEOUS at 12:12

## 2022-12-12 RX ADMIN — HEPARIN SODIUM 5000 UNITS: 5000 INJECTION INTRAVENOUS; SUBCUTANEOUS at 09:12

## 2022-12-12 RX ADMIN — PROPOFOL 150 MG: 10 INJECTION, EMULSION INTRAVENOUS at 07:12

## 2022-12-12 RX ADMIN — PROPOFOL 50 MG: 10 INJECTION, EMULSION INTRAVENOUS at 11:12

## 2022-12-12 RX ADMIN — DEXAMETHASONE SODIUM PHOSPHATE 8 MG: 4 INJECTION, SOLUTION INTRAMUSCULAR; INTRAVENOUS at 07:12

## 2022-12-12 RX ADMIN — PHENYLEPHRINE HYDROCHLORIDE 100 MCG: 10 INJECTION INTRAVENOUS at 07:12

## 2022-12-12 RX ADMIN — PHENYLEPHRINE HYDROCHLORIDE 50 MCG: 10 INJECTION INTRAVENOUS at 10:12

## 2022-12-12 RX ADMIN — DEXMEDETOMIDINE HYDROCHLORIDE 16 MCG: 100 INJECTION, SOLUTION INTRAVENOUS at 08:12

## 2022-12-12 RX ADMIN — ROCURONIUM BROMIDE 25 MG: 10 INJECTION INTRAVENOUS at 08:12

## 2022-12-12 RX ADMIN — SODIUM CHLORIDE: 0.9 INJECTION, SOLUTION INTRAVENOUS at 07:12

## 2022-12-12 RX ADMIN — ONDANSETRON 4 MG: 2 INJECTION INTRAMUSCULAR; INTRAVENOUS at 11:12

## 2022-12-12 RX ADMIN — OXYCODONE AND ACETAMINOPHEN 1 TABLET: 10; 325 TABLET ORAL at 09:12

## 2022-12-12 RX ADMIN — MIDAZOLAM HYDROCHLORIDE 2 MG: 1 INJECTION, SOLUTION INTRAMUSCULAR; INTRAVENOUS at 07:12

## 2022-12-12 RX ADMIN — MAGNESIUM OXIDE TAB 400 MG (241.3 MG ELEMENTAL MG) 400 MG: 400 (241.3 MG) TAB at 12:12

## 2022-12-12 RX ADMIN — PROCHLORPERAZINE EDISYLATE 2.5 MG: 5 INJECTION INTRAMUSCULAR; INTRAVENOUS at 07:12

## 2022-12-12 RX ADMIN — Medication 10 MG: at 09:12

## 2022-12-12 RX ADMIN — Medication 30 MG: at 08:12

## 2022-12-12 RX ADMIN — SODIUM CHLORIDE 0.25 MCG/KG/MIN: 9 INJECTION, SOLUTION INTRAVENOUS at 08:12

## 2022-12-12 RX ADMIN — KETOROLAC TROMETHAMINE 15 MG: 30 INJECTION, SOLUTION INTRAMUSCULAR; INTRAVENOUS at 12:12

## 2022-12-12 RX ADMIN — FENTANYL CITRATE 100 MCG: 50 INJECTION, SOLUTION INTRAMUSCULAR; INTRAVENOUS at 07:12

## 2022-12-12 RX ADMIN — ROCURONIUM BROMIDE 25 MG: 10 INJECTION INTRAVENOUS at 09:12

## 2022-12-12 RX ADMIN — LIDOCAINE HYDROCHLORIDE 80 MG: 20 INJECTION INTRAVENOUS at 07:12

## 2022-12-12 RX ADMIN — PHENYLEPHRINE HYDROCHLORIDE 100 MCG: 10 INJECTION INTRAVENOUS at 09:12

## 2022-12-12 RX ADMIN — SODIUM CHLORIDE, SODIUM GLUCONATE, SODIUM ACETATE, POTASSIUM CHLORIDE, MAGNESIUM CHLORIDE, SODIUM PHOSPHATE, DIBASIC, AND POTASSIUM PHOSPHATE: .53; .5; .37; .037; .03; .012; .00082 INJECTION, SOLUTION INTRAVENOUS at 11:12

## 2022-12-12 RX ADMIN — KETOROLAC TROMETHAMINE 15 MG: 30 INJECTION, SOLUTION INTRAMUSCULAR; INTRAVENOUS at 11:12

## 2022-12-12 RX ADMIN — KETOROLAC TROMETHAMINE 15 MG: 30 INJECTION, SOLUTION INTRAMUSCULAR; INTRAVENOUS at 06:12

## 2022-12-12 RX ADMIN — SODIUM CHLORIDE, SODIUM GLUCONATE, SODIUM ACETATE, POTASSIUM CHLORIDE, MAGNESIUM CHLORIDE, SODIUM PHOSPHATE, DIBASIC, AND POTASSIUM PHOSPHATE: .53; .5; .37; .037; .03; .012; .00082 INJECTION, SOLUTION INTRAVENOUS at 07:12

## 2022-12-12 RX ADMIN — DOCUSATE SODIUM 100 MG: 100 CAPSULE ORAL at 09:12

## 2022-12-12 RX ADMIN — AMPICILLIN SODIUM AND SULBACTAM SODIUM 3 G: 2; 1 INJECTION, POWDER, FOR SOLUTION INTRAMUSCULAR; INTRAVENOUS at 07:12

## 2022-12-12 RX ADMIN — SODIUM CHLORIDE, SODIUM LACTATE, POTASSIUM CHLORIDE, AND CALCIUM CHLORIDE 125 ML/HR: 600; 310; 30; 20 INJECTION, SOLUTION INTRAVENOUS at 12:12

## 2022-12-12 RX ADMIN — HEPARIN SODIUM 5000 UNITS: 5000 INJECTION INTRAVENOUS; SUBCUTANEOUS at 06:12

## 2022-12-12 RX ADMIN — SODIUM CHLORIDE 2 UNITS/HR: 9 INJECTION, SOLUTION INTRAVENOUS at 10:12

## 2022-12-12 RX ADMIN — AMPICILLIN SODIUM AND SULBACTAM SODIUM 3 G: 2; 1 INJECTION, POWDER, FOR SOLUTION INTRAMUSCULAR; INTRAVENOUS at 03:12

## 2022-12-12 RX ADMIN — Medication 0.2 MCG/KG/MIN: at 01:12

## 2022-12-12 RX ADMIN — PHENYLEPHRINE HYDROCHLORIDE 100 MCG: 10 INJECTION INTRAVENOUS at 10:12

## 2022-12-12 RX ADMIN — HEPARIN SODIUM 5000 UNITS: 5000 INJECTION INTRAVENOUS; SUBCUTANEOUS at 03:12

## 2022-12-12 RX ADMIN — SUGAMMADEX 100 MG: 100 INJECTION, SOLUTION INTRAVENOUS at 12:12

## 2022-12-12 RX ADMIN — AMPICILLIN SODIUM AND SULBACTAM SODIUM 3 G: 2; 1 INJECTION, POWDER, FOR SOLUTION INTRAMUSCULAR; INTRAVENOUS at 10:12

## 2022-12-12 NOTE — TELEPHONE ENCOUNTER
----- Message from Jen Suresh RN sent at 12/12/2022  3:13 PM CST -----    ----- Message -----  From: Kurt Martinez  Sent: 12/10/2022  10:01 AM CST  To: Lorri Balderrama Staff    Pt would like to be called back asap regarding questions concerning her current condition(skin irritation in bellybutton area).  Pt is scheduled for Surgery on 12/12/22  Pt can be reached at [phone].    Thanks

## 2022-12-12 NOTE — ANESTHESIA PREPROCEDURE EVALUATION
12/12/2022  Rehana Whitten is a 67 y.o., female.      Pre-op Assessment    I have reviewed the Patient Summary Reports.          Review of Systems  Anesthesia Hx:  No problems with previous Anesthesia    Social:  Non-Smoker    Hematology/Oncology:  Hematology Normal   Oncology Normal     EENT/Dental:EENT/Dental Normal   Cardiovascular:   Hypertension    Pulmonary:  Pulmonary Normal    Renal/:  Renal/ Normal     Hepatic/GI:  Hepatic/GI Normal    Musculoskeletal:  Musculoskeletal Normal    Neurological:  Neurology Normal    Endocrine:   Hypothyroidism    Dermatological:  Skin Normal    Psych:   anxiety depression          Physical Exam  General: Alert and Oriented    Airway:  Mallampati: II / II  Mouth Opening: Normal  TM Distance: Normal  Tongue: Normal  Neck ROM: Normal ROM    Dental:  Intact    Chest/Lungs:  Clear to auscultation, Normal Respiratory Rate    Heart:  Rate: Normal  Rhythm: Regular Rhythm  Sounds: Normal        Anesthesia Plan  Type of Anesthesia, risks & benefits discussed:    Anesthesia Type: Gen ETT, MAC  Intra-op Monitoring Plan: Standard ASA Monitors  Post Op Pain Control Plan: multimodal analgesia  Airway Plan: Direct  Informed Consent: Informed consent signed with the Patient and all parties understand the risks and agree with anesthesia plan.  All questions answered.   ASA Score: 3    Ready For Surgery From Anesthesia Perspective.     .

## 2022-12-12 NOTE — OP NOTE
[facility]    Operative Report     Date of Procedure: 12/12/22     Surgeons:    Surgeon(s):  MD Cathi Vail MD Faith C Mason, MD     First Assistant Attestation:    The presence of an additional attending surgeon functioning as first assistant was required due to the complexity of the procedure relative to any available residents. I certify that no resident was available who was qualified to serve as first assistant. Duties performed by the assistant included assisting the primary surgeon.     Pre-operative Diagnosis: Intrahepatic cholangiocarcinoma     Post-operative Diagnosis: Same     Procedure Perfomed: Anatomic resection of the right lobe of the liver (segments V,VI,VII,VIII), cholecystectomy, portal lymphadenectomy     Anesthesia: General endotracheal    Findings: Grossly normal liver, no evidence of extrahepatic disease, tumor confined to right lobe    Estimated Blood Loss: 200mL    Specimens: Right lobe liver, gallbladder, portal lymph nodes    Drains: None     Preamble  Indications and Patient Counseling: Patient is a 67 y.o. female with intrahepatic cholangiocarcinoma presenting for liver resection. The procedure was thoroughly discussed with the patient, including potential risks, complications, and alternatives. Specific complications mentioned included death, bleeding, infection, bile leak, intra-abdominal abscess pulmonary embolism, deep vein thrombosis, liver dysfunction/failure, as well as the possibility of other complications not specifically mentioned.     All questions were answered, the patient voiced appropriate understanding, and agreed to proceed with the planned procedure.     Time-Out: A complete time out was carried out prior to incision, with confirmation of patient identity, correct procedure, correct operative site, appropriate antibiotic prophylaxis, review of any known allergies, and presence of all needed equipment.      Procedure in Detail  Following the induction of general endotracheal anesthesia, appropriate arterial and venous lines were placed by the anesthesia team.  Care was taken to pad all pressure points and avoid any potential traction injuries from positioning. The urinary bladder was catheterized.  Sequential compression boots and Camryn Huggers were used. The chest, abdomen, and upper thighs were sterilely prepped and draped.      The abdomen was entered via limited upper midline incision with ligation of ligamentum teres and resection of associated fat. Intraoperative ultrasound was performed to identify tumor, assess for intrahepatic metastases and map vascular anatomy. The peritoneum was palpated to rule out carcinomatosis. The incision was extended to umbilicus and Dorado retractor placed to optimize exposure. 0 mL of ascites was removed. The right lobe of the liver was mobilized by taking down peritoneal attachements with electocautery. A cholecystectomy was performed with clear identification and ligation of the cystic artery and duct with 2-0 silk sutures. The right hepatic artery and portal vein branches were exposed and circumferentially dissected. The vessels were temporarily occluded to demarcate line of transection and confirm perfusion of the remnant liver. The artery was then ligated with silk sutures and divided. Theright portal vein was transected with single fire vascular load stapler. The right hepatic vein was circumferentially dissected. An umbilical tape was threaded medial to the hepatic vein along the posterior surface of the liver and medial to the transected hilar structures to identify the posterior limit of the transection. After communication with the anesthesia team, the liver transection was started. The line of transection was marked with electrocautery along the surface of the liver. The liver parenchyma was transected using combination Ligasure device, CUSA, electrocautery and  suture/clip ligation of larger vascular structures. The transection was carried down to the level of the hilar plate. The right hepatic duct was identified and cirumferentially secured with a vessel loop. The duct and hilar plate tissue were transected with a vascular load stapler. The remainder of the liver parenchyma was transected up to the level of the hepatic vein. The right hepatic vein was transected with a single fire of vascular load stapler and the specimen removed. The gross margin on the specimen was confirmed to be negative and adequate. The cut surface of the liver was carefully inspected for hemostasis and bile leak. A portal lymphadenectomy was performed in multiple specimens. A final inspection for hemostasis was performed and the abdomen irrigated with warm saline. The left lobe of the liver was re-suspended to the falciform ligament with 4-0 Prolene sutures. The peritoneum was injected with 0.25% Marcaine solution. The fascia was closed with #1 PDS suture. The skin was closed with 4-0 monocryl and dermabond. At the conclusion of the operation, all instrument, sponge and needle counts were correct. The patient was extubated in the operating room and transferred to the PACU in stable condition.       Additional Comments: None

## 2022-12-12 NOTE — PROGRESS NOTES
Per angelo Calles to titrate angie to BP cuff. Cuff currently reading 113/59 (83) while arterial line reads 138/48 (77). Titrating angie to a MAP >70 (confirmed with Dr Michel by RN).

## 2022-12-12 NOTE — ANESTHESIA RELEASE NOTE
"Anesthesia Release from PACU Note    Patient: Rehana Whitten    Procedure(s) Performed: Procedure(s) (LRB):  EXCISION, LIVER (Right lobe) 4 hours Open (N/A)  CHOLECYSTECTOMY  LYMPHADENECTOMY, PORTAL (N/A)    Anesthesia type: general    Post pain: Adequate analgesia    Post assessment: no apparent anesthetic complications    Last Vitals:   Visit Vitals  BP (!) 143/65   Pulse 62   Temp 36.6 °C (97.9 °F) (Temporal)   Resp 13   Ht 5' 1" (1.549 m)   Wt 64 kg (141 lb 1.5 oz)   LMP  (LMP Unknown)   SpO2 100%   Breastfeeding No   BMI 26.66 kg/m²       Post vital signs: stable    Level of consciousness: awake, alert  and oriented    Nausea/Vomiting: no nausea/no vomiting    Complications: none    Airway Patency: patent    Respiratory: unassisted, spontaneous ventilation    Cardiovascular: stable and blood pressure at baseline    Hydration: euvolemic  "

## 2022-12-12 NOTE — PLAN OF CARE
Pt IV flushed. Pt midline abd incision CDI. Pt reports pain relief following administration of medication. Pt not currently on any vasopressors

## 2022-12-12 NOTE — TRANSFER OF CARE
"Anesthesia Transfer of Care Note    Patient: Rehana Whitten    Procedure(s) Performed: Procedure(s) (LRB):  EXCISION, LIVER (Right lobe) 4 hours Open (N/A)  CHOLECYSTECTOMY  LYMPHADENECTOMY, PORTAL (N/A)    Patient location: PACU    Anesthesia Type: general    Transport from OR: Transported from OR on 6-10 L/min O2 by face mask with adequate spontaneous ventilation    Post pain: adequate analgesia    Post assessment: no apparent anesthetic complications and tolerated procedure well    Post vital signs: stable    Level of consciousness: sedated    Nausea/Vomiting: no nausea/vomiting    Complications: none    Transfer of care protocol was followed      Last vitals:   Visit Vitals  BP (!) 106/52 (BP Location: Right arm, Patient Position: Lying)   Pulse 63   Temp 36.8 °C (98.2 °F) (Temporal)   Resp 20   Ht 5' 1" (1.549 m)   Wt 64 kg (141 lb 1.5 oz)   LMP  (LMP Unknown)   SpO2 100%   Breastfeeding No   BMI 26.66 kg/m²     "

## 2022-12-13 PROBLEM — Z90.49 H/O RESECTION OF LIVER: Status: ACTIVE | Noted: 2022-12-13

## 2022-12-13 PROBLEM — D62 ACUTE BLOOD LOSS ANEMIA: Status: ACTIVE | Noted: 2022-12-13

## 2022-12-13 PROBLEM — E83.39 HYPOPHOSPHATEMIA: Status: ACTIVE | Noted: 2022-12-13

## 2022-12-13 LAB
ALBUMIN SERPL BCP-MCNC: 3.6 G/DL (ref 3.5–5.2)
ALP SERPL-CCNC: 67 U/L (ref 55–135)
ALT SERPL W/O P-5'-P-CCNC: 514 U/L (ref 10–44)
ANION GAP SERPL CALC-SCNC: 7 MMOL/L (ref 8–16)
AST SERPL-CCNC: 420 U/L (ref 10–40)
BASOPHILS # BLD AUTO: 0 K/UL (ref 0–0.2)
BASOPHILS NFR BLD: 0 % (ref 0–1.9)
BILIRUB SERPL-MCNC: 1.9 MG/DL (ref 0.1–1)
BLD PROD TYP BPU: NORMAL
BLD PROD TYP BPU: NORMAL
BLOOD UNIT EXPIRATION DATE: NORMAL
BLOOD UNIT EXPIRATION DATE: NORMAL
BLOOD UNIT TYPE CODE: 6200
BLOOD UNIT TYPE CODE: 6200
BLOOD UNIT TYPE: NORMAL
BLOOD UNIT TYPE: NORMAL
BUN SERPL-MCNC: 14 MG/DL (ref 8–23)
CALCIUM SERPL-MCNC: 8.7 MG/DL (ref 8.7–10.5)
CHLORIDE SERPL-SCNC: 104 MMOL/L (ref 95–110)
CO2 SERPL-SCNC: 26 MMOL/L (ref 23–29)
CODING SYSTEM: NORMAL
CODING SYSTEM: NORMAL
CREAT SERPL-MCNC: 0.7 MG/DL (ref 0.5–1.4)
DIFFERENTIAL METHOD: ABNORMAL
DISPENSE STATUS: NORMAL
DISPENSE STATUS: NORMAL
EOSINOPHIL # BLD AUTO: 0 K/UL (ref 0–0.5)
EOSINOPHIL NFR BLD: 0 % (ref 0–8)
ERYTHROCYTE [DISTWIDTH] IN BLOOD BY AUTOMATED COUNT: 13 % (ref 11.5–14.5)
EST. GFR  (NO RACE VARIABLE): >60 ML/MIN/1.73 M^2
GLUCOSE SERPL-MCNC: 152 MG/DL (ref 70–110)
GLUCOSE SERPL-MCNC: 163 MG/DL (ref 70–110)
GLUCOSE SERPL-MCNC: 195 MG/DL (ref 70–110)
GLUCOSE SERPL-MCNC: 235 MG/DL (ref 70–110)
HCO3 UR-SCNC: 21.3 MMOL/L (ref 24–28)
HCO3 UR-SCNC: 22.6 MMOL/L (ref 24–28)
HCO3 UR-SCNC: 24 MMOL/L (ref 24–28)
HCT VFR BLD AUTO: 29.7 % (ref 37–48.5)
HCT VFR BLD CALC: 33 %PCV (ref 36–54)
HCT VFR BLD CALC: 33 %PCV (ref 36–54)
HCT VFR BLD CALC: 35 %PCV (ref 36–54)
HGB BLD-MCNC: 9.2 G/DL (ref 12–16)
IMM GRANULOCYTES # BLD AUTO: 0.01 K/UL (ref 0–0.04)
IMM GRANULOCYTES NFR BLD AUTO: 0.1 % (ref 0–0.5)
INR PPP: 1.2 (ref 0.8–1.2)
LYMPHOCYTES # BLD AUTO: 0.8 K/UL (ref 1–4.8)
LYMPHOCYTES NFR BLD: 8.9 % (ref 18–48)
MAGNESIUM SERPL-MCNC: 1.9 MG/DL (ref 1.6–2.6)
MCH RBC QN AUTO: 29.9 PG (ref 27–31)
MCHC RBC AUTO-ENTMCNC: 31 G/DL (ref 32–36)
MCV RBC AUTO: 96 FL (ref 82–98)
MONOCYTES # BLD AUTO: 0.7 K/UL (ref 0.3–1)
MONOCYTES NFR BLD: 7.7 % (ref 4–15)
NEUTROPHILS # BLD AUTO: 7.7 K/UL (ref 1.8–7.7)
NEUTROPHILS NFR BLD: 83.3 % (ref 38–73)
NRBC BLD-RTO: 0 /100 WBC
NUM UNITS TRANS PACKED RBC: NORMAL
NUM UNITS TRANS PACKED RBC: NORMAL
PCO2 BLDA: 28.6 MMHG (ref 35–45)
PCO2 BLDA: 33.3 MMHG (ref 35–45)
PCO2 BLDA: 33.4 MMHG (ref 35–45)
PH SMN: 7.44 [PH] (ref 7.35–7.45)
PH SMN: 7.47 [PH] (ref 7.35–7.45)
PH SMN: 7.48 [PH] (ref 7.35–7.45)
PHOSPHATE SERPL-MCNC: 2.5 MG/DL (ref 2.7–4.5)
PHOSPHATE SERPL-MCNC: 2.5 MG/DL (ref 2.7–4.5)
PHOSPHATE SERPL-MCNC: 2.6 MG/DL (ref 2.7–4.5)
PLATELET # BLD AUTO: 191 K/UL (ref 150–450)
PMV BLD AUTO: 12 FL (ref 9.2–12.9)
PO2 BLDA: 125 MMHG (ref 80–100)
PO2 BLDA: 185 MMHG (ref 80–100)
PO2 BLDA: 201 MMHG (ref 80–100)
POC BE: -2 MMOL/L
POC BE: -2 MMOL/L
POC BE: 0 MMOL/L
POC IONIZED CALCIUM: 1.09 MMOL/L (ref 1.06–1.42)
POC IONIZED CALCIUM: 1.13 MMOL/L (ref 1.06–1.42)
POC IONIZED CALCIUM: 1.14 MMOL/L (ref 1.06–1.42)
POC SATURATED O2: 100 % (ref 95–100)
POC SATURATED O2: 100 % (ref 95–100)
POC SATURATED O2: 99 % (ref 95–100)
POC TCO2: 22 MMOL/L (ref 23–27)
POC TCO2: 24 MMOL/L (ref 23–27)
POC TCO2: 25 MMOL/L (ref 23–27)
POTASSIUM BLD-SCNC: 3.2 MMOL/L (ref 3.5–5.1)
POTASSIUM BLD-SCNC: 3.5 MMOL/L (ref 3.5–5.1)
POTASSIUM BLD-SCNC: 3.5 MMOL/L (ref 3.5–5.1)
POTASSIUM SERPL-SCNC: 4.2 MMOL/L (ref 3.5–5.1)
PROT SERPL-MCNC: 5.5 G/DL (ref 6–8.4)
PROTHROMBIN TIME: 12.1 SEC (ref 9–12.5)
RBC # BLD AUTO: 3.08 M/UL (ref 4–5.4)
SAMPLE: ABNORMAL
SODIUM BLD-SCNC: 138 MMOL/L (ref 136–145)
SODIUM BLD-SCNC: 140 MMOL/L (ref 136–145)
SODIUM BLD-SCNC: 140 MMOL/L (ref 136–145)
SODIUM SERPL-SCNC: 137 MMOL/L (ref 136–145)
WBC # BLD AUTO: 9.3 K/UL (ref 3.9–12.7)

## 2022-12-13 PROCEDURE — 25000003 PHARM REV CODE 250: Performed by: STUDENT IN AN ORGANIZED HEALTH CARE EDUCATION/TRAINING PROGRAM

## 2022-12-13 PROCEDURE — 80053 COMPREHEN METABOLIC PANEL: CPT | Performed by: STUDENT IN AN ORGANIZED HEALTH CARE EDUCATION/TRAINING PROGRAM

## 2022-12-13 PROCEDURE — 84100 ASSAY OF PHOSPHORUS: CPT | Mod: 91 | Performed by: STUDENT IN AN ORGANIZED HEALTH CARE EDUCATION/TRAINING PROGRAM

## 2022-12-13 PROCEDURE — 63600175 PHARM REV CODE 636 W HCPCS: Performed by: TRANSPLANT SURGERY

## 2022-12-13 PROCEDURE — 25000003 PHARM REV CODE 250: Performed by: TRANSPLANT SURGERY

## 2022-12-13 PROCEDURE — 25000003 PHARM REV CODE 250: Performed by: NURSE PRACTITIONER

## 2022-12-13 PROCEDURE — 63600175 PHARM REV CODE 636 W HCPCS: Performed by: STUDENT IN AN ORGANIZED HEALTH CARE EDUCATION/TRAINING PROGRAM

## 2022-12-13 PROCEDURE — 20600001 HC STEP DOWN PRIVATE ROOM

## 2022-12-13 PROCEDURE — 85025 COMPLETE CBC W/AUTO DIFF WBC: CPT | Performed by: STUDENT IN AN ORGANIZED HEALTH CARE EDUCATION/TRAINING PROGRAM

## 2022-12-13 PROCEDURE — 36415 COLL VENOUS BLD VENIPUNCTURE: CPT | Performed by: NURSE PRACTITIONER

## 2022-12-13 PROCEDURE — 99024 PR POST-OP FOLLOW-UP VISIT: ICD-10-PCS | Mod: ,,, | Performed by: TRANSPLANT SURGERY

## 2022-12-13 PROCEDURE — 83735 ASSAY OF MAGNESIUM: CPT | Performed by: STUDENT IN AN ORGANIZED HEALTH CARE EDUCATION/TRAINING PROGRAM

## 2022-12-13 PROCEDURE — 84100 ASSAY OF PHOSPHORUS: CPT | Mod: 91 | Performed by: NURSE PRACTITIONER

## 2022-12-13 PROCEDURE — 85610 PROTHROMBIN TIME: CPT | Performed by: STUDENT IN AN ORGANIZED HEALTH CARE EDUCATION/TRAINING PROGRAM

## 2022-12-13 PROCEDURE — 99024 POSTOP FOLLOW-UP VISIT: CPT | Mod: ,,, | Performed by: TRANSPLANT SURGERY

## 2022-12-13 RX ORDER — ENOXAPARIN SODIUM 100 MG/ML
40 INJECTION SUBCUTANEOUS EVERY 24 HOURS
Status: DISCONTINUED | OUTPATIENT
Start: 2022-12-13 | End: 2022-12-16 | Stop reason: HOSPADM

## 2022-12-13 RX ADMIN — DIBASIC SODIUM PHOSPHATE, MONOBASIC POTASSIUM PHOSPHATE AND MONOBASIC SODIUM PHOSPHATE 2 TABLET: 852; 155; 130 TABLET ORAL at 08:12

## 2022-12-13 RX ADMIN — KETOROLAC TROMETHAMINE 15 MG: 30 INJECTION, SOLUTION INTRAMUSCULAR; INTRAVENOUS at 12:12

## 2022-12-13 RX ADMIN — OXYCODONE AND ACETAMINOPHEN 1 TABLET: 10; 325 TABLET ORAL at 08:12

## 2022-12-13 RX ADMIN — LEVOTHYROXINE SODIUM 88 MCG: 88 TABLET ORAL at 05:12

## 2022-12-13 RX ADMIN — DIBASIC SODIUM PHOSPHATE, MONOBASIC POTASSIUM PHOSPHATE AND MONOBASIC SODIUM PHOSPHATE 2 TABLET: 852; 155; 130 TABLET ORAL at 09:12

## 2022-12-13 RX ADMIN — DOCUSATE SODIUM 100 MG: 100 CAPSULE ORAL at 08:12

## 2022-12-13 RX ADMIN — MAGNESIUM OXIDE TAB 400 MG (241.3 MG ELEMENTAL MG) 400 MG: 400 (241.3 MG) TAB at 08:12

## 2022-12-13 RX ADMIN — AMPICILLIN SODIUM AND SULBACTAM SODIUM 3 G: 2; 1 INJECTION, POWDER, FOR SOLUTION INTRAMUSCULAR; INTRAVENOUS at 11:12

## 2022-12-13 RX ADMIN — HEPARIN SODIUM 5000 UNITS: 5000 INJECTION INTRAVENOUS; SUBCUTANEOUS at 05:12

## 2022-12-13 RX ADMIN — ENOXAPARIN SODIUM 40 MG: 40 INJECTION SUBCUTANEOUS at 05:12

## 2022-12-13 RX ADMIN — AMPICILLIN SODIUM AND SULBACTAM SODIUM 3 G: 2; 1 INJECTION, POWDER, FOR SOLUTION INTRAMUSCULAR; INTRAVENOUS at 03:12

## 2022-12-13 RX ADMIN — DIBASIC SODIUM PHOSPHATE, MONOBASIC POTASSIUM PHOSPHATE AND MONOBASIC SODIUM PHOSPHATE 2 TABLET: 852; 155; 130 TABLET ORAL at 05:12

## 2022-12-13 RX ADMIN — KETOROLAC TROMETHAMINE 15 MG: 30 INJECTION, SOLUTION INTRAMUSCULAR; INTRAVENOUS at 05:12

## 2022-12-13 RX ADMIN — CITALOPRAM HYDROBROMIDE 20 MG: 20 TABLET ORAL at 08:12

## 2022-12-13 RX ADMIN — KETOROLAC TROMETHAMINE 15 MG: 30 INJECTION, SOLUTION INTRAMUSCULAR; INTRAVENOUS at 06:12

## 2022-12-13 RX ADMIN — OXYCODONE HYDROCHLORIDE AND ACETAMINOPHEN 1 TABLET: 5; 325 TABLET ORAL at 01:12

## 2022-12-13 RX ADMIN — SODIUM CHLORIDE, SODIUM LACTATE, POTASSIUM CHLORIDE, AND CALCIUM CHLORIDE 125 ML/HR: 600; 310; 30; 20 INJECTION, SOLUTION INTRAVENOUS at 03:12

## 2022-12-13 RX ADMIN — VALACYCLOVIR HYDROCHLORIDE 500 MG: 500 TABLET, FILM COATED ORAL at 08:12

## 2022-12-13 RX ADMIN — TRIAMTERENE AND HYDROCHLOROTHIAZIDE 1 CAPSULE: 37.5; 25 CAPSULE ORAL at 08:12

## 2022-12-13 NOTE — ANESTHESIA POSTPROCEDURE EVALUATION
Anesthesia Post Evaluation    Patient: Rehana Whitten    Procedure(s) Performed: Procedure(s) (LRB):  EXCISION, LIVER (Right lobe) 4 hours Open (N/A)  CHOLECYSTECTOMY  LYMPHADENECTOMY, PORTAL (N/A)    Final Anesthesia Type: general      Patient location during evaluation: PACU  Patient participation: Yes- Able to Participate  Level of consciousness: awake and alert  Post-procedure vital signs: reviewed and stable  Pain control: Pain has been treated.  Airway patency: patent    PONV status: PONV absent or treated.  Anesthetic complications: no      Cardiovascular status: hemodynamically stable  Respiratory status: spontaneous ventilation  Hydration status: euvolemic  Follow-up not needed.          Vitals Value Taken Time   /65 12/12/22 1909   Temp 36.8 °C (98.3 °F) 12/12/22 1909   Pulse 70 12/12/22 1909   Resp 16 12/12/22 1909   SpO2 95 % 12/12/22 1909         Event Time   Out of Recovery 15:00:00         Pain/Kenton Score: Pain Rating Prior to Med Admin: 4 (12/12/2022  6:15 PM)  Pain Rating Post Med Admin: 5 (12/12/2022  1:05 PM)  Kenton Score: 10 (12/12/2022  3:00 PM)

## 2022-12-13 NOTE — ASSESSMENT & PLAN NOTE
- expected post-op  - H/H trended down but stable.   - No obvious signs of bleeding  - continue to monitor

## 2022-12-13 NOTE — SUBJECTIVE & OBJECTIVE
Scheduled Meds:   ampicillin-sulbactim (UNASYN) IVPB  3 g Intravenous Q6H    citalopram  20 mg Oral Daily    docusate sodium  100 mg Oral BID    heparin (porcine)  5,000 Units Subcutaneous Q8H    k phos di & mono-sod phos mono  500 mg Oral BID    ketorolac  15 mg Intravenous Q6H    levothyroxine  88 mcg Oral Before breakfast    magnesium oxide  400 mg Oral Daily    triamterene-hydrochlorothiazide 37.5-25 mg  1 capsule Oral QAM    valACYclovir  500 mg Oral Daily     Continuous Infusions:  PRN Meds:ondansetron, oxyCODONE-acetaminophen, oxyCODONE-acetaminophen    Review of Systems   Constitutional:  Negative for appetite change, chills, fatigue and fever.   Respiratory:  Negative for cough, chest tightness and shortness of breath.    Cardiovascular:  Negative for chest pain, palpitations and leg swelling.   Gastrointestinal:  Positive for abdominal pain. Negative for abdominal distention, constipation, diarrhea, nausea and vomiting.   Genitourinary:  Negative for difficulty urinating, dysuria, frequency and urgency.   Musculoskeletal:  Negative for back pain and neck pain.   Skin:  Positive for wound. Negative for color change, pallor and rash.   Neurological:  Positive for weakness. Negative for dizziness and headaches.   Psychiatric/Behavioral:  Negative for confusion and sleep disturbance.    Objective:     Vital Signs (Most Recent):  Temp: 98 °F (36.7 °C) (12/13/22 0821)  Pulse: 75 (12/13/22 0821)  Resp: 17 (12/13/22 0821)  BP: 137/64 (12/13/22 0821)  SpO2: 96 % (12/13/22 0821) Vital Signs (24h Range):  Temp:  [97.6 °F (36.4 °C)-98.8 °F (37.1 °C)] 98 °F (36.7 °C)  Pulse:  [62-75] 75  Resp:  [9-22] 17  SpO2:  [94 %-100 %] 96 %  BP: ()/(46-67) 137/64  Arterial Line BP: ()/(33-60) 133/47     Weight: 64 kg (141 lb 1.5 oz)  Body mass index is 26.66 kg/m².    Intake/Output - Last 3 Shifts         12/11 0700 12/12 0659 12/12 0700 12/13 0659 12/13 0700 12/14 0659    I.V. (mL/kg)  523.1 (8.2) 2003.9 (31.3)     IV Piggyback  2599.8 200    Total Intake(mL/kg)  3122.9 (48.8) 2203.9 (34.4)    Urine (mL/kg/hr)  1475 (1) 250 (1.3)    Total Output  1475 250    Net  +1647.9 +1953.9                   Physical Exam  Vitals and nursing note reviewed.   Constitutional:       Appearance: Normal appearance.   Cardiovascular:      Rate and Rhythm: Normal rate and regular rhythm.      Pulses: Normal pulses.   Pulmonary:      Effort: Pulmonary effort is normal. No respiratory distress.      Breath sounds: Normal breath sounds. No decreased breath sounds, wheezing or rales.   Abdominal:      General: Bowel sounds are normal. There is no distension.      Palpations: Abdomen is soft.      Tenderness: There is abdominal tenderness (incisional). There is no guarding.       Musculoskeletal:         General: Normal range of motion.   Skin:     General: Skin is warm and dry.   Neurological:      Mental Status: She is alert and oriented to person, place, and time.   Psychiatric:         Behavior: Behavior is cooperative.       Laboratory:  Immunosuppressants       None          CBC:   Recent Labs   Lab 12/13/22  0537   WBC 9.30   RBC 3.08*   HGB 9.2*   HCT 29.7*      MCV 96   MCH 29.9   MCHC 31.0*     CMP:   Recent Labs   Lab 12/13/22  0537   *   CALCIUM 8.7   ALBUMIN 3.6   PROT 5.5*      K 4.2   CO2 26      BUN 14   CREATININE 0.7   ALKPHOS 67   *   *   BILITOT 1.9*     Coagulation:   Recent Labs   Lab 12/13/22  0537   INR 1.2     Labs within the past 24 hours have been reviewed.    Diagnostic Results:  I have personally reviewed all pertinent imaging studies.    Debility/Functional status: No debility noted.

## 2022-12-13 NOTE — HOSPITAL COURSE
Patient is now s/p R lobe liver resection 12/12/22 (segments V, VI, VII, VIII) and cholecystectomy with Dr. Blackmon. Lymph nodes also sent for pathology. Grossly normal liver, no evidence of extrahepatic disease, tumor confined to right lobe. Surgery without complication.    Interval History: no acute events overnight. Continues to progress well. C/o heartburn, started protonix. Decrease KPN dose, Phos stable. Labs remain stable. Pain well controlled with oral medications. Tolerating diet, no N/V, (-) BM, (+) flatus, on bowel regimen, encouraged ambulation. Continue lovenox, plan to switch to apixaban at discharge.

## 2022-12-13 NOTE — PROGRESS NOTES
Patient arrived to the unit from pacu, oriented to room and call bell, vitals stable. No acute distress noted. Midline incision with dermabond no ss of bleeding. Family at bedside. Will continue to monitor.

## 2022-12-13 NOTE — ASSESSMENT & PLAN NOTE
- s/p R lobe liver resection 12/12, grossly normal liver, no evidence of extrahepatic disease, tumor confined to right lobe  - Pathology pending

## 2022-12-13 NOTE — PROGRESS NOTES
[facility] - Transplant Stepdown  Liver Transplant  Progress Note    Patient Name: Rehana Whitten  MRN: [id number]  Admission Date: 12/12/2022  Hospital Length of Stay: 1 days  Code Status: Full Code  Primary Care Provider: Bernie Valverde MD  Post-Operative Day:      Subjective:     History of Present Illness:  Rehana Whitten is a 67 y.o. female referred for consultation regarding liver mass. she initially presented with vague abdominal pain with US showing liver mass. Subsequent imaging studies demonstrated mass in the right lobe of the liver with satellite nodules concerning for malignancy. There was no evidence of extrahepatic disease on abdominal or chest CT. She currently reports no symptoms.       Hospital Course:  Patient is now s/p R lobe liver resection 12/12/22 (segments V, VI, VII, VIII) and cholecystectomy with Dr. Blackmon. Lymph nodes also sent for pathology. Grossly normal liver, no evidence of extrahepatic disease, tumor confined to right lobe. Surgery without complication.    Interval History: no acute events overnight. Patient feels well this AM. Up to chair. Pain well controlled with IV toradol, pain meds available PRN. Tolerating CLD, no N/V, advanced to regular. H/H trended down but expected due to surgery, no obvious signs of bleeding. Burciaga dc'd this AM, voiding without difficulty. Switch SQ heparin to lovenox with plans for apixaban at discharge. Monitoring phosphorus level 2x daily, start PO KPN.       Scheduled Meds:   ampicillin-sulbactim (UNASYN) IVPB  3 g Intravenous Q6H    citalopram  20 mg Oral Daily    docusate sodium  100 mg Oral BID    heparin (porcine)  5,000 Units Subcutaneous Q8H    k phos di & mono-sod phos mono  500 mg Oral BID    ketorolac  15 mg Intravenous Q6H    levothyroxine  88 mcg Oral Before breakfast    magnesium oxide  400 mg Oral Daily    triamterene-hydrochlorothiazide 37.5-25 mg  1 capsule Oral QAM    valACYclovir  500 mg Oral Daily     Continuous  Infusions:  PRN Meds:ondansetron, oxyCODONE-acetaminophen, oxyCODONE-acetaminophen    Review of Systems   Constitutional:  Negative for appetite change, chills, fatigue and fever.   Respiratory:  Negative for cough, chest tightness and shortness of breath.    Cardiovascular:  Negative for chest pain, palpitations and leg swelling.   Gastrointestinal:  Positive for abdominal pain. Negative for abdominal distention, constipation, diarrhea, nausea and vomiting.   Genitourinary:  Negative for difficulty urinating, dysuria, frequency and urgency.   Musculoskeletal:  Negative for back pain and neck pain.   Skin:  Positive for wound. Negative for color change, pallor and rash.   Neurological:  Positive for weakness. Negative for dizziness and headaches.   Psychiatric/Behavioral:  Negative for confusion and sleep disturbance.    Objective:     Vital Signs (Most Recent):  Temp: 98 °F (36.7 °C) (12/13/22 0821)  Pulse: 75 (12/13/22 0821)  Resp: 17 (12/13/22 0821)  BP: 137/64 (12/13/22 0821)  SpO2: 96 % (12/13/22 0821) Vital Signs (24h Range):  Temp:  [97.6 °F (36.4 °C)-98.8 °F (37.1 °C)] 98 °F (36.7 °C)  Pulse:  [62-75] 75  Resp:  [9-22] 17  SpO2:  [94 %-100 %] 96 %  BP: ()/(46-67) 137/64  Arterial Line BP: ()/(33-60) 133/47     Weight: 64 kg (141 lb 1.5 oz)  Body mass index is 26.66 kg/m².    Intake/Output - Last 3 Shifts         12/11 0700  12/12 0659 12/12 0700 12/13 0659 12/13 0700 12/14 0659    I.V. (mL/kg)  523.1 (8.2) 2003.9 (31.3)    IV Piggyback  2599.8 200    Total Intake(mL/kg)  3122.9 (48.8) 2203.9 (34.4)    Urine (mL/kg/hr)  1475 (1) 250 (1.3)    Total Output  1475 250    Net  +1647.9 +1953.9                   Physical Exam  Vitals and nursing note reviewed.   Constitutional:       Appearance: Normal appearance.   Cardiovascular:      Rate and Rhythm: Normal rate and regular rhythm.      Pulses: Normal pulses.   Pulmonary:      Effort: Pulmonary effort is normal. No respiratory distress.      Breath  sounds: Normal breath sounds. No decreased breath sounds, wheezing or rales.   Abdominal:      General: Bowel sounds are normal. There is no distension.      Palpations: Abdomen is soft.      Tenderness: There is abdominal tenderness (incisional). There is no guarding.       Musculoskeletal:         General: Normal range of motion.   Skin:     General: Skin is warm and dry.   Neurological:      Mental Status: She is alert and oriented to person, place, and time.   Psychiatric:         Behavior: Behavior is cooperative.       Laboratory:  Immunosuppressants       None          CBC:   Recent Labs   Lab 12/13/22  0537   WBC 9.30   RBC 3.08*   HGB 9.2*   HCT 29.7*      MCV 96   MCH 29.9   MCHC 31.0*     CMP:   Recent Labs   Lab 12/13/22  0537   *   CALCIUM 8.7   ALBUMIN 3.6   PROT 5.5*      K 4.2   CO2 26      BUN 14   CREATININE 0.7   ALKPHOS 67   *   *   BILITOT 1.9*     Coagulation:   Recent Labs   Lab 12/13/22  0537   INR 1.2     Labs within the past 24 hours have been reviewed.    Diagnostic Results:  I have personally reviewed all pertinent imaging studies.    Debility/Functional status: No debility noted.    Assessment/Plan:     * H/O resection of liver  - s/p R lobe liver resection 12/12/22 (segments V, VI, VII, VIII) and cholecystectomy with Dr. Blackmon. Lymph nodes also sent for pathology. Grossly normal liver, no evidence of extrahepatic disease, tumor confined to right lobe.  - Pain well controlled, no N/V, advance diet as tolerated.   - Monitoring and replacing phosphorus as needed  - Will need apixaban at discharge    Hypophosphatemia  - monitor 2x daily  - PO KPN started    Acute blood loss anemia  - expected post-op  - H/H trended down but stable.   - No obvious signs of bleeding  - continue to monitor    Cholangiocarcinoma  - s/p R lobe liver resection 12/12, grossly normal liver, no evidence of extrahepatic disease, tumor confined to right lobe  - Pathology  pending        VTE Risk Mitigation (From admission, onward)         Ordered     heparin (porcine) injection 5,000 Units  Every 8 hours         12/12/22 1209     Place sequential compression device with MINDY hose  Until discontinued         12/12/22 1209                The patients clinical status was discussed at multidisplinary rounds, involving transplant surgery, transplant medicine, pharmacy, nursing, nutrition, and social work    Discharge Planning: not stable for dc at this time. Possible dc Thursday.     Cat Perez, STEVE  Liver Transplant  [facility] - Transplant Stepdown

## 2022-12-13 NOTE — PLAN OF CARE
Pt aao x3. Sister at bedside. Pt has a midline incision open to air and dermabonded. Pt given pain med before bedtime. Pt has + bowel sounds in all quadrants. Bernardino hose and scds intact. Foely draining clear yellow urine. Burciaga to be removed at 0600. LR infusing as per order. See assessment for full chart details. Will continue to monitor, assess and adjust care as needed.

## 2022-12-13 NOTE — PROGRESS NOTES
Admit Note    Please see previous social work notes for continuity.  SW met with patient at beside to assess needs.  Patient was alert and sitting up on chair with her sister, Michaela Ricks, PH: [phone] at bedside.  Patient is admitted for liver resection and reported that she is doing well after surgery.      Patient reports not receiving any services outside of hospital and being independent of ADL and driving prior to hospitalization.  At time of discharge, patient plans to return to patient's home under the care of her , Robb.  Patients  will transport patient.  Per rounds today, expected discharge date has not been medically determined at this time. Patient and patients caregiver  verbalize understanding and are involved in treatment planning and discharge process. Patient is coping adequately with the aid of  family members.   Patient denies mental health difficulties.    Patient is being followed for needs, education, resources, information, emotional support, supportive counseling, and for supportive and skilled discharge plan of care.  remains available. Patient denies additional needs and/or concerns at this time.

## 2022-12-13 NOTE — HPI
Rehana Whitten is a 67 y.o. female referred for consultation regarding liver mass. she initially presented with vague abdominal pain with US showing liver mass. Subsequent imaging studies demonstrated mass in the right lobe of the liver with satellite nodules concerning for malignancy. There was no evidence of extrahepatic disease on abdominal or chest CT. She currently reports no symptoms.

## 2022-12-13 NOTE — ASSESSMENT & PLAN NOTE
- s/p R lobe liver resection 12/12/22 (segments V, VI, VII, VIII) and cholecystectomy with Dr. Blackmon. Lymph nodes also sent for pathology. Grossly normal liver, no evidence of extrahepatic disease, tumor confined to right lobe.  - Pain well controlled, no N/V, advance diet as tolerated.   - Monitoring and replacing phosphorus as needed  - Will need apixaban at discharge

## 2022-12-14 LAB
ALBUMIN SERPL BCP-MCNC: 3.4 G/DL (ref 3.5–5.2)
ALP SERPL-CCNC: 73 U/L (ref 55–135)
ALT SERPL W/O P-5'-P-CCNC: 423 U/L (ref 10–44)
ANION GAP SERPL CALC-SCNC: 6 MMOL/L (ref 8–16)
AST SERPL-CCNC: 255 U/L (ref 10–40)
BASOPHILS # BLD AUTO: 0.01 K/UL (ref 0–0.2)
BASOPHILS NFR BLD: 0.1 % (ref 0–1.9)
BILIRUB SERPL-MCNC: 1.3 MG/DL (ref 0.1–1)
BUN SERPL-MCNC: 23 MG/DL (ref 8–23)
CALCIUM SERPL-MCNC: 8.6 MG/DL (ref 8.7–10.5)
CHLORIDE SERPL-SCNC: 103 MMOL/L (ref 95–110)
CO2 SERPL-SCNC: 26 MMOL/L (ref 23–29)
CREAT SERPL-MCNC: 0.8 MG/DL (ref 0.5–1.4)
DIFFERENTIAL METHOD: ABNORMAL
EOSINOPHIL # BLD AUTO: 0 K/UL (ref 0–0.5)
EOSINOPHIL NFR BLD: 0 % (ref 0–8)
ERYTHROCYTE [DISTWIDTH] IN BLOOD BY AUTOMATED COUNT: 13 % (ref 11.5–14.5)
EST. GFR  (NO RACE VARIABLE): >60 ML/MIN/1.73 M^2
GLUCOSE SERPL-MCNC: 120 MG/DL (ref 70–110)
HCT VFR BLD AUTO: 28.9 % (ref 37–48.5)
HGB BLD-MCNC: 8.9 G/DL (ref 12–16)
IMM GRANULOCYTES # BLD AUTO: 0.03 K/UL (ref 0–0.04)
IMM GRANULOCYTES NFR BLD AUTO: 0.4 % (ref 0–0.5)
INR PPP: 1.1 (ref 0.8–1.2)
LYMPHOCYTES # BLD AUTO: 1 K/UL (ref 1–4.8)
LYMPHOCYTES NFR BLD: 13.2 % (ref 18–48)
MAGNESIUM SERPL-MCNC: 2 MG/DL (ref 1.6–2.6)
MCH RBC QN AUTO: 29.8 PG (ref 27–31)
MCHC RBC AUTO-ENTMCNC: 30.8 G/DL (ref 32–36)
MCV RBC AUTO: 97 FL (ref 82–98)
MONOCYTES # BLD AUTO: 0.6 K/UL (ref 0.3–1)
MONOCYTES NFR BLD: 8.2 % (ref 4–15)
NEUTROPHILS # BLD AUTO: 6.1 K/UL (ref 1.8–7.7)
NEUTROPHILS NFR BLD: 78.1 % (ref 38–73)
NRBC BLD-RTO: 0 /100 WBC
PHOSPHATE SERPL-MCNC: 3.6 MG/DL (ref 2.7–4.5)
PHOSPHATE SERPL-MCNC: 3.7 MG/DL (ref 2.7–4.5)
PLATELET # BLD AUTO: 183 K/UL (ref 150–450)
PMV BLD AUTO: 12 FL (ref 9.2–12.9)
POTASSIUM SERPL-SCNC: 3.8 MMOL/L (ref 3.5–5.1)
PROT SERPL-MCNC: 5.5 G/DL (ref 6–8.4)
PROTHROMBIN TIME: 10.9 SEC (ref 9–12.5)
RBC # BLD AUTO: 2.99 M/UL (ref 4–5.4)
SODIUM SERPL-SCNC: 135 MMOL/L (ref 136–145)
WBC # BLD AUTO: 7.85 K/UL (ref 3.9–12.7)

## 2022-12-14 PROCEDURE — 80053 COMPREHEN METABOLIC PANEL: CPT | Performed by: NURSE PRACTITIONER

## 2022-12-14 PROCEDURE — 99233 SBSQ HOSP IP/OBS HIGH 50: CPT | Mod: 24,,, | Performed by: NURSE PRACTITIONER

## 2022-12-14 PROCEDURE — 20600001 HC STEP DOWN PRIVATE ROOM

## 2022-12-14 PROCEDURE — 99233 PR SUBSEQUENT HOSPITAL CARE,LEVL III: ICD-10-PCS | Mod: 24,,, | Performed by: NURSE PRACTITIONER

## 2022-12-14 PROCEDURE — 36415 COLL VENOUS BLD VENIPUNCTURE: CPT | Performed by: NURSE PRACTITIONER

## 2022-12-14 PROCEDURE — 85610 PROTHROMBIN TIME: CPT | Performed by: NURSE PRACTITIONER

## 2022-12-14 PROCEDURE — 25000003 PHARM REV CODE 250: Performed by: TRANSPLANT SURGERY

## 2022-12-14 PROCEDURE — 83735 ASSAY OF MAGNESIUM: CPT | Performed by: NURSE PRACTITIONER

## 2022-12-14 PROCEDURE — 25000003 PHARM REV CODE 250: Performed by: NURSE PRACTITIONER

## 2022-12-14 PROCEDURE — 84100 ASSAY OF PHOSPHORUS: CPT | Performed by: NURSE PRACTITIONER

## 2022-12-14 PROCEDURE — 63600175 PHARM REV CODE 636 W HCPCS: Performed by: TRANSPLANT SURGERY

## 2022-12-14 PROCEDURE — 25000003 PHARM REV CODE 250: Performed by: STUDENT IN AN ORGANIZED HEALTH CARE EDUCATION/TRAINING PROGRAM

## 2022-12-14 PROCEDURE — 85025 COMPLETE CBC W/AUTO DIFF WBC: CPT | Performed by: NURSE PRACTITIONER

## 2022-12-14 RX ORDER — POLYETHYLENE GLYCOL 3350 17 G/17G
17 POWDER, FOR SOLUTION ORAL 2 TIMES DAILY PRN
Status: DISCONTINUED | OUTPATIENT
Start: 2022-12-14 | End: 2022-12-16 | Stop reason: HOSPADM

## 2022-12-14 RX ORDER — ACETAMINOPHEN 325 MG/1
650 TABLET ORAL EVERY 6 HOURS PRN
Status: DISCONTINUED | OUTPATIENT
Start: 2022-12-14 | End: 2022-12-16 | Stop reason: HOSPADM

## 2022-12-14 RX ADMIN — LEVOTHYROXINE SODIUM 88 MCG: 88 TABLET ORAL at 05:12

## 2022-12-14 RX ADMIN — MAGNESIUM OXIDE TAB 400 MG (241.3 MG ELEMENTAL MG) 400 MG: 400 (241.3 MG) TAB at 08:12

## 2022-12-14 RX ADMIN — OXYCODONE HYDROCHLORIDE AND ACETAMINOPHEN 1 TABLET: 5; 325 TABLET ORAL at 08:12

## 2022-12-14 RX ADMIN — TRIAMTERENE AND HYDROCHLOROTHIAZIDE 1 CAPSULE: 37.5; 25 CAPSULE ORAL at 05:12

## 2022-12-14 RX ADMIN — OXYCODONE AND ACETAMINOPHEN 1 TABLET: 10; 325 TABLET ORAL at 08:12

## 2022-12-14 RX ADMIN — CITALOPRAM HYDROBROMIDE 20 MG: 20 TABLET ORAL at 08:12

## 2022-12-14 RX ADMIN — VALACYCLOVIR HYDROCHLORIDE 500 MG: 500 TABLET, FILM COATED ORAL at 08:12

## 2022-12-14 RX ADMIN — ENOXAPARIN SODIUM 40 MG: 40 INJECTION SUBCUTANEOUS at 05:12

## 2022-12-14 RX ADMIN — DIBASIC SODIUM PHOSPHATE, MONOBASIC POTASSIUM PHOSPHATE AND MONOBASIC SODIUM PHOSPHATE 2 TABLET: 852; 155; 130 TABLET ORAL at 08:12

## 2022-12-14 RX ADMIN — DOCUSATE SODIUM 100 MG: 100 CAPSULE ORAL at 08:12

## 2022-12-14 RX ADMIN — OXYCODONE HYDROCHLORIDE AND ACETAMINOPHEN 1 TABLET: 5; 325 TABLET ORAL at 05:12

## 2022-12-14 NOTE — PLAN OF CARE
Pt aaox4. Independent and ambulatory c stand-by assistance. VSS. Pain well controlled; oxy x1 for abd pain 6/10. Midline incision c DB KATE; CDI. Abdomen soft, tender. + bowel sounds; no BM yet. Ambulation promoted. Voiding without difficulty; PO intake encouraged.  at bedside; attentive to pt.

## 2022-12-14 NOTE — PROGRESS NOTES
[facility] - [facility]plant Stepdown  Liver Transplant  Progress Note    Patient Name: Rehana Whitten  MRN: [id number]  Admission Date: 12/12/2022  Hospital Length of Stay: 2 days  Code Status: Full Code  Primary Care Provider: Bernie Valverde MD  Post-Operative Day:     Subjective:     History of Present Illness:  Rehana Whitten is a 67 y.o. female referred for consultation regarding liver mass. she initially presented with vague abdominal pain with US showing liver mass. Subsequent imaging studies demonstrated mass in the right lobe of the liver with satellite nodules concerning for malignancy. There was no evidence of extrahepatic disease on abdominal or chest CT. She currently reports no symptoms.       Hospital Course:  Patient is now s/p R lobe liver resection 12/12/22 (segments V, VI, VII, VIII) and cholecystectomy with Dr. Blackmon. Lymph nodes also sent for pathology. Grossly normal liver, no evidence of extrahepatic disease, tumor confined to right lobe. Surgery without complication.    Interval History: no acute events overnight. Continues to progress well. Reports some BLE weakness when ambulating but slowly improving. Labs stable. Pain well controlled. Tolerating diet, no N/V, (+) BM, on bowel regimen. Continue lovenox, plan to switch to apixaban at discharge. H/H trending down as expected but stable, no obvious signs of bleeding. Monitoring phos 2x/day, continue PO KPN.       Scheduled Meds:   citalopram  20 mg Oral Daily    docusate sodium  100 mg Oral BID    enoxaparin  40 mg Subcutaneous Daily    k phos di & mono-sod phos mono  500 mg Oral BID    levothyroxine  88 mcg Oral Before breakfast    magnesium oxide  400 mg Oral Daily    triamterene-hydrochlorothiazide 37.5-25 mg  1 capsule Oral QAM    valACYclovir  500 mg Oral Daily     Continuous Infusions:  PRN Meds:ondansetron, oxyCODONE-acetaminophen, oxyCODONE-acetaminophen    Review of Systems   Constitutional:  Negative for appetite change,  chills, fatigue and fever.   Respiratory:  Negative for cough, chest tightness and shortness of breath.    Cardiovascular:  Negative for chest pain, palpitations and leg swelling.   Gastrointestinal:  Positive for abdominal pain. Negative for abdominal distention, constipation, diarrhea, nausea and vomiting.   Genitourinary:  Negative for difficulty urinating, dysuria, frequency and urgency.   Musculoskeletal:  Negative for back pain and neck pain.   Skin:  Positive for wound. Negative for color change, pallor and rash.   Neurological:  Positive for weakness. Negative for dizziness and headaches.   Psychiatric/Behavioral:  Negative for confusion and sleep disturbance.    Objective:     Vital Signs (Most Recent):  Temp: 97.9 °F (36.6 °C) (12/14/22 0353)  Pulse: 68 (12/14/22 0353)  Resp: 18 (12/14/22 0353)  BP: (!) 121/56 (12/14/22 0353)  SpO2: (!) 94 % (12/14/22 0353)   Vital Signs (24h Range):  Temp:  [97.7 °F (36.5 °C)-98.3 °F (36.8 °C)] 97.9 °F (36.6 °C)  Pulse:  [68-75] 68  Resp:  [17-20] 18  SpO2:  [92 %-96 %] 94 %  BP: (114-137)/(53-64) 121/56     Weight: 64.6 kg (142 lb 6.7 oz)  Body mass index is 26.91 kg/m².    Intake/Output - Last 3 Shifts         12/12 0700  12/13 0659 12/13 0700  12/14 0659 12/14 0700  12/15 0659    P.O.  1585     I.V. (mL/kg) 523.1 (8.2) 2003.9 (31)     IV Piggyback 2599.8 200     Total Intake(mL/kg) 3122.9 (48.8) 3788.9 (58.7)     Urine (mL/kg/hr) 1475 (1) 1475 (1)     Stool  0     Total Output 1475 1475     Net +1647.9 +2313.9            Stool Occurrence  0 x             Physical Exam  Vitals and nursing note reviewed.   Constitutional:       Appearance: Normal appearance.   Cardiovascular:      Rate and Rhythm: Normal rate and regular rhythm.      Pulses: Normal pulses.   Pulmonary:      Effort: Pulmonary effort is normal. No respiratory distress.      Breath sounds: Normal breath sounds. No decreased breath sounds, wheezing or rales.   Abdominal:      General: Bowel sounds are  normal. There is no distension.      Palpations: Abdomen is soft.      Tenderness: There is abdominal tenderness (incisional). There is no guarding.       Musculoskeletal:         General: Normal range of motion.   Skin:     General: Skin is warm and dry.   Neurological:      Mental Status: She is alert and oriented to person, place, and time.   Psychiatric:         Behavior: Behavior is cooperative.       Laboratory:  Immunosuppressants       None          CBC:   Recent Labs   Lab 12/13/22  0537   WBC 9.30   RBC 3.08*   HGB 9.2*   HCT 29.7*      MCV 96   MCH 29.9   MCHC 31.0*     CMP:   Recent Labs   Lab 12/14/22  0547   *   CALCIUM 8.6*   ALBUMIN 3.4*   PROT 5.5*   *   K 3.8   CO2 26      BUN 23   CREATININE 0.8   ALKPHOS 73   *   *   BILITOT 1.3*     Coagulation:   Recent Labs   Lab 12/14/22  0547   INR 1.1     Labs within the past 24 hours have been reviewed.    Diagnostic Results:  I have personally reviewed all pertinent imaging studies.    Debility/Functional status: No debility noted.    Assessment/Plan:     * H/O resection of liver  - s/p R lobe liver resection 12/12/22 (segments V, VI, VII, VIII) and cholecystectomy with Dr. Blackmon. Lymph nodes also sent for pathology. Grossly normal liver, no evidence of extrahepatic disease, tumor confined to right lobe.  - Pain well controlled, no N/V, advance diet as tolerated.   - Monitoring and replacing phosphorus as needed  - Will need apixaban at discharge    Hypophosphatemia  - monitor 2x daily  - PO KPN started    Acute blood loss anemia  - expected post-op  - H/H trended down but stable.   - No obvious signs of bleeding  - continue to monitor    Cholangiocarcinoma  - s/p R lobe liver resection 12/12, grossly normal liver, no evidence of extrahepatic disease, tumor confined to right lobe  - Pathology pending        VTE Risk Mitigation (From admission, onward)         Ordered     enoxaparin injection 40 mg  Daily          12/13/22 1013     Place sequential compression device with MINDY hose  Until discontinued         12/12/22 1209                The patients clinical status was discussed at multidisplinary rounds, involving transplant surgery, transplant medicine, pharmacy, nursing, nutrition, and social work    Discharge Planning: not stable for dc at this time. Possible dc tomorrow or Friday.     Cat Perez, STEVE  Liver Transplant  [facility] - Transplant Stepdown

## 2022-12-14 NOTE — SUBJECTIVE & OBJECTIVE
Scheduled Meds:   citalopram  20 mg Oral Daily    docusate sodium  100 mg Oral BID    enoxaparin  40 mg Subcutaneous Daily    k phos di & mono-sod phos mono  500 mg Oral BID    levothyroxine  88 mcg Oral Before breakfast    magnesium oxide  400 mg Oral Daily    triamterene-hydrochlorothiazide 37.5-25 mg  1 capsule Oral QAM    valACYclovir  500 mg Oral Daily     Continuous Infusions:  PRN Meds:ondansetron, oxyCODONE-acetaminophen, oxyCODONE-acetaminophen    Review of Systems   Constitutional:  Negative for appetite change, chills, fatigue and fever.   Respiratory:  Negative for cough, chest tightness and shortness of breath.    Cardiovascular:  Negative for chest pain, palpitations and leg swelling.   Gastrointestinal:  Positive for abdominal pain. Negative for abdominal distention, constipation, diarrhea, nausea and vomiting.   Genitourinary:  Negative for difficulty urinating, dysuria, frequency and urgency.   Musculoskeletal:  Negative for back pain and neck pain.   Skin:  Positive for wound. Negative for color change, pallor and rash.   Neurological:  Positive for weakness. Negative for dizziness and headaches.   Psychiatric/Behavioral:  Negative for confusion and sleep disturbance.    Objective:     Vital Signs (Most Recent):  Temp: 97.9 °F (36.6 °C) (12/14/22 0353)  Pulse: 68 (12/14/22 0353)  Resp: 18 (12/14/22 0353)  BP: (!) 121/56 (12/14/22 0353)  SpO2: (!) 94 % (12/14/22 0353)   Vital Signs (24h Range):  Temp:  [97.7 °F (36.5 °C)-98.3 °F (36.8 °C)] 97.9 °F (36.6 °C)  Pulse:  [68-75] 68  Resp:  [17-20] 18  SpO2:  [92 %-96 %] 94 %  BP: (114-137)/(53-64) 121/56     Weight: 64.6 kg (142 lb 6.7 oz)  Body mass index is 26.91 kg/m².    Intake/Output - Last 3 Shifts         12/12 0700  12/13 0659 12/13 0700  12/14 0659 12/14 0700  12/15 0659    P.O.  1585     I.V. (mL/kg) 523.1 (8.2) 2003.9 (31)     IV Piggyback 2599.8 200     Total Intake(mL/kg) 3122.9 (48.8) 3788.9 (58.7)     Urine (mL/kg/hr) 1475 (1) 1475 (1)      Stool  0     Total Output 1475 1475     Net +1647.9 +2313.9            Stool Occurrence  0 x             Physical Exam  Vitals and nursing note reviewed.   Constitutional:       Appearance: Normal appearance.   Cardiovascular:      Rate and Rhythm: Normal rate and regular rhythm.      Pulses: Normal pulses.   Pulmonary:      Effort: Pulmonary effort is normal. No respiratory distress.      Breath sounds: Normal breath sounds. No decreased breath sounds, wheezing or rales.   Abdominal:      General: Bowel sounds are normal. There is no distension.      Palpations: Abdomen is soft.      Tenderness: There is abdominal tenderness (incisional). There is no guarding.       Musculoskeletal:         General: Normal range of motion.   Skin:     General: Skin is warm and dry.   Neurological:      Mental Status: She is alert and oriented to person, place, and time.   Psychiatric:         Behavior: Behavior is cooperative.       Laboratory:  Immunosuppressants       None          CBC:   Recent Labs   Lab 12/13/22  0537   WBC 9.30   RBC 3.08*   HGB 9.2*   HCT 29.7*      MCV 96   MCH 29.9   MCHC 31.0*     CMP:   Recent Labs   Lab 12/14/22  0547   *   CALCIUM 8.6*   ALBUMIN 3.4*   PROT 5.5*   *   K 3.8   CO2 26      BUN 23   CREATININE 0.8   ALKPHOS 73   *   *   BILITOT 1.3*     Coagulation:   Recent Labs   Lab 12/14/22  0547   INR 1.1     Labs within the past 24 hours have been reviewed.    Diagnostic Results:  I have personally reviewed all pertinent imaging studies.    Debility/Functional status: No debility noted.

## 2022-12-15 LAB
ALBUMIN SERPL BCP-MCNC: 3.2 G/DL (ref 3.5–5.2)
ALP SERPL-CCNC: 107 U/L (ref 55–135)
ALT SERPL W/O P-5'-P-CCNC: 328 U/L (ref 10–44)
ANION GAP SERPL CALC-SCNC: 10 MMOL/L (ref 8–16)
AST SERPL-CCNC: 146 U/L (ref 10–40)
BASOPHILS # BLD AUTO: 0.02 K/UL (ref 0–0.2)
BASOPHILS NFR BLD: 0.3 % (ref 0–1.9)
BILIRUB SERPL-MCNC: 1.1 MG/DL (ref 0.1–1)
BUN SERPL-MCNC: 13 MG/DL (ref 8–23)
CALCIUM SERPL-MCNC: 8.7 MG/DL (ref 8.7–10.5)
CHLORIDE SERPL-SCNC: 98 MMOL/L (ref 95–110)
CO2 SERPL-SCNC: 26 MMOL/L (ref 23–29)
CREAT SERPL-MCNC: 0.7 MG/DL (ref 0.5–1.4)
DIFFERENTIAL METHOD: ABNORMAL
EOSINOPHIL # BLD AUTO: 0.1 K/UL (ref 0–0.5)
EOSINOPHIL NFR BLD: 0.7 % (ref 0–8)
ERYTHROCYTE [DISTWIDTH] IN BLOOD BY AUTOMATED COUNT: 13.2 % (ref 11.5–14.5)
EST. GFR  (NO RACE VARIABLE): >60 ML/MIN/1.73 M^2
GLUCOSE SERPL-MCNC: 133 MG/DL (ref 70–110)
HCT VFR BLD AUTO: 29.7 % (ref 37–48.5)
HGB BLD-MCNC: 9.4 G/DL (ref 12–16)
IMM GRANULOCYTES # BLD AUTO: 0.03 K/UL (ref 0–0.04)
IMM GRANULOCYTES NFR BLD AUTO: 0.4 % (ref 0–0.5)
INR PPP: 1 (ref 0.8–1.2)
LYMPHOCYTES # BLD AUTO: 1.1 K/UL (ref 1–4.8)
LYMPHOCYTES NFR BLD: 14.7 % (ref 18–48)
MAGNESIUM SERPL-MCNC: 1.7 MG/DL (ref 1.6–2.6)
MCH RBC QN AUTO: 30.2 PG (ref 27–31)
MCHC RBC AUTO-ENTMCNC: 31.6 G/DL (ref 32–36)
MCV RBC AUTO: 96 FL (ref 82–98)
MONOCYTES # BLD AUTO: 0.5 K/UL (ref 0.3–1)
MONOCYTES NFR BLD: 6.4 % (ref 4–15)
NEUTROPHILS # BLD AUTO: 5.6 K/UL (ref 1.8–7.7)
NEUTROPHILS NFR BLD: 77.5 % (ref 38–73)
NRBC BLD-RTO: 0 /100 WBC
PHOSPHATE SERPL-MCNC: 2.9 MG/DL (ref 2.7–4.5)
PLATELET # BLD AUTO: 205 K/UL (ref 150–450)
PMV BLD AUTO: 12 FL (ref 9.2–12.9)
POTASSIUM SERPL-SCNC: 3.2 MMOL/L (ref 3.5–5.1)
PROT SERPL-MCNC: 5.6 G/DL (ref 6–8.4)
PROTHROMBIN TIME: 10.8 SEC (ref 9–12.5)
RBC # BLD AUTO: 3.11 M/UL (ref 4–5.4)
SODIUM SERPL-SCNC: 134 MMOL/L (ref 136–145)
WBC # BLD AUTO: 7.16 K/UL (ref 3.9–12.7)

## 2022-12-15 PROCEDURE — 25000003 PHARM REV CODE 250: Performed by: NURSE PRACTITIONER

## 2022-12-15 PROCEDURE — 84100 ASSAY OF PHOSPHORUS: CPT | Performed by: NURSE PRACTITIONER

## 2022-12-15 PROCEDURE — 99233 SBSQ HOSP IP/OBS HIGH 50: CPT | Mod: 24,,, | Performed by: NURSE PRACTITIONER

## 2022-12-15 PROCEDURE — 99233 PR SUBSEQUENT HOSPITAL CARE,LEVL III: ICD-10-PCS | Mod: 24,,, | Performed by: NURSE PRACTITIONER

## 2022-12-15 PROCEDURE — 63600175 PHARM REV CODE 636 W HCPCS: Performed by: TRANSPLANT SURGERY

## 2022-12-15 PROCEDURE — 25000003 PHARM REV CODE 250: Performed by: TRANSPLANT SURGERY

## 2022-12-15 PROCEDURE — 20600001 HC STEP DOWN PRIVATE ROOM

## 2022-12-15 PROCEDURE — 36415 COLL VENOUS BLD VENIPUNCTURE: CPT | Performed by: NURSE PRACTITIONER

## 2022-12-15 PROCEDURE — 83735 ASSAY OF MAGNESIUM: CPT | Performed by: NURSE PRACTITIONER

## 2022-12-15 PROCEDURE — 85025 COMPLETE CBC W/AUTO DIFF WBC: CPT | Performed by: NURSE PRACTITIONER

## 2022-12-15 PROCEDURE — 85610 PROTHROMBIN TIME: CPT | Performed by: NURSE PRACTITIONER

## 2022-12-15 PROCEDURE — 25000003 PHARM REV CODE 250: Performed by: STUDENT IN AN ORGANIZED HEALTH CARE EDUCATION/TRAINING PROGRAM

## 2022-12-15 PROCEDURE — 80053 COMPREHEN METABOLIC PANEL: CPT | Performed by: NURSE PRACTITIONER

## 2022-12-15 PROCEDURE — 63600175 PHARM REV CODE 636 W HCPCS: Performed by: STUDENT IN AN ORGANIZED HEALTH CARE EDUCATION/TRAINING PROGRAM

## 2022-12-15 RX ORDER — PANTOPRAZOLE SODIUM 40 MG/1
40 TABLET, DELAYED RELEASE ORAL DAILY
Qty: 30 TABLET | Refills: 0 | Status: SHIPPED | OUTPATIENT
Start: 2022-12-16 | End: 2023-02-02 | Stop reason: ALTCHOICE

## 2022-12-15 RX ORDER — CALCIUM CARBONATE 200(500)MG
500 TABLET,CHEWABLE ORAL 3 TIMES DAILY PRN
Status: DISCONTINUED | OUTPATIENT
Start: 2022-12-15 | End: 2022-12-16 | Stop reason: HOSPADM

## 2022-12-15 RX ORDER — ONDANSETRON 4 MG/1
4 TABLET, ORALLY DISINTEGRATING ORAL EVERY 6 HOURS PRN
Status: DISCONTINUED | OUTPATIENT
Start: 2022-12-15 | End: 2022-12-16 | Stop reason: HOSPADM

## 2022-12-15 RX ORDER — PANTOPRAZOLE SODIUM 40 MG/1
40 TABLET, DELAYED RELEASE ORAL DAILY
Status: DISCONTINUED | OUTPATIENT
Start: 2022-12-15 | End: 2022-12-16 | Stop reason: HOSPADM

## 2022-12-15 RX ORDER — OXYCODONE AND ACETAMINOPHEN 10; 325 MG/1; MG/1
.5-1 TABLET ORAL EVERY 4 HOURS PRN
Qty: 40 TABLET | Refills: 0 | Status: SHIPPED | OUTPATIENT
Start: 2022-12-15 | End: 2023-01-13 | Stop reason: ALTCHOICE

## 2022-12-15 RX ORDER — POTASSIUM CHLORIDE 20 MEQ/1
40 TABLET, EXTENDED RELEASE ORAL ONCE
Status: COMPLETED | OUTPATIENT
Start: 2022-12-15 | End: 2022-12-15

## 2022-12-15 RX ADMIN — DOCUSATE SODIUM 100 MG: 100 CAPSULE ORAL at 08:12

## 2022-12-15 RX ADMIN — MAGNESIUM OXIDE TAB 400 MG (241.3 MG ELEMENTAL MG) 400 MG: 400 (241.3 MG) TAB at 08:12

## 2022-12-15 RX ADMIN — POLYETHYLENE GLYCOL 3350 17 G: 17 POWDER, FOR SOLUTION ORAL at 08:12

## 2022-12-15 RX ADMIN — ENOXAPARIN SODIUM 40 MG: 40 INJECTION SUBCUTANEOUS at 04:12

## 2022-12-15 RX ADMIN — TRIAMTERENE AND HYDROCHLOROTHIAZIDE 1 CAPSULE: 37.5; 25 CAPSULE ORAL at 06:12

## 2022-12-15 RX ADMIN — VALACYCLOVIR HYDROCHLORIDE 500 MG: 500 TABLET, FILM COATED ORAL at 08:12

## 2022-12-15 RX ADMIN — DIBASIC SODIUM PHOSPHATE, MONOBASIC POTASSIUM PHOSPHATE AND MONOBASIC SODIUM PHOSPHATE 2 TABLET: 852; 155; 130 TABLET ORAL at 08:12

## 2022-12-15 RX ADMIN — OXYCODONE AND ACETAMINOPHEN 1 TABLET: 10; 325 TABLET ORAL at 08:12

## 2022-12-15 RX ADMIN — ONDANSETRON 4 MG: 2 INJECTION INTRAMUSCULAR; INTRAVENOUS at 03:12

## 2022-12-15 RX ADMIN — CITALOPRAM HYDROBROMIDE 20 MG: 20 TABLET ORAL at 08:12

## 2022-12-15 RX ADMIN — POTASSIUM CHLORIDE 40 MEQ: 1500 TABLET, EXTENDED RELEASE ORAL at 08:12

## 2022-12-15 RX ADMIN — OXYCODONE HYDROCHLORIDE AND ACETAMINOPHEN 1 TABLET: 5; 325 TABLET ORAL at 01:12

## 2022-12-15 RX ADMIN — OXYCODONE AND ACETAMINOPHEN 1 TABLET: 10; 325 TABLET ORAL at 06:12

## 2022-12-15 RX ADMIN — LEVOTHYROXINE SODIUM 88 MCG: 88 TABLET ORAL at 06:12

## 2022-12-15 RX ADMIN — PANTOPRAZOLE SODIUM 40 MG: 40 TABLET, DELAYED RELEASE ORAL at 08:12

## 2022-12-15 NOTE — PROGRESS NOTES
[facility]  Liver Transplant  Progress Note    Patient Name: Rehana Whitten  MRN: [id number]  Admission Date: 12/12/2022  Hospital Length of Stay: 3 days  Code Status: Full Code  Primary Care Provider: Bernie Valverde MD  Post-Operative Day:     Subjective:     History of Present Illness:  Rehana Whitten is a 67 y.o. female referred for consultation regarding liver mass. she initially presented with vague abdominal pain with US showing liver mass. Subsequent imaging studies demonstrated mass in the right lobe of the liver with satellite nodules concerning for malignancy. There was no evidence of extrahepatic disease on abdominal or chest CT. She currently reports no symptoms.       Hospital Course:  Patient is now s/p R lobe liver resection 12/12/22 (segments V, VI, VII, VIII) and cholecystectomy with Dr. Blackmon. Lymph nodes also sent for pathology. Grossly normal liver, no evidence of extrahepatic disease, tumor confined to right lobe. Surgery without complication.    Interval History: no acute events overnight. Continues to progress well. C/o heartburn, started protonix. Decrease KPN dose, Phos stable. Labs remain stable. Pain well controlled with oral medications. Tolerating diet, no N/V, (-) BM, (+) flatus, on bowel regimen, encouraged ambulation. Continue lovenox, plan to switch to apixaban at discharge.       Scheduled Meds:   citalopram  20 mg Oral Daily    docusate sodium  100 mg Oral BID    enoxaparin  40 mg Subcutaneous Daily    [START ON 12/16/2022] k phos di & mono-sod phos mono  500 mg Oral Daily    levothyroxine  88 mcg Oral Before breakfast    magnesium oxide  400 mg Oral Daily    pantoprazole  40 mg Oral Daily    triamterene-hydrochlorothiazide 37.5-25 mg  1 capsule Oral QAM    valACYclovir  500 mg Oral Daily     Continuous Infusions:  PRN Meds:acetaminophen, ondansetron, oxyCODONE-acetaminophen, oxyCODONE-acetaminophen, polyethylene glycol    Review of Systems    Constitutional:  Negative for appetite change, chills, fatigue and fever.   Respiratory:  Negative for cough, chest tightness and shortness of breath.    Cardiovascular:  Negative for chest pain, palpitations and leg swelling.   Gastrointestinal:  Positive for abdominal pain. Negative for abdominal distention, constipation, diarrhea, nausea and vomiting.   Genitourinary:  Negative for difficulty urinating, dysuria, frequency and urgency.   Musculoskeletal:  Negative for back pain and neck pain.   Skin:  Positive for wound. Negative for color change, pallor and rash.   Neurological:  Positive for weakness. Negative for dizziness and headaches.   Psychiatric/Behavioral:  Negative for confusion and sleep disturbance.    Objective:     Vital Signs (Most Recent):  Temp: 98.4 °F (36.9 °C) (12/15/22 1048)  Pulse: 76 (12/15/22 1048)  Resp: 14 (12/15/22 1313)  BP: (!) 123/59 (12/15/22 1048)  SpO2: (!) 94 % (12/15/22 1048)   Vital Signs (24h Range):  Temp:  [98 °F (36.7 °C)-98.4 °F (36.9 °C)] 98.4 °F (36.9 °C)  Pulse:  [65-76] 76  Resp:  [14-18] 14  SpO2:  [94 %-97 %] 94 %  BP: (120-150)/(58-71) 123/59     Weight: 64.2 kg (141 lb 8.6 oz)  Body mass index is 26.74 kg/m².    Intake/Output - Last 3 Shifts         12/13 0700  12/14 0659 12/14 0700  12/15 0659 12/15 0700  12/16 0659    P.O. 1585 1675 480    I.V. (mL/kg) 2003.9 (31)      IV Piggyback 200      Total Intake(mL/kg) 3788.9 (58.7) 1675 (26.1) 480 (7.5)    Urine (mL/kg/hr) 1475 (1) 1800 (1.2) 1700 (3.7)    Stool 0 0 0    Total Output 1475 1800 1700    Net +2313.9 -125 -1220           Stool Occurrence 0 x 0 x 0 x            Physical Exam  Vitals and nursing note reviewed.   Constitutional:       Appearance: Normal appearance.   Cardiovascular:      Rate and Rhythm: Normal rate and regular rhythm.      Pulses: Normal pulses.   Pulmonary:      Effort: Pulmonary effort is normal. No respiratory distress.      Breath sounds: Normal breath sounds. No decreased breath sounds,  wheezing or rales.   Abdominal:      General: Bowel sounds are normal. There is no distension.      Palpations: Abdomen is soft.      Tenderness: There is abdominal tenderness (incisional). There is no guarding.       Musculoskeletal:         General: Normal range of motion.   Skin:     General: Skin is warm and dry.   Neurological:      Mental Status: She is alert and oriented to person, place, and time.   Psychiatric:         Behavior: Behavior is cooperative.       Laboratory:  Immunosuppressants       None          CBC:   Recent Labs   Lab 12/15/22  0637   WBC 7.16   RBC 3.11*   HGB 9.4*   HCT 29.7*      MCV 96   MCH 30.2   MCHC 31.6*     CMP:   Recent Labs   Lab 12/15/22  0637   *   CALCIUM 8.7   ALBUMIN 3.2*   PROT 5.6*   *   K 3.2*   CO2 26   CL 98   BUN 13   CREATININE 0.7   ALKPHOS 107   *   *   BILITOT 1.1*     Coagulation:   Recent Labs   Lab 12/15/22  0637   INR 1.0     Labs within the past 24 hours have been reviewed.    Diagnostic Results:  I have personally reviewed all pertinent imaging studies.    Debility/Functional status: No debility noted.    Assessment/Plan:     * H/O resection of liver  - s/p R lobe liver resection 12/12/22 (segments V, VI, VII, VIII) and cholecystectomy with Dr. Blackmon. Lymph nodes also sent for pathology. Grossly normal liver, no evidence of extrahepatic disease, tumor confined to right lobe.  - Pain well controlled, no N/V, advance diet as tolerated.   - Monitoring and replacing phosphorus as needed  - Will need apixaban at discharge    Hypophosphatemia  - PO KPN started  - continue to monitor    Acute blood loss anemia  - expected post-op  - H/H trended down but stable.   - No obvious signs of bleeding  - continue to monitor    Cholangiocarcinoma  - s/p R lobe liver resection 12/12, grossly normal liver, no evidence of extrahepatic disease, tumor confined to right lobe  - Pathology pending        VTE Risk Mitigation (From admission, onward)          Ordered     enoxaparin injection 40 mg  Daily         12/13/22 1013     Place sequential compression device with MINDY hose  Until discontinued         12/12/22 1209                The patients clinical status was discussed at multidisplinary rounds, involving transplant surgery, transplant medicine, pharmacy, nursing, nutrition, and social work    Discharge Planning: not stable for dc at this time. Possible dc tomorrow if feeling well.     Cat Perez, STEVE  Liver Transplant  [facility] - Transplant Stepdown

## 2022-12-15 NOTE — PLAN OF CARE
Pt aaox4. Pt independent and ambulatory c minimal stand-by assistance. VSS. Pt endorses 5/10 abdominal pain. Relieved c prn oxy. Pt tolerating diet. Midline incision c DB KATE; CDI. + BS + gas; no BM yet. Poss dc today vs Friday

## 2022-12-15 NOTE — PROGRESS NOTES
Discharge Note     presented to patient bedside to discuss discharge plans, as well as assess any concerns or needs. Patient was alert and oriented x4, pleasant and communicative. Patient is coping well with support from family. Patient presented with her sister, Michaela Ricks ((ph: [phone]) at bedside.  Patient will discharge today to home with no  needs. Patient's , Robb (ph: [phone]) will transport patient. Patient reports  agreeing with the discharge plan and has no psychosocial concerns. Patient and caregiver verbalize understanding and are involved in treatment planning and discharge process. Patient nor caregiver had any further concerns or questions at this time.    SW remains available and will continue to follow, providing psychosocial support, education and assistance as needed.

## 2022-12-15 NOTE — ASSESSMENT & PLAN NOTE
- s/p R lobe liver resection 12/12/22 (segments V, VI, VII, VIII) and cholecystectomy with Dr. Blackmon. Lymph nodes also sent for pathology. Grossly normal liver, no evidence of extrahepatic disease, tumor confined to right lobe.  - Pain well controlled, no N/V, advance diet as tolerated.   - Monitoring and replacing phosphorus as needed  - Will need apixaban at discharge   Problem: Inadequate protein-energy intake  (NI-5.3)  Goal: Food and/or Nutrient Delivery  Description: Individualized approach for food/nutrient provision.   Outcome: Met This Shift

## 2022-12-15 NOTE — PLAN OF CARE
Pt aao x4. Call bell within reach. She is up with standby assist. Up to chair throughout shift. Has ambulated in room. Passing gas but no bm yet. Eating about 50% of meals. Plan for discharge tomorrow. Family at bedside. Pt verbalized understanding of plan of care.

## 2022-12-16 VITALS
OXYGEN SATURATION: 95 % | BODY MASS INDEX: 30.34 KG/M2 | RESPIRATION RATE: 18 BRPM | HEART RATE: 72 BPM | WEIGHT: 160.69 LBS | HEIGHT: 61 IN | TEMPERATURE: 98 F | SYSTOLIC BLOOD PRESSURE: 138 MMHG | DIASTOLIC BLOOD PRESSURE: 60 MMHG

## 2022-12-16 LAB
ABO + RH BLD: NORMAL
ALBUMIN SERPL BCP-MCNC: 3 G/DL (ref 3.5–5.2)
ALP SERPL-CCNC: 140 U/L (ref 55–135)
ALT SERPL W/O P-5'-P-CCNC: 264 U/L (ref 10–44)
ANION GAP SERPL CALC-SCNC: 6 MMOL/L (ref 8–16)
AST SERPL-CCNC: 118 U/L (ref 10–40)
BASOPHILS # BLD AUTO: 0.03 K/UL (ref 0–0.2)
BASOPHILS NFR BLD: 0.4 % (ref 0–1.9)
BILIRUB SERPL-MCNC: 0.9 MG/DL (ref 0.1–1)
BLD GP AB SCN CELLS X3 SERPL QL: NORMAL
BUN SERPL-MCNC: 9 MG/DL (ref 8–23)
CALCIUM SERPL-MCNC: 9 MG/DL (ref 8.7–10.5)
CHLORIDE SERPL-SCNC: 101 MMOL/L (ref 95–110)
CO2 SERPL-SCNC: 27 MMOL/L (ref 23–29)
CREAT SERPL-MCNC: 0.6 MG/DL (ref 0.5–1.4)
DIFFERENTIAL METHOD: ABNORMAL
EOSINOPHIL # BLD AUTO: 0.1 K/UL (ref 0–0.5)
EOSINOPHIL NFR BLD: 2 % (ref 0–8)
ERYTHROCYTE [DISTWIDTH] IN BLOOD BY AUTOMATED COUNT: 13.3 % (ref 11.5–14.5)
EST. GFR  (NO RACE VARIABLE): >60 ML/MIN/1.73 M^2
GLUCOSE SERPL-MCNC: 116 MG/DL (ref 70–110)
HCT VFR BLD AUTO: 29 % (ref 37–48.5)
HGB BLD-MCNC: 9.1 G/DL (ref 12–16)
IMM GRANULOCYTES # BLD AUTO: 0.06 K/UL (ref 0–0.04)
IMM GRANULOCYTES NFR BLD AUTO: 0.9 % (ref 0–0.5)
LYMPHOCYTES # BLD AUTO: 1.2 K/UL (ref 1–4.8)
LYMPHOCYTES NFR BLD: 17.1 % (ref 18–48)
MAGNESIUM SERPL-MCNC: 1.7 MG/DL (ref 1.6–2.6)
MCH RBC QN AUTO: 29.9 PG (ref 27–31)
MCHC RBC AUTO-ENTMCNC: 31.4 G/DL (ref 32–36)
MCV RBC AUTO: 95 FL (ref 82–98)
MONOCYTES # BLD AUTO: 0.5 K/UL (ref 0.3–1)
MONOCYTES NFR BLD: 7.1 % (ref 4–15)
NEUTROPHILS # BLD AUTO: 5 K/UL (ref 1.8–7.7)
NEUTROPHILS NFR BLD: 72.5 % (ref 38–73)
NRBC BLD-RTO: 0 /100 WBC
PHOSPHATE SERPL-MCNC: 2.8 MG/DL (ref 2.7–4.5)
PLATELET # BLD AUTO: 212 K/UL (ref 150–450)
PMV BLD AUTO: 11.6 FL (ref 9.2–12.9)
POTASSIUM SERPL-SCNC: 4.2 MMOL/L (ref 3.5–5.1)
PROT SERPL-MCNC: 5.6 G/DL (ref 6–8.4)
RBC # BLD AUTO: 3.04 M/UL (ref 4–5.4)
SODIUM SERPL-SCNC: 134 MMOL/L (ref 136–145)
WBC # BLD AUTO: 6.95 K/UL (ref 3.9–12.7)

## 2022-12-16 PROCEDURE — 83735 ASSAY OF MAGNESIUM: CPT | Performed by: NURSE PRACTITIONER

## 2022-12-16 PROCEDURE — 99024 PR POST-OP FOLLOW-UP VISIT: ICD-10-PCS | Mod: ,,, | Performed by: TRANSPLANT SURGERY

## 2022-12-16 PROCEDURE — 85025 COMPLETE CBC W/AUTO DIFF WBC: CPT | Performed by: NURSE PRACTITIONER

## 2022-12-16 PROCEDURE — 25000003 PHARM REV CODE 250: Performed by: NURSE PRACTITIONER

## 2022-12-16 PROCEDURE — 36415 COLL VENOUS BLD VENIPUNCTURE: CPT | Performed by: TRANSPLANT SURGERY

## 2022-12-16 PROCEDURE — 25000003 PHARM REV CODE 250: Performed by: TRANSPLANT SURGERY

## 2022-12-16 PROCEDURE — 1111F PR DISCHARGE MEDS RECONCILED W/ CURRENT OUTPATIENT MED LIST: ICD-10-PCS | Mod: CPTII,,, | Performed by: TRANSPLANT SURGERY

## 2022-12-16 PROCEDURE — 86900 BLOOD TYPING SEROLOGIC ABO: CPT | Performed by: TRANSPLANT SURGERY

## 2022-12-16 PROCEDURE — 25000003 PHARM REV CODE 250: Performed by: STUDENT IN AN ORGANIZED HEALTH CARE EDUCATION/TRAINING PROGRAM

## 2022-12-16 PROCEDURE — 99024 POSTOP FOLLOW-UP VISIT: CPT | Mod: ,,, | Performed by: TRANSPLANT SURGERY

## 2022-12-16 PROCEDURE — 1111F DSCHRG MED/CURRENT MED MERGE: CPT | Mod: CPTII,,, | Performed by: TRANSPLANT SURGERY

## 2022-12-16 PROCEDURE — 84100 ASSAY OF PHOSPHORUS: CPT | Performed by: NURSE PRACTITIONER

## 2022-12-16 PROCEDURE — 80053 COMPREHEN METABOLIC PANEL: CPT | Performed by: NURSE PRACTITIONER

## 2022-12-16 RX ORDER — SENNOSIDES 8.6 MG/1
8.6 TABLET ORAL DAILY
Status: DISCONTINUED | OUTPATIENT
Start: 2022-12-16 | End: 2022-12-16 | Stop reason: HOSPADM

## 2022-12-16 RX ADMIN — LEVOTHYROXINE SODIUM 88 MCG: 88 TABLET ORAL at 04:12

## 2022-12-16 RX ADMIN — VALACYCLOVIR HYDROCHLORIDE 500 MG: 500 TABLET, FILM COATED ORAL at 08:12

## 2022-12-16 RX ADMIN — CITALOPRAM HYDROBROMIDE 20 MG: 20 TABLET ORAL at 08:12

## 2022-12-16 RX ADMIN — SENNOSIDES 8.6 MG: 8.6 TABLET, FILM COATED ORAL at 08:12

## 2022-12-16 RX ADMIN — DIBASIC SODIUM PHOSPHATE, MONOBASIC POTASSIUM PHOSPHATE AND MONOBASIC SODIUM PHOSPHATE 2 TABLET: 852; 155; 130 TABLET ORAL at 08:12

## 2022-12-16 RX ADMIN — MAGNESIUM OXIDE TAB 400 MG (241.3 MG ELEMENTAL MG) 400 MG: 400 (241.3 MG) TAB at 08:12

## 2022-12-16 RX ADMIN — OXYCODONE AND ACETAMINOPHEN 1 TABLET: 10; 325 TABLET ORAL at 03:12

## 2022-12-16 RX ADMIN — TRIAMTERENE AND HYDROCHLOROTHIAZIDE 1 CAPSULE: 37.5; 25 CAPSULE ORAL at 08:12

## 2022-12-16 RX ADMIN — OXYCODONE AND ACETAMINOPHEN 1 TABLET: 10; 325 TABLET ORAL at 04:12

## 2022-12-16 RX ADMIN — PANTOPRAZOLE SODIUM 40 MG: 40 TABLET, DELAYED RELEASE ORAL at 08:12

## 2022-12-16 RX ADMIN — POLYETHYLENE GLYCOL 3350 17 G: 17 POWDER, FOR SOLUTION ORAL at 08:12

## 2022-12-16 RX ADMIN — OXYCODONE AND ACETAMINOPHEN 1 TABLET: 10; 325 TABLET ORAL at 11:12

## 2022-12-16 RX ADMIN — DOCUSATE SODIUM 100 MG: 100 CAPSULE ORAL at 08:12

## 2022-12-16 NOTE — PLAN OF CARE
Pt. AAOx4, VSS, spo2> 94% on room air. NRS on telemetry monitor. Pt. Up restroom independently. Prn pain medication administered per pt request. Bed in low locked position, call light within reach, pt instructed to call for assistance needed, pt verbalized understanding, remains free from falls this shift. WCTM.

## 2022-12-16 NOTE — PROGRESS NOTES
Discharge instructions given and patient verbalizes understanding.  IV's dc'd.  Meds from bedside delivery arrived.  Patient left floor via wheelchair accompanied by family with all personal belongings in tow.

## 2022-12-16 NOTE — PLAN OF CARE
Patient remained free from injury and ambulated to and from the restroom with minimal assist.  Patient reports pain well controlled with Percocet.  Patient incision WNL with no s/s of infection noted.  Patient eating about 1/2 of meals.  Discussed blood thinners and s/s of bleeding.  Patient to dc home

## 2022-12-16 NOTE — DISCHARGE SUMMARY
[facility]  Liver Transplant  Discharge Summary      Patient Name: Rehana Whitten  MRN: [id number]  Admission Date: 12/12/2022  Hospital Length of Stay: 4 days  Discharge Date and Time:  12/16/2022 11:44 AM  Attending Physician: Tacos Blackmon MD   Discharging Provider: Cat Perez DNP  Primary Care Provider: Bernie Valverde MD  Post-Operative Day:        HPI:   Rehana Whitten is a 67 y.o. female referred for consultation regarding liver mass. she initially presented with vague abdominal pain with US showing liver mass. Subsequent imaging studies demonstrated mass in the right lobe of the liver with satellite nodules concerning for malignancy. There was no evidence of extrahepatic disease on abdominal or chest CT. She currently reports no symptoms.       Procedure(s) (LRB):  EXCISION, LIVER (Right lobe) 4 hours Open (N/A)  CHOLECYSTECTOMY  LYMPHADENECTOMY, PORTAL (N/A)     Hospital Course:    Patient is now s/p R lobe liver resection 12/12/22 (segments V, VI, VII, VIII) and cholecystectomy with Dr. Blackmon. Lymph nodes also sent for pathology. Grossly normal liver, no evidence of extrahepatic disease, tumor confined to right lobe. Surgery without complication. She progressed well post-operatively. Pain well controlled with oral agents. She is able to ambulate with minimal to no assistance. She is tolerating a full diet w/o N/V. (+) flatus, on bowel regimen. She will be transitioned from lovenox (inpatient) to apixaban for 2 weeks. She is stable and ready for discharge. She will have follow up labs and clinic appointment in 2 weeks with Dr. Blackmon (to be scheduled the first week of January).       Goals of Care Treatment Preferences:  Code Status: Full Code      Final Active Diagnoses:    Diagnosis Date Noted POA    PRINCIPAL PROBLEM:  H/O resection of liver [Z90.49] 12/13/2022 Not Applicable    Acute blood loss anemia [D62] 12/13/2022 No    Hypophosphatemia [E83.39] 12/13/2022 No     Cholangiocarcinoma [C22.1] 12/12/2022 Yes      Problems Resolved During this Admission:           Pending Diagnostic Studies:       Procedure Component Value Units Date/Time    Specimen to Pathology, Surgery Liver [[id number]] Collected: 12/12/22 1116    Order Status: Sent Lab Status: In process Updated: 12/12/22 1430    Specimen: Tissue           Significant Diagnostic Studies: Labs:   CMP   Recent Labs   Lab 12/15/22  0637 12/16/22  0603   * 134*   K 3.2* 4.2   CL 98 101   CO2 26 27   * 116*   BUN 13 9   CREATININE 0.7 0.6   CALCIUM 8.7 9.0   PROT 5.6* 5.6*   ALBUMIN 3.2* 3.0*   BILITOT 1.1* 0.9   ALKPHOS 107 140*   * 118*   * 264*   ANIONGAP 10 6*   , CBC   Recent Labs   Lab 12/15/22  0637 12/16/22  0603   WBC 7.16 6.95   HGB 9.4* 9.1*   HCT 29.7* 29.0*    212   , INR   Lab Results   Component Value Date    INR 1.0 12/15/2022    INR 1.1 12/14/2022    INR 1.2 12/13/2022    and All labs within the past 24 hours have been reviewed    The patients clinical status was discussed at multidisplinary rounds, involving transplant surgery, transplant medicine, pharmacy, nursing, nutrition, and social work    Discharged Condition: good    Disposition: Home or Self Care    Follow Up: see hospital course    Patient Instructions:      Diet Adult Regular     No dressing needed     Notify your health care provider if you experience any of the following:  temperature >100.4     Notify your health care provider if you experience any of the following:  persistent nausea and vomiting or diarrhea     Notify your health care provider if you experience any of the following:  severe uncontrolled pain     Notify your health care provider if you experience any of the following:  redness, tenderness, or signs of infection (pain, swelling, redness, odor or green/yellow discharge around incision site)     Notify your health care provider if you experience any of the following:  difficulty breathing or  increased cough     Notify your health care provider if you experience any of the following:  severe persistent headache     Notify your health care provider if you experience any of the following:  worsening rash     Notify your health care provider if you experience any of the following:  persistent dizziness, light-headedness, or visual disturbances     Notify your health care provider if you experience any of the following:  increased confusion or weakness     Notify your health care provider if you experience any of the following:   Order Comments: Any other concerning signs and symptoms. If you have not received your scheduled follow up within 24 hours of your discharge or for any questions or concerns, please call [phone] for further assistance.     Activity as tolerated     Medications:  Reconciled Home Medications:      Medication List        START taking these medications      ELIQUIS 5 mg Tab  Generic drug: apixaban  Take 1 tablet (5 mg total) by mouth 2 (two) times daily. for 14 days     k phos di & mono-sod phos mono 250 mg Tab  Commonly known as: K-PHOS-NEUTRAL  Take 2 tablets by mouth Daily. for 5 days     oxyCODONE-acetaminophen  mg per tablet  Commonly known as: PERCOCET  Take 0.5-1 tablets by mouth every 4 (four) hours as needed for Pain.     pantoprazole 40 MG tablet  Commonly known as: PROTONIX  Take 1 tablet (40 mg total) by mouth once daily.            CONTINUE taking these medications      albuterol 90 mcg/actuation inhaler  Commonly known as: PROVENTIL/VENTOLIN HFA  albuterol sulfate Take 2 puff(s) (inhalation) 4 times per day PRN - Wheezing for 30 days [id number] HFA aerosol inhaler 4 times per day inhalation 30 days suspended 90 mcg/actuation     candesartan 8 MG tablet  Commonly known as: ATACAND  TAKE 1 TABLET(8 MG) BY MOUTH EVERY DAY     citalopram 20 MG tablet  Commonly known as: CeleXA  TAKE 1 TABLET BY MOUTH EVERY DAY     levothyroxine 88 MCG tablet  Commonly known as:  SYNTHROID  TAKE 1 TABLET BY MOUTH EVERY DAY     magnesium oxide 400 mg (241.3 mg magnesium) tablet  Commonly known as: MAG-OX  Take 400 mg by mouth once daily.     rosuvastatin 40 MG Tab  Commonly known as: CRESTOR  Take 1 tablet (40 mg total) by mouth every evening.     tretinoin 0.025 % cream  Commonly known as: RETIN-A  Apply topically every evening.     triamterene-hydrochlorothiazide 37.5-25 mg 37.5-25 mg per capsule  Commonly known as: DYAZIDE  TAKE 1 CAPSULE BY MOUTH EVERY MORNING     valACYclovir 500 MG tablet  Commonly known as: VALTREX  TAKE 1 TABLET(500 MG) BY MOUTH EVERY DAY     vitamin D 1000 units Tab  Commonly known as: VITAMIN D3  Take 1,000 Units by mouth once daily.            STOP taking these medications      ALPRAZolam 0.25 MG tablet  Commonly known as: XANAX            Time spent caring for patient (Greater than 1/2 spent in direct face-to-face contact): > 30 minutes    Cat Perze DNP  Liver Transplant  [facility] Step[facility]

## 2022-12-19 ENCOUNTER — PATIENT MESSAGE (OUTPATIENT)
Dept: TRANSPLANT | Facility: CLINIC | Age: 67
End: 2022-12-19
Payer: MEDICARE

## 2022-12-20 ENCOUNTER — PATIENT MESSAGE (OUTPATIENT)
Dept: TRANSPLANT | Facility: CLINIC | Age: 67
End: 2022-12-20
Payer: MEDICARE

## 2022-12-21 ENCOUNTER — PATIENT MESSAGE (OUTPATIENT)
Dept: TRANSPLANT | Facility: CLINIC | Age: 67
End: 2022-12-21
Payer: MEDICARE

## 2022-12-26 ENCOUNTER — PATIENT MESSAGE (OUTPATIENT)
Dept: TRANSPLANT | Facility: CLINIC | Age: 67
End: 2022-12-26
Payer: MEDICARE

## 2022-12-27 NOTE — TELEPHONE ENCOUNTER
Called patient regarding complaints of nausea and fatigue. No answer to phone number provided. Will send [employer] message and also notify Dr. Blackmon of patients complaints. Will attempt to call patient back.

## 2023-01-03 ENCOUNTER — OFFICE VISIT (OUTPATIENT)
Dept: TRANSPLANT | Facility: CLINIC | Age: 68
End: 2023-01-03
Payer: MEDICARE

## 2023-01-03 VITALS
RESPIRATION RATE: 16 BRPM | HEIGHT: 61 IN | HEART RATE: 90 BPM | DIASTOLIC BLOOD PRESSURE: 59 MMHG | BODY MASS INDEX: 25.94 KG/M2 | SYSTOLIC BLOOD PRESSURE: 125 MMHG | OXYGEN SATURATION: 94 % | WEIGHT: 137.38 LBS | TEMPERATURE: 97 F

## 2023-01-03 DIAGNOSIS — C22.1 CHOLANGIOCARCINOMA: Primary | ICD-10-CM

## 2023-01-03 LAB
FINAL PATHOLOGIC DIAGNOSIS: NORMAL
GROSS: NORMAL
Lab: NORMAL
MICROSCOPIC EXAM: NORMAL

## 2023-01-03 PROCEDURE — 99999 PR PBB SHADOW E&M-EST. PATIENT-LVL II: ICD-10-PCS | Mod: PBBFAC,HCNC,,

## 2023-01-03 PROCEDURE — 3074F SYST BP LT 130 MM HG: CPT | Mod: HCNC,CPTII,S$GLB, | Performed by: TRANSPLANT SURGERY

## 2023-01-03 PROCEDURE — 3078F DIAST BP <80 MM HG: CPT | Mod: HCNC,CPTII,S$GLB, | Performed by: TRANSPLANT SURGERY

## 2023-01-03 PROCEDURE — 1126F AMNT PAIN NOTED NONE PRSNT: CPT | Mod: HCNC,CPTII,S$GLB, | Performed by: TRANSPLANT SURGERY

## 2023-01-03 PROCEDURE — 1101F PT FALLS ASSESS-DOCD LE1/YR: CPT | Mod: HCNC,CPTII,S$GLB, | Performed by: TRANSPLANT SURGERY

## 2023-01-03 PROCEDURE — 99024 POSTOP FOLLOW-UP VISIT: CPT | Mod: HCNC,S$GLB,, | Performed by: TRANSPLANT SURGERY

## 2023-01-03 PROCEDURE — 99999 PR PBB SHADOW E&M-EST. PATIENT-LVL II: CPT | Mod: PBBFAC,HCNC,,

## 2023-01-03 PROCEDURE — 3078F PR MOST RECENT DIASTOLIC BLOOD PRESSURE < 80 MM HG: ICD-10-PCS | Mod: HCNC,CPTII,S$GLB, | Performed by: TRANSPLANT SURGERY

## 2023-01-03 PROCEDURE — 1126F PR PAIN SEVERITY QUANTIFIED, NO PAIN PRESENT: ICD-10-PCS | Mod: HCNC,CPTII,S$GLB, | Performed by: TRANSPLANT SURGERY

## 2023-01-03 PROCEDURE — 3288F PR FALLS RISK ASSESSMENT DOCUMENTED: ICD-10-PCS | Mod: HCNC,CPTII,S$GLB, | Performed by: TRANSPLANT SURGERY

## 2023-01-03 PROCEDURE — 3074F PR MOST RECENT SYSTOLIC BLOOD PRESSURE < 130 MM HG: ICD-10-PCS | Mod: HCNC,CPTII,S$GLB, | Performed by: TRANSPLANT SURGERY

## 2023-01-03 PROCEDURE — 1101F PR PT FALLS ASSESS DOC 0-1 FALLS W/OUT INJ PAST YR: ICD-10-PCS | Mod: HCNC,CPTII,S$GLB, | Performed by: TRANSPLANT SURGERY

## 2023-01-03 PROCEDURE — 3008F BODY MASS INDEX DOCD: CPT | Mod: HCNC,CPTII,S$GLB, | Performed by: TRANSPLANT SURGERY

## 2023-01-03 PROCEDURE — 3288F FALL RISK ASSESSMENT DOCD: CPT | Mod: HCNC,CPTII,S$GLB, | Performed by: TRANSPLANT SURGERY

## 2023-01-03 PROCEDURE — 99024 PR POST-OP FOLLOW-UP VISIT: ICD-10-PCS | Mod: HCNC,S$GLB,, | Performed by: TRANSPLANT SURGERY

## 2023-01-03 PROCEDURE — 3008F PR BODY MASS INDEX (BMI) DOCUMENTED: ICD-10-PCS | Mod: HCNC,CPTII,S$GLB, | Performed by: TRANSPLANT SURGERY

## 2023-01-03 NOTE — PROGRESS NOTES
67 F s/p right lobe liver resection and portal lymphadenectomy for intrahepatic cholangiocarcinoma. Her post op recovery has been uneventful. She reports fatigue, but appetite and bowel habits are normal. She has some incisional pain, but this is improving. Incision is healing well, no hernia.    Her pathology report is not back. I explained I would call her when it is finalized, but recommend consultation with medical oncology regardless of the results. We will place a referral.    Tacos Blackmon MD

## 2023-01-04 ENCOUNTER — TELEPHONE (OUTPATIENT)
Dept: HEMATOLOGY/ONCOLOGY | Facility: CLINIC | Age: 68
End: 2023-01-04
Payer: MEDICARE

## 2023-01-04 NOTE — PHYSICIAN QUERY
PT Name: Rehana Whitten  MR #: [id number]    DOCUMENTATION CLARIFICATION     CDS/: MARCUS Moreno, RN, CCDS               Contact information: [email or web address]  This form is a permanent document in the medical record.     Query Date: January 4, 2023    By submitting this query, we are merely seeking further clarification of documentation.  Please utilize your independent clinical judgment when addressing the question(s) below.    The medical record contains the following:  Pathology Findings Location in Medical Record   Final Pathologic Diagnosis 1. Liver, right lobe (lobectomy):   Invasive adenocarcinoma, moderately differentiated   See cancer synoptic report below   2. Lymph nodes, portal (excision):   No metastatic carcinoma in six lymph nodes (0/6)   Cancer synoptic report   Procedure:  Right lobectomy   Histologic type:  Largest duct intrahepatic cholangiocarcinoma   Histologic grade:  G2, moderately differentiated   Tumor focality:  Solitary tumor   Tumor size:  6.5 cm   Tumor extent:  Confined to hepatic parenchyma   Tumor growth pattern: Mass forming   Lymphovascular invasion:  Present (slide 1 D)   Perineural invasion:  Not identified   Margins   All margins negative for invasive carcinoma, negative for intraepithelial   neoplasia   Tumor present 6 mm from resection margin   Pathologic stage classification (pTNM, AJCC 8th edition)   pT Category   pT2:  Solitary tumor with intrahepatic vascular invasion   pN Category   pN0: No regional lymph node metastasis   Number of lymph nodes examined:  6   pM Category   None submitted   Additional findings:   High-grade intraepithelial neoplasia   Subcapsular hemangioma, 1.3 cm, adjacent to malignancy   Non-neoplastic liver: No fibrosis   Mild steatosis 5-10%   Ancillary studies   Immunohistochemistry (IHC) Testing for Mismatch Repair (MMR) Proteins:   MLH1 - Intact nuclear expression   MSH2 - Intact nuclear expression   MSH6 - Intact nuclear  expression   PMS2 - Intact nuclear expression   Background nonneoplastic tissue/internal control with intact nuclear   expression   There are exceptions to the above IHC interpretations. These results should   not be considered in isolation, and clinical correlation with genetic   counseling is recommended to assess the need for germline testing.   Interpretation: No loss of nuclear expression of MMR proteins: low   probability of microsatellite instability    Path report: 12/12       Please clarify the pathology findings.  [   X] Pathology findings noted above are ruled in/confirmed as diagnoses   [  ] Pathology findings noted above are not confirmed as diagnoses   [  ] Pathology findings noted above are incidental   [  ] Other diagnosis (please specify): ___________   [  ] Clinically Undetermined     Please document in your progress notes daily for the duration of treatment until resolved and include in your discharge summary.    Form No. [id number]

## 2023-01-06 ENCOUNTER — TELEPHONE (OUTPATIENT)
Dept: HEMATOLOGY/ONCOLOGY | Facility: CLINIC | Age: 68
End: 2023-01-06
Payer: MEDICARE

## 2023-01-06 DIAGNOSIS — C22.1 CHOLANGIOCARCINOMA: Primary | ICD-10-CM

## 2023-01-06 NOTE — TELEPHONE ENCOUNTER
Instructed patient to arrive at 1330 for scheduled labs on 01/13/2022. Patient denies any questions or concerns

## 2023-01-12 NOTE — PROGRESS NOTES
MEDICAL ONCOLOGY - NEW PATIENT VISIT    Best Contact Phone Number(s): [phone] (home)      Cancer/Stage/TNM:    Cancer Staging   Cholangiocarcinoma  Staging form: Intrahepatic Bile Duct, AJCC 8th Edition  - Pathologic: Stage II (pT2, pN0, cM0) - Unsigned       Reason for visit: Ms. Whitten is a 67 year old woman with remote history of HBV who was found to have stage II pT2N0 intrahepatic cholangiocarcinoma in the setting of abdominal pain who underwent R0 resection 12/12/23.    History has been obtained by chart review and discussion with the patient.    HPI:   Patient developed new right sided intermittent abdominal pain. No associated nausea, vomiting or weight loss. Has history of hepatis B in her 20's. US and CT imaging 11/9/22 showed new 7.2 x 5.3 x 5.9 mass in the right lobe of the liver, confirmed to be IHCC by CT guided biopsy 11/30/22. She underwent R0 surgical resection 12/12/23.    Recovered well from surgery. Lost about 10-15 pounds after surgery. Appetite still low but recovering. No N/V. Surgical pain well controlled; mild soreness persists. No analgesics. No other concerns.    Oncology History   Cholangiocarcinoma   11/9/2022 Imaging Significant Findings    Abdominal US in the setting of abdominal pain shows a large heterogeneous mass in the right lobe, measures 7.2 x 5.3 x 5.9 cm.     11/9/2022 Imaging Significant Findings    CT a/p:  Irregular right hepatic lobe lesion demonstrating delayed central enhancement and overlying capsular retraction.  Capsular retraction can be seen in cholangiocarcinoma and sclerosing hepatic hemangiomas.  Differential includes metastatic disease and other primary hepatic neoplasms.     11/14/2022 Tumor Markers    CA 19-9: 19.1  AFP: 5.5     11/16/2022 Imaging Significant Findings    CT chest:  1. No specific CT evidence of metastatic disease within the chest.  2. Left lower lobe punctate micro nodules.  Attention on continued surveillance imaging.  3. Other findings  as above.     11/30/2022 Biopsy    Percutaneous biopsy of liver mass: Moderately differentiated adenocarcinoma. CK7+ Negative for CK20, CDX2, GATA3, TTF1, and PAX 8. Thought consistent with pancraetobiliary primary.     1/13/2023 -  Chemotherapy    Treatment Summary   Plan Name: OP CAPECITABINE 1250MG/M2 BID Q3W  Treatment Goal: Control  Status: Active  Start Date: 1/13/2023  End Date: 1/13/2023  Provider: Bennie Moore MD  Chemotherapy: capecitabine (XELODA) 500 MG Tab, 1,250 mg/m2 = 2,000 mg, Oral, 2 times daily, 1 of 1 cycle, Start date: 1/13/2023, End date: --       12/12/2023 Surgery    Resection of right hepatic lobe tumor. Pathology shows moderately differentiated adenocarcinoma up to 6.5cm in size c/w intrahepatic cholangiocarcinoma. Tumor was confined to the hepatic parenchyma with associated  LVI but no PNI. R0 resection with 6mm margins. 06/ LN involved. 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    Hypertension     Hypothyroidism     hypothyroidism    Insomnia     previously on restoril    Liver mass 11/14/2022    Vertigo         Past Surgical History:   Procedure Laterality Date    BREAST BIOPSY Right     exc bx benign per pt    BREAST BIOPSY Left     core bx benign per pt    BREAST SURGERY      CHOLECYSTECTOMY  12/12/2022    Procedure: CHOLECYSTECTOMY;  Surgeon: Tacos Blackmon MD;  Location: [address];  Service: Transplant;;    EXCISION, LIVER N/A 12/12/2022    Procedure: EXCISION, LIVER (Right lobe) 4 hours Open;  Surgeon: Tacos Blackmon MD;  Location: [address];  Service: Transplant;  Laterality: N/A;    LYMPH NODE BIOPSY      LYMPHADENECTOMY N/A 12/12/2022    Procedure: LYMPHADENECTOMY, PORTAL;  Surgeon: Tacos Blackmon MD;  Location: [address];   Service: Transplant;  Laterality: N/A;    NASAL SINUS SURGERY      PARATHYROIDECTOMY      TUBAL LIGATION          Family History   Problem Relation Age of Onset    Heart disease Mother 67        MI    Depression Mother     Diabetes Mother     Heart disease Father 56        CAD    Hypertension Father     No Known Problems Sister     Hyperlipidemia Sister     Hypertension Sister     Melanoma Neg Hx     Liver cancer Neg Hx     Pancreatic cancer Neg Hx         Social History     Tobacco Use    Smoking status: Former     Packs/day: 0.50     Years: 10.00     Pack years: 5.00     Types: Cigarettes     Quit date: 3/17/1989     Years since quittin.8    Smokeless tobacco: Never   Substance Use Topics    Alcohol use: Not Currently     Alcohol/week: 4.0 standard drinks     Types: 4 Standard drinks or equivalent per week     Comment: soc        I have reviewed and updated the patient's past medical, surgical, family and social histories.    Review of patient's allergies indicates:  No Known Allergies     Current Outpatient Medications   Medication Sig Dispense Refill    candesartan (ATACAND) 8 MG tablet TAKE 1 TABLET(8 MG) BY MOUTH EVERY DAY 90 tablet 3    levothyroxine (SYNTHROID) 88 MCG tablet TAKE 1 TABLET BY MOUTH EVERY DAY 90 tablet 3    capecitabine (XELODA) 500 MG Tab Take 4 tablets (2,000 mg total) by mouth 2 (two) times daily Take as directed days 1-14 of each 21 day cycle. Take with water within 30 minutes after a meal.. 112 tablet 7    citalopram (CELEXA) 20 MG tablet TAKE 1 TABLET BY MOUTH EVERY DAY 90 tablet 2    loperamide (IMODIUM) 2 mg capsule Take two caps at the onset of loose stools.  Then take one cap after every subsequent loose stool. 60 capsule 5    magnesium oxide (MAG-OX) 400 mg (241.3 mg magnesium) tablet Take 400 mg by mouth once daily.      ondansetron (ZOFRAN) 8 MG tablet Take 1 tablet (8 mg total) by mouth every 8 (eight) hours as needed for Nausea. 90 tablet 11    pantoprazole (PROTONIX) 40 MG  "tablet Take 1 tablet (40 mg total) by mouth once daily. 30 tablet 0    rosuvastatin (CRESTOR) 40 MG Tab Take 1 tablet (40 mg total) by mouth every evening. 90 tablet 3    triamterene-hydrochlorothiazide 37.5-25 mg (DYAZIDE) 37.5-25 mg per capsule TAKE 1 CAPSULE BY MOUTH EVERY MORNING 90 capsule 3    valACYclovir (VALTREX) 500 MG tablet TAKE 1 TABLET(500 MG) BY MOUTH EVERY DAY 90 tablet 4    vitamin D (VITAMIN D3) 1000 units Tab Take 1,000 Units by mouth once daily.       No current facility-administered medications for this visit.        Physical Exam:   BP (!) 142/67 (BP Location: Right arm, Patient Position: Sitting, BP Method: Medium (Automatic))   Pulse 86   Temp 98 °F (36.7 °C) (Oral)   Resp 18   Ht 5' 1" (1.549 m)   Wt 61 kg (134 lb 7.7 oz)   LMP  (LMP Unknown)   SpO2 99%   BMI 25.41 kg/m²      ECOG Performance status: 1            Physical Exam  Constitutional:       General: She is not in acute distress.     Appearance: She is normal weight.   HENT:      Head: Normocephalic.      Nose: Nose normal.      Mouth/Throat:      Mouth: Mucous membranes are moist.      Pharynx: Oropharynx is clear.   Eyes:      General: No scleral icterus.     Conjunctiva/sclera: Conjunctivae normal.   Cardiovascular:      Rate and Rhythm: Normal rate and regular rhythm.      Heart sounds: No murmur heard.    No gallop.   Pulmonary:      Effort: Pulmonary effort is normal. No respiratory distress.      Breath sounds: Normal breath sounds. No wheezing.   Abdominal:      General: There is no distension.      Palpations: Abdomen is soft.      Tenderness: There is no abdominal tenderness.      Comments: Well healing surgical incision   Musculoskeletal:         General: Normal range of motion.      Cervical back: Normal range of motion and neck supple.      Right lower leg: No edema.      Left lower leg: No edema.   Lymphadenopathy:      Cervical: No cervical adenopathy.   Skin:     General: Skin is warm.      Coloration: Skin is " not jaundiced.   Neurological:      General: No focal deficit present.      Mental Status: She is alert and oriented to person, place, and time.      Motor: No weakness.   Psychiatric:         Mood and Affect: Mood normal.         Behavior: Behavior normal.         Thought Content: Thought content normal.         Labs:   Recent Results (from the past 48 hour(s))   COMPREHENSIVE METABOLIC PANEL    Collection Time: 01/13/23  1:29 PM   Result Value Ref Range    Sodium 138 136 - 145 mmol/L    Potassium 3.6 3.5 - 5.1 mmol/L    Chloride 101 95 - 110 mmol/L    CO2 28 23 - 29 mmol/L    Glucose 91 70 - 110 mg/dL    BUN 16 8 - 23 mg/dL    Creatinine 0.7 0.5 - 1.4 mg/dL    Calcium 9.9 8.7 - 10.5 mg/dL    Total Protein 7.1 6.0 - 8.4 g/dL    Albumin 3.8 3.5 - 5.2 g/dL    Total Bilirubin 0.7 0.1 - 1.0 mg/dL    Alkaline Phosphatase 163 (H) 55 - 135 U/L    AST 28 10 - 40 U/L    ALT 26 10 - 44 U/L    Anion Gap 9 8 - 16 mmol/L    eGFR >60.0 >60 mL/min/1.73 m^2   CBC W/ AUTO DIFFERENTIAL    Collection Time: 01/13/23  1:29 PM   Result Value Ref Range    WBC 7.16 3.90 - 12.70 K/uL    RBC 3.90 (L) 4.00 - 5.40 M/uL    Hemoglobin 10.8 (L) 12.0 - 16.0 g/dL    Hematocrit 36.2 (L) 37.0 - 48.5 %    MCV 93 82 - 98 fL    MCH 27.7 27.0 - 31.0 pg    MCHC 29.8 (L) 32.0 - 36.0 g/dL    RDW 13.4 11.5 - 14.5 %    Platelets 322 150 - 450 K/uL    MPV 9.8 9.2 - 12.9 fL    Immature Granulocytes 0.4 0.0 - 0.5 %    Gran # (ANC) 4.6 1.8 - 7.7 K/uL    Immature Grans (Abs) 0.03 0.00 - 0.04 K/uL    Lymph # 1.8 1.0 - 4.8 K/uL    Mono # 0.5 0.3 - 1.0 K/uL    Eos # 0.2 0.0 - 0.5 K/uL    Baso # 0.09 0.00 - 0.20 K/uL    nRBC 0 0 /100 WBC    Gran % 64.0 38.0 - 73.0 %    Lymph % 24.7 18.0 - 48.0 %    Mono % 6.8 4.0 - 15.0 %    Eosinophil % 2.8 0.0 - 8.0 %    Basophil % 1.3 0.0 - 1.9 %    Differential Method Automated         Imaging:         I have personally reviewed the imaging including CT chest 11/16/23 and CT a/p 11/9/23    Path:   Reviewed pathology as documented  in oncology history      Assessment and Plan:   1. Cholangiocarcinoma  Overview:  Stage II (pT2N0) IHCC sp R0 resection 12/12/23. Plan for adjuvant capecitabine x6 months per [facility]    Assessment & Plan:  - RX for capecitabine 1250mg/m2 (2000mg) po bid D1-14 of 21 day cycle called into [facility]  - Informed consent signed today  - RX for ondansetron and immmodium called into local pharmacy  - Tentative start date 2/1/23    Orders:  -     Ambulatory consult to Hematology / Oncology  -     Physician communication order; Standing  -     Physician communication order; Standing  -     Physician communication order; Standing  -     Physician communication order; Standing  -     Physician communication order; Standing  -     capecitabine (XELODA) 500 MG Tab; Take 4 tablets (2,000 mg total) by mouth 2 (two) times daily Take as directed days 1-14 of each 21 day cycle. Take with water within 30 minutes after a meal..  Dispense: 112 tablet; Refill: 7  -     ondansetron (ZOFRAN) 8 MG tablet; Take 1 tablet (8 mg total) by mouth every 8 (eight) hours as needed for Nausea.  Dispense: 90 tablet; Refill: 11  -     loperamide (IMODIUM) 2 mg capsule; Take two caps at the onset of loose stools.  Then take one cap after every subsequent loose stool.  Dispense: 60 capsule; Refill: 5    2. Essential hypertension  Overview:  Home medications include candesartan and triameternet-HCTZ    Assessment & Plan:  - Cont home medications      3. Postoperative hypothyroidism  Overview:  Home medication includes levothyroxine    Assessment & Plan:  - Con't home levothyroxine.      4. Recurrent major depressive disorder, in full remission  Overview:  Home medications include citalopram    Assessment & Plan:  - Con't home citalopram  - Declined  referral               Follow up:   Route Chart for Scheduling    Med Onc Chart Routing      Follow up with physician . ALANNA Moore 2/1/23   Follow up with [facility]    Infusion scheduling note    Injection scheduling  note    Labs CBC, CMP and CA 19-9   Lab interval:     Imaging    Pharmacy appointment    Other referrals        Treatment Plan Information   OP CAPECITABINE 1250MG/M2 BID Q3W   Bennie Moore MD   Upcoming Treatment Dates - OP CAPECITABINE 1250MG/M2 BID Q3W    No upcoming days in selected categories.        The above information has been reviewed with the patient and all questions have been answered to their apparent satisfaction.  They understand that they can call the clinic with any questions.    Bennie Moore MD  Hematology/Oncology  [facility]

## 2023-01-13 ENCOUNTER — LAB VISIT (OUTPATIENT)
Dept: LAB | Facility: HOSPITAL | Age: 68
End: 2023-01-13
Attending: HOSPITALIST
Payer: MEDICARE

## 2023-01-13 ENCOUNTER — OFFICE VISIT (OUTPATIENT)
Dept: HEMATOLOGY/ONCOLOGY | Facility: CLINIC | Age: 68
End: 2023-01-13
Payer: MEDICARE

## 2023-01-13 ENCOUNTER — SPECIALTY PHARMACY (OUTPATIENT)
Dept: PHARMACY | Facility: CLINIC | Age: 68
End: 2023-01-13
Payer: MEDICARE

## 2023-01-13 VITALS
HEART RATE: 86 BPM | TEMPERATURE: 98 F | WEIGHT: 134.5 LBS | RESPIRATION RATE: 18 BRPM | OXYGEN SATURATION: 99 % | SYSTOLIC BLOOD PRESSURE: 142 MMHG | BODY MASS INDEX: 25.39 KG/M2 | DIASTOLIC BLOOD PRESSURE: 67 MMHG | HEIGHT: 61 IN

## 2023-01-13 DIAGNOSIS — C22.1 CHOLANGIOCARCINOMA: ICD-10-CM

## 2023-01-13 DIAGNOSIS — C22.1 CHOLANGIOCARCINOMA: Primary | ICD-10-CM

## 2023-01-13 DIAGNOSIS — F33.42 RECURRENT MAJOR DEPRESSIVE DISORDER, IN FULL REMISSION: ICD-10-CM

## 2023-01-13 DIAGNOSIS — E89.0 POSTOPERATIVE HYPOTHYROIDISM: ICD-10-CM

## 2023-01-13 DIAGNOSIS — I10 ESSENTIAL HYPERTENSION: ICD-10-CM

## 2023-01-13 PROBLEM — D62 ACUTE BLOOD LOSS ANEMIA: Status: RESOLVED | Noted: 2022-12-13 | Resolved: 2023-01-13

## 2023-01-13 PROBLEM — R16.0 LIVER MASS: Status: RESOLVED | Noted: 2022-11-14 | Resolved: 2023-01-13

## 2023-01-13 LAB
ALBUMIN SERPL BCP-MCNC: 3.8 G/DL (ref 3.5–5.2)
ALP SERPL-CCNC: 163 U/L (ref 55–135)
ALT SERPL W/O P-5'-P-CCNC: 26 U/L (ref 10–44)
ANION GAP SERPL CALC-SCNC: 9 MMOL/L (ref 8–16)
AST SERPL-CCNC: 28 U/L (ref 10–40)
BASOPHILS # BLD AUTO: 0.09 K/UL (ref 0–0.2)
BASOPHILS NFR BLD: 1.3 % (ref 0–1.9)
BILIRUB SERPL-MCNC: 0.7 MG/DL (ref 0.1–1)
BUN SERPL-MCNC: 16 MG/DL (ref 8–23)
CALCIUM SERPL-MCNC: 9.9 MG/DL (ref 8.7–10.5)
CHLORIDE SERPL-SCNC: 101 MMOL/L (ref 95–110)
CO2 SERPL-SCNC: 28 MMOL/L (ref 23–29)
CREAT SERPL-MCNC: 0.7 MG/DL (ref 0.5–1.4)
DIFFERENTIAL METHOD: ABNORMAL
EOSINOPHIL # BLD AUTO: 0.2 K/UL (ref 0–0.5)
EOSINOPHIL NFR BLD: 2.8 % (ref 0–8)
ERYTHROCYTE [DISTWIDTH] IN BLOOD BY AUTOMATED COUNT: 13.4 % (ref 11.5–14.5)
EST. GFR  (NO RACE VARIABLE): >60 ML/MIN/1.73 M^2
GLUCOSE SERPL-MCNC: 91 MG/DL (ref 70–110)
HCT VFR BLD AUTO: 36.2 % (ref 37–48.5)
HGB BLD-MCNC: 10.8 G/DL (ref 12–16)
IMM GRANULOCYTES # BLD AUTO: 0.03 K/UL (ref 0–0.04)
IMM GRANULOCYTES NFR BLD AUTO: 0.4 % (ref 0–0.5)
LYMPHOCYTES # BLD AUTO: 1.8 K/UL (ref 1–4.8)
LYMPHOCYTES NFR BLD: 24.7 % (ref 18–48)
MCH RBC QN AUTO: 27.7 PG (ref 27–31)
MCHC RBC AUTO-ENTMCNC: 29.8 G/DL (ref 32–36)
MCV RBC AUTO: 93 FL (ref 82–98)
MONOCYTES # BLD AUTO: 0.5 K/UL (ref 0.3–1)
MONOCYTES NFR BLD: 6.8 % (ref 4–15)
NEUTROPHILS # BLD AUTO: 4.6 K/UL (ref 1.8–7.7)
NEUTROPHILS NFR BLD: 64 % (ref 38–73)
NRBC BLD-RTO: 0 /100 WBC
PLATELET # BLD AUTO: 322 K/UL (ref 150–450)
PMV BLD AUTO: 9.8 FL (ref 9.2–12.9)
POTASSIUM SERPL-SCNC: 3.6 MMOL/L (ref 3.5–5.1)
PROT SERPL-MCNC: 7.1 G/DL (ref 6–8.4)
RBC # BLD AUTO: 3.9 M/UL (ref 4–5.4)
SODIUM SERPL-SCNC: 138 MMOL/L (ref 136–145)
WBC # BLD AUTO: 7.16 K/UL (ref 3.9–12.7)

## 2023-01-13 PROCEDURE — 99205 PR OFFICE/OUTPT VISIT, NEW, LEVL V, 60-74 MIN: ICD-10-PCS | Mod: HCNC,S$GLB,, | Performed by: HOSPITALIST

## 2023-01-13 PROCEDURE — 1126F PR PAIN SEVERITY QUANTIFIED, NO PAIN PRESENT: ICD-10-PCS | Mod: HCNC,CPTII,S$GLB, | Performed by: HOSPITALIST

## 2023-01-13 PROCEDURE — 3288F FALL RISK ASSESSMENT DOCD: CPT | Mod: HCNC,CPTII,S$GLB, | Performed by: HOSPITALIST

## 2023-01-13 PROCEDURE — 1111F DSCHRG MED/CURRENT MED MERGE: CPT | Mod: HCNC,CPTII,S$GLB, | Performed by: HOSPITALIST

## 2023-01-13 PROCEDURE — 1126F AMNT PAIN NOTED NONE PRSNT: CPT | Mod: HCNC,CPTII,S$GLB, | Performed by: HOSPITALIST

## 2023-01-13 PROCEDURE — 3078F PR MOST RECENT DIASTOLIC BLOOD PRESSURE < 80 MM HG: ICD-10-PCS | Mod: HCNC,CPTII,S$GLB, | Performed by: HOSPITALIST

## 2023-01-13 PROCEDURE — 99999 PR PBB SHADOW E&M-EST. PATIENT-LVL IV: CPT | Mod: PBBFAC,HCNC,, | Performed by: HOSPITALIST

## 2023-01-13 PROCEDURE — 3078F DIAST BP <80 MM HG: CPT | Mod: HCNC,CPTII,S$GLB, | Performed by: HOSPITALIST

## 2023-01-13 PROCEDURE — 99999 PR PBB SHADOW E&M-EST. PATIENT-LVL IV: ICD-10-PCS | Mod: PBBFAC,HCNC,, | Performed by: HOSPITALIST

## 2023-01-13 PROCEDURE — 3077F SYST BP >= 140 MM HG: CPT | Mod: HCNC,CPTII,S$GLB, | Performed by: HOSPITALIST

## 2023-01-13 PROCEDURE — 80053 COMPREHEN METABOLIC PANEL: CPT | Mod: HCNC | Performed by: HOSPITALIST

## 2023-01-13 PROCEDURE — 4010F ACE/ARB THERAPY RXD/TAKEN: CPT | Mod: HCNC,CPTII,S$GLB, | Performed by: HOSPITALIST

## 2023-01-13 PROCEDURE — 1111F PR DISCHARGE MEDS RECONCILED W/ CURRENT OUTPATIENT MED LIST: ICD-10-PCS | Mod: HCNC,CPTII,S$GLB, | Performed by: HOSPITALIST

## 2023-01-13 PROCEDURE — 36415 COLL VENOUS BLD VENIPUNCTURE: CPT | Mod: HCNC | Performed by: HOSPITALIST

## 2023-01-13 PROCEDURE — 99499 RISK ADDL DX/OHS AUDIT: ICD-10-PCS | Mod: HCNC,S$GLB,, | Performed by: HOSPITALIST

## 2023-01-13 PROCEDURE — 3008F PR BODY MASS INDEX (BMI) DOCUMENTED: ICD-10-PCS | Mod: HCNC,CPTII,S$GLB, | Performed by: HOSPITALIST

## 2023-01-13 PROCEDURE — 1101F PT FALLS ASSESS-DOCD LE1/YR: CPT | Mod: HCNC,CPTII,S$GLB, | Performed by: HOSPITALIST

## 2023-01-13 PROCEDURE — 85025 COMPLETE CBC W/AUTO DIFF WBC: CPT | Mod: HCNC | Performed by: HOSPITALIST

## 2023-01-13 PROCEDURE — 99205 OFFICE O/P NEW HI 60 MIN: CPT | Mod: HCNC,S$GLB,, | Performed by: HOSPITALIST

## 2023-01-13 PROCEDURE — 3077F PR MOST RECENT SYSTOLIC BLOOD PRESSURE >= 140 MM HG: ICD-10-PCS | Mod: HCNC,CPTII,S$GLB, | Performed by: HOSPITALIST

## 2023-01-13 PROCEDURE — 4010F PR ACE/ARB THEARPY RXD/TAKEN: ICD-10-PCS | Mod: HCNC,CPTII,S$GLB, | Performed by: HOSPITALIST

## 2023-01-13 PROCEDURE — 3288F PR FALLS RISK ASSESSMENT DOCUMENTED: ICD-10-PCS | Mod: HCNC,CPTII,S$GLB, | Performed by: HOSPITALIST

## 2023-01-13 PROCEDURE — 3008F BODY MASS INDEX DOCD: CPT | Mod: HCNC,CPTII,S$GLB, | Performed by: HOSPITALIST

## 2023-01-13 PROCEDURE — 1101F PR PT FALLS ASSESS DOC 0-1 FALLS W/OUT INJ PAST YR: ICD-10-PCS | Mod: HCNC,CPTII,S$GLB, | Performed by: HOSPITALIST

## 2023-01-13 PROCEDURE — 99499 UNLISTED E&M SERVICE: CPT | Mod: HCNC,S$GLB,, | Performed by: HOSPITALIST

## 2023-01-13 RX ORDER — CAPECITABINE 500 MG/1
1250 TABLET, FILM COATED ORAL 2 TIMES DAILY
Qty: 112 TABLET | Refills: 7 | Status: SHIPPED | OUTPATIENT
Start: 2023-01-13 | End: 2023-02-14

## 2023-01-13 RX ORDER — ONDANSETRON HYDROCHLORIDE 8 MG/1
8 TABLET, FILM COATED ORAL EVERY 8 HOURS PRN
Qty: 90 TABLET | Refills: 11 | Status: SHIPPED | OUTPATIENT
Start: 2023-01-13 | End: 2024-01-13

## 2023-01-13 RX ORDER — LOPERAMIDE HYDROCHLORIDE 2 MG/1
CAPSULE ORAL
Qty: 60 CAPSULE | Refills: 5 | Status: SHIPPED | OUTPATIENT
Start: 2023-01-13 | End: 2023-10-26 | Stop reason: ALTCHOICE

## 2023-01-13 NOTE — PATIENT INSTRUCTIONS
You had a stage II intrahepatic cholangiocarcinoma that was adequately resected. In order to reduce the risk of the cancer returning I recommend a course of chemotherapy with capecitabine. Specifically 8 three week cycles of the capecitabine taken twice daily for 14 days with a 7 day rest period.    - Capecitabine called into [facility]; they will contact you regarding delivery; don't take until you see Dr. Moore again  - Ondansetron and immodium called into your local pharmacy  - Recommend picking up Udderly Smooth cream to use during treatment  - FU with Dr. Moore on 2/1/23 to start treatment

## 2023-01-14 NOTE — ASSESSMENT & PLAN NOTE
- RX for capecitabine 1250mg/m2 (2000mg) po bid D1-14 of 21 day cycle called into [facility]  - Informed consent signed today  - RX for ondansetron and immmodium called into local pharmacy  - Tentative start date 2/1/23

## 2023-01-17 NOTE — TELEPHONE ENCOUNTER
Therapy appropriate. BSA: 1.62m2 and Crcl: 66.3 mL/min. No adjustments needed.     Urgent PA submitted via [facility]- Key: [id number] - PA Case ID: [id number]

## 2023-01-18 ENCOUNTER — TELEPHONE (OUTPATIENT)
Dept: HEMATOLOGY/ONCOLOGY | Facility: CLINIC | Age: 68
End: 2023-01-18
Payer: MEDICARE

## 2023-01-18 NOTE — TELEPHONE ENCOUNTER
----- Message from Patricia Melo sent at 1/18/2023 10:52 AM CST -----  Regarding: CBC CMP CA 19-9  Good morning can I please get labs linked to patients 2/1 lab appt    Thanks

## 2023-01-19 LAB
ONEOME COMMENT: NORMAL
ONEOME METHOD: NORMAL

## 2023-01-20 ENCOUNTER — SPECIALTY PHARMACY (OUTPATIENT)
Dept: PHARMACY | Facility: CLINIC | Age: 68
End: 2023-01-20
Payer: MEDICARE

## 2023-01-20 NOTE — TELEPHONE ENCOUNTER
Specialty Pharmacy - Initial Clinical Assessment    Specialty Medication Orders Linked to Encounter      Flowsheet Row Most Recent Value   Medication #1 capecitabine (XELODA) 500 MG Tab (Order#[id number], Rx#[id number])          Patient Diagnosis   C22.1 - Cholangiocarcinoma    Subjective    Rehana Whitten is a 67 y.o. female, who is followed by the specialty pharmacy service for management and education.    Recent Encounters       Date Type Provider Description    01/20/2023 Specialty Pharmacy Maxine Fuller, [place] Initial Clinical Assessment    01/13/2023 Specialty Pharmacy WALESKA STACY, [place] Referral Authorization          Clinical call attempts since last clinical assessment   No call attempts found.     Current Outpatient Medications   Medication Sig Note    candesartan (ATACAND) 8 MG tablet TAKE 1 TABLET(8 MG) BY MOUTH EVERY DAY     citalopram (CELEXA) 20 MG tablet TAKE 1 TABLET BY MOUTH EVERY DAY     levothyroxine (SYNTHROID) 88 MCG tablet TAKE 1 TABLET BY MOUTH EVERY DAY     magnesium oxide (MAG-OX) 400 mg (241.3 mg magnesium) tablet Take 400 mg by mouth once daily.     rosuvastatin (CRESTOR) 40 MG Tab Take 1 tablet (40 mg total) by mouth every evening.     triamterene-hydrochlorothiazide 37.5-25 mg (DYAZIDE) 37.5-25 mg per capsule TAKE 1 CAPSULE BY MOUTH EVERY MORNING     valACYclovir (VALTREX) 500 MG tablet TAKE 1 TABLET(500 MG) BY MOUTH EVERY DAY     vitamin D (VITAMIN D3) 1000 units Tab Take 1,000 Units by mouth once daily.     capecitabine (XELODA) 500 MG Tab Take 4 tablets (2,000 mg total) by mouth 2 (two) times daily on  days 1-14 of each 21 day cycle. Take with water within 30 minutes after a meal.     loperamide (IMODIUM) 2 mg capsule Take two caps at the onset of loose stools.  Then take one cap after every subsequent loose stool.     ondansetron (ZOFRAN) 8 MG tablet Take 1 tablet (8 mg total) by mouth every 8 (eight) hours as needed for Nausea.     pantoprazole (PROTONIX) 40 MG  tablet Take 1 tablet (40 mg total) by mouth once daily. 1/20/2023: Pt reports to no longer taking the medication   Last reviewed on 1/20/2023  3:09 PM by Maxine Fuller, [place]    Review of patient's allergies indicates:  No Known AllergiesLast reviewed on  1/20/2023 3:09 PM by Maxine Fuller    Drug Interactions    Drug interactions evaluated: yes  Clinically relevant drug interactions identified: yes   Interactions list: Fluorouracil Products / Ondansetron  Dependencies  Route: This interaction is likely of greater significance with IV ondansetron, which has a higher risk for prolonging the QT interval.  Risk Rating C: Monitor therapy  Summary Ondansetron may enhance the QTc-prolonging effect of Fluorouracil Products. Severity Moderate Reliability Rating Fair  Patient Management Monitor for QTc interval prolongation and ventricular arrhythmias (including torsades de pointes) when these drugs are combined. Patients with other risk factors (eg, older age, female sex, bradycardia, hypokalemia, hypomagnesemia, heart disease, and higher drug concentrations) or those using IV ondansetron are likely at greater risk for these potentially life-threatening toxicities.    QT-prolonging Antidepressants (Moderate Risk) / Fluorouracil Products  Risk Rating C: Monitor therapy  Summary Fluorouracil Products may enhance the QTc-prolonging effect of QT-prolonging Antidepressants (Moderate Risk). Severity Moderate Reliability Rating Fair  Patient Management Monitor for QTc interval prolongation and ventricular arrhythmias (including torsades de pointes) when these drugs are combined. Patients with other risk factors (eg, older age, female sex, bradycardia, hypokalemia, hypomagnesemia, heart disease, and higher drug concentrations) are likely at greater risk for these potentially life-threatening toxicities.         Drug management plan: Qtc 12/9/22: 452    Counseled pt on chest pain and palpitations             Adverse  Effects    *All other systems reviewed and are negative       Assessment Questions - Documented Responses      Flowsheet Row Most Recent Value   Assessment    Medication Reconciliation completed for patient Yes   During the past 4 weeks, has patient missed any activities due to condition or medication? No   During the past 4 weeks, did patient have any of the following urgent care visits? Unplanned provider visit   Goals of Therapy Status Discussed (new start)   Status of the patients ability to self-administer: Is Able   All education points have been covered with patient? Yes, supplemental printed education provided   Welcome packet contents reviewed and discussed with patient? Yes   Assesment completed? Yes   Plan Therapy being initiated   Do you need to open a clinical intervention (i-vent)? No   Do you want to schedule first shipment? Yes          Refill Questions - Documented Responses      Flowsheet Row Most Recent Value   Patient Availability and HIPAA Verification    Does patient want to proceed with activity? Yes   HIPAA/medical authority confirmed? Yes   Relationship to patient of person spoken to? Self   Refill Screening Questions    When does the patient need to receive the medication? 02/02/23   Refill Delivery Questions    How will the patient receive the medication? [facility]   When does the patient need to receive the medication? 02/02/23   Shipping Address Home   Address in [facility] confirmed and updated if neccessary? Yes   Expected Copay ($) 22.83   Is the patient able to afford the medication copay? Yes   Payment Method new CC added to file   Days supply of Refill 21   Supplies needed? No supplies needed   Refill activity completed? Yes   Refill activity plan Refill scheduled   Shipment/Pickup Date: 01/25/23            Objective    She has a past medical history of Anorexia, Anxiety, Basal cell carcinoma, Depression, Former smoker; quit 1980, 1/2 pack x 10 years (09/23/2016),  "Hypercalcemia, Hypertension, Hypothyroidism, Insomnia, Liver mass (11/14/2022), and Vertigo.    Tried/failed medications: unknown    BP Readings from Last 4 Encounters:   01/13/23 (!) 142/67   01/03/23 (!) 125/59   12/16/22 138/60   11/30/22 (!) 140/64     Ht Readings from Last 4 Encounters:   01/13/23 5' 1" (1.549 m)   01/03/23 5' 1" (1.549 m)   12/12/22 5' 1" (1.549 m)   11/30/22 5' 1" (1.549 m)     Wt Readings from Last 4 Encounters:   01/13/23 61 kg (134 lb 7.7 oz)   01/03/23 62.3 kg (137 lb 5.6 oz)   12/16/22 72.9 kg (160 lb 11.5 oz)   11/30/22 64 kg (141 lb)     Recent Labs   Lab Result Units 01/13/23  1329 12/16/22  0603 12/15/22  0637 12/14/22  0547   RBC M/uL 3.90 L 3.04 L 3.11 L 2.99 L   Hemoglobin g/dL 10.8 L 9.1 L 9.4 L 8.9 L   Hematocrit % 36.2 L 29.0 L 29.7 L 28.9 L   WBC K/uL 7.16 6.95 7.16 7.85   Gran # (ANC) K/uL 4.6 5.0 5.6 6.1   Gran % % 64.0 72.5 77.5 H 78.1 H   Platelets K/uL 322 212 205 183   Sodium mmol/L 138 134 L 134 L 135 L   Potassium mmol/L 3.6 4.2 3.2 L 3.8   Chloride mmol/L 101 101 98 103   Glucose mg/dL 91 116 H 133 H 120 H   BUN mg/dL 16 9 13 23   Creatinine mg/dL 0.7 0.6 0.7 0.8   Calcium mg/dL 9.9 9.0 8.7 8.6 L   Total Protein g/dL 7.1 5.6 L 5.6 L 5.5 L   Albumin g/dL 3.8 3.0 L 3.2 L 3.4 L   Total Bilirubin mg/dL 0.7 0.9 1.1 H 1.3 H   Alkaline Phosphatase U/L 163 H 140 H 107 73   AST U/L 28 118 H 146 H 255 H   ALT U/L 26 264 H 328 H 423 H     The goals of cancer treatment include:  Achieving remission of cancer, if possible  Reducing tumor size and spread of cancer, if remission is not possible  Minimizing pain and symptoms of the cancer  Preventing infection and other complications of treatment  Promoting adequate nutrition  Encouraging proper hydration  Improving or maintaining quality of life  Maintaining optimal therapy adherence  Minimizing and managing side effects    Goals of Therapy Status: Discussed (new start)    Assessment/Plan  Patient plans to start therapy on " 02/02/23      Indication, dosage, appropriateness, effectiveness, safety and convenience of her specialty medication(s) were reviewed today.     Patient Education   Patient received education on the following:   Expectations and possible outcomes of therapy  Proper use, timely administration, and missed dose management  Duration of therapy  Side effects, including prevention, minimization, and management  Contraindications and safety precautions  New or changed medications, including prescribe and over the counter medications and supplements  Reviews recommended vaccinations, as appropriate  Storage, safe handling, and disposal    Discussed pt not starting medication until speaking to provider.    Tasks added this encounter   No tasks added.   Tasks due within next 3 months   1/18/2023 - Clinical - Initial Assessment     Maxine Fuller, PharmD  [facility]  [address]  [facility] [address]  Phone: [phone]  Fax: [phone]

## 2023-02-02 ENCOUNTER — LAB VISIT (OUTPATIENT)
Dept: LAB | Facility: HOSPITAL | Age: 68
End: 2023-02-02
Attending: HOSPITALIST
Payer: MEDICARE

## 2023-02-02 ENCOUNTER — OFFICE VISIT (OUTPATIENT)
Dept: HEMATOLOGY/ONCOLOGY | Facility: CLINIC | Age: 68
End: 2023-02-02
Payer: MEDICARE

## 2023-02-02 VITALS
OXYGEN SATURATION: 100 % | BODY MASS INDEX: 26.06 KG/M2 | TEMPERATURE: 98 F | HEART RATE: 78 BPM | HEIGHT: 61 IN | DIASTOLIC BLOOD PRESSURE: 64 MMHG | RESPIRATION RATE: 18 BRPM | WEIGHT: 138 LBS | SYSTOLIC BLOOD PRESSURE: 147 MMHG

## 2023-02-02 DIAGNOSIS — C22.1 CHOLANGIOCARCINOMA: ICD-10-CM

## 2023-02-02 DIAGNOSIS — I10 ESSENTIAL HYPERTENSION: ICD-10-CM

## 2023-02-02 DIAGNOSIS — F33.42 RECURRENT MAJOR DEPRESSIVE DISORDER, IN FULL REMISSION: Primary | ICD-10-CM

## 2023-02-02 DIAGNOSIS — E89.0 POSTOPERATIVE HYPOTHYROIDISM: ICD-10-CM

## 2023-02-02 LAB
ALBUMIN SERPL BCP-MCNC: 3.9 G/DL (ref 3.5–5.2)
ALP SERPL-CCNC: 121 U/L (ref 55–135)
ALT SERPL W/O P-5'-P-CCNC: 24 U/L (ref 10–44)
ANION GAP SERPL CALC-SCNC: 7 MMOL/L (ref 8–16)
AST SERPL-CCNC: 25 U/L (ref 10–40)
BILIRUB SERPL-MCNC: 0.8 MG/DL (ref 0.1–1)
BUN SERPL-MCNC: 11 MG/DL (ref 8–23)
CALCIUM SERPL-MCNC: 9.8 MG/DL (ref 8.7–10.5)
CANCER AG19-9 SERPL-ACNC: 15.1 U/ML (ref 0–40)
CHLORIDE SERPL-SCNC: 105 MMOL/L (ref 95–110)
CO2 SERPL-SCNC: 29 MMOL/L (ref 23–29)
CREAT SERPL-MCNC: 0.7 MG/DL (ref 0.5–1.4)
ERYTHROCYTE [DISTWIDTH] IN BLOOD BY AUTOMATED COUNT: 13.8 % (ref 11.5–14.5)
EST. GFR  (NO RACE VARIABLE): >60 ML/MIN/1.73 M^2
GLUCOSE SERPL-MCNC: 93 MG/DL (ref 70–110)
HCT VFR BLD AUTO: 37.9 % (ref 37–48.5)
HGB BLD-MCNC: 11.4 G/DL (ref 12–16)
IMM GRANULOCYTES # BLD AUTO: 0.01 K/UL (ref 0–0.04)
MCH RBC QN AUTO: 28 PG (ref 27–31)
MCHC RBC AUTO-ENTMCNC: 30.1 G/DL (ref 32–36)
MCV RBC AUTO: 93 FL (ref 82–98)
NEUTROPHILS # BLD AUTO: 2.9 K/UL (ref 1.8–7.7)
PLATELET # BLD AUTO: 210 K/UL (ref 150–450)
PMV BLD AUTO: 11.2 FL (ref 9.2–12.9)
POTASSIUM SERPL-SCNC: 3.8 MMOL/L (ref 3.5–5.1)
PROT SERPL-MCNC: 7 G/DL (ref 6–8.4)
RBC # BLD AUTO: 4.07 M/UL (ref 4–5.4)
SODIUM SERPL-SCNC: 141 MMOL/L (ref 136–145)
WBC # BLD AUTO: 4.83 K/UL (ref 3.9–12.7)

## 2023-02-02 PROCEDURE — 4010F PR ACE/ARB THEARPY RXD/TAKEN: ICD-10-PCS | Mod: HCNC,CPTII,S$GLB, | Performed by: HOSPITALIST

## 2023-02-02 PROCEDURE — 3077F PR MOST RECENT SYSTOLIC BLOOD PRESSURE >= 140 MM HG: ICD-10-PCS | Mod: HCNC,CPTII,S$GLB, | Performed by: HOSPITALIST

## 2023-02-02 PROCEDURE — 86301 IMMUNOASSAY TUMOR CA 19-9: CPT | Mod: HCNC | Performed by: HOSPITALIST

## 2023-02-02 PROCEDURE — 36415 COLL VENOUS BLD VENIPUNCTURE: CPT | Mod: HCNC | Performed by: HOSPITALIST

## 2023-02-02 PROCEDURE — 99499 RISK ADDL DX/OHS AUDIT: ICD-10-PCS | Mod: HCNC,S$GLB,, | Performed by: HOSPITALIST

## 2023-02-02 PROCEDURE — 99215 OFFICE O/P EST HI 40 MIN: CPT | Mod: HCNC,S$GLB,, | Performed by: HOSPITALIST

## 2023-02-02 PROCEDURE — 99999 PR PBB SHADOW E&M-EST. PATIENT-LVL IV: ICD-10-PCS | Mod: PBBFAC,HCNC,, | Performed by: HOSPITALIST

## 2023-02-02 PROCEDURE — 3288F FALL RISK ASSESSMENT DOCD: CPT | Mod: HCNC,CPTII,S$GLB, | Performed by: HOSPITALIST

## 2023-02-02 PROCEDURE — 99215 PR OFFICE/OUTPT VISIT, EST, LEVL V, 40-54 MIN: ICD-10-PCS | Mod: HCNC,S$GLB,, | Performed by: HOSPITALIST

## 2023-02-02 PROCEDURE — 3008F PR BODY MASS INDEX (BMI) DOCUMENTED: ICD-10-PCS | Mod: HCNC,CPTII,S$GLB, | Performed by: HOSPITALIST

## 2023-02-02 PROCEDURE — 80053 COMPREHEN METABOLIC PANEL: CPT | Mod: HCNC | Performed by: HOSPITALIST

## 2023-02-02 PROCEDURE — 4010F ACE/ARB THERAPY RXD/TAKEN: CPT | Mod: HCNC,CPTII,S$GLB, | Performed by: HOSPITALIST

## 2023-02-02 PROCEDURE — 85027 COMPLETE CBC AUTOMATED: CPT | Mod: HCNC | Performed by: HOSPITALIST

## 2023-02-02 PROCEDURE — 3077F SYST BP >= 140 MM HG: CPT | Mod: HCNC,CPTII,S$GLB, | Performed by: HOSPITALIST

## 2023-02-02 PROCEDURE — 1126F PR PAIN SEVERITY QUANTIFIED, NO PAIN PRESENT: ICD-10-PCS | Mod: HCNC,CPTII,S$GLB, | Performed by: HOSPITALIST

## 2023-02-02 PROCEDURE — 3078F PR MOST RECENT DIASTOLIC BLOOD PRESSURE < 80 MM HG: ICD-10-PCS | Mod: HCNC,CPTII,S$GLB, | Performed by: HOSPITALIST

## 2023-02-02 PROCEDURE — 3078F DIAST BP <80 MM HG: CPT | Mod: HCNC,CPTII,S$GLB, | Performed by: HOSPITALIST

## 2023-02-02 PROCEDURE — 1126F AMNT PAIN NOTED NONE PRSNT: CPT | Mod: HCNC,CPTII,S$GLB, | Performed by: HOSPITALIST

## 2023-02-02 PROCEDURE — 1101F PR PT FALLS ASSESS DOC 0-1 FALLS W/OUT INJ PAST YR: ICD-10-PCS | Mod: HCNC,CPTII,S$GLB, | Performed by: HOSPITALIST

## 2023-02-02 PROCEDURE — 3288F PR FALLS RISK ASSESSMENT DOCUMENTED: ICD-10-PCS | Mod: HCNC,CPTII,S$GLB, | Performed by: HOSPITALIST

## 2023-02-02 PROCEDURE — 1101F PT FALLS ASSESS-DOCD LE1/YR: CPT | Mod: HCNC,CPTII,S$GLB, | Performed by: HOSPITALIST

## 2023-02-02 PROCEDURE — 3008F BODY MASS INDEX DOCD: CPT | Mod: HCNC,CPTII,S$GLB, | Performed by: HOSPITALIST

## 2023-02-02 PROCEDURE — 99999 PR PBB SHADOW E&M-EST. PATIENT-LVL IV: CPT | Mod: PBBFAC,HCNC,, | Performed by: HOSPITALIST

## 2023-02-02 PROCEDURE — 99499 UNLISTED E&M SERVICE: CPT | Mod: HCNC,S$GLB,, | Performed by: HOSPITALIST

## 2023-02-02 NOTE — ASSESSMENT & PLAN NOTE
- Capecitabine 1250mg/m2 po bid (2000mg bid) D1-14 of 21 day cycle; C1D1 2/3/23  - Ondasetron and immodium prn for nausea and diarrhea  - Urea cream for hand foot syndrome  - FU in 3 weeks prior to C2D1

## 2023-02-02 NOTE — PROGRESS NOTES
MEDICAL ONCOLOGY FOLLOW-UP VISIT.     Best Contact Phone Number(s): [phone] (home)      Cancer/Stage/TNM:    Cancer Staging   Cholangiocarcinoma  Staging form: Intrahepatic Bile Duct, AJCC 8th Edition  - Pathologic: Stage II (pT2, pN0, cM0) - Unsigned       Reason for visit: Rehana Whitten is a 67 y.o. female remote history of HBV who was found to have stage II pT2N0 intrahepatic cholangiocarcinoma in the setting of abdominal pain who underwent R0 resection 12/12/23. She presents to medical oncology clinic to start C1 of adjuvant capecitabine.      Interval History:   Today patient reports feeling generally well. No abdominal pain, nausea or vomiting. Prior right sided abdominal discomfort has resolved. Appetite is good. No CP, SOB or cough. Normal BM 1-2x per day. No neuropathy. No rashes.        Oncology History   Cholangiocarcinoma   11/9/2022 Imaging Significant Findings    Abdominal US in the setting of abdominal pain shows a large heterogeneous mass in the right lobe, measures 7.2 x 5.3 x 5.9 cm.     11/9/2022 Imaging Significant Findings    CT a/p:  Irregular right hepatic lobe lesion demonstrating delayed central enhancement and overlying capsular retraction.  Capsular retraction can be seen in cholangiocarcinoma and sclerosing hepatic hemangiomas.  Differential includes metastatic disease and other primary hepatic neoplasms.     11/14/2022 Tumor Markers    CA 19-9: 19.1  AFP: 5.5     11/16/2022 Imaging Significant Findings    CT chest:  1. No specific CT evidence of metastatic disease within the chest.  2. Left lower lobe punctate micro nodules.  Attention on continued surveillance imaging.  3. Other findings as above.     11/30/2022 Biopsy    Percutaneous biopsy of liver mass: Moderately differentiated adenocarcinoma. CK7+ Negative for CK20, CDX2, GATA3, TTF1, and PAX 8. Thought consistent with pancraetobiliary primary.     1/13/2023 -  Chemotherapy    Treatment Summary   Plan Name: OP  "CAPECITABINE 1250MG/M2 BID Q3W  Treatment Goal: Control  Status: Active  Start Date: 1/13/2023  End Date: 1/13/2023  Provider: Bennie Moore MD  Chemotherapy: capecitabine (XELODA) 500 MG Tab, 1,250 mg/m2 = 2,000 mg, Oral, 2 times daily, 1 of 1 cycle, Start date: 1/13/2023, End date: --       12/12/2023 Surgery    Resection of right hepatic lobe tumor. Pathology shows moderately differentiated adenocarcinoma up to 6.5cm in size c/w intrahepatic cholangiocarcinoma. Tumor was confined to the hepatic parenchyma with associated  LVI but no PNI. R0 resection with 6mm margins. 06/ LN involved. [id number]            Physical Exam:   BP (!) 147/64 (BP Location: Left arm, Patient Position: Sitting, BP Method: Medium (Automatic))   Pulse 78   Temp 98.2 °F (36.8 °C) (Oral)   Resp 18   Ht 5' 1" (1.549 m)   Wt 62.6 kg (138 lb 0.1 oz)   LMP  (LMP Unknown)   SpO2 100%   BMI 26.08 kg/m²      ECOG Performance Status: (foot note - ECOG PS provided by [employer] Co[employer]cology Group) 0 - Asymptomatic    Physical Exam  Constitutional:       General: She is not in acute distress.     Appearance: She is normal weight.   HENT:      Head: Normocephalic.      Mouth/Throat:      Mouth: Mucous membranes are moist.      Pharynx: Oropharynx is clear. No oropharyngeal exudate or posterior oropharyngeal erythema.   Eyes:      General: No scleral icterus.     Conjunctiva/sclera: Conjunctivae normal.   Cardiovascular:      Rate and Rhythm: Normal rate and regular rhythm.      Heart sounds: No murmur heard.    No friction rub. No gallop.   Pulmonary:      Effort: Pulmonary effort is normal. No respiratory distress.      Breath sounds: Normal breath sounds.   Abdominal:      General: There is no distension.      Palpations: Abdomen is soft.      Tenderness: There is no abdominal tenderness.      Comments: Well healed midline surgical scar   Musculoskeletal:         General: Normal range of motion.      Cervical back: Normal range of " motion and neck supple.      Right lower leg: No edema.      Left lower leg: No edema.   Lymphadenopathy:      Cervical: No cervical adenopathy.   Skin:     General: Skin is warm.      Coloration: Skin is not jaundiced.      Findings: No bruising or rash.   Neurological:      General: No focal deficit present.      Mental Status: She is alert and oriented to person, place, and time.      Motor: No weakness.   Psychiatric:         Mood and Affect: Mood normal.         Behavior: Behavior normal.         Thought Content: Thought content normal.         Labs:   Recent Results (from the past 48 hour(s))   Cancer Antigen 19-9    Collection Time: 02/02/23  2:30 PM   Result Value Ref Range    CA 19-9 15.1 0.0 - 40.0 U/mL   CBC Oncology    Collection Time: 02/02/23  2:30 PM   Result Value Ref Range    WBC 4.83 3.90 - 12.70 K/uL    RBC 4.07 4.00 - 5.40 M/uL    Hemoglobin 11.4 (L) 12.0 - 16.0 g/dL    Hematocrit 37.9 37.0 - 48.5 %    MCV 93 82 - 98 fL    MCH 28.0 27.0 - 31.0 pg    MCHC 30.1 (L) 32.0 - 36.0 g/dL    RDW 13.8 11.5 - 14.5 %    Platelets 210 150 - 450 K/uL    MPV 11.2 9.2 - 12.9 fL    Gran # (ANC) 2.9 1.8 - 7.7 K/uL    Immature Grans (Abs) 0.01 0.00 - 0.04 K/uL   Comprehensive Metabolic Panel    Collection Time: 02/02/23  2:30 PM   Result Value Ref Range    Sodium 141 136 - 145 mmol/L    Potassium 3.8 3.5 - 5.1 mmol/L    Chloride 105 95 - 110 mmol/L    CO2 29 23 - 29 mmol/L    Glucose 93 70 - 110 mg/dL    BUN 11 8 - 23 mg/dL    Creatinine 0.7 0.5 - 1.4 mg/dL    Calcium 9.8 8.7 - 10.5 mg/dL    Total Protein 7.0 6.0 - 8.4 g/dL    Albumin 3.9 3.5 - 5.2 g/dL    Total Bilirubin 0.8 0.1 - 1.0 mg/dL    Alkaline Phosphatase 121 55 - 135 U/L    AST 25 10 - 40 U/L    ALT 24 10 - 44 U/L    Anion Gap 7 (L) 8 - 16 mmol/L    eGFR >60.0 >60 mL/min/1.73 m^2          Imaging:     X-Ray Chest 1 View  Narrative: EXAMINATION:  XR CHEST 1 VIEW    CLINICAL HISTORY:  s/p R lobe liver resection. Mild hypoxia.;    TECHNIQUE:  Single frontal  view of the chest was performed.    COMPARISON:  12/09/2022    FINDINGS:  Heart size is normal.  Mediastinum shows aortic atherosclerosis.  Lungs are not as well expanded for the current examination.  Left lung has a couple bands of opacity in the lower zone consistent with plate atelectasis.  Right lower lung zone shows volume loss with elevation of the diaphragm and patchy opacification likely representing atelectasis.  Some airspace consolidation such as pneumonia or aspiration is also possible.  Small right pleural effusion is probably present.  No pneumothorax detected.  Skeletal structures look intact.  Surgical changes are noted in the liver area.  Impression: Interval hepatic surgery.  Small right pleural effusion.  Volume loss and opacity at right lung base likely representing atelectasis.    Electronically signed by: Ian Fan MD  Date:    12/16/2022  Time:    08:03            Diagnoses:       1. Recurrent major depressive disorder, in full remission    2. Essential hypertension    3. Cholangiocarcinoma    4. Postoperative hypothyroidism          Assessment and Plan:     1. Recurrent major depressive disorder, in full remission  Overview:  Home medications include citalopram    Assessment & Plan:  - Well controlled  - Continue home medications      2. Essential hypertension  Overview:  Home medications include candesartan and triameternet-HCTZ    Assessment & Plan:  - Continue home medications      3. Cholangiocarcinoma  Overview:  Stage II (pT2N0) IHCC sp R0 resection 12/12/23. Plan Starting adjuvant capecitabine x6 months per JAYSHREE 2/3/23.    Assessment & Plan:  - Capecitabine 1250mg/m2 po bid (2000mg bid) D1-14 of 21 day cycle; C1D1 2/3/23  - Ondasetron and immodium prn for nausea and diarrhea  - Urea cream for hand foot syndrome  - FU in 3 weeks prior to C2D1      4. Postoperative hypothyroidism  Overview:  Home medication includes levothyroxine    Assessment & Plan:  - Con't home  medications             Route Chart for Scheduling    Med Onc Chart Routing      Follow up with physician Abby Moore 2/23/23   Follow up with [facility]    Infusion scheduling note    Injection scheduling note    Labs CBC, CMP and CA 19-9   Lab interval:     Imaging    Pharmacy appointment    Other referrals        Treatment Plan Information   OP CAPECITABINE 1250MG/M2 BID Q3W   Bennie Moore MD   Upcoming Treatment Dates - OP CAPECITABINE 1250MG/M2 BID Q3W    No upcoming days in selected categories.       Bennie Moore MD  Hematology/Oncology  [facility]

## 2023-02-02 NOTE — PATIENT INSTRUCTIONS
Ready to start first cycle of adjuvant capecitabine chemotherapy. Start taking 2000mg by mouth twice daily starting tomorrow x14 days then 7 days off.    Monitor for side effects including diarrhea, painful peeling of the hands and feet, mouth sores, and nausea.    OK to use ondansetron for nausea and immodium for diarrhea.    Udderly smooth cream throughout the day to relieve hand and foot symptoms.    Follow up on 2/23/23 prior to starting next cycle.

## 2023-02-06 ENCOUNTER — PATIENT MESSAGE (OUTPATIENT)
Dept: HEMATOLOGY/ONCOLOGY | Facility: CLINIC | Age: 68
End: 2023-02-06
Payer: MEDICARE

## 2023-02-06 ENCOUNTER — TELEPHONE (OUTPATIENT)
Dept: HEMATOLOGY/ONCOLOGY | Facility: CLINIC | Age: 68
End: 2023-02-06

## 2023-02-06 NOTE — TELEPHONE ENCOUNTER
"----- Message from Manfred Jaimes sent at 2/6/2023  9:06 AM CST -----  Consult/Advisory:          Name Of Caller: Self      Contact Preference?: [phone]       Provider Name: Teresa      Does patient feel the need to be seen today? No      What is the nature of the call?: Calling to discuss potentially reducing the dosage for capecitabine (XELODA) 500 MG Tab. Stating she vomited twice on Saturday and twice on Sunday. Also her appetite has been noticeably suppressed          Additional Notes:  "Thank you for all that you do for our patients"      "

## 2023-02-06 NOTE — TELEPHONE ENCOUNTER
I sent her a message, but if you can check to make sure she got it - hold capecitabine today and tomorrow, then resume at 1500mg twice daily. Will completed cycle on 2/16/23 as previously planned

## 2023-02-07 ENCOUNTER — PATIENT MESSAGE (OUTPATIENT)
Dept: PRIMARY CARE CLINIC | Facility: CLINIC | Age: 68
End: 2023-02-07
Payer: MEDICARE

## 2023-02-07 DIAGNOSIS — Z00.00 ENCOUNTER FOR MEDICARE ANNUAL WELLNESS EXAM: ICD-10-CM

## 2023-02-09 ENCOUNTER — PATIENT MESSAGE (OUTPATIENT)
Dept: HEMATOLOGY/ONCOLOGY | Facility: CLINIC | Age: 68
End: 2023-02-09
Payer: MEDICARE

## 2023-02-09 DIAGNOSIS — Z00.00 ENCOUNTER FOR MEDICARE ANNUAL WELLNESS EXAM: ICD-10-CM

## 2023-02-13 ENCOUNTER — SPECIALTY PHARMACY (OUTPATIENT)
Dept: PHARMACY | Facility: CLINIC | Age: 68
End: 2023-02-13
Payer: MEDICARE

## 2023-02-13 NOTE — TELEPHONE ENCOUNTER
Messaged [facility] to request a new script reflective of capecitabine current dose. Awaiting new script.

## 2023-02-13 NOTE — TELEPHONE ENCOUNTER
Patient called regarding Xeloda. Patient reports her provider reduced her dose to 3 tablets bid due to adverse effects. Patient's next cycle starts on 2/24. Routing assigned [facility] to obtain updated Rx.

## 2023-02-14 DIAGNOSIS — C22.1 CHOLANGIOCARCINOMA: ICD-10-CM

## 2023-02-14 RX ORDER — CAPECITABINE 500 MG/1
1500 TABLET, FILM COATED ORAL 2 TIMES DAILY
Qty: 84 TABLET | Refills: 6 | OUTPATIENT
Start: 2023-02-14 | End: 2023-02-17 | Stop reason: SDUPTHER

## 2023-02-17 DIAGNOSIS — C22.1 CHOLANGIOCARCINOMA: ICD-10-CM

## 2023-02-17 RX ORDER — CAPECITABINE 500 MG/1
1500 TABLET, FILM COATED ORAL 2 TIMES DAILY
Qty: 84 TABLET | Refills: 6 | Status: SHIPPED | OUTPATIENT
Start: 2023-02-17 | End: 2023-02-24

## 2023-02-17 RX ORDER — CAPECITABINE 500 MG/1
1500 TABLET, FILM COATED ORAL 2 TIMES DAILY
Qty: 84 TABLET | Refills: 6 | OUTPATIENT
Start: 2023-02-17 | End: 2023-02-17

## 2023-02-17 NOTE — TELEPHONE ENCOUNTER
Specialty Pharmacy - Refill Coordination    Specialty Medication Orders Linked to Encounter      Flowsheet Row Most Recent Value   Medication #1 capecitabine (XELODA) 500 MG Tab (Order#[id number], Rx#[id number])          Pt aware of dosage change to 1500mg (3 capsules) twice a day    Refill Questions - Documented Responses      Flowsheet Row Most Recent Value   Patient Availability and HIPAA Verification    Does patient want to proceed with activity? Yes   HIPAA/medical authority confirmed? Yes   Relationship to patient of person spoken to? Self   Refill Screening Questions    Changes to allergies? No   Changes to medications? Yes  [DOse change]   New conditions since last clinic visit? No   Unplanned office visit, urgent care, ED, or hospital admission in the last 4 weeks? No   How does patient/caregiver feel medication is working? Good   Financial problems or insurance changes? No   How many doses of your specialty medications were missed in the last 4 weeks? 0   Would patient like to speak to a pharmacist? No   When does the patient need to receive the medication? 02/23/23   Refill Delivery Questions    How will the patient receive the medication? [facility]   When does the patient need to receive the medication? 02/23/23   Shipping Address Home   Address in [facility] confirmed and updated if neccessary? Yes   Expected Copay ($) 8.4   Is the patient able to afford the medication copay? Yes   Payment Method CC on file   Days supply of Refill 14   Supplies needed? No supplies needed   Refill activity completed? Yes   Refill activity plan Refill scheduled   Shipment/Pickup Date: 02/20/23            Current Outpatient Medications   Medication Sig    candesartan (ATACAND) 8 MG tablet TAKE 1 TABLET(8 MG) BY MOUTH EVERY DAY    capecitabine (XELODA) 500 MG Tab Take 3 tablets (1,500 mg total) by mouth 2 (two) times daily.    citalopram (CELEXA) 20 MG tablet TAKE 1 TABLET BY MOUTH EVERY DAY    levothyroxine (SYNTHROID)  88 MCG tablet TAKE 1 TABLET BY MOUTH EVERY DAY    loperamide (IMODIUM) 2 mg capsule Take two caps at the onset of loose stools.  Then take one cap after every subsequent loose stool.    ondansetron (ZOFRAN) 8 MG tablet Take 1 tablet (8 mg total) by mouth every 8 (eight) hours as needed for Nausea.    rosuvastatin (CRESTOR) 40 MG Tab Take 1 tablet (40 mg total) by mouth every evening.    triamterene-hydrochlorothiazide 37.5-25 mg (DYAZIDE) 37.5-25 mg per capsule TAKE 1 CAPSULE BY MOUTH EVERY MORNING    valACYclovir (VALTREX) 500 MG tablet TAKE 1 TABLET(500 MG) BY MOUTH EVERY DAY    vitamin D (VITAMIN D3) 1000 units Tab Take 1,000 Units by mouth once daily.   Last reviewed on 1/20/2023  3:09 PM by Maxine Fuller, PharmD    Review of patient's allergies indicates:  No Known Allergies Last reviewed on  2/17/2023 7:44 AM by Bennie Moore    Interventions added this encounter   Open: [facility] Provider Intervention: capecitabine (XELODA) 500 MG Tab     Tasks added this encounter   2/28/2023 - Refill Call (Auto Added)   Tasks due within next 3 months   No tasks due.     Anny Dawn, PharmD  [facility]  [address] [place] [id number]  Phone: [phone]  Fax: [phone]

## 2023-02-24 ENCOUNTER — PATIENT MESSAGE (OUTPATIENT)
Dept: ADMINISTRATIVE | Facility: OTHER | Age: 68
End: 2023-02-24
Payer: MEDICARE

## 2023-02-24 ENCOUNTER — LAB VISIT (OUTPATIENT)
Dept: LAB | Facility: HOSPITAL | Age: 68
End: 2023-02-24
Attending: HOSPITALIST
Payer: MEDICARE

## 2023-02-24 ENCOUNTER — OFFICE VISIT (OUTPATIENT)
Dept: HEMATOLOGY/ONCOLOGY | Facility: CLINIC | Age: 68
End: 2023-02-24
Payer: MEDICARE

## 2023-02-24 VITALS
HEIGHT: 61 IN | OXYGEN SATURATION: 99 % | SYSTOLIC BLOOD PRESSURE: 103 MMHG | BODY MASS INDEX: 25.34 KG/M2 | TEMPERATURE: 98 F | DIASTOLIC BLOOD PRESSURE: 54 MMHG | HEART RATE: 87 BPM | RESPIRATION RATE: 18 BRPM | WEIGHT: 134.25 LBS

## 2023-02-24 DIAGNOSIS — F33.42 RECURRENT MAJOR DEPRESSIVE DISORDER, IN FULL REMISSION: ICD-10-CM

## 2023-02-24 DIAGNOSIS — C22.1 CHOLANGIOCARCINOMA: ICD-10-CM

## 2023-02-24 DIAGNOSIS — E89.0 POSTOPERATIVE HYPOTHYROIDISM: ICD-10-CM

## 2023-02-24 DIAGNOSIS — C22.1 CHOLANGIOCARCINOMA: Primary | ICD-10-CM

## 2023-02-24 DIAGNOSIS — I10 ESSENTIAL HYPERTENSION: ICD-10-CM

## 2023-02-24 LAB
ALBUMIN SERPL BCP-MCNC: 4 G/DL (ref 3.5–5.2)
ALP SERPL-CCNC: 116 U/L (ref 55–135)
ALT SERPL W/O P-5'-P-CCNC: 25 U/L (ref 10–44)
ANION GAP SERPL CALC-SCNC: 9 MMOL/L (ref 8–16)
AST SERPL-CCNC: 27 U/L (ref 10–40)
BILIRUB SERPL-MCNC: 1 MG/DL (ref 0.1–1)
BUN SERPL-MCNC: 13 MG/DL (ref 8–23)
CALCIUM SERPL-MCNC: 9.9 MG/DL (ref 8.7–10.5)
CANCER AG19-9 SERPL-ACNC: 19.6 U/ML (ref 0–40)
CHLORIDE SERPL-SCNC: 102 MMOL/L (ref 95–110)
CO2 SERPL-SCNC: 27 MMOL/L (ref 23–29)
CREAT SERPL-MCNC: 0.9 MG/DL (ref 0.5–1.4)
ERYTHROCYTE [DISTWIDTH] IN BLOOD BY AUTOMATED COUNT: 16.5 % (ref 11.5–14.5)
EST. GFR  (NO RACE VARIABLE): >60 ML/MIN/1.73 M^2
GLUCOSE SERPL-MCNC: 90 MG/DL (ref 70–110)
HCT VFR BLD AUTO: 40.1 % (ref 37–48.5)
HGB BLD-MCNC: 12.3 G/DL (ref 12–16)
IMM GRANULOCYTES # BLD AUTO: 0.02 K/UL (ref 0–0.04)
MCH RBC QN AUTO: 28.5 PG (ref 27–31)
MCHC RBC AUTO-ENTMCNC: 30.7 G/DL (ref 32–36)
MCV RBC AUTO: 93 FL (ref 82–98)
NEUTROPHILS # BLD AUTO: 4.1 K/UL (ref 1.8–7.7)
PLATELET # BLD AUTO: 275 K/UL (ref 150–450)
PMV BLD AUTO: 9.7 FL (ref 9.2–12.9)
POTASSIUM SERPL-SCNC: 3.5 MMOL/L (ref 3.5–5.1)
PROT SERPL-MCNC: 7.2 G/DL (ref 6–8.4)
RBC # BLD AUTO: 4.31 M/UL (ref 4–5.4)
SODIUM SERPL-SCNC: 138 MMOL/L (ref 136–145)
WBC # BLD AUTO: 6.01 K/UL (ref 3.9–12.7)

## 2023-02-24 PROCEDURE — 3078F PR MOST RECENT DIASTOLIC BLOOD PRESSURE < 80 MM HG: ICD-10-PCS | Mod: HCNC,CPTII,S$GLB, | Performed by: HOSPITALIST

## 2023-02-24 PROCEDURE — 4010F PR ACE/ARB THEARPY RXD/TAKEN: ICD-10-PCS | Mod: HCNC,CPTII,S$GLB, | Performed by: HOSPITALIST

## 2023-02-24 PROCEDURE — 4010F ACE/ARB THERAPY RXD/TAKEN: CPT | Mod: HCNC,CPTII,S$GLB, | Performed by: HOSPITALIST

## 2023-02-24 PROCEDURE — 3288F PR FALLS RISK ASSESSMENT DOCUMENTED: ICD-10-PCS | Mod: HCNC,CPTII,S$GLB, | Performed by: HOSPITALIST

## 2023-02-24 PROCEDURE — 86301 IMMUNOASSAY TUMOR CA 19-9: CPT | Mod: HCNC | Performed by: HOSPITALIST

## 2023-02-24 PROCEDURE — 80053 COMPREHEN METABOLIC PANEL: CPT | Mod: HCNC | Performed by: HOSPITALIST

## 2023-02-24 PROCEDURE — 3008F BODY MASS INDEX DOCD: CPT | Mod: HCNC,CPTII,S$GLB, | Performed by: HOSPITALIST

## 2023-02-24 PROCEDURE — 1160F PR REVIEW ALL MEDS BY PRESCRIBER/CLIN PHARMACIST DOCUMENTED: ICD-10-PCS | Mod: HCNC,CPTII,S$GLB, | Performed by: HOSPITALIST

## 2023-02-24 PROCEDURE — 36415 COLL VENOUS BLD VENIPUNCTURE: CPT | Mod: HCNC | Performed by: HOSPITALIST

## 2023-02-24 PROCEDURE — 3074F PR MOST RECENT SYSTOLIC BLOOD PRESSURE < 130 MM HG: ICD-10-PCS | Mod: HCNC,CPTII,S$GLB, | Performed by: HOSPITALIST

## 2023-02-24 PROCEDURE — 99999 PR PBB SHADOW E&M-EST. PATIENT-LVL III: CPT | Mod: PBBFAC,HCNC,, | Performed by: HOSPITALIST

## 2023-02-24 PROCEDURE — 3288F FALL RISK ASSESSMENT DOCD: CPT | Mod: HCNC,CPTII,S$GLB, | Performed by: HOSPITALIST

## 2023-02-24 PROCEDURE — 1126F AMNT PAIN NOTED NONE PRSNT: CPT | Mod: HCNC,CPTII,S$GLB, | Performed by: HOSPITALIST

## 2023-02-24 PROCEDURE — 99215 OFFICE O/P EST HI 40 MIN: CPT | Mod: HCNC,S$GLB,, | Performed by: HOSPITALIST

## 2023-02-24 PROCEDURE — 1101F PT FALLS ASSESS-DOCD LE1/YR: CPT | Mod: HCNC,CPTII,S$GLB, | Performed by: HOSPITALIST

## 2023-02-24 PROCEDURE — 1159F PR MEDICATION LIST DOCUMENTED IN MEDICAL RECORD: ICD-10-PCS | Mod: HCNC,CPTII,S$GLB, | Performed by: HOSPITALIST

## 2023-02-24 PROCEDURE — 99499 UNLISTED E&M SERVICE: CPT | Mod: HCNC,S$GLB,, | Performed by: HOSPITALIST

## 2023-02-24 PROCEDURE — 3078F DIAST BP <80 MM HG: CPT | Mod: HCNC,CPTII,S$GLB, | Performed by: HOSPITALIST

## 2023-02-24 PROCEDURE — 99215 PR OFFICE/OUTPT VISIT, EST, LEVL V, 40-54 MIN: ICD-10-PCS | Mod: HCNC,S$GLB,, | Performed by: HOSPITALIST

## 2023-02-24 PROCEDURE — 3074F SYST BP LT 130 MM HG: CPT | Mod: HCNC,CPTII,S$GLB, | Performed by: HOSPITALIST

## 2023-02-24 PROCEDURE — 1126F PR PAIN SEVERITY QUANTIFIED, NO PAIN PRESENT: ICD-10-PCS | Mod: HCNC,CPTII,S$GLB, | Performed by: HOSPITALIST

## 2023-02-24 PROCEDURE — 1101F PR PT FALLS ASSESS DOC 0-1 FALLS W/OUT INJ PAST YR: ICD-10-PCS | Mod: HCNC,CPTII,S$GLB, | Performed by: HOSPITALIST

## 2023-02-24 PROCEDURE — 85027 COMPLETE CBC AUTOMATED: CPT | Mod: HCNC | Performed by: HOSPITALIST

## 2023-02-24 PROCEDURE — 99999 PR PBB SHADOW E&M-EST. PATIENT-LVL III: ICD-10-PCS | Mod: PBBFAC,HCNC,, | Performed by: HOSPITALIST

## 2023-02-24 PROCEDURE — 1159F MED LIST DOCD IN RCRD: CPT | Mod: HCNC,CPTII,S$GLB, | Performed by: HOSPITALIST

## 2023-02-24 PROCEDURE — 1160F RVW MEDS BY RX/DR IN RCRD: CPT | Mod: HCNC,CPTII,S$GLB, | Performed by: HOSPITALIST

## 2023-02-24 PROCEDURE — 3008F PR BODY MASS INDEX (BMI) DOCUMENTED: ICD-10-PCS | Mod: HCNC,CPTII,S$GLB, | Performed by: HOSPITALIST

## 2023-02-24 PROCEDURE — 99499 RISK ADDL DX/OHS AUDIT: ICD-10-PCS | Mod: HCNC,S$GLB,, | Performed by: HOSPITALIST

## 2023-02-24 RX ORDER — CAPECITABINE 500 MG/1
1500 TABLET, FILM COATED ORAL 2 TIMES DAILY
Qty: 84 TABLET | Refills: 3 | Status: SHIPPED | OUTPATIENT
Start: 2023-02-24 | End: 2023-05-26 | Stop reason: SDUPTHER

## 2023-02-24 RX ORDER — CAPECITABINE 150 MG/1
150 TABLET, FILM COATED ORAL 2 TIMES DAILY
Qty: 28 TABLET | Refills: 3 | Status: SHIPPED | OUTPATIENT
Start: 2023-02-24 | End: 2023-05-26 | Stop reason: SDUPTHER

## 2023-02-24 NOTE — ASSESSMENT & PLAN NOTE
Did not tolerate fulle 1250mg/m2 capecitabine dose for C1. Empirically decreased mid cycle to 1500mg po bid (~925mg/m2) which she tolerated much better.    - Con't 1500mg po bid capecitabine for C2  - Will increase capecitabine to 1650mg bid for C3 (closer to 1000mg/m2)

## 2023-02-24 NOTE — ASSESSMENT & PLAN NOTE
Blood pressures slightly low today. Will monitor closely and consider holding trimaterene-HCTZ if progressive dehydration.  - Continue current medications  - Enrollement in [facility]

## 2023-02-24 NOTE — PATIENT INSTRUCTIONS
Tolerated lower dose capecitabine well. Will continue current 1500mg twice daily dose for this cycle.     - Continue capecitabine 1500mg (3 tablets) twice daily for 14 days (2/24/23 - 3/9/23)    - FU in clinic 3/17/23 to start C3    - Will fill prescription for C3 at 1650mg twice daily    - Enroll in [employer]  to monitor blood pressures at home; may consider holding triamterene-HCTZ if remains low    - Symptomatic support with loperamide for diarrhea and ondansetron for nausea and Udderly Smooth cream for hand symptoms.

## 2023-02-24 NOTE — PROGRESS NOTES
MEDICAL ONCOLOGY FOLLOW-UP VISIT.     Best Contact Phone Number(s): [phone] (home)      Cancer/Stage/TNM:    Cancer Staging   Cholangiocarcinoma  Staging form: Intrahepatic Bile Duct, AJCC 8th Edition  - Pathologic: Stage II (pT2, pN0, cM0) - Unsigned       Reason for visit: Rehana Whitten is a 67 y.o. female remote history of HBV who was found to have stage II pT2N0 intrahepatic cholangiocarcinoma in the setting of abdominal pain who underwent R0 resection 12/12/23. She presents to medical oncology clinic to start C2 of adjuvant capecitabine.      Interval History:     Started C1 2/3/23 at 1250mg/m2 (2000 po bid). Developed significant nausea and emesis within a few days. Held dose x2 days and resumed at 1500mg po bid. Nausea cleared very quickly after holding.     Tolerated this dose much better. Noted ongoing fatigue with some difficulty sleeping. Appetite has been OK. No signficant hand-foot symptoms. No diarrhea, actually developed constipation; took miralax to good effect. Developed some sinus congestion during week off. No FC.       Oncology History   Cholangiocarcinoma   11/9/2022 Imaging Significant Findings    Abdominal US in the setting of abdominal pain shows a large heterogeneous mass in the right lobe, measures 7.2 x 5.3 x 5.9 cm.     11/9/2022 Imaging Significant Findings    CT a/p:  Irregular right hepatic lobe lesion demonstrating delayed central enhancement and overlying capsular retraction.  Capsular retraction can be seen in cholangiocarcinoma and sclerosing hepatic hemangiomas.  Differential includes metastatic disease and other primary hepatic neoplasms.     11/14/2022 Tumor Markers    CA 19-9: 19.1  AFP: 5.5     11/16/2022 Imaging Significant Findings    CT chest:  1. No specific CT evidence of metastatic disease within the chest.  2. Left lower lobe punctate micro nodules.  Attention on continued surveillance imaging.  3. Other findings as above.     11/30/2022 Biopsy     "Percutaneous biopsy of liver mass: Moderately differentiated adenocarcinoma. CK7+ Negative for CK20, CDX2, GATA3, TTF1, and PAX 8. Thought consistent with pancraetobiliary primary.     12/12/2022 Surgery    Resection of right hepatic lobe tumor. Pathology shows moderately differentiated adenocarcinoma up to 6.5cm in size c/w intrahepatic cholangiocarcinoma. Tumor was confined to the hepatic parenchyma with associated  LVI but no PNI. R0 resection with 6mm margins. 06/ LN involved. [id number]     1/13/2023 -  Chemotherapy    Treatment Summary   Plan Name: OP CAPECITABINE 1250MG/M2 BID Q3W  Treatment Goal: Control  Status: Active  Start Date: 1/13/2023  End Date: 1/13/2023  Provider: Bennie Moore MD  Chemotherapy: capecitabine (XELODA) 500 MG Tab, 1,250 mg/m2 = 2,000 mg, Oral, 2 times daily, 1 of 1 cycle, Start date: 1/13/2023, End date: --       2/24/2023 -  Chemotherapy    C2D1 capectiabine; DR to 1500mg po bid              Physical Exam:   BP (!) 103/54 (BP Location: Left arm, Patient Position: Sitting, BP Method: Medium (Automatic))   Pulse 87   Temp 98.1 °F (36.7 °C) (Oral)   Resp 18   Ht 5' 1" (1.549 m)   Wt 60.9 kg (134 lb 4.2 oz)   LMP  (LMP Unknown)   SpO2 99%   BMI 25.37 kg/m²      ECOG Performance Status: (foot note - ECOG PS provided by Eastern Cooperative Oncology Group) 0 - Asymptomatic    Physical Exam  Constitutional:       General: She is not in acute distress.     Appearance: She is normal weight.   HENT:      Head: Normocephalic.      Mouth/Throat:      Mouth: Mucous membranes are moist.      Pharynx: Oropharynx is clear. No oropharyngeal exudate or posterior oropharyngeal erythema.   Eyes:      General: No scleral icterus.     Conjunctiva/sclera: Conjunctivae normal.   Cardiovascular:      Rate and Rhythm: Normal rate and regular rhythm.      Heart sounds: No murmur heard.    No friction rub. No gallop.   Pulmonary:      Effort: Pulmonary effort is normal. No respiratory distress.      " Breath sounds: Normal breath sounds.   Abdominal:      General: There is no distension.      Palpations: Abdomen is soft.      Tenderness: There is no abdominal tenderness.      Comments: Well healed midline surgical scar; small ventral hernia   Musculoskeletal:         General: Normal range of motion.      Cervical back: Normal range of motion and neck supple.      Right lower leg: No edema.      Left lower leg: No edema.   Lymphadenopathy:      Cervical: No cervical adenopathy.   Skin:     General: Skin is warm.      Coloration: Skin is not jaundiced.      Findings: No bruising or rash.   Neurological:      General: No focal deficit present.      Mental Status: She is alert and oriented to person, place, and time.      Motor: No weakness.   Psychiatric:         Mood and Affect: Mood normal.         Behavior: Behavior normal.         Thought Content: Thought content normal.         Labs:   Recent Results (from the past 48 hour(s))   Cancer Antigen 19-9    Collection Time: 02/24/23  9:10 AM   Result Value Ref Range    CA 19-9 19.6 0.0 - 40.0 U/mL   CBC Oncology    Collection Time: 02/24/23  9:10 AM   Result Value Ref Range    WBC 6.01 3.90 - 12.70 K/uL    RBC 4.31 4.00 - 5.40 M/uL    Hemoglobin 12.3 12.0 - 16.0 g/dL    Hematocrit 40.1 37.0 - 48.5 %    MCV 93 82 - 98 fL    MCH 28.5 27.0 - 31.0 pg    MCHC 30.7 (L) 32.0 - 36.0 g/dL    RDW 16.5 (H) 11.5 - 14.5 %    Platelets 275 150 - 450 K/uL    MPV 9.7 9.2 - 12.9 fL    Gran # (ANC) 4.1 1.8 - 7.7 K/uL    Immature Grans (Abs) 0.02 0.00 - 0.04 K/uL   Comprehensive Metabolic Panel    Collection Time: 02/24/23  9:10 AM   Result Value Ref Range    Sodium 138 136 - 145 mmol/L    Potassium 3.5 3.5 - 5.1 mmol/L    Chloride 102 95 - 110 mmol/L    CO2 27 23 - 29 mmol/L    Glucose 90 70 - 110 mg/dL    BUN 13 8 - 23 mg/dL    Creatinine 0.9 0.5 - 1.4 mg/dL    Calcium 9.9 8.7 - 10.5 mg/dL    Total Protein 7.2 6.0 - 8.4 g/dL    Albumin 4.0 3.5 - 5.2 g/dL    Total Bilirubin 1.0 0.1 -  1.0 mg/dL    Alkaline Phosphatase 116 55 - 135 U/L    AST 27 10 - 40 U/L    ALT 25 10 - 44 U/L    Anion Gap 9 8 - 16 mmol/L    eGFR >60.0 >60 mL/min/1.73 m^2            Imaging:     X-Ray Chest 1 View  Narrative: EXAMINATION:  XR CHEST 1 VIEW    CLINICAL HISTORY:  s/p R lobe liver resection. Mild hypoxia.;    TECHNIQUE:  Single frontal view of the chest was performed.    COMPARISON:  12/09/2022    FINDINGS:  Heart size is normal.  Mediastinum shows aortic atherosclerosis.  Lungs are not as well expanded for the current examination.  Left lung has a couple bands of opacity in the lower zone consistent with plate atelectasis.  Right lower lung zone shows volume loss with elevation of the diaphragm and patchy opacification likely representing atelectasis.  Some airspace consolidation such as pneumonia or aspiration is also possible.  Small right pleural effusion is probably present.  No pneumothorax detected.  Skeletal structures look intact.  Surgical changes are noted in the liver area.  Impression: Interval hepatic surgery.  Small right pleural effusion.  Volume loss and opacity at right lung base likely representing atelectasis.    Electronically signed by: Ian Fan MD  Date:    12/16/2022  Time:    08:03            Diagnoses:       1. Cholangiocarcinoma    2. Recurrent major depressive disorder, in full remission    3. Essential hypertension    4. Postoperative hypothyroidism            Assessment and Plan:     1. Cholangiocarcinoma  Overview:  Stage II (pT2N0) IHCC sp R0 resection 12/12/23. Plan Started adjuvant capecitabine x6 months per [facility] 2/3/23.    Assessment & Plan:  Did not tolerate fulle 1250mg/m2 capecitabine dose for C1. Empirically decreased mid cycle to 1500mg po bid (~925mg/m2) which she tolerated much better.    - Con't 1500mg po bid capecitabine for C2  - Will increase capecitabine to 1650mg bid for C3 (closer to 1000mg/m2)    Orders:  -     [facility] Enrollment Chemotherapy  -      Assign Chemotherapy Program Consent Questionaire  -     capecitabine (XELODA) 150 MG tablet; Take 1 tablet (150 mg total) by mouth 2 (two) times daily for days 1-14 of every 21 day chemotherapy cycle with 1 other capecitabine prescription for 1,650 mg total.  Dispense: 28 tablet; Refill: 3  -     capecitabine (XELODA) 500 MG Tab; Take 3 tablets (1,500 mg total) by mouth 2 (two) times daily for days 1-14 of every 21 day chemotherapy cycle with 1 other capecitabine prescription for 1,650 mg total.  Dispense: 84 tablet; Refill: 3    2. Recurrent major depressive disorder, in full remission  Overview:  Home medications include citalopram    Assessment & Plan:  Continue home medications      3. Essential hypertension  Overview:  Home medications include candesartan and triameternet-HCTZ    Assessment & Plan:  Blood pressures slightly low today. Will monitor closely and consider holding trimaterene-HCTZ if progressive dehydration.  - Continue current medications  - Enrollement in [facility]    Orders:  -     [facility] Enrollment Chemotherapy  -     Assign Chemotherapy Program Consent Questionaire    4. Postoperative hypothyroidism  Overview:  Home medication includes levothyroxine    Assessment & Plan:  - Contine home levothyroxine               Route Chart for Scheduling    Med Onc Chart Routing      Follow up with physician . ALANNA Moore 3/17/23   Follow up with [facility]    Infusion scheduling note    Injection scheduling note    Labs CBC and CMP   Lab interval:     Imaging    Pharmacy appointment    Other referrals        Treatment Plan Information   OP CAPECITABINE 1250MG/M2 BID Q3W   Bennie Moore MD   Upcoming Treatment Dates - OP CAPECITABINE 1250MG/M2 BID Q3W    No upcoming days in selected categories.       Bennie Moore MD  Hematology/Oncology  [facility]

## 2023-02-27 ENCOUNTER — SPECIALTY PHARMACY (OUTPATIENT)
Dept: PHARMACY | Facility: CLINIC | Age: 68
End: 2023-02-27
Payer: MEDICARE

## 2023-02-27 NOTE — TELEPHONE ENCOUNTER
Dose change to capecitabine 1,650 mg twice daily.  Test claim:  150 mg tablet #28 for $2.90 copay  500 mg tablet #84 last dispensed on 2/11 RTS until 2/28  Paid $8.40 for last fill

## 2023-03-02 ENCOUNTER — PATIENT MESSAGE (OUTPATIENT)
Dept: ADMINISTRATIVE | Facility: OTHER | Age: 68
End: 2023-03-02
Payer: MEDICARE

## 2023-03-07 ENCOUNTER — SPECIALTY PHARMACY (OUTPATIENT)
Dept: PHARMACY | Facility: CLINIC | Age: 68
End: 2023-03-07
Payer: MEDICARE

## 2023-03-07 NOTE — TELEPHONE ENCOUNTER
Specialty Pharmacy - Refill Coordination    Patient will start her new dose 1650 mg BID X14 days on 7 days off on 3/17. she was counseled on dose change during the refill call .    Patient started a new medication HCTZ. No DDIs       Specialty Medication Orders Linked to Encounter      Flowsheet Row Most Recent Value   Medication #1 capecitabine (XELODA) 150 MG tablet (Order#[id number], Rx#[id number])   Medication #2 capecitabine (XELODA) 500 MG Tab (Order#[id number], Rx#[id number])            Refill Questions - Documented Responses      Flowsheet Row Most Recent Value   Patient Availability and HIPAA Verification    Does patient want to proceed with activity? Yes   HIPAA/medical authority confirmed? Yes   Relationship to patient of person spoken to? Self   Refill Screening Questions    Changes to allergies? No   Changes to medications? Yes   New conditions since last clinic visit? No   Unplanned office visit, urgent care, ED, or hospital admission in the last 4 weeks? No   How does patient/caregiver feel medication is working? Good   Financial problems or insurance changes? No   How many doses of your specialty medications were missed in the last 4 weeks? 0   Would patient like to speak to a pharmacist? No   When does the patient need to receive the medication? 03/17/23   Refill Delivery Questions    How will the patient receive the medication? [facility]   When does the patient need to receive the medication? 03/17/23   Shipping Address Home   Address in [facility] confirmed and updated if neccessary? Yes   Expected Copay ($) 11.3   Is the patient able to afford the medication copay? Yes   Payment Method CC on file   Days supply of Refill 21   Supplies needed? No supplies needed   Refill activity completed? Yes   Refill activity plan Refill scheduled   Shipment/Pickup Date: 03/14/23            Current Outpatient Medications   Medication Sig    hydroCHLOROthiazide (HYDRODIURIL) 25 MG tablet Take 1 tablet (25 mg  total) by mouth once daily.    candesartan (ATACAND) 8 MG tablet TAKE 1 TABLET(8 MG) BY MOUTH EVERY DAY    capecitabine (XELODA) 150 MG tablet Take 1 tablet (150 mg total) by mouth 2 (two) times daily for days 1-14 of every 21 day chemotherapy cycle with 1 other capecitabine prescription for 1,650 mg total.    capecitabine (XELODA) 500 MG Tab Take 3 tablets (1,500 mg total) by mouth 2 (two) times daily for days 1-14 of every 21 day chemotherapy cycle with 1 other capecitabine prescription for 1,650 mg total.    citalopram (CELEXA) 20 MG tablet TAKE 1 TABLET BY MOUTH EVERY DAY    levothyroxine (SYNTHROID) 88 MCG tablet TAKE 1 TABLET BY MOUTH EVERY DAY    loperamide (IMODIUM) 2 mg capsule Take two caps at the onset of loose stools.  Then take one cap after every subsequent loose stool.    ondansetron (ZOFRAN) 8 MG tablet Take 1 tablet (8 mg total) by mouth every 8 (eight) hours as needed for Nausea.    rosuvastatin (CRESTOR) 40 MG Tab Take 1 tablet (40 mg total) by mouth every evening.    valACYclovir (VALTREX) 500 MG tablet TAKE 1 TABLET(500 MG) BY MOUTH EVERY DAY    vitamin D (VITAMIN D3) 1000 units Tab Take 1,000 Units by mouth once daily.   Last reviewed on 3/2/2023 11:29 AM by GISELLE Bailey    Review of patient's allergies indicates:  No Known Allergies Last reviewed on  3/2/2023 11:42 AM by Bigg Amador      Tasks added this encounter   No tasks added.   Tasks due within next 3 months   3/7/2023 - Refill Call (Auto Added)     WALESKA STACY, PharmD  [facility]  [address] [place] [id number]  Phone: [phone]  Fax: [phone]

## 2023-03-17 ENCOUNTER — PATIENT MESSAGE (OUTPATIENT)
Dept: ADMINISTRATIVE | Facility: OTHER | Age: 68
End: 2023-03-17
Payer: MEDICARE

## 2023-03-17 ENCOUNTER — OFFICE VISIT (OUTPATIENT)
Dept: HEMATOLOGY/ONCOLOGY | Facility: CLINIC | Age: 68
End: 2023-03-17
Payer: MEDICARE

## 2023-03-17 ENCOUNTER — LAB VISIT (OUTPATIENT)
Dept: LAB | Facility: HOSPITAL | Age: 68
End: 2023-03-17
Attending: HOSPITALIST
Payer: MEDICARE

## 2023-03-17 VITALS
HEART RATE: 77 BPM | DIASTOLIC BLOOD PRESSURE: 62 MMHG | WEIGHT: 135.81 LBS | SYSTOLIC BLOOD PRESSURE: 138 MMHG | BODY MASS INDEX: 25.64 KG/M2 | HEIGHT: 61 IN | RESPIRATION RATE: 18 BRPM

## 2023-03-17 DIAGNOSIS — C22.1 CHOLANGIOCARCINOMA: ICD-10-CM

## 2023-03-17 DIAGNOSIS — F33.42 RECURRENT MAJOR DEPRESSIVE DISORDER, IN FULL REMISSION: ICD-10-CM

## 2023-03-17 DIAGNOSIS — C22.1 CHOLANGIOCARCINOMA: Primary | ICD-10-CM

## 2023-03-17 DIAGNOSIS — I10 ESSENTIAL HYPERTENSION: ICD-10-CM

## 2023-03-17 DIAGNOSIS — E89.0 POSTOPERATIVE HYPOTHYROIDISM: ICD-10-CM

## 2023-03-17 LAB
ALBUMIN SERPL BCP-MCNC: 4.3 G/DL (ref 3.5–5.2)
ALP SERPL-CCNC: 102 U/L (ref 55–135)
ALT SERPL W/O P-5'-P-CCNC: 24 U/L (ref 10–44)
ANION GAP SERPL CALC-SCNC: 8 MMOL/L (ref 8–16)
AST SERPL-CCNC: 31 U/L (ref 10–40)
BILIRUB SERPL-MCNC: 1.3 MG/DL (ref 0.1–1)
BUN SERPL-MCNC: 15 MG/DL (ref 8–23)
CALCIUM SERPL-MCNC: 9.9 MG/DL (ref 8.7–10.5)
CHLORIDE SERPL-SCNC: 101 MMOL/L (ref 95–110)
CO2 SERPL-SCNC: 31 MMOL/L (ref 23–29)
CREAT SERPL-MCNC: 0.9 MG/DL (ref 0.5–1.4)
ERYTHROCYTE [DISTWIDTH] IN BLOOD BY AUTOMATED COUNT: 19.1 % (ref 11.5–14.5)
EST. GFR  (NO RACE VARIABLE): >60 ML/MIN/1.73 M^2
GLUCOSE SERPL-MCNC: 136 MG/DL (ref 70–110)
HCT VFR BLD AUTO: 39.2 % (ref 37–48.5)
HGB BLD-MCNC: 12.4 G/DL (ref 12–16)
IMM GRANULOCYTES # BLD AUTO: 0.02 K/UL (ref 0–0.04)
MCH RBC QN AUTO: 29.4 PG (ref 27–31)
MCHC RBC AUTO-ENTMCNC: 31.6 G/DL (ref 32–36)
MCV RBC AUTO: 93 FL (ref 82–98)
NEUTROPHILS # BLD AUTO: 3.2 K/UL (ref 1.8–7.7)
PLATELET # BLD AUTO: 202 K/UL (ref 150–450)
PMV BLD AUTO: 10.2 FL (ref 9.2–12.9)
POTASSIUM SERPL-SCNC: 3.3 MMOL/L (ref 3.5–5.1)
PROT SERPL-MCNC: 7.3 G/DL (ref 6–8.4)
RBC # BLD AUTO: 4.22 M/UL (ref 4–5.4)
SODIUM SERPL-SCNC: 140 MMOL/L (ref 136–145)
WBC # BLD AUTO: 5.1 K/UL (ref 3.9–12.7)

## 2023-03-17 PROCEDURE — 3288F PR FALLS RISK ASSESSMENT DOCUMENTED: ICD-10-PCS | Mod: HCNC,CPTII,S$GLB, | Performed by: HOSPITALIST

## 2023-03-17 PROCEDURE — 3008F BODY MASS INDEX DOCD: CPT | Mod: HCNC,CPTII,S$GLB, | Performed by: HOSPITALIST

## 2023-03-17 PROCEDURE — 1101F PR PT FALLS ASSESS DOC 0-1 FALLS W/OUT INJ PAST YR: ICD-10-PCS | Mod: HCNC,CPTII,S$GLB, | Performed by: HOSPITALIST

## 2023-03-17 PROCEDURE — 99999 PR PBB SHADOW E&M-EST. PATIENT-LVL III: ICD-10-PCS | Mod: PBBFAC,HCNC,, | Performed by: HOSPITALIST

## 2023-03-17 PROCEDURE — 3008F PR BODY MASS INDEX (BMI) DOCUMENTED: ICD-10-PCS | Mod: HCNC,CPTII,S$GLB, | Performed by: HOSPITALIST

## 2023-03-17 PROCEDURE — 99215 PR OFFICE/OUTPT VISIT, EST, LEVL V, 40-54 MIN: ICD-10-PCS | Mod: HCNC,S$GLB,, | Performed by: HOSPITALIST

## 2023-03-17 PROCEDURE — 4010F PR ACE/ARB THEARPY RXD/TAKEN: ICD-10-PCS | Mod: HCNC,CPTII,S$GLB, | Performed by: HOSPITALIST

## 2023-03-17 PROCEDURE — 99999 PR PBB SHADOW E&M-EST. PATIENT-LVL III: CPT | Mod: PBBFAC,HCNC,, | Performed by: HOSPITALIST

## 2023-03-17 PROCEDURE — 1101F PT FALLS ASSESS-DOCD LE1/YR: CPT | Mod: HCNC,CPTII,S$GLB, | Performed by: HOSPITALIST

## 2023-03-17 PROCEDURE — 3078F DIAST BP <80 MM HG: CPT | Mod: HCNC,CPTII,S$GLB, | Performed by: HOSPITALIST

## 2023-03-17 PROCEDURE — 3078F PR MOST RECENT DIASTOLIC BLOOD PRESSURE < 80 MM HG: ICD-10-PCS | Mod: HCNC,CPTII,S$GLB, | Performed by: HOSPITALIST

## 2023-03-17 PROCEDURE — 99215 OFFICE O/P EST HI 40 MIN: CPT | Mod: HCNC,S$GLB,, | Performed by: HOSPITALIST

## 2023-03-17 PROCEDURE — 4010F ACE/ARB THERAPY RXD/TAKEN: CPT | Mod: HCNC,CPTII,S$GLB, | Performed by: HOSPITALIST

## 2023-03-17 PROCEDURE — 99499 UNLISTED E&M SERVICE: CPT | Mod: HCNC,S$GLB,, | Performed by: HOSPITALIST

## 2023-03-17 PROCEDURE — 3075F PR MOST RECENT SYSTOLIC BLOOD PRESS GE 130-139MM HG: ICD-10-PCS | Mod: HCNC,CPTII,S$GLB, | Performed by: HOSPITALIST

## 2023-03-17 PROCEDURE — 80053 COMPREHEN METABOLIC PANEL: CPT | Mod: HCNC | Performed by: HOSPITALIST

## 2023-03-17 PROCEDURE — 3288F FALL RISK ASSESSMENT DOCD: CPT | Mod: HCNC,CPTII,S$GLB, | Performed by: HOSPITALIST

## 2023-03-17 PROCEDURE — 99499 RISK ADDL DX/OHS AUDIT: ICD-10-PCS | Mod: HCNC,S$GLB,, | Performed by: HOSPITALIST

## 2023-03-17 PROCEDURE — 85027 COMPLETE CBC AUTOMATED: CPT | Mod: HCNC | Performed by: HOSPITALIST

## 2023-03-17 PROCEDURE — 3075F SYST BP GE 130 - 139MM HG: CPT | Mod: HCNC,CPTII,S$GLB, | Performed by: HOSPITALIST

## 2023-03-17 PROCEDURE — 36415 COLL VENOUS BLD VENIPUNCTURE: CPT | Mod: HCNC | Performed by: HOSPITALIST

## 2023-03-17 NOTE — ASSESSMENT & PLAN NOTE
Tolerated C2 at 1500mg po bid quite well with only mild stomatitis. Will increase dose to 1650mg po bid (closer to proprer 1000mg/m2 dosing) for this cycle. Plan to reimage after C4.    - Capecitabine 1650mg po bid D1-14 q21 days; today is C3D1  - FU in 3 weeks for C4  - Image after C4

## 2023-03-17 NOTE — PROGRESS NOTES
MEDICAL ONCOLOGY FOLLOW-UP VISIT.     Best Contact Phone Number(s): [phone] (home)      Cancer/Stage/TNM:    Cancer Staging   Cholangiocarcinoma  Staging form: Intrahepatic Bile Duct, AJCC 8th Edition  - Pathologic: Stage II (pT2, pN0, cM0) - Unsigned       Reason for visit: Rehana Whitten is a 67 y.o. female remote history of HBV who was found to have stage II pT2N0 intrahepatic cholangiocarcinoma in the setting of abdominal pain who underwent R0 resection 12/12/23. She presents to medical oncology clinic prior to start of C3 of adjuvant capecitabine.      Interval History:     C2 at 1500mg po bid. Tolerated reasonably well. Had mild stomatis near the end of the cycle but generally tolerable. No issues with eating or drinking. Has occaisional mild nauasea. No emesis. No issues with diarrhea. Continues to have some ongoing constipation. Does use miralax on occaision. No FC. No CP, SOB or cough. Continues to have some intermittent insomnia. No other concerns.    Is monitoring blood pressure at home. Had episodes into 90's with associated light headedness. Recently stopped triamterene to good effect. Only on HCTZ 25mg daily.      Oncology History   Cholangiocarcinoma   11/9/2022 Imaging Significant Findings    Abdominal US in the setting of abdominal pain shows a large heterogeneous mass in the right lobe, measures 7.2 x 5.3 x 5.9 cm.     11/9/2022 Imaging Significant Findings    CT a/p:  Irregular right hepatic lobe lesion demonstrating delayed central enhancement and overlying capsular retraction.  Capsular retraction can be seen in cholangiocarcinoma and sclerosing hepatic hemangiomas.  Differential includes metastatic disease and other primary hepatic neoplasms.     11/14/2022 Tumor Markers    CA 19-9: 19.1  AFP: 5.5     11/16/2022 Imaging Significant Findings    CT chest:  1. No specific CT evidence of metastatic disease within the chest.  2. Left lower lobe punctate micro nodules.  Attention on  "continued surveillance imaging.  3. Other findings as above.     11/30/2022 Biopsy    Percutaneous biopsy of liver mass: Moderately differentiated adenocarcinoma. CK7+ Negative for CK20, CDX2, GATA3, TTF1, and PAX 8. Thought consistent with pancraetobiliary primary.     12/12/2022 Surgery    Resection of right hepatic lobe tumor. Pathology shows moderately differentiated adenocarcinoma up to 6.5cm in size c/w intrahepatic cholangiocarcinoma. Tumor was confined to the hepatic parenchyma with associated  LVI but no PNI. R0 resection with 6mm margins. 06/ LN involved. [id number]     1/13/2023 -  Chemotherapy    Treatment Summary   Plan Name: OP CAPECITABINE 1250MG/M2 BID Q3W  Treatment Goal: Control  Status: Active  Start Date: 1/13/2023  End Date: 1/13/2023  Provider: Bennie Moore MD  Chemotherapy: capecitabine (XELODA) 500 MG Tab, 1,250 mg/m2 = 2,000 mg, Oral, 2 times daily, 1 of 1 cycle, Start date: 1/13/2023, End date: --       2/24/2023 -  Chemotherapy    C2D1 capectiabine; DR to 1500mg po bid              Physical Exam:   /62 (BP Location: Left arm, Patient Position: Sitting, BP Method: Medium (Automatic))   Pulse 77   Resp 18   Ht 5' 1" (1.549 m)   Wt 61.6 kg (135 lb 12.9 oz)   LMP  (LMP Unknown)   BMI 25.66 kg/m²      ECOG Performance Status: (foot note - ECOG PS provided by Eastern Cooperative Oncology Group) 0 - Asymptomatic    Physical Exam  Constitutional:       General: She is not in acute distress.     Appearance: She is normal weight.   HENT:      Head: Normocephalic.      Mouth/Throat:      Mouth: Mucous membranes are moist.      Pharynx: Oropharynx is clear. No oropharyngeal exudate or posterior oropharyngeal erythema.   Eyes:      General: No scleral icterus.     Conjunctiva/sclera: Conjunctivae normal.   Cardiovascular:      Rate and Rhythm: Normal rate and regular rhythm.      Heart sounds: No murmur heard.    No friction rub. No gallop.   Pulmonary:      Effort: Pulmonary effort is " normal. No respiratory distress.      Breath sounds: Normal breath sounds.   Abdominal:      General: There is no distension.      Palpations: Abdomen is soft.      Tenderness: There is no abdominal tenderness.      Comments: Well healed midline surgical scar; small ventral hernia   Musculoskeletal:         General: Normal range of motion.      Cervical back: Normal range of motion and neck supple.      Right lower leg: No edema.      Left lower leg: No edema.   Lymphadenopathy:      Cervical: No cervical adenopathy.   Skin:     General: Skin is warm.      Coloration: Skin is not jaundiced.      Findings: No bruising or rash.   Neurological:      General: No focal deficit present.      Mental Status: She is alert and oriented to person, place, and time.      Motor: No weakness.   Psychiatric:         Mood and Affect: Mood normal.         Behavior: Behavior normal.         Thought Content: Thought content normal.         Labs:   Recent Results (from the past 48 hour(s))   CBC Oncology    Collection Time: 03/17/23  1:25 PM   Result Value Ref Range    WBC 5.10 3.90 - 12.70 K/uL    RBC 4.22 4.00 - 5.40 M/uL    Hemoglobin 12.4 12.0 - 16.0 g/dL    Hematocrit 39.2 37.0 - 48.5 %    MCV 93 82 - 98 fL    MCH 29.4 27.0 - 31.0 pg    MCHC 31.6 (L) 32.0 - 36.0 g/dL    RDW 19.1 (H) 11.5 - 14.5 %    Platelets 202 150 - 450 K/uL    MPV 10.2 9.2 - 12.9 fL    Gran # (ANC) 3.2 1.8 - 7.7 K/uL    Immature Grans (Abs) 0.02 0.00 - 0.04 K/uL              Imaging:     X-Ray Chest 1 View  Narrative: EXAMINATION:  XR CHEST 1 VIEW    CLINICAL HISTORY:  s/p R lobe liver resection. Mild hypoxia.;    TECHNIQUE:  Single frontal view of the chest was performed.    COMPARISON:  12/09/2022    FINDINGS:  Heart size is normal.  Mediastinum shows aortic atherosclerosis.  Lungs are not as well expanded for the current examination.  Left lung has a couple bands of opacity in the lower zone consistent with plate atelectasis.  Right lower lung zone shows  volume loss with elevation of the diaphragm and patchy opacification likely representing atelectasis.  Some airspace consolidation such as pneumonia or aspiration is also possible.  Small right pleural effusion is probably present.  No pneumothorax detected.  Skeletal structures look intact.  Surgical changes are noted in the liver area.  Impression: Interval hepatic surgery.  Small right pleural effusion.  Volume loss and opacity at right lung base likely representing atelectasis.    Electronically signed by: Ian Fan MD  Date:    12/16/2022  Time:    08:03            Diagnoses:       1. Cholangiocarcinoma    2. Essential hypertension    3. Recurrent major depressive disorder, in full remission    4. Postoperative hypothyroidism            Assessment and Plan:     1. Cholangiocarcinoma  Overview:  Stage II (pT2N0) IHCC sp R0 resection 12/12/23. Started adjuvant capecitabine x6 months per [facility] 2/3/23. Did not tolerated 1250mg/m2 dose; DR down to 1000mg/m2.    Assessment & Plan:  Tolerated C2 at 1500mg po bid quite well with only mild stomatitis. Will increase dose to 1650mg po bid (closer to proprer 1000mg/m2 dosing) for this cycle. Plan to reimage after C4.    - Capecitabine 1650mg po bid D1-14 q21 days; today is C3D1  - FU in 3 weeks for C4  - Image after C4      2. Essential hypertension  Overview:  Home medications include candesartan and HCTZ; recently stopped triamterene after episodes of hypotension.    Assessment & Plan:  - Con't candesartan and HCTZ  - Monitors blood pressures at home regularly      3. Recurrent major depressive disorder, in full remission  Overview:  Home medications include citalopram    Assessment & Plan:  - Con't home medications      4. Postoperative hypothyroidism  Overview:  Home medication includes levothyroxine    Assessment & Plan:  - Con't home medications               Route Chart for Scheduling    Med Onc Chart Routing      Follow up with physician Abby Moore 4/7/23    Follow up with [facility]    Infusion scheduling note    Injection scheduling note    Labs CBC, CMP and CA 19-9   Scheduling:  Preferred lab:  Lab interval:     Imaging    Pharmacy appointment    Other referrals           Treatment Plan Information   OP CAPECITABINE 1250MG/M2 BID Q3W   Bennie Moore MD   Upcoming Treatment Dates - OP CAPECITABINE 1250MG/M2 BID Q3W    No upcoming days in selected categories.       Bennie Moore MD  Hematology/Oncology  [facility]

## 2023-03-17 NOTE — PATIENT INSTRUCTIONS
Tolerating treatment well with expected side effects of mouth sores. Will increase dose this cycle to 1650mg twice daily up from 1500mg. Monitor for increased symptoms with this cycle. Will plan to reimage with CT scan after cycle 4.     - Continue capecitabine 1650mg po twice daily through 3/30/23  - FU 4/7/23

## 2023-03-19 ENCOUNTER — PATIENT MESSAGE (OUTPATIENT)
Dept: HEMATOLOGY/ONCOLOGY | Facility: CLINIC | Age: 68
End: 2023-03-19
Payer: MEDICARE

## 2023-03-20 ENCOUNTER — PATIENT MESSAGE (OUTPATIENT)
Dept: HEMATOLOGY/ONCOLOGY | Facility: CLINIC | Age: 68
End: 2023-03-20
Payer: MEDICARE

## 2023-03-22 ENCOUNTER — PATIENT MESSAGE (OUTPATIENT)
Dept: ADMINISTRATIVE | Facility: OTHER | Age: 68
End: 2023-03-22
Payer: MEDICARE

## 2023-03-23 ENCOUNTER — PATIENT MESSAGE (OUTPATIENT)
Dept: ADMINISTRATIVE | Facility: OTHER | Age: 68
End: 2023-03-23
Payer: MEDICARE

## 2023-03-23 ENCOUNTER — OFFICE VISIT (OUTPATIENT)
Dept: PRIMARY CARE CLINIC | Facility: CLINIC | Age: 68
End: 2023-03-23
Payer: MEDICARE

## 2023-03-23 VITALS
RESPIRATION RATE: 18 BRPM | DIASTOLIC BLOOD PRESSURE: 52 MMHG | BODY MASS INDEX: 25.72 KG/M2 | SYSTOLIC BLOOD PRESSURE: 118 MMHG | HEIGHT: 61 IN | HEART RATE: 74 BPM | WEIGHT: 136.25 LBS | OXYGEN SATURATION: 99 % | TEMPERATURE: 98 F

## 2023-03-23 DIAGNOSIS — C22.1 CHOLANGIOCARCINOMA: ICD-10-CM

## 2023-03-23 DIAGNOSIS — E78.5 HYPERLIPIDEMIA, UNSPECIFIED HYPERLIPIDEMIA TYPE: ICD-10-CM

## 2023-03-23 DIAGNOSIS — F33.0 MDD (MAJOR DEPRESSIVE DISORDER), RECURRENT EPISODE, MILD: ICD-10-CM

## 2023-03-23 DIAGNOSIS — E03.9 HYPOTHYROIDISM, UNSPECIFIED TYPE: ICD-10-CM

## 2023-03-23 DIAGNOSIS — Z90.89 H/O PARATHYROIDECTOMY: ICD-10-CM

## 2023-03-23 DIAGNOSIS — I70.0 AORTIC ATHEROSCLEROSIS: ICD-10-CM

## 2023-03-23 DIAGNOSIS — R06.09 DOE (DYSPNEA ON EXERTION): ICD-10-CM

## 2023-03-23 DIAGNOSIS — Z98.890 H/O PARATHYROIDECTOMY: ICD-10-CM

## 2023-03-23 DIAGNOSIS — F41.1 GAD (GENERALIZED ANXIETY DISORDER): ICD-10-CM

## 2023-03-23 DIAGNOSIS — I10 BENIGN ESSENTIAL HYPERTENSION: Primary | ICD-10-CM

## 2023-03-23 PROCEDURE — 1159F MED LIST DOCD IN RCRD: CPT | Mod: HCNC,CPTII,S$GLB, | Performed by: INTERNAL MEDICINE

## 2023-03-23 PROCEDURE — 4010F ACE/ARB THERAPY RXD/TAKEN: CPT | Mod: HCNC,CPTII,S$GLB, | Performed by: INTERNAL MEDICINE

## 2023-03-23 PROCEDURE — 3074F PR MOST RECENT SYSTOLIC BLOOD PRESSURE < 130 MM HG: ICD-10-PCS | Mod: HCNC,CPTII,S$GLB, | Performed by: INTERNAL MEDICINE

## 2023-03-23 PROCEDURE — 1126F AMNT PAIN NOTED NONE PRSNT: CPT | Mod: HCNC,CPTII,S$GLB, | Performed by: INTERNAL MEDICINE

## 2023-03-23 PROCEDURE — 1160F RVW MEDS BY RX/DR IN RCRD: CPT | Mod: HCNC,CPTII,S$GLB, | Performed by: INTERNAL MEDICINE

## 2023-03-23 PROCEDURE — 3078F DIAST BP <80 MM HG: CPT | Mod: HCNC,CPTII,S$GLB, | Performed by: INTERNAL MEDICINE

## 2023-03-23 PROCEDURE — 1101F PT FALLS ASSESS-DOCD LE1/YR: CPT | Mod: HCNC,CPTII,S$GLB, | Performed by: INTERNAL MEDICINE

## 2023-03-23 PROCEDURE — 3008F BODY MASS INDEX DOCD: CPT | Mod: HCNC,CPTII,S$GLB, | Performed by: INTERNAL MEDICINE

## 2023-03-23 PROCEDURE — 99999 PR PBB SHADOW E&M-EST. PATIENT-LVL III: ICD-10-PCS | Mod: PBBFAC,HCNC,, | Performed by: INTERNAL MEDICINE

## 2023-03-23 PROCEDURE — 1159F PR MEDICATION LIST DOCUMENTED IN MEDICAL RECORD: ICD-10-PCS | Mod: HCNC,CPTII,S$GLB, | Performed by: INTERNAL MEDICINE

## 2023-03-23 PROCEDURE — 3078F PR MOST RECENT DIASTOLIC BLOOD PRESSURE < 80 MM HG: ICD-10-PCS | Mod: HCNC,CPTII,S$GLB, | Performed by: INTERNAL MEDICINE

## 2023-03-23 PROCEDURE — 99214 OFFICE O/P EST MOD 30 MIN: CPT | Mod: HCNC,S$GLB,, | Performed by: INTERNAL MEDICINE

## 2023-03-23 PROCEDURE — 3288F PR FALLS RISK ASSESSMENT DOCUMENTED: ICD-10-PCS | Mod: HCNC,CPTII,S$GLB, | Performed by: INTERNAL MEDICINE

## 2023-03-23 PROCEDURE — 1126F PR PAIN SEVERITY QUANTIFIED, NO PAIN PRESENT: ICD-10-PCS | Mod: HCNC,CPTII,S$GLB, | Performed by: INTERNAL MEDICINE

## 2023-03-23 PROCEDURE — 1160F PR REVIEW ALL MEDS BY PRESCRIBER/CLIN PHARMACIST DOCUMENTED: ICD-10-PCS | Mod: HCNC,CPTII,S$GLB, | Performed by: INTERNAL MEDICINE

## 2023-03-23 PROCEDURE — 99214 PR OFFICE/OUTPT VISIT, EST, LEVL IV, 30-39 MIN: ICD-10-PCS | Mod: HCNC,S$GLB,, | Performed by: INTERNAL MEDICINE

## 2023-03-23 PROCEDURE — 1101F PR PT FALLS ASSESS DOC 0-1 FALLS W/OUT INJ PAST YR: ICD-10-PCS | Mod: HCNC,CPTII,S$GLB, | Performed by: INTERNAL MEDICINE

## 2023-03-23 PROCEDURE — 3074F SYST BP LT 130 MM HG: CPT | Mod: HCNC,CPTII,S$GLB, | Performed by: INTERNAL MEDICINE

## 2023-03-23 PROCEDURE — 99999 PR PBB SHADOW E&M-EST. PATIENT-LVL III: CPT | Mod: PBBFAC,HCNC,, | Performed by: INTERNAL MEDICINE

## 2023-03-23 PROCEDURE — 3288F FALL RISK ASSESSMENT DOCD: CPT | Mod: HCNC,CPTII,S$GLB, | Performed by: INTERNAL MEDICINE

## 2023-03-23 PROCEDURE — 4010F PR ACE/ARB THEARPY RXD/TAKEN: ICD-10-PCS | Mod: HCNC,CPTII,S$GLB, | Performed by: INTERNAL MEDICINE

## 2023-03-23 PROCEDURE — 3008F PR BODY MASS INDEX (BMI) DOCUMENTED: ICD-10-PCS | Mod: HCNC,CPTII,S$GLB, | Performed by: INTERNAL MEDICINE

## 2023-03-23 RX ORDER — HYDROCHLOROTHIAZIDE 12.5 MG/1
12.5 TABLET ORAL DAILY
Qty: 90 TABLET | Refills: 3 | Status: SHIPPED | OUTPATIENT
Start: 2023-03-23 | End: 2023-07-27 | Stop reason: SDUPTHER

## 2023-03-23 NOTE — PROGRESS NOTES
Subjective:       Patient ID: Rehana Whitten is a 67 y.o. female.    Chief Complaint: essential hypertension    HPI  Since our last visit, pt has been dx w/ cholangiocarcinoma.   Liver biopsy 11/30/22 consistent with moderately differentiated adenoCA.  S/p R liver lobe resection, cholecystectomy and portal lymphadenectomy 12/12/22. Path w/ invasive adenoCA w/ lymphovascular invasion. No mets in 6 LN.  Started on capecitabine 2/1/23. When first started, had a vomiting x 4 days. Decreased dosage. Follows w/ Dr. Moore in H/O. Most recently on 1650 BID but still have so nausea. Staying hydrated.   CBC WNL 3/17/23    HTN - candesartan 8mg daily, hctz 25mg daily. Previously triamterene had to be stopped due to low BP while on all 3 regimen. Part of digital HTN program. BP reviewed and at goal.   3/18/23 visit w/ K 3.3. glucose mildly elevated 136. T bili 1.3.  Thinks chemo lowers her BP.   Tried to walk yesterday but had a little bit SOB. Did it a mo ago and wasn't quite as SOB. No leg swelling. No hair loss. No cough. No hand and mouth disease. Few mouth ulcer. Occasional numbness/tingling in the fingertips/toes but transient. No CP/palpitations.  Eating well though appetite is not as good as when she was on the higher dose.     HLD - crestor 40mg daily.   CT chest 11/16/22 - Cardiovascular: Normal heart size.No pericardial effusion.Mild aortic and coronary artery calcific disease.  LDL 8/10/22 76.2    MDD/DIXIE - Celexa 20mg daily.  Depression Patient Health Questionnaire 3/23/2023 8/8/2022 1/13/2021   Over the last two weeks how often have you been bothered by little interest or pleasure in doing things Several days Not at all Not at all   Over the last two weeks how often have you been bothered by feeling down, depressed or hopeless Several days Not at all Not at all   PHQ-2 Total Score 2 0 0   Over the last two weeks how often have you been bothered by trouble falling or staying asleep, or sleeping too  much Several days More than half the days -   Over the last two weeks how often have you been bothered by feeling tired or having little energy Several days Not at all -   Over the last two weeks how often have you been bothered by a poor appetite or overeating Several days Not at all -   Over the last two weeks how often have you been bothered by feeling bad about yourself - or that you are a failure or have let yourself or your family down Several days Several days -   Over the last two weeks how often have you been bothered by trouble concentrating on things, such as reading the newspaper or watching television Not at all Not at all -   Over the last two weeks how often have you been bothered by moving or speaking so slowly that other people could have noticed. Or the opposite - being so fidgety or restless that you have been moving around a lot more than usual. Not at all Not at all -   Over the last two weeks how often have you been bothered by thoughts that you would be better off dead, or of hurting yourself Several days Not at all -   If you checked off any problems, how difficult have these problems made it for you to do your work, take care of things at home or get along with other people? Somewhat difficult Not difficult at all -   Total Score 7 3 -   Interpretation Mild Minimal or None -       GAD7 3/23/2023 8/8/2022   1. Feeling nervous, anxious, or on edge? 1 1   2. Not being able to stop or control worrying? 1 1   3. Worrying too much about different things? 1 1   4. Trouble relaxing? 1 0   5. Being so restless that it is hard to sit still? 0 0   6. Becoming easily annoyed or irritable? 1 1   7. Feeling afraid as if something awful might happen? 1 0   8. If you checked off any problems, how difficult have these problems made it for you to do your work, take care of things at home, or get along with other people? 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  TFT 8/2022 wnl.    Review of Systems  Comprehensive review of systems otherwise negative. See history/subjective section for more details.    Objective:      Physical Exam    BP (!) 118/52 (BP Location: Left arm, Patient Position: Sitting, BP Method: Medium (Manual))   Pulse 74   Temp 97.8 °F (36.6 °C) (Oral)   Resp 18   Ht 5' 1" (1.549 m)   Wt 61.8 kg (136 lb 3.9 oz)   LMP  (LMP Unknown)   SpO2 99%   BMI 25.74 kg/m²     GEN - A+OX4, NAD   HEENT - PERRL, EOMI, OP clear. MMM.   Neck - No thyromegaly or cervical LAD. No thyroid masses felt.  CV - RRR, no m/r   Chest - CTAB, no wheezing or rhonchi  Abd - S/NT/ND/+BS. Abdominal scar well healed. Ventral incisional hernia - reducible.  Ext - 2+BDP and radial pulses. No C/C/E.  Neuro - 5/5 BUE and BLE strength. Normal gait.   Skin - No rash.    Previous labs reviewed.    Assessment/Plan     Diagnoses and all orders for this visit:    Benign essential hypertension - dec hctz from 25 to 12.5mg daily.  Cont digital HTN program.   -     hydroCHLOROthiazide (HYDRODIURIL) 12.5 MG Tab; Take 1 tablet (12.5 mg total) by mouth once daily.    Cholangiocarcinoma - undergoing chemo. Good support system. Part of online support groups.     Hyperlipidemia, unspecified hyperlipidemia type - cont crestor.     Aortic atherosclerosis - cont crestor.     NUNEZ (dyspnea on exertion) - echo to r/o CM due to being on chemo.   -     Echo; Future    H/O parathyroidectomy w/ resultant Hypothyroidism, unspecified type - Ca normal. On synthroid. TFT WNL. Cont current dosage.    Mild MDD/DIXIE - on celexa 20mg daily. Has good support system. Offered LMSW w/ 65+ if needed. Pt will let me know.     Reminded to fill out ACP.     Follow up in about 4 months (around 7/23/2023).      Bernie Valverde MD  Department of Internal Medicine - [facility]  7:31 AM    "

## 2023-03-24 ENCOUNTER — PATIENT MESSAGE (OUTPATIENT)
Dept: ADMINISTRATIVE | Facility: OTHER | Age: 68
End: 2023-03-24
Payer: MEDICARE

## 2023-03-25 ENCOUNTER — PATIENT MESSAGE (OUTPATIENT)
Dept: ADMINISTRATIVE | Facility: OTHER | Age: 68
End: 2023-03-25
Payer: MEDICARE

## 2023-03-26 ENCOUNTER — PATIENT MESSAGE (OUTPATIENT)
Dept: ADMINISTRATIVE | Facility: OTHER | Age: 68
End: 2023-03-26
Payer: MEDICARE

## 2023-03-27 ENCOUNTER — PATIENT MESSAGE (OUTPATIENT)
Dept: ADMINISTRATIVE | Facility: OTHER | Age: 68
End: 2023-03-27
Payer: MEDICARE

## 2023-03-28 ENCOUNTER — PATIENT MESSAGE (OUTPATIENT)
Dept: ADMINISTRATIVE | Facility: OTHER | Age: 68
End: 2023-03-28
Payer: MEDICARE

## 2023-03-29 ENCOUNTER — PATIENT MESSAGE (OUTPATIENT)
Dept: ADMINISTRATIVE | Facility: OTHER | Age: 68
End: 2023-03-29
Payer: MEDICARE

## 2023-03-30 ENCOUNTER — PATIENT MESSAGE (OUTPATIENT)
Dept: HEMATOLOGY/ONCOLOGY | Facility: CLINIC | Age: 68
End: 2023-03-30
Payer: MEDICARE

## 2023-03-30 ENCOUNTER — PATIENT MESSAGE (OUTPATIENT)
Dept: ADMINISTRATIVE | Facility: OTHER | Age: 68
End: 2023-03-30
Payer: MEDICARE

## 2023-03-30 ENCOUNTER — HOSPITAL ENCOUNTER (OUTPATIENT)
Dept: CARDIOLOGY | Facility: HOSPITAL | Age: 68
Discharge: HOME | End: 2023-03-30
Attending: INTERNAL MEDICINE
Payer: MEDICARE

## 2023-03-30 ENCOUNTER — SPECIALTY PHARMACY (OUTPATIENT)
Dept: PHARMACY | Facility: CLINIC | Age: 68
End: 2023-03-30
Payer: MEDICARE

## 2023-03-30 VITALS
DIASTOLIC BLOOD PRESSURE: 68 MMHG | BODY MASS INDEX: 25.68 KG/M2 | WEIGHT: 136 LBS | HEIGHT: 61 IN | HEART RATE: 72 BPM | SYSTOLIC BLOOD PRESSURE: 117 MMHG

## 2023-03-30 DIAGNOSIS — R06.09 DOE (DYSPNEA ON EXERTION): ICD-10-CM

## 2023-03-30 LAB
ASCENDING AORTA: 2.69 CM
AV INDEX (PROSTH): 0.74
AV MEAN GRADIENT: 3 MMHG
AV PEAK GRADIENT: 6 MMHG
AV VALVE AREA: 2.14 CM2
AV VELOCITY RATIO: 0.75
BSA FOR ECHO PROCEDURE: 1.63 M2
CV ECHO LV RWT: 0.25 CM
DOP CALC AO PEAK VEL: 1.21 M/S
DOP CALC AO VTI: 25.6 CM
DOP CALC LVOT AREA: 2.9 CM2
DOP CALC LVOT DIAMETER: 1.92 CM
DOP CALC LVOT PEAK VEL: 0.91 M/S
DOP CALC LVOT STROKE VOLUME: 54.9 CM3
DOP CALCLVOT PEAK VEL VTI: 18.97 CM
E WAVE DECELERATION TIME: 204.08 MSEC
E/A RATIO: 0.84
E/E' RATIO: 6.55 M/S
ECHO LV POSTERIOR WALL: 0.57 CM (ref 0.6–1.1)
EJECTION FRACTION: 65 %
FRACTIONAL SHORTENING: 35 % (ref 28–44)
INTERVENTRICULAR SEPTUM: 0.52 CM (ref 0.6–1.1)
LA MAJOR: 3.78 CM
LA MINOR: 3.73 CM
LA WIDTH: 2.74 CM
LEFT ATRIUM SIZE: 3.93 CM
LEFT ATRIUM VOLUME INDEX MOD: 15.6 ML/M2
LEFT ATRIUM VOLUME INDEX: 21.5 ML/M2
LEFT ATRIUM VOLUME MOD: 25.01 CM3
LEFT ATRIUM VOLUME: 34.37 CM3
LEFT INTERNAL DIMENSION IN SYSTOLE: 3 CM (ref 2.1–4)
LEFT VENTRICLE DIASTOLIC VOLUME INDEX: 60.88 ML/M2
LEFT VENTRICLE DIASTOLIC VOLUME: 97.41 ML
LEFT VENTRICLE MASS INDEX: 46 G/M2
LEFT VENTRICLE SYSTOLIC VOLUME INDEX: 21.9 ML/M2
LEFT VENTRICLE SYSTOLIC VOLUME: 35.07 ML
LEFT VENTRICULAR INTERNAL DIMENSION IN DIASTOLE: 4.6 CM (ref 3.5–6)
LEFT VENTRICULAR MASS: 72.89 G
LV LATERAL E/E' RATIO: 5.54 M/S
LV SEPTAL E/E' RATIO: 8 M/S
MV A" WAVE DURATION": 14.84 MSEC
MV PEAK A VEL: 0.86 M/S
MV PEAK E VEL: 0.72 M/S
MV STENOSIS PRESSURE HALF TIME: 59.18 MS
MV VALVE AREA P 1/2 METHOD: 3.72 CM2
PISA TR MAX VEL: 2.54 M/S
PULM VEIN S/D RATIO: 1.29
PV PEAK D VEL: 0.45 M/S
PV PEAK S VEL: 0.58 M/S
RA MAJOR: 3.47 CM
RA PRESSURE: 3 MMHG
RA WIDTH: 2.56 CM
RIGHT VENTRICULAR END-DIASTOLIC DIMENSION: 2.1 CM
SINUS: 2.39 CM
STJ: 2.45 CM
TDI LATERAL: 0.13 M/S
TDI SEPTAL: 0.09 M/S
TDI: 0.11 M/S
TR MAX PG: 26 MMHG
TRICUSPID ANNULAR PLANE SYSTOLIC EXCURSION: 1.61 CM
TV REST PULMONARY ARTERY PRESSURE: 29 MMHG

## 2023-03-30 PROCEDURE — 93356 ECHO (CUPID ONLY): ICD-10-PCS | Mod: ,,, | Performed by: INTERNAL MEDICINE

## 2023-03-30 PROCEDURE — 93356 MYOCRD STRAIN IMG SPCKL TRCK: CPT | Mod: ,,, | Performed by: INTERNAL MEDICINE

## 2023-03-30 PROCEDURE — 93356 MYOCRD STRAIN IMG SPCKL TRCK: CPT

## 2023-03-30 PROCEDURE — 93306 TTE W/DOPPLER COMPLETE: CPT | Mod: 26,,, | Performed by: INTERNAL MEDICINE

## 2023-03-30 PROCEDURE — 93306 ECHO (CUPID ONLY): ICD-10-PCS | Mod: 26,,, | Performed by: INTERNAL MEDICINE

## 2023-03-30 NOTE — TELEPHONE ENCOUNTER
Specialty Pharmacy - Refill Coordination    Specialty Medication Orders Linked to Encounter      Flowsheet Row Most Recent Value   Medication #1 capecitabine (XELODA) 150 MG tablet (Order#[id number], Rx#[id number])   Medication #2 capecitabine (XELODA) 500 MG Tab (Order#[id number], Rx#[id number])            Refill Questions - Documented Responses      Flowsheet Row Most Recent Value   Patient Availability and HIPAA Verification    Does patient want to proceed with activity? Yes   HIPAA/medical authority confirmed? Yes   Relationship to patient of person spoken to? Self   Refill Screening Questions    Changes to allergies? No   Changes to medications? No   New conditions since last clinic visit? No   Unplanned office visit, urgent care, ED, or hospital admission in the last 4 weeks? No   How does patient/caregiver feel medication is working? Good   Financial problems or insurance changes? No   How many doses of your specialty medications were missed in the last 4 weeks? 0   Would patient like to speak to a pharmacist? No   When does the patient need to receive the medication? 04/09/23   Refill Delivery Questions    How will the patient receive the medication? MEDRx   When does the patient need to receive the medication? 04/09/23   Shipping Address Home   Address in [facility] confirmed and updated if neccessary? Yes   Expected Copay ($) 11.3   Is the patient able to afford the medication copay? Yes   Payment Method CC on file   Days supply of Refill 21   Supplies needed? No supplies needed   Refill activity completed? Yes   Refill activity plan Refill scheduled   Shipment/Pickup Date: 03/31/23            Current Outpatient Medications   Medication Sig    candesartan (ATACAND) 8 MG tablet TAKE 1 TABLET(8 MG) BY MOUTH EVERY DAY    capecitabine (XELODA) 150 MG tablet Take 1 tablet (150 mg total) by mouth 2 (two) times daily for days 1-14 of every 21 day chemotherapy cycle with 1 other capecitabine prescription  for 1,650 mg total.    capecitabine (XELODA) 500 MG Tab Take 3 tablets (1,500 mg total) by mouth 2 (two) times daily for days 1-14 of every 21 day chemotherapy cycle with 1 other capecitabine prescription for 1,650 mg total.    citalopram (CELEXA) 20 MG tablet TAKE 1 TABLET BY MOUTH EVERY DAY    hydroCHLOROthiazide (HYDRODIURIL) 12.5 MG Tab Take 1 tablet (12.5 mg total) by mouth once daily.    levothyroxine (SYNTHROID) 88 MCG tablet TAKE 1 TABLET BY MOUTH EVERY DAY    loperamide (IMODIUM) 2 mg capsule Take two caps at the onset of loose stools.  Then take one cap after every subsequent loose stool.    ondansetron (ZOFRAN) 8 MG tablet Take 1 tablet (8 mg total) by mouth every 8 (eight) hours as needed for Nausea.    rosuvastatin (CRESTOR) 40 MG Tab Take 1 tablet (40 mg total) by mouth every evening.    valACYclovir (VALTREX) 500 MG tablet TAKE 1 TABLET(500 MG) BY MOUTH EVERY DAY    vitamin D (VITAMIN D3) 1000 units Tab Take 1,000 Units by mouth once daily.   Last reviewed on 3/23/2023 10:35 AM by Bernie Valverde MD    Review of patient's allergies indicates:  No Known Allergies Last reviewed on  3/23/2023 10:35 AM by Bernie Valverde      Tasks added this encounter   4/23/2023 - Refill Call (Auto Added)   Tasks due within next 3 months   No tasks due.     [facility] - Specialty Pharmacy  [address] [place] [id number]  Phone: [phone]  Fax: [phone]

## 2023-03-30 NOTE — TELEPHONE ENCOUNTER
Specialty Pharmacy - Refill Coordination    Specialty Medication Orders Linked to Encounter      Flowsheet Row Most Recent Value   Medication #1 capecitabine (XELODA) 150 MG tablet (Order#[id number], Rx#[id number])   Medication #2 capecitabine (XELODA) 500 MG Tab (Order#[id number], Rx#[id number])            Refill Questions - Documented Responses      Flowsheet Row Most Recent Value   Patient Availability and HIPAA Verification    Does patient want to proceed with activity? Yes   HIPAA/medical authority confirmed? Yes   Relationship to patient of person spoken to? Self   Refill Screening Questions    Changes to allergies? No   Changes to medications? No   New conditions since last clinic visit? No   Unplanned office visit, urgent care, ED, or hospital admission in the last 4 weeks? No   How does patient/caregiver feel medication is working? Good   Financial problems or insurance changes? No   How many doses of your specialty medications were missed in the last 4 weeks? 0   Would patient like to speak to a pharmacist? No   When does the patient need to receive the medication? 04/09/23   Refill Delivery Questions    How will the patient receive the medication? MEDRx   When does the patient need to receive the medication? 04/09/23   Shipping Address Home   Address in [facility] confirmed and updated if neccessary? Yes   Expected Copay ($) 0   Is the patient able to afford the medication copay? Yes   Payment Method zero copay   Days supply of Refill 21   Supplies needed? No supplies needed   Refill activity completed? Yes   Refill activity plan Refill scheduled   Shipment/Pickup Date: 03/31/23            Current Outpatient Medications   Medication Sig    candesartan (ATACAND) 8 MG tablet TAKE 1 TABLET(8 MG) BY MOUTH EVERY DAY    capecitabine (XELODA) 150 MG tablet Take 1 tablet (150 mg total) by mouth 2 (two) times daily for days 1-14 of every 21 day chemotherapy cycle with 1 other capecitabine prescription for  1,650 mg total.    capecitabine (XELODA) 500 MG Tab Take 3 tablets (1,500 mg total) by mouth 2 (two) times daily for days 1-14 of every 21 day chemotherapy cycle with 1 other capecitabine prescription for 1,650 mg total.    citalopram (CELEXA) 20 MG tablet TAKE 1 TABLET BY MOUTH EVERY DAY    hydroCHLOROthiazide (HYDRODIURIL) 12.5 MG Tab Take 1 tablet (12.5 mg total) by mouth once daily.    levothyroxine (SYNTHROID) 88 MCG tablet TAKE 1 TABLET BY MOUTH EVERY DAY    loperamide (IMODIUM) 2 mg capsule Take two caps at the onset of loose stools.  Then take one cap after every subsequent loose stool.    ondansetron (ZOFRAN) 8 MG tablet Take 1 tablet (8 mg total) by mouth every 8 (eight) hours as needed for Nausea.    rosuvastatin (CRESTOR) 40 MG Tab Take 1 tablet (40 mg total) by mouth every evening.    valACYclovir (VALTREX) 500 MG tablet TAKE 1 TABLET(500 MG) BY MOUTH EVERY DAY    vitamin D (VITAMIN D3) 1000 units Tab Take 1,000 Units by mouth once daily.   Last reviewed on 3/23/2023 10:35 AM by Bernie Valverde MD    Review of patient's allergies indicates:  No Known Allergies Last reviewed on  3/23/2023 10:35 AM by Bernie Valverde      Tasks added this encounter   4/23/2023 - Refill Call (Auto Added)   Tasks due within next 3 months   No tasks due.     [facility] - [facility]ty Pharmacy  [address] [facility] [address]  Phone: [phone]  Fax: [phone]

## 2023-03-31 ENCOUNTER — PATIENT MESSAGE (OUTPATIENT)
Dept: ADMINISTRATIVE | Facility: OTHER | Age: 68
End: 2023-03-31
Payer: MEDICARE

## 2023-04-01 ENCOUNTER — PATIENT MESSAGE (OUTPATIENT)
Dept: ADMINISTRATIVE | Facility: OTHER | Age: 68
End: 2023-04-01
Payer: MEDICARE

## 2023-04-02 ENCOUNTER — NURSE TRIAGE (OUTPATIENT)
Dept: ADMINISTRATIVE | Facility: CLINIC | Age: 68
End: 2023-04-02
Payer: MEDICARE

## 2023-04-02 ENCOUNTER — PATIENT MESSAGE (OUTPATIENT)
Dept: ADMINISTRATIVE | Facility: OTHER | Age: 68
End: 2023-04-02
Payer: MEDICARE

## 2023-04-02 NOTE — TELEPHONE ENCOUNTER
Pt currently being treated for cholangiocarcinoma with capecitabine; she states that she does 2 weeks on and 1 week off the medication. She finished her 2 week course on Friday. She noticed yesterday she is starting to have a flare up of genital herpes and would like to know if it is okay to start taking her Valtrex. Denies fever, spreading redness, or drainage. Contacted Dr. Norton, on call, who advised for patient to start Valtrex. Relayed information to patient and advised to call back with further questions/ concerns or if symptoms worsen.     Reason for Disposition   [1] Caller has URGENT medicine question about med that PCP or specialist prescribed AND [2] triager unable to answer question    Additional Information   Negative: [1] Intentional drug overdose AND [2] suicidal thoughts or ideas   Negative: MORE THAN A DOUBLE DOSE of a prescription or over-the-counter (OTC) drug   Negative: [1] DOUBLE DOSE (an extra dose or lesser amount) of prescription drug AND [2] any symptoms (e.g., dizziness, nausea, pain, sleepiness)   Negative: [1] DOUBLE DOSE (an extra dose or lesser amount) of over-the-counter (OTC) drug AND [2] any symptoms (e.g., dizziness, nausea, pain, sleepiness)   Negative: Took another person's prescription drug   Negative: [1] DOUBLE DOSE (an extra dose or lesser amount) of prescription drug AND [2] NO symptoms (Exception: a double dose of antibiotics)   Negative: Diabetes drug error or overdose (e.g., took wrong type of insulin or took extra dose)   Negative: [1] Prescription not at pharmacy AND [2] was prescribed by PCP recently (Exception: triager has access to EMR and prescription is recorded there. Go to Home Care and confirm for pharmacy.)   Negative: [1] Pharmacy calling with prescription question AND [2] triager unable to answer question    Protocols used: Medication Question Call-A-

## 2023-04-03 ENCOUNTER — TELEPHONE (OUTPATIENT)
Dept: OBSTETRICS AND GYNECOLOGY | Facility: CLINIC | Age: 68
End: 2023-04-03
Payer: MEDICARE

## 2023-04-03 ENCOUNTER — PATIENT MESSAGE (OUTPATIENT)
Dept: ADMINISTRATIVE | Facility: OTHER | Age: 68
End: 2023-04-03
Payer: MEDICARE

## 2023-04-03 ENCOUNTER — PATIENT MESSAGE (OUTPATIENT)
Dept: OBSTETRICS AND GYNECOLOGY | Facility: CLINIC | Age: 68
End: 2023-04-03
Payer: MEDICARE

## 2023-04-03 ENCOUNTER — PATIENT MESSAGE (OUTPATIENT)
Dept: PRIMARY CARE CLINIC | Facility: CLINIC | Age: 68
End: 2023-04-03
Payer: MEDICARE

## 2023-04-03 RX ORDER — VALACYCLOVIR HYDROCHLORIDE 500 MG/1
500 TABLET, FILM COATED ORAL DAILY
Qty: 90 TABLET | Refills: 0 | Status: CANCELLED | OUTPATIENT
Start: 2023-04-03

## 2023-04-03 RX ORDER — VALACYCLOVIR HYDROCHLORIDE 500 MG/1
500 TABLET, FILM COATED ORAL DAILY
Qty: 90 TABLET | Refills: 4 | Status: SHIPPED | OUTPATIENT
Start: 2023-04-03

## 2023-04-03 RX ORDER — VALACYCLOVIR HYDROCHLORIDE 500 MG/1
500 TABLET, FILM COATED ORAL DAILY
Qty: 90 TABLET | Refills: 4 | Status: SHIPPED | OUTPATIENT
Start: 2023-04-03 | End: 2023-04-03 | Stop reason: SDUPTHER

## 2023-04-03 NOTE — TELEPHONE ENCOUNTER
Medication refill request sent to Dr Penaloza as Dr Savage is out of office.  Pt scheduled for WWE

## 2023-04-04 ENCOUNTER — PATIENT MESSAGE (OUTPATIENT)
Dept: ADMINISTRATIVE | Facility: OTHER | Age: 68
End: 2023-04-04
Payer: MEDICARE

## 2023-04-05 ENCOUNTER — PATIENT MESSAGE (OUTPATIENT)
Dept: ADMINISTRATIVE | Facility: OTHER | Age: 68
End: 2023-04-05
Payer: MEDICARE

## 2023-04-06 ENCOUNTER — PATIENT MESSAGE (OUTPATIENT)
Dept: ADMINISTRATIVE | Facility: OTHER | Age: 68
End: 2023-04-06
Payer: MEDICARE

## 2023-04-07 ENCOUNTER — PATIENT MESSAGE (OUTPATIENT)
Dept: ADMINISTRATIVE | Facility: OTHER | Age: 68
End: 2023-04-07
Payer: MEDICARE

## 2023-04-08 ENCOUNTER — PATIENT MESSAGE (OUTPATIENT)
Dept: ADMINISTRATIVE | Facility: OTHER | Age: 68
End: 2023-04-08
Payer: MEDICARE

## 2023-04-09 ENCOUNTER — PATIENT MESSAGE (OUTPATIENT)
Dept: ADMINISTRATIVE | Facility: OTHER | Age: 68
End: 2023-04-09
Payer: MEDICARE

## 2023-04-10 ENCOUNTER — PATIENT MESSAGE (OUTPATIENT)
Dept: ADMINISTRATIVE | Facility: OTHER | Age: 68
End: 2023-04-10
Payer: MEDICARE

## 2023-04-11 ENCOUNTER — PATIENT MESSAGE (OUTPATIENT)
Dept: ADMINISTRATIVE | Facility: OTHER | Age: 68
End: 2023-04-11
Payer: MEDICARE

## 2023-04-12 ENCOUNTER — PATIENT MESSAGE (OUTPATIENT)
Dept: ADMINISTRATIVE | Facility: OTHER | Age: 68
End: 2023-04-12
Payer: MEDICARE

## 2023-04-13 ENCOUNTER — PATIENT MESSAGE (OUTPATIENT)
Dept: ADMINISTRATIVE | Facility: OTHER | Age: 68
End: 2023-04-13
Payer: MEDICARE

## 2023-04-13 ENCOUNTER — LAB VISIT (OUTPATIENT)
Dept: LAB | Facility: HOSPITAL | Age: 68
End: 2023-04-13
Attending: HOSPITALIST
Payer: MEDICARE

## 2023-04-13 ENCOUNTER — OFFICE VISIT (OUTPATIENT)
Dept: HEMATOLOGY/ONCOLOGY | Facility: CLINIC | Age: 68
End: 2023-04-13
Payer: MEDICARE

## 2023-04-13 VITALS
DIASTOLIC BLOOD PRESSURE: 62 MMHG | HEART RATE: 76 BPM | WEIGHT: 138.88 LBS | HEIGHT: 62 IN | OXYGEN SATURATION: 100 % | BODY MASS INDEX: 25.55 KG/M2 | SYSTOLIC BLOOD PRESSURE: 127 MMHG | RESPIRATION RATE: 18 BRPM

## 2023-04-13 DIAGNOSIS — D84.821 DRUG-INDUCED IMMUNODEFICIENCY: ICD-10-CM

## 2023-04-13 DIAGNOSIS — I10 ESSENTIAL HYPERTENSION: ICD-10-CM

## 2023-04-13 DIAGNOSIS — E03.9 HYPOTHYROIDISM, UNSPECIFIED TYPE: ICD-10-CM

## 2023-04-13 DIAGNOSIS — C22.1 CHOLANGIOCARCINOMA: Primary | ICD-10-CM

## 2023-04-13 DIAGNOSIS — C22.1 CHOLANGIOCARCINOMA: ICD-10-CM

## 2023-04-13 DIAGNOSIS — Z79.899 DRUG-INDUCED IMMUNODEFICIENCY: ICD-10-CM

## 2023-04-13 LAB
ALBUMIN SERPL BCP-MCNC: 4.1 G/DL (ref 3.5–5.2)
ALP SERPL-CCNC: 91 U/L (ref 55–135)
ALT SERPL W/O P-5'-P-CCNC: 21 U/L (ref 10–44)
ANION GAP SERPL CALC-SCNC: 12 MMOL/L (ref 8–16)
AST SERPL-CCNC: 26 U/L (ref 10–40)
BILIRUB SERPL-MCNC: 1.6 MG/DL (ref 0.1–1)
BUN SERPL-MCNC: 13 MG/DL (ref 8–23)
CALCIUM SERPL-MCNC: 9.9 MG/DL (ref 8.7–10.5)
CANCER AG19-9 SERPL-ACNC: 17.7 U/ML (ref 0–40)
CHLORIDE SERPL-SCNC: 103 MMOL/L (ref 95–110)
CO2 SERPL-SCNC: 27 MMOL/L (ref 23–29)
CREAT SERPL-MCNC: 1.2 MG/DL (ref 0.5–1.4)
ERYTHROCYTE [DISTWIDTH] IN BLOOD BY AUTOMATED COUNT: 21.2 % (ref 11.5–14.5)
EST. GFR  (NO RACE VARIABLE): 49.9 ML/MIN/1.73 M^2
GLUCOSE SERPL-MCNC: 102 MG/DL (ref 70–110)
HCT VFR BLD AUTO: 39.1 % (ref 37–48.5)
HGB BLD-MCNC: 12.1 G/DL (ref 12–16)
IMM GRANULOCYTES # BLD AUTO: 0.01 K/UL (ref 0–0.04)
MCH RBC QN AUTO: 30.3 PG (ref 27–31)
MCHC RBC AUTO-ENTMCNC: 30.9 G/DL (ref 32–36)
MCV RBC AUTO: 98 FL (ref 82–98)
NEUTROPHILS # BLD AUTO: 2.9 K/UL (ref 1.8–7.7)
PLATELET # BLD AUTO: 214 K/UL (ref 150–450)
PMV BLD AUTO: 10.3 FL (ref 9.2–12.9)
POTASSIUM SERPL-SCNC: 3.8 MMOL/L (ref 3.5–5.1)
PROT SERPL-MCNC: 7 G/DL (ref 6–8.4)
RBC # BLD AUTO: 4 M/UL (ref 4–5.4)
SODIUM SERPL-SCNC: 142 MMOL/L (ref 136–145)
WBC # BLD AUTO: 5.23 K/UL (ref 3.9–12.7)

## 2023-04-13 PROCEDURE — 99999 PR PBB SHADOW E&M-EST. PATIENT-LVL III: ICD-10-PCS | Mod: PBBFAC,HCNC,, | Performed by: HOSPITALIST

## 2023-04-13 PROCEDURE — 99499 RISK ADDL DX/OHS AUDIT: ICD-10-PCS | Mod: HCNC,S$GLB,, | Performed by: HOSPITALIST

## 2023-04-13 PROCEDURE — 3074F SYST BP LT 130 MM HG: CPT | Mod: HCNC,CPTII,S$GLB, | Performed by: HOSPITALIST

## 2023-04-13 PROCEDURE — 4010F PR ACE/ARB THEARPY RXD/TAKEN: ICD-10-PCS | Mod: HCNC,CPTII,S$GLB, | Performed by: HOSPITALIST

## 2023-04-13 PROCEDURE — 85027 COMPLETE CBC AUTOMATED: CPT | Mod: HCNC | Performed by: HOSPITALIST

## 2023-04-13 PROCEDURE — 3078F DIAST BP <80 MM HG: CPT | Mod: HCNC,CPTII,S$GLB, | Performed by: HOSPITALIST

## 2023-04-13 PROCEDURE — 1126F AMNT PAIN NOTED NONE PRSNT: CPT | Mod: HCNC,CPTII,S$GLB, | Performed by: HOSPITALIST

## 2023-04-13 PROCEDURE — 1101F PT FALLS ASSESS-DOCD LE1/YR: CPT | Mod: HCNC,CPTII,S$GLB, | Performed by: HOSPITALIST

## 2023-04-13 PROCEDURE — 36415 COLL VENOUS BLD VENIPUNCTURE: CPT | Mod: HCNC | Performed by: HOSPITALIST

## 2023-04-13 PROCEDURE — 3288F PR FALLS RISK ASSESSMENT DOCUMENTED: ICD-10-PCS | Mod: HCNC,CPTII,S$GLB, | Performed by: HOSPITALIST

## 2023-04-13 PROCEDURE — 3078F PR MOST RECENT DIASTOLIC BLOOD PRESSURE < 80 MM HG: ICD-10-PCS | Mod: HCNC,CPTII,S$GLB, | Performed by: HOSPITALIST

## 2023-04-13 PROCEDURE — 99499 UNLISTED E&M SERVICE: CPT | Mod: HCNC,S$GLB,, | Performed by: HOSPITALIST

## 2023-04-13 PROCEDURE — 3288F FALL RISK ASSESSMENT DOCD: CPT | Mod: HCNC,CPTII,S$GLB, | Performed by: HOSPITALIST

## 2023-04-13 PROCEDURE — 80053 COMPREHEN METABOLIC PANEL: CPT | Mod: HCNC | Performed by: HOSPITALIST

## 2023-04-13 PROCEDURE — 99214 PR OFFICE/OUTPT VISIT, EST, LEVL IV, 30-39 MIN: ICD-10-PCS | Mod: HCNC,S$GLB,, | Performed by: HOSPITALIST

## 2023-04-13 PROCEDURE — 86301 IMMUNOASSAY TUMOR CA 19-9: CPT | Mod: HCNC | Performed by: HOSPITALIST

## 2023-04-13 PROCEDURE — 3074F PR MOST RECENT SYSTOLIC BLOOD PRESSURE < 130 MM HG: ICD-10-PCS | Mod: HCNC,CPTII,S$GLB, | Performed by: HOSPITALIST

## 2023-04-13 PROCEDURE — 99214 OFFICE O/P EST MOD 30 MIN: CPT | Mod: HCNC,S$GLB,, | Performed by: HOSPITALIST

## 2023-04-13 PROCEDURE — 99999 PR PBB SHADOW E&M-EST. PATIENT-LVL III: CPT | Mod: PBBFAC,HCNC,, | Performed by: HOSPITALIST

## 2023-04-13 PROCEDURE — 1101F PR PT FALLS ASSESS DOC 0-1 FALLS W/OUT INJ PAST YR: ICD-10-PCS | Mod: HCNC,CPTII,S$GLB, | Performed by: HOSPITALIST

## 2023-04-13 PROCEDURE — 1126F PR PAIN SEVERITY QUANTIFIED, NO PAIN PRESENT: ICD-10-PCS | Mod: HCNC,CPTII,S$GLB, | Performed by: HOSPITALIST

## 2023-04-13 PROCEDURE — 4010F ACE/ARB THERAPY RXD/TAKEN: CPT | Mod: HCNC,CPTII,S$GLB, | Performed by: HOSPITALIST

## 2023-04-13 PROCEDURE — 3008F PR BODY MASS INDEX (BMI) DOCUMENTED: ICD-10-PCS | Mod: HCNC,CPTII,S$GLB, | Performed by: HOSPITALIST

## 2023-04-13 PROCEDURE — 3008F BODY MASS INDEX DOCD: CPT | Mod: HCNC,CPTII,S$GLB, | Performed by: HOSPITALIST

## 2023-04-13 NOTE — PATIENT INSTRUCTIONS
You have developed moderate expected side effects from the chemotherapy including nausea, fatigue, and low appetite. Feel free to be more liberal with nausea medications if needed. Creatinine elevation likely due to ongoing mild dehydration.    - Hold candesartan given mild elevation in creatinine  - Be sure to stay hydrated  - Continue capecitabine 1650mg twice daily through 4/20/23  - Will await to reimage after C5 due to mild creatine elevation    Follow up 4/28/23 morning

## 2023-04-13 NOTE — ASSESSMENT & PLAN NOTE
- Currently C4D7 of capecitabine  - Continue 1650mg po bid; consider decrease to 1500 bid for future cycles if needed  - Defer reimaging until after C5 given mild NAVI

## 2023-04-13 NOTE — PROGRESS NOTES
MEDICAL ONCOLOGY FOLLOW-UP VISIT.     Best Contact Phone Number(s): [phone] (home)      Cancer/Stage/TNM:    Cancer Staging   Cholangiocarcinoma  Staging form: Intrahepatic Bile Duct, AJCC 8th Edition  - Pathologic: Stage II (pT2, pN0, cM0) - Unsigned       Reason for visit: Rehana Whittne is a 67 y.o. female remote history of HBV who was found to have stage II pT2N0 intrahepatic cholangiocarcinoma in the setting of abdominal pain who underwent R0 resection 12/12/23. She presents to medical oncology clinic during C4 of adjuvant capecitabine.      Interval History:     Increased to 1650 bid last cycle for C3, and started C4 4/7/23.     Notes increased fatigue with this cycle and associated low appetite. Continues to tolerate po with mild to moderate nausea. Modest benefit with sparing use of antiemetics. Also notes headaches a few times throughout the week. Seen by PCP recently and HCTZ reduced to 12.5mg due to dizziness. No signficant hand-foot symptoms. No stomatitis. No diarrhea.      Oncology History   Cholangiocarcinoma   11/9/2022 Imaging Significant Findings    Abdominal US in the setting of abdominal pain shows a large heterogeneous mass in the right lobe, measures 7.2 x 5.3 x 5.9 cm.     11/9/2022 Imaging Significant Findings    CT a/p:  Irregular right hepatic lobe lesion demonstrating delayed central enhancement and overlying capsular retraction.  Capsular retraction can be seen in cholangiocarcinoma and sclerosing hepatic hemangiomas.  Differential includes metastatic disease and other primary hepatic neoplasms.     11/14/2022 Tumor Markers    CA 19-9: 19.1  AFP: 5.5     11/16/2022 Imaging Significant Findings    CT chest:  1. No specific CT evidence of metastatic disease within the chest.  2. Left lower lobe punctate micro nodules.  Attention on continued surveillance imaging.  3. Other findings as above.     11/30/2022 Biopsy    Percutaneous biopsy of liver mass: Moderately  "differentiated adenocarcinoma. CK7+ Negative for CK20, CDX2, GATA3, TTF1, and PAX 8. Thought consistent with pancraetobiliary primary.     12/12/2022 Surgery    Resection of right hepatic lobe tumor. Pathology shows moderately differentiated adenocarcinoma up to 6.5cm in size c/w intrahepatic cholangiocarcinoma. Tumor was confined to the hepatic parenchyma with associated  LVI but no PNI. R0 resection with 6mm margins. 06/ LN involved. [id number]     1/13/2023 -  Chemotherapy    Treatment Summary   Plan Name: OP CAPECITABINE 1250MG/M2 BID Q3W  Treatment Goal: Control  Status: Active  Start Date: 1/13/2023  End Date: 1/13/2023  Provider: Bennie Moore MD  Chemotherapy: capecitabine (XELODA) 500 MG Tab, 1,250 mg/m2 = 2,000 mg, Oral, 2 times daily, 1 of 1 cycle, Start date: 1/13/2023, End date: --       2/24/2023 -  Chemotherapy    C2D1 capectiabine; DR to 1500mg po bid              Physical Exam:   /62 (BP Location: Left arm, Patient Position: Sitting, BP Method: Medium (Automatic))   Pulse 76   Resp 18   Ht 5' 2" (1.575 m)   Wt 63 kg (138 lb 14.2 oz)   LMP  (LMP Unknown)   SpO2 100%   BMI 25.40 kg/m²      ECOG Performance Status: (foot note - ECOG PS provided by Eastern Cooperative Oncology Group) 1 - Symptomatic but completely ambulatory    Physical Exam  Constitutional:       General: She is not in acute distress.     Appearance: She is normal weight.   HENT:      Head: Normocephalic.      Mouth/Throat:      Mouth: Mucous membranes are moist.      Pharynx: Oropharynx is clear. No oropharyngeal exudate or posterior oropharyngeal erythema.   Eyes:      General: No scleral icterus.     Conjunctiva/sclera: Conjunctivae normal.   Cardiovascular:      Rate and Rhythm: Normal rate and regular rhythm.      Heart sounds: No murmur heard.    No friction rub. No gallop.   Pulmonary:      Effort: Pulmonary effort is normal. No respiratory distress.      Breath sounds: Normal breath sounds.   Abdominal:      " General: There is no distension.      Palpations: Abdomen is soft.      Tenderness: There is no abdominal tenderness.      Comments: Well healed midline surgical scar; small ventral hernia   Musculoskeletal:         General: Normal range of motion.      Cervical back: Normal range of motion and neck supple.      Right lower leg: No edema.      Left lower leg: No edema.   Lymphadenopathy:      Cervical: No cervical adenopathy.   Skin:     General: Skin is warm.      Coloration: Skin is not jaundiced.      Findings: No bruising or rash.   Neurological:      General: No focal deficit present.      Mental Status: She is alert and oriented to person, place, and time.      Motor: No weakness.   Psychiatric:         Mood and Affect: Mood normal.         Behavior: Behavior normal.         Thought Content: Thought content normal.         Labs:   Recent Results (from the past 48 hour(s))   Cancer Antigen 19-9    Collection Time: 04/13/23  2:32 PM   Result Value Ref Range    CA 19-9 17.7 0.0 - 40.0 U/mL   CBC Oncology    Collection Time: 04/13/23  2:32 PM   Result Value Ref Range    WBC 5.23 3.90 - 12.70 K/uL    RBC 4.00 4.00 - 5.40 M/uL    Hemoglobin 12.1 12.0 - 16.0 g/dL    Hematocrit 39.1 37.0 - 48.5 %    MCV 98 82 - 98 fL    MCH 30.3 27.0 - 31.0 pg    MCHC 30.9 (L) 32.0 - 36.0 g/dL    RDW 21.2 (H) 11.5 - 14.5 %    Platelets 214 150 - 450 K/uL    MPV 10.3 9.2 - 12.9 fL    Gran # (ANC) 2.9 1.8 - 7.7 K/uL    Immature Grans (Abs) 0.01 0.00 - 0.04 K/uL   Comprehensive Metabolic Panel    Collection Time: 04/13/23  2:32 PM   Result Value Ref Range    Sodium 142 136 - 145 mmol/L    Potassium 3.8 3.5 - 5.1 mmol/L    Chloride 103 95 - 110 mmol/L    CO2 27 23 - 29 mmol/L    Glucose 102 70 - 110 mg/dL    BUN 13 8 - 23 mg/dL    Creatinine 1.2 0.5 - 1.4 mg/dL    Calcium 9.9 8.7 - 10.5 mg/dL    Total Protein 7.0 6.0 - 8.4 g/dL    Albumin 4.1 3.5 - 5.2 g/dL    Total Bilirubin 1.6 (H) 0.1 - 1.0 mg/dL    Alkaline Phosphatase 91 55 - 135 U/L     AST 26 10 - 40 U/L    ALT 21 10 - 44 U/L    Anion Gap 12 8 - 16 mmol/L    eGFR 49.9 (A) >60 mL/min/1.73 m^2                Imaging:     Echo  · The left ventricle is normal in size with normal systolic function.  · The estimated ejection fraction is 65%.  · The left ventricular global longitudinal strain is -15.4%.  · Normal left ventricular diastolic function.  · The estimated PA systolic pressure is 29 mmHg.  · Normal right ventricular size with normal right ventricular systolic   function.  · Normal central venous pressure (3 mmHg).  · There is no pulmonary hypertension.     Normal echocardiogram with Doppler            Diagnoses:       1. Cholangiocarcinoma    2. Essential hypertension    3. Hypothyroidism, unspecified type    4. Drug-induced immunodeficiency              Assessment and Plan:     1. Cholangiocarcinoma  Overview:  Stage II (pT2N0) IHCC sp R0 resection 12/12/23. Started adjuvant capecitabine x6 months per PRANAVCAP 2/3/23. Did not tolerated 1250mg/m2 dose; DR down to 1000mg/m2.    Assessment & Plan:  - Currently C4D7 of capecitabine  - Continue 1650mg po bid; consider decrease to 1500 bid for future cycles if needed  - Defer reimaging until after C5 given mild NAVI      2. Essential hypertension  Overview:  Home medications include candesartan and HCTZ; recently stopped triamterene after episodes of hypotension.    Assessment & Plan:  - Recently decreased HCTZ to 12.5mg   - Hold candesartan      3. Hypothyroidism, unspecified type  Overview:  Home medication includes levothyroxine      4. Drug-induced immunodeficiency  Assessment & Plan:  - Monitor for infection                 Route Chart for Scheduling    Med Onc Chart Routing      Follow up with physician . 4/28/23 - early am if possible   Follow up with [facility]    Infusion scheduling note    Injection scheduling note    Labs CBC, CMP and CA 19-9   Scheduling:  Preferred lab:  Lab interval:     Imaging    Pharmacy appointment    Other referrals            Treatment Plan Information   OP CAPECITABINE 1250MG/M2 BID Q3W   Bennie Moore MD   Upcoming Treatment Dates - OP CAPECITABINE 1250MG/M2 BID Q3W    No upcoming days in selected categories.       Bennie Moore MD  Hematology/Oncology  [facility]

## 2023-04-14 ENCOUNTER — PATIENT MESSAGE (OUTPATIENT)
Dept: ADMINISTRATIVE | Facility: OTHER | Age: 68
End: 2023-04-14
Payer: MEDICARE

## 2023-04-15 ENCOUNTER — PATIENT MESSAGE (OUTPATIENT)
Dept: ADMINISTRATIVE | Facility: OTHER | Age: 68
End: 2023-04-15
Payer: MEDICARE

## 2023-04-16 ENCOUNTER — PATIENT MESSAGE (OUTPATIENT)
Dept: ADMINISTRATIVE | Facility: OTHER | Age: 68
End: 2023-04-16
Payer: MEDICARE

## 2023-04-17 ENCOUNTER — PATIENT MESSAGE (OUTPATIENT)
Dept: ADMINISTRATIVE | Facility: OTHER | Age: 68
End: 2023-04-17
Payer: MEDICARE

## 2023-04-18 ENCOUNTER — PATIENT MESSAGE (OUTPATIENT)
Dept: PRIMARY CARE CLINIC | Facility: CLINIC | Age: 68
End: 2023-04-18
Payer: MEDICARE

## 2023-04-18 ENCOUNTER — PATIENT MESSAGE (OUTPATIENT)
Dept: ADMINISTRATIVE | Facility: OTHER | Age: 68
End: 2023-04-18
Payer: MEDICARE

## 2023-04-18 DIAGNOSIS — Z12.31 ENCOUNTER FOR SCREENING MAMMOGRAM FOR MALIGNANT NEOPLASM OF BREAST: Primary | ICD-10-CM

## 2023-04-19 ENCOUNTER — PATIENT MESSAGE (OUTPATIENT)
Dept: ADMINISTRATIVE | Facility: OTHER | Age: 68
End: 2023-04-19
Payer: MEDICARE

## 2023-04-20 ENCOUNTER — PATIENT MESSAGE (OUTPATIENT)
Dept: ADMINISTRATIVE | Facility: OTHER | Age: 68
End: 2023-04-20
Payer: MEDICARE

## 2023-04-22 ENCOUNTER — PATIENT MESSAGE (OUTPATIENT)
Dept: ADMINISTRATIVE | Facility: OTHER | Age: 68
End: 2023-04-22
Payer: MEDICARE

## 2023-04-23 ENCOUNTER — PATIENT MESSAGE (OUTPATIENT)
Dept: ADMINISTRATIVE | Facility: OTHER | Age: 68
End: 2023-04-23
Payer: MEDICARE

## 2023-04-24 ENCOUNTER — PATIENT MESSAGE (OUTPATIENT)
Dept: ADMINISTRATIVE | Facility: OTHER | Age: 68
End: 2023-04-24
Payer: MEDICARE

## 2023-04-25 ENCOUNTER — SPECIALTY PHARMACY (OUTPATIENT)
Dept: PHARMACY | Facility: CLINIC | Age: 68
End: 2023-04-25
Payer: MEDICARE

## 2023-04-25 NOTE — TELEPHONE ENCOUNTER
Specialty Pharmacy - Refill Coordination    Specialty Medication Orders Linked to Encounter      Flowsheet Row Most Recent Value   Medication #1 capecitabine (XELODA) 150 MG tablet (Order#[id number], Rx#[id number])   Medication #2 capecitabine (XELODA) 500 MG Tab (Order#[id number], Rx#[id number])            Refill Questions - Documented Responses      Flowsheet Row Most Recent Value   Refill Screening Questions    Changes to allergies? No   Changes to medications? No   New conditions since last clinic visit? No   Unplanned office visit, urgent care, ED, or hospital admission in the last 4 weeks? No   How does patient/caregiver feel medication is working? Good   Financial problems or insurance changes? No   How many doses of your specialty medications were missed in the last 4 weeks? 0   Would patient like to speak to a pharmacist? No   When does the patient need to receive the medication? 04/28/23   Refill Delivery Questions    How will the patient receive the medication? MEDRx   When does the patient need to receive the medication? 04/28/23   Shipping Address Home   Address in [facility] confirmed and updated if neccessary? Yes   Expected Copay ($) 11.3   Is the patient able to afford the medication copay? Yes   Payment Method CC on file   Days supply of Refill 21   Supplies needed? No supplies needed   Refill activity completed? Yes   Refill activity plan Refill scheduled   Shipment/Pickup Date: 04/26/23            Current Outpatient Medications   Medication Sig    candesartan (ATACAND) 8 MG tablet TAKE 1 TABLET(8 MG) BY MOUTH EVERY DAY    capecitabine (XELODA) 150 MG tablet Take 1 tablet (150 mg total) by mouth 2 (two) times daily for days 1-14 of every 21 day chemotherapy cycle with 1 other capecitabine prescription for 1,650 mg total.    capecitabine (XELODA) 500 MG Tab Take 3 tablets (1,500 mg total) by mouth 2 (two) times daily for days 1-14 of every 21 day chemotherapy cycle with 1 other capecitabine  prescription for 1,650 mg total.    citalopram (CELEXA) 20 MG tablet TAKE 1 TABLET BY MOUTH EVERY DAY    hydroCHLOROthiazide (HYDRODIURIL) 12.5 MG Tab Take 1 tablet (12.5 mg total) by mouth once daily.    levothyroxine (SYNTHROID) 88 MCG tablet TAKE 1 TABLET BY MOUTH EVERY DAY    loperamide (IMODIUM) 2 mg capsule Take two caps at the onset of loose stools.  Then take one cap after every subsequent loose stool.    ondansetron (ZOFRAN) 8 MG tablet Take 1 tablet (8 mg total) by mouth every 8 (eight) hours as needed for Nausea.    rosuvastatin (CRESTOR) 40 MG Tab Take 1 tablet (40 mg total) by mouth every evening.    valACYclovir (VALTREX) 500 MG tablet Take 1 tablet (500 mg total) by mouth once daily.    vitamin D (VITAMIN D3) 1000 units Tab Take 1,000 Units by mouth once daily.   Last reviewed on 3/23/2023 10:35 AM by Bernie Valverde MD    Review of patient's allergies indicates:  No Known Allergies Last reviewed on  4/13/2023 3:18 PM by Rosalind Verdin      Tasks added this encounter   No tasks added.   Tasks due within next 3 months   7/12/2023 - Clinical Assessment (6 month recurrence)  4/23/2023 - Refill Coordination Outreach (1 time occurrence)     [facility] - Specialty Pharmacy  [address] [place], [place] [address]  [facility] [address]  Phone: [phone]  Fax: [phone]

## 2023-04-26 ENCOUNTER — PATIENT MESSAGE (OUTPATIENT)
Dept: ADMINISTRATIVE | Facility: OTHER | Age: 68
End: 2023-04-26
Payer: MEDICARE

## 2023-04-27 ENCOUNTER — PATIENT MESSAGE (OUTPATIENT)
Dept: ADMINISTRATIVE | Facility: OTHER | Age: 68
End: 2023-04-27
Payer: MEDICARE

## 2023-04-27 NOTE — TELEPHONE ENCOUNTER
Patient called to inquire about adjustment in price from previously expected cost. Rx was re-billed to a new [facility] due to manufacture shortages and cost was not the same. Explained likely cost difference to patient. Patient understands and has no further questions at this time.

## 2023-04-28 ENCOUNTER — OFFICE VISIT (OUTPATIENT)
Dept: HEMATOLOGY/ONCOLOGY | Facility: CLINIC | Age: 68
End: 2023-04-28
Payer: MEDICARE

## 2023-04-28 ENCOUNTER — LAB VISIT (OUTPATIENT)
Dept: LAB | Facility: HOSPITAL | Age: 68
End: 2023-04-28
Attending: HOSPITALIST
Payer: MEDICARE

## 2023-04-28 VITALS
HEIGHT: 62 IN | DIASTOLIC BLOOD PRESSURE: 65 MMHG | BODY MASS INDEX: 25.8 KG/M2 | SYSTOLIC BLOOD PRESSURE: 141 MMHG | OXYGEN SATURATION: 99 % | RESPIRATION RATE: 18 BRPM | HEART RATE: 75 BPM | WEIGHT: 140.19 LBS

## 2023-04-28 DIAGNOSIS — Z79.899 DRUG-INDUCED IMMUNODEFICIENCY: ICD-10-CM

## 2023-04-28 DIAGNOSIS — R21 RASH: ICD-10-CM

## 2023-04-28 DIAGNOSIS — D84.821 DRUG-INDUCED IMMUNODEFICIENCY: ICD-10-CM

## 2023-04-28 DIAGNOSIS — C22.1 CHOLANGIOCARCINOMA: ICD-10-CM

## 2023-04-28 DIAGNOSIS — C22.1 CHOLANGIOCARCINOMA: Primary | ICD-10-CM

## 2023-04-28 DIAGNOSIS — E03.9 HYPOTHYROIDISM, UNSPECIFIED TYPE: ICD-10-CM

## 2023-04-28 DIAGNOSIS — I10 ESSENTIAL HYPERTENSION: ICD-10-CM

## 2023-04-28 DIAGNOSIS — F33.42 RECURRENT MAJOR DEPRESSIVE DISORDER, IN FULL REMISSION: ICD-10-CM

## 2023-04-28 LAB
ALBUMIN SERPL BCP-MCNC: 4.1 G/DL (ref 3.5–5.2)
ALP SERPL-CCNC: 101 U/L (ref 55–135)
ALT SERPL W/O P-5'-P-CCNC: 22 U/L (ref 10–44)
ANION GAP SERPL CALC-SCNC: 6 MMOL/L (ref 8–16)
AST SERPL-CCNC: 27 U/L (ref 10–40)
BILIRUB SERPL-MCNC: 1.9 MG/DL (ref 0.1–1)
BUN SERPL-MCNC: 14 MG/DL (ref 8–23)
CALCIUM SERPL-MCNC: 10 MG/DL (ref 8.7–10.5)
CANCER AG19-9 SERPL-ACNC: 20.4 U/ML (ref 0–40)
CHLORIDE SERPL-SCNC: 105 MMOL/L (ref 95–110)
CO2 SERPL-SCNC: 29 MMOL/L (ref 23–29)
CREAT SERPL-MCNC: 0.9 MG/DL (ref 0.5–1.4)
ERYTHROCYTE [DISTWIDTH] IN BLOOD BY AUTOMATED COUNT: 22.2 % (ref 11.5–14.5)
EST. GFR  (NO RACE VARIABLE): >60 ML/MIN/1.73 M^2
GLUCOSE SERPL-MCNC: 99 MG/DL (ref 70–110)
HCT VFR BLD AUTO: 40.1 % (ref 37–48.5)
HGB BLD-MCNC: 12.7 G/DL (ref 12–16)
IMM GRANULOCYTES # BLD AUTO: 0.02 K/UL (ref 0–0.04)
MCH RBC QN AUTO: 31.3 PG (ref 27–31)
MCHC RBC AUTO-ENTMCNC: 31.7 G/DL (ref 32–36)
MCV RBC AUTO: 99 FL (ref 82–98)
NEUTROPHILS # BLD AUTO: 3.6 K/UL (ref 1.8–7.7)
PLATELET # BLD AUTO: 193 K/UL (ref 150–450)
PMV BLD AUTO: 10.3 FL (ref 9.2–12.9)
POTASSIUM SERPL-SCNC: 4.3 MMOL/L (ref 3.5–5.1)
PROT SERPL-MCNC: 7 G/DL (ref 6–8.4)
RBC # BLD AUTO: 4.06 M/UL (ref 4–5.4)
SODIUM SERPL-SCNC: 140 MMOL/L (ref 136–145)
WBC # BLD AUTO: 5.34 K/UL (ref 3.9–12.7)

## 2023-04-28 PROCEDURE — 3078F DIAST BP <80 MM HG: CPT | Mod: HCNC,CPTII,S$GLB, | Performed by: HOSPITALIST

## 2023-04-28 PROCEDURE — 86301 IMMUNOASSAY TUMOR CA 19-9: CPT | Mod: HCNC | Performed by: HOSPITALIST

## 2023-04-28 PROCEDURE — 3008F PR BODY MASS INDEX (BMI) DOCUMENTED: ICD-10-PCS | Mod: HCNC,CPTII,S$GLB, | Performed by: HOSPITALIST

## 2023-04-28 PROCEDURE — 80053 COMPREHEN METABOLIC PANEL: CPT | Mod: HCNC | Performed by: HOSPITALIST

## 2023-04-28 PROCEDURE — 99215 PR OFFICE/OUTPT VISIT, EST, LEVL V, 40-54 MIN: ICD-10-PCS | Mod: HCNC,S$GLB,, | Performed by: HOSPITALIST

## 2023-04-28 PROCEDURE — 99499 UNLISTED E&M SERVICE: CPT | Mod: HCNC,S$GLB,, | Performed by: HOSPITALIST

## 2023-04-28 PROCEDURE — 85027 COMPLETE CBC AUTOMATED: CPT | Mod: HCNC | Performed by: HOSPITALIST

## 2023-04-28 PROCEDURE — 36415 COLL VENOUS BLD VENIPUNCTURE: CPT | Mod: HCNC | Performed by: HOSPITALIST

## 2023-04-28 PROCEDURE — 99499 RISK ADDL DX/OHS AUDIT: ICD-10-PCS | Mod: HCNC,S$GLB,, | Performed by: HOSPITALIST

## 2023-04-28 PROCEDURE — 4010F PR ACE/ARB THEARPY RXD/TAKEN: ICD-10-PCS | Mod: HCNC,CPTII,S$GLB, | Performed by: HOSPITALIST

## 2023-04-28 PROCEDURE — 3077F SYST BP >= 140 MM HG: CPT | Mod: HCNC,CPTII,S$GLB, | Performed by: HOSPITALIST

## 2023-04-28 PROCEDURE — 99999 PR PBB SHADOW E&M-EST. PATIENT-LVL III: ICD-10-PCS | Mod: PBBFAC,HCNC,, | Performed by: HOSPITALIST

## 2023-04-28 PROCEDURE — 99215 OFFICE O/P EST HI 40 MIN: CPT | Mod: HCNC,S$GLB,, | Performed by: HOSPITALIST

## 2023-04-28 PROCEDURE — 3008F BODY MASS INDEX DOCD: CPT | Mod: HCNC,CPTII,S$GLB, | Performed by: HOSPITALIST

## 2023-04-28 PROCEDURE — 99999 PR PBB SHADOW E&M-EST. PATIENT-LVL III: CPT | Mod: PBBFAC,HCNC,, | Performed by: HOSPITALIST

## 2023-04-28 PROCEDURE — 4010F ACE/ARB THERAPY RXD/TAKEN: CPT | Mod: HCNC,CPTII,S$GLB, | Performed by: HOSPITALIST

## 2023-04-28 PROCEDURE — 3077F PR MOST RECENT SYSTOLIC BLOOD PRESSURE >= 140 MM HG: ICD-10-PCS | Mod: HCNC,CPTII,S$GLB, | Performed by: HOSPITALIST

## 2023-04-28 PROCEDURE — 3288F PR FALLS RISK ASSESSMENT DOCUMENTED: ICD-10-PCS | Mod: HCNC,CPTII,S$GLB, | Performed by: HOSPITALIST

## 2023-04-28 PROCEDURE — 1101F PR PT FALLS ASSESS DOC 0-1 FALLS W/OUT INJ PAST YR: ICD-10-PCS | Mod: HCNC,CPTII,S$GLB, | Performed by: HOSPITALIST

## 2023-04-28 PROCEDURE — 1126F PR PAIN SEVERITY QUANTIFIED, NO PAIN PRESENT: ICD-10-PCS | Mod: HCNC,CPTII,S$GLB, | Performed by: HOSPITALIST

## 2023-04-28 PROCEDURE — 1101F PT FALLS ASSESS-DOCD LE1/YR: CPT | Mod: HCNC,CPTII,S$GLB, | Performed by: HOSPITALIST

## 2023-04-28 PROCEDURE — 3288F FALL RISK ASSESSMENT DOCD: CPT | Mod: HCNC,CPTII,S$GLB, | Performed by: HOSPITALIST

## 2023-04-28 PROCEDURE — 3078F PR MOST RECENT DIASTOLIC BLOOD PRESSURE < 80 MM HG: ICD-10-PCS | Mod: HCNC,CPTII,S$GLB, | Performed by: HOSPITALIST

## 2023-04-28 PROCEDURE — 1126F AMNT PAIN NOTED NONE PRSNT: CPT | Mod: HCNC,CPTII,S$GLB, | Performed by: HOSPITALIST

## 2023-04-28 RX ORDER — HYDROXYZINE HYDROCHLORIDE 25 MG/1
25 TABLET, FILM COATED ORAL 3 TIMES DAILY PRN
Qty: 60 TABLET | Refills: 0 | Status: SHIPPED | OUTPATIENT
Start: 2023-04-28 | End: 2023-10-26 | Stop reason: SDUPTHER

## 2023-04-28 NOTE — ASSESSMENT & PLAN NOTE
- Unclear if related to capecitabine  - May be due to sun sensitivity given distribution  - Encourage limiting sun exposure  - Hydrocortisone and hydroxyzine as needed

## 2023-04-28 NOTE — PROGRESS NOTES
MEDICAL ONCOLOGY FOLLOW-UP VISIT.     Best Contact Phone Number(s): [phone] (home)      Cancer/Stage/TNM:    Cancer Staging   Cholangiocarcinoma  Staging form: Intrahepatic Bile Duct, AJCC 8th Edition  - Pathologic: Stage II (pT2, pN0, cM0) - Unsigned       Reason for visit: Rehana Whitten is a 67 y.o. female remote history of HBV who was found to have stage II pT2N0 intrahepatic cholangiocarcinoma in the setting of abdominal pain who underwent R0 resection 12/12/23. She presents to medical oncology clinic prior to C5 of adjuvant capecitabine.      Interval History:     Underwent C4 4/7/23. - 4/20/23 at 1650 po bid (1000 mg/m2)    Noted ongoing fatigue with C4 and associated malaise. Also with mild nausea during cycle. Not needing antiemetics and no emesis. No mouth pain or sores. Developed recurrent diarrhea during her off week up to 3x per day. Usually not an issue during treatment.     Devleoloped maculopapular over the last localized over her arms bilaterally. Using hydrocortisone cream.     Otherwise feels generally OK with no other concerns.         Oncology History   Cholangiocarcinoma   11/9/2022 Imaging Significant Findings    Abdominal US in the setting of abdominal pain shows a large heterogeneous mass in the right lobe, measures 7.2 x 5.3 x 5.9 cm.     11/9/2022 Imaging Significant Findings    CT a/p:  Irregular right hepatic lobe lesion demonstrating delayed central enhancement and overlying capsular retraction.  Capsular retraction can be seen in cholangiocarcinoma and sclerosing hepatic hemangiomas.  Differential includes metastatic disease and other primary hepatic neoplasms.     11/14/2022 Tumor Markers    CA 19-9: 19.1  AFP: 5.5     11/16/2022 Imaging Significant Findings    CT chest:  1. No specific CT evidence of metastatic disease within the chest.  2. Left lower lobe punctate micro nodules.  Attention on continued surveillance imaging.  3. Other findings as above.     11/30/2022  "Biopsy    Percutaneous biopsy of liver mass: Moderately differentiated adenocarcinoma. CK7+ Negative for CK20, CDX2, GATA3, TTF1, and PAX 8. Thought consistent with pancraetobiliary primary.     12/12/2022 Surgery    Resection of right hepatic lobe tumor. Pathology shows moderately differentiated adenocarcinoma up to 6.5cm in size c/w intrahepatic cholangiocarcinoma. Tumor was confined to the hepatic parenchyma with associated  LVI but no PNI. R0 resection with 6mm margins. 06/ LN involved. [id number]     1/13/2023 -  Chemotherapy    Treatment Summary   Plan Name: OP CAPECITABINE 1250MG/M2 BID Q3W  Treatment Goal: Control  Status: Active  Start Date: 1/13/2023  End Date: 1/13/2023  Provider: Bennie Moore MD  Chemotherapy: capecitabine (XELODA) 500 MG Tab, 1,250 mg/m2 = 2,000 mg, Oral, 2 times daily, 1 of 1 cycle, Start date: 1/13/2023, End date: --       2/24/2023 -  Chemotherapy    C2D1 capectiabine; DR to 1500mg po bid       3/17/2023 -  Chemotherapy    C3D1 capecitabine 1650 po bid       4/7/2023 -  Chemotherapy    C4D1 capecitabine 1650 po bid              Physical Exam:   BP (!) 141/65 (BP Location: Right arm, Patient Position: Sitting, BP Method: Medium (Automatic))   Pulse 75   Resp 18   Ht 5' 2" (1.575 m)   Wt 63.6 kg (140 lb 3.4 oz)   LMP  (LMP Unknown)   SpO2 99%   BMI 25.65 kg/m²      ECOG Performance Status: (foot note - ECOG PS provided by Eastern Cooperative Oncology Group) 1 - Symptomatic but completely ambulatory    Physical Exam  Constitutional:       General: She is not in acute distress.     Appearance: She is normal weight.   HENT:      Head: Normocephalic.      Mouth/Throat:      Mouth: Mucous membranes are moist.      Pharynx: Oropharynx is clear. No oropharyngeal exudate or posterior oropharyngeal erythema.   Eyes:      General: No scleral icterus.     Conjunctiva/sclera: Conjunctivae normal.   Cardiovascular:      Rate and Rhythm: Normal rate and regular rhythm.      Heart sounds: No " murmur heard.    No friction rub. No gallop.   Pulmonary:      Effort: Pulmonary effort is normal. No respiratory distress.      Breath sounds: Normal breath sounds.   Abdominal:      General: There is no distension.      Palpations: Abdomen is soft.      Tenderness: There is no abdominal tenderness.      Comments: Well healed midline surgical scar; small ventral hernia   Musculoskeletal:         General: Normal range of motion.      Cervical back: Normal range of motion and neck supple.      Right lower leg: No edema.      Left lower leg: No edema.   Lymphadenopathy:      Cervical: No cervical adenopathy.   Skin:     General: Skin is warm.      Coloration: Skin is not jaundiced.      Findings: Rash present. No bruising.      Comments: Maculo-papular rash over bilateral arms   Neurological:      General: No focal deficit present.      Mental Status: She is alert and oriented to person, place, and time.      Motor: No weakness.   Psychiatric:         Mood and Affect: Mood normal.         Behavior: Behavior normal.         Thought Content: Thought content normal.         Labs:   Recent Results (from the past 48 hour(s))   Cancer Antigen 19-9    Collection Time: 04/28/23  8:18 AM   Result Value Ref Range    CA 19-9 20.4 0.0 - 40.0 U/mL   CBC Oncology    Collection Time: 04/28/23  8:18 AM   Result Value Ref Range    WBC 5.34 3.90 - 12.70 K/uL    RBC 4.06 4.00 - 5.40 M/uL    Hemoglobin 12.7 12.0 - 16.0 g/dL    Hematocrit 40.1 37.0 - 48.5 %    MCV 99 (H) 82 - 98 fL    MCH 31.3 (H) 27.0 - 31.0 pg    MCHC 31.7 (L) 32.0 - 36.0 g/dL    RDW 22.2 (H) 11.5 - 14.5 %    Platelets 193 150 - 450 K/uL    MPV 10.3 9.2 - 12.9 fL    Gran # (ANC) 3.6 1.8 - 7.7 K/uL    Immature Grans (Abs) 0.02 0.00 - 0.04 K/uL   Comprehensive Metabolic Panel    Collection Time: 04/28/23  8:18 AM   Result Value Ref Range    Sodium 140 136 - 145 mmol/L    Potassium 4.3 3.5 - 5.1 mmol/L    Chloride 105 95 - 110 mmol/L    CO2 29 23 - 29 mmol/L    Glucose 99  70 - 110 mg/dL    BUN 14 8 - 23 mg/dL    Creatinine 0.9 0.5 - 1.4 mg/dL    Calcium 10.0 8.7 - 10.5 mg/dL    Total Protein 7.0 6.0 - 8.4 g/dL    Albumin 4.1 3.5 - 5.2 g/dL    Total Bilirubin 1.9 (H) 0.1 - 1.0 mg/dL    Alkaline Phosphatase 101 55 - 135 U/L    AST 27 10 - 40 U/L    ALT 22 10 - 44 U/L    Anion Gap 6 (L) 8 - 16 mmol/L    eGFR >60.0 >60 mL/min/1.73 m^2                  Imaging:     Echo  · The left ventricle is normal in size with normal systolic function.  · The estimated ejection fraction is 65%.  · The left ventricular global longitudinal strain is -15.4%.  · Normal left ventricular diastolic function.  · The estimated PA systolic pressure is 29 mmHg.  · Normal right ventricular size with normal right ventricular systolic   function.  · Normal central venous pressure (3 mmHg).  · There is no pulmonary hypertension.     Normal echocardiogram with Doppler            Diagnoses:       1. Cholangiocarcinoma    2. Rash    3. Drug-induced immunodeficiency    4. Essential hypertension    5. Recurrent major depressive disorder, in full remission    6. Hypothyroidism, unspecified type                Assessment and Plan:     1. Cholangiocarcinoma  Overview:  Stage II (pT2N0) IHCC sp R0 resection 12/12/23. Started adjuvant capecitabine x6 months per [facility] 2/3/23. Did not tolerated 1250mg/m2 dose; DR down to 1000mg/m2.    Assessment & Plan:  Tolerating 1000mg/m2 po bid with expected side effects.  Counts and toxicity appropriate to continue current dose. Will plan to reimage after C5  - Continue capecitabine 1650mg po bid D1-14 of 21 day cycle  - Reimage prior to C6  - FU in 3 weeks for C6    Orders:  -     hydrOXYzine HCL (ATARAX) 25 MG tablet; Take 1 tablet (25 mg total) by mouth 3 (three) times daily as needed for Itching.  Dispense: 60 tablet; Refill: 0  -     CT Chest Abdomen Pelvis With Contrast; Future; Expected date: 04/28/2023    2. Rash  Assessment & Plan:  - Unclear if related to capecitabine  - May be  due to sun sensitivity given distribution  - Encourage limiting sun exposure  - Hydrocortisone and hydroxyzine as needed    Orders:  -     hydrOXYzine HCL (ATARAX) 25 MG tablet; Take 1 tablet (25 mg total) by mouth 3 (three) times daily as needed for Itching.  Dispense: 60 tablet; Refill: 0    3. Drug-induced immunodeficiency  Assessment & Plan:  - Monitor for infection      4. Essential hypertension  Overview:  Home medications include candesartan and HCTZ; recently stopped triamterene after episodes of hypotension.    Assessment & Plan:  - Cotninue current meds      5. Recurrent major depressive disorder, in full remission  Overview:  Home medications include citalopram      6. Hypothyroidism, unspecified type  Overview:  Home medication includes levothyroxine                   Route Chart for Scheduling    Med Onc Chart Routing      Follow up with physician . Teresa 5/19/23   Follow up with [facility]    Infusion scheduling note    Injection scheduling note    Labs CBC, CMP and CA 19-9   Scheduling:  Preferred lab:  Lab interval:     Imaging CT chest abdomen pelvis      Pharmacy appointment    Other referrals           Treatment Plan Information   OP CAPECITABINE 1250MG/M2 BID Q3W   Bennie Moore MD   Upcoming Treatment Dates - OP CAPECITABINE 1250MG/M2 BID Q3W    No upcoming days in selected categories.       Bennie Moore MD  Hematology/Oncology  [facility]

## 2023-04-28 NOTE — PATIENT INSTRUCTIONS
Continuing to tolerate capecitabine with moderate expected side effects. Can use ondansetron or loperamide for nausea and diarrhea respectively, if needed.    For rash over arm, limit sun exposure. OK to use hydrocortisone cream as needed. RX for hydroxyzine called into local pharmacy. Use as needed.    Repeat CT scan in 3 weeks prior to next cycle

## 2023-04-28 NOTE — ASSESSMENT & PLAN NOTE
Tolerating 1000mg/m2 po bid with expected side effects.  Counts and toxicity appropriate to continue current dose. Will plan to reimage after C5  - Continue capecitabine 1650mg po bid D1-14 of 21 day cycle  - Reimage prior to C6  - FU in 3 weeks for C6

## 2023-04-29 ENCOUNTER — PATIENT MESSAGE (OUTPATIENT)
Dept: ADMINISTRATIVE | Facility: OTHER | Age: 68
End: 2023-04-29
Payer: MEDICARE

## 2023-04-30 ENCOUNTER — PATIENT MESSAGE (OUTPATIENT)
Dept: ADMINISTRATIVE | Facility: OTHER | Age: 68
End: 2023-04-30
Payer: MEDICARE

## 2023-05-01 ENCOUNTER — PATIENT MESSAGE (OUTPATIENT)
Dept: HEMATOLOGY/ONCOLOGY | Facility: CLINIC | Age: 68
End: 2023-05-01
Payer: MEDICARE

## 2023-05-02 ENCOUNTER — PATIENT MESSAGE (OUTPATIENT)
Dept: ADMINISTRATIVE | Facility: OTHER | Age: 68
End: 2023-05-02
Payer: MEDICARE

## 2023-05-03 ENCOUNTER — PATIENT MESSAGE (OUTPATIENT)
Dept: ADMINISTRATIVE | Facility: OTHER | Age: 68
End: 2023-05-03
Payer: MEDICARE

## 2023-05-04 ENCOUNTER — PATIENT MESSAGE (OUTPATIENT)
Dept: ADMINISTRATIVE | Facility: OTHER | Age: 68
End: 2023-05-04
Payer: MEDICARE

## 2023-05-05 ENCOUNTER — PATIENT MESSAGE (OUTPATIENT)
Dept: ADMINISTRATIVE | Facility: OTHER | Age: 68
End: 2023-05-05
Payer: MEDICARE

## 2023-05-06 ENCOUNTER — PATIENT MESSAGE (OUTPATIENT)
Dept: ADMINISTRATIVE | Facility: OTHER | Age: 68
End: 2023-05-06
Payer: MEDICARE

## 2023-05-08 ENCOUNTER — PATIENT MESSAGE (OUTPATIENT)
Dept: ADMINISTRATIVE | Facility: OTHER | Age: 68
End: 2023-05-08
Payer: MEDICARE

## 2023-05-10 ENCOUNTER — SPECIALTY PHARMACY (OUTPATIENT)
Dept: PHARMACY | Facility: CLINIC | Age: 68
End: 2023-05-10
Payer: MEDICARE

## 2023-05-10 NOTE — TELEPHONE ENCOUNTER
Outgoing call regarding Xeloda refill, informed pt one day too soon, refillable on 5/11. Pt states she takes her last dose of medication on 5/11, then thereafter her off week begins. Informed pt will follow up on 5/11 to schedule refill and delivery.

## 2023-05-11 NOTE — TELEPHONE ENCOUNTER
Specialty Pharmacy - Refill Coordination    Specialty Medication Orders Linked to Encounter      Flowsheet Row Most Recent Value   Medication #1 capecitabine (XELODA) 150 MG tablet (Order#[id number], Rx#[id number])   Medication #2 capecitabine (XELODA) 500 MG Tab (Order#[id number], Rx#[id number])            Refill Questions - Documented Responses      Flowsheet Row Most Recent Value   Patient Availability and HIPAA Verification    Does patient want to proceed with activity? Yes   HIPAA/medical authority confirmed? Yes   Relationship to patient of person spoken to? Self   Refill Screening Questions    Changes to allergies? No   Changes to medications? No   New conditions since last clinic visit? No   Unplanned office visit, urgent care, ED, or hospital admission in the last 4 weeks? No   How does patient/caregiver feel medication is working? Good   Financial problems or insurance changes? No   How many doses of your specialty medications were missed in the last 4 weeks? 0   Would patient like to speak to a pharmacist? No   When does the patient need to receive the medication? 05/18/23   Refill Delivery Questions    How will the patient receive the medication? MEDRx   When does the patient need to receive the medication? 05/18/23   Shipping Address Home   Address in [facility] confirmed and updated if neccessary? Yes   Expected Copay ($) 42.24   Is the patient able to afford the medication copay? Yes   Payment Method CC on file   Days supply of Refill 21   Supplies needed? No supplies needed   Refill activity completed? Yes   Refill activity plan Refill scheduled   Shipment/Pickup Date: 05/12/23            Current Outpatient Medications   Medication Sig    candesartan (ATACAND) 8 MG tablet TAKE 1 TABLET(8 MG) BY MOUTH EVERY DAY    capecitabine (XELODA) 150 MG tablet Take 1 tablet (150 mg total) by mouth 2 (two) times daily for days 1-14 of every 21 day chemotherapy cycle with 1 other capecitabine prescription  for 1,650 mg total.    capecitabine (XELODA) 500 MG Tab Take 3 tablets (1,500 mg total) by mouth 2 (two) times daily for days 1-14 of every 21 day chemotherapy cycle with 1 other capecitabine prescription for 1,650 mg total.    citalopram (CELEXA) 20 MG tablet TAKE 1 TABLET BY MOUTH EVERY DAY    hydroCHLOROthiazide (HYDRODIURIL) 12.5 MG Tab Take 1 tablet (12.5 mg total) by mouth once daily.    hydrOXYzine HCL (ATARAX) 25 MG tablet Take 1 tablet (25 mg total) by mouth 3 (three) times daily as needed for Itching.    levothyroxine (SYNTHROID) 88 MCG tablet TAKE 1 TABLET BY MOUTH EVERY DAY    loperamide (IMODIUM) 2 mg capsule Take two caps at the onset of loose stools.  Then take one cap after every subsequent loose stool.    ondansetron (ZOFRAN) 8 MG tablet Take 1 tablet (8 mg total) by mouth every 8 (eight) hours as needed for Nausea.    rosuvastatin (CRESTOR) 40 MG Tab Take 1 tablet (40 mg total) by mouth every evening.    valACYclovir (VALTREX) 500 MG tablet Take 1 tablet (500 mg total) by mouth once daily.    vitamin D (VITAMIN D3) 1000 units Tab Take 1,000 Units by mouth once daily.   Last reviewed on 3/23/2023 10:35 AM by Bernie Valverde MD    Review of patient's allergies indicates:  No Known Allergies Last reviewed on  4/28/2023 8:30 AM by Rosalind Verdin      Tasks added this encounter   No tasks added.   Tasks due within next 3 months   7/12/2023 - Clinical Assessment (6 month recurrence)  5/11/2023 - Refill Coordination Outreach (1 time occurrence)     Amelia Osborne  [facility] - Specialty Pharmacy  [address] [place] [address]  Phone: [phone]  Fax: [phone]

## 2023-05-15 ENCOUNTER — HOSPITAL ENCOUNTER (OUTPATIENT)
Dept: RADIOLOGY | Facility: HOSPITAL | Age: 68
Discharge: HOME | End: 2023-05-15
Attending: HOSPITALIST
Payer: MEDICARE

## 2023-05-15 DIAGNOSIS — C22.1 CHOLANGIOCARCINOMA: ICD-10-CM

## 2023-05-15 PROCEDURE — 71260 CT CHEST ABDOMEN PELVIS WITH CONTRAST (XPD): ICD-10-PCS | Mod: 26,HCNC,, | Performed by: INTERNAL MEDICINE

## 2023-05-15 PROCEDURE — 71260 CT THORAX DX C+: CPT | Mod: TC,HCNC

## 2023-05-15 PROCEDURE — 71260 CT THORAX DX C+: CPT | Mod: 26,HCNC,, | Performed by: INTERNAL MEDICINE

## 2023-05-15 PROCEDURE — A9698 NON-RAD CONTRAST MATERIALNOC: HCPCS | Mod: HCNC | Performed by: HOSPITALIST

## 2023-05-15 PROCEDURE — 74177 CT CHEST ABDOMEN PELVIS WITH CONTRAST (XPD): ICD-10-PCS | Mod: 26,HCNC,, | Performed by: INTERNAL MEDICINE

## 2023-05-15 PROCEDURE — 74177 CT ABD & PELVIS W/CONTRAST: CPT | Mod: 26,HCNC,, | Performed by: INTERNAL MEDICINE

## 2023-05-15 PROCEDURE — 25500020 PHARM REV CODE 255: Mod: HCNC | Performed by: HOSPITALIST

## 2023-05-15 PROCEDURE — 74177 CT ABD & PELVIS W/CONTRAST: CPT | Mod: TC,HCNC

## 2023-05-15 RX ADMIN — IOHEXOL 75 ML: 350 INJECTION, SOLUTION INTRAVENOUS at 09:05

## 2023-05-15 RX ADMIN — Medication 450 ML: at 09:05

## 2023-05-17 ENCOUNTER — PATIENT MESSAGE (OUTPATIENT)
Dept: ADMINISTRATIVE | Facility: OTHER | Age: 68
End: 2023-05-17
Payer: MEDICARE

## 2023-05-18 ENCOUNTER — PATIENT MESSAGE (OUTPATIENT)
Dept: ADMINISTRATIVE | Facility: OTHER | Age: 68
End: 2023-05-18
Payer: MEDICARE

## 2023-05-19 ENCOUNTER — LAB VISIT (OUTPATIENT)
Dept: LAB | Facility: HOSPITAL | Age: 68
End: 2023-05-19
Attending: HOSPITALIST
Payer: MEDICARE

## 2023-05-19 ENCOUNTER — OFFICE VISIT (OUTPATIENT)
Dept: HEMATOLOGY/ONCOLOGY | Facility: CLINIC | Age: 68
End: 2023-05-19
Payer: MEDICARE

## 2023-05-19 VITALS
SYSTOLIC BLOOD PRESSURE: 135 MMHG | BODY MASS INDEX: 25.79 KG/M2 | TEMPERATURE: 99 F | DIASTOLIC BLOOD PRESSURE: 65 MMHG | OXYGEN SATURATION: 98 % | HEIGHT: 62 IN | RESPIRATION RATE: 18 BRPM | HEART RATE: 82 BPM | WEIGHT: 140.13 LBS

## 2023-05-19 DIAGNOSIS — E03.9 HYPOTHYROIDISM, UNSPECIFIED TYPE: ICD-10-CM

## 2023-05-19 DIAGNOSIS — C22.1 CHOLANGIOCARCINOMA: ICD-10-CM

## 2023-05-19 DIAGNOSIS — I10 ESSENTIAL HYPERTENSION: ICD-10-CM

## 2023-05-19 DIAGNOSIS — C22.1 CHOLANGIOCARCINOMA: Primary | ICD-10-CM

## 2023-05-19 DIAGNOSIS — F33.42 RECURRENT MAJOR DEPRESSIVE DISORDER, IN FULL REMISSION: ICD-10-CM

## 2023-05-19 LAB
ALBUMIN SERPL BCP-MCNC: 4.1 G/DL (ref 3.5–5.2)
ALP SERPL-CCNC: 100 U/L (ref 55–135)
ALT SERPL W/O P-5'-P-CCNC: 26 U/L (ref 10–44)
ANION GAP SERPL CALC-SCNC: 9 MMOL/L (ref 8–16)
AST SERPL-CCNC: 29 U/L (ref 10–40)
BILIRUB SERPL-MCNC: 2.3 MG/DL (ref 0.1–1)
BUN SERPL-MCNC: 13 MG/DL (ref 8–23)
CALCIUM SERPL-MCNC: 10 MG/DL (ref 8.7–10.5)
CANCER AG19-9 SERPL-ACNC: 19 U/ML (ref 0–40)
CHLORIDE SERPL-SCNC: 103 MMOL/L (ref 95–110)
CO2 SERPL-SCNC: 27 MMOL/L (ref 23–29)
CREAT SERPL-MCNC: 0.8 MG/DL (ref 0.5–1.4)
ERYTHROCYTE [DISTWIDTH] IN BLOOD BY AUTOMATED COUNT: 20.6 % (ref 11.5–14.5)
EST. GFR  (NO RACE VARIABLE): >60 ML/MIN/1.73 M^2
GLUCOSE SERPL-MCNC: 103 MG/DL (ref 70–110)
HCT VFR BLD AUTO: 38.7 % (ref 37–48.5)
HGB BLD-MCNC: 12.8 G/DL (ref 12–16)
IMM GRANULOCYTES # BLD AUTO: 0.04 K/UL (ref 0–0.04)
MCH RBC QN AUTO: 32.7 PG (ref 27–31)
MCHC RBC AUTO-ENTMCNC: 33.1 G/DL (ref 32–36)
MCV RBC AUTO: 99 FL (ref 82–98)
NEUTROPHILS # BLD AUTO: 5.4 K/UL (ref 1.8–7.7)
PLATELET # BLD AUTO: 179 K/UL (ref 150–450)
PMV BLD AUTO: 10.2 FL (ref 9.2–12.9)
POTASSIUM SERPL-SCNC: 3.9 MMOL/L (ref 3.5–5.1)
PROT SERPL-MCNC: 7 G/DL (ref 6–8.4)
RBC # BLD AUTO: 3.91 M/UL (ref 4–5.4)
SODIUM SERPL-SCNC: 139 MMOL/L (ref 136–145)
WBC # BLD AUTO: 7.39 K/UL (ref 3.9–12.7)

## 2023-05-19 PROCEDURE — 1159F PR MEDICATION LIST DOCUMENTED IN MEDICAL RECORD: ICD-10-PCS | Mod: HCNC,CPTII,, | Performed by: HOSPITALIST

## 2023-05-19 PROCEDURE — 1101F PR PT FALLS ASSESS DOC 0-1 FALLS W/OUT INJ PAST YR: ICD-10-PCS | Mod: HCNC,CPTII,, | Performed by: HOSPITALIST

## 2023-05-19 PROCEDURE — 99215 PR OFFICE/OUTPT VISIT, EST, LEVL V, 40-54 MIN: ICD-10-PCS | Mod: HCNC,,, | Performed by: HOSPITALIST

## 2023-05-19 PROCEDURE — 85027 COMPLETE CBC AUTOMATED: CPT | Mod: HCNC | Performed by: HOSPITALIST

## 2023-05-19 PROCEDURE — 3078F PR MOST RECENT DIASTOLIC BLOOD PRESSURE < 80 MM HG: ICD-10-PCS | Mod: HCNC,CPTII,, | Performed by: HOSPITALIST

## 2023-05-19 PROCEDURE — 99215 OFFICE O/P EST HI 40 MIN: CPT | Mod: HCNC,,, | Performed by: HOSPITALIST

## 2023-05-19 PROCEDURE — 99999 PR PBB SHADOW E&M-EST. PATIENT-LVL IV: CPT | Mod: PBBFAC,HCNC,, | Performed by: HOSPITALIST

## 2023-05-19 PROCEDURE — 3008F PR BODY MASS INDEX (BMI) DOCUMENTED: ICD-10-PCS | Mod: HCNC,CPTII,, | Performed by: HOSPITALIST

## 2023-05-19 PROCEDURE — 86301 IMMUNOASSAY TUMOR CA 19-9: CPT | Mod: HCNC | Performed by: HOSPITALIST

## 2023-05-19 PROCEDURE — 99499 UNLISTED E&M SERVICE: CPT | Mod: HCNC,S$GLB,, | Performed by: HOSPITALIST

## 2023-05-19 PROCEDURE — 4010F ACE/ARB THERAPY RXD/TAKEN: CPT | Mod: HCNC,CPTII,, | Performed by: HOSPITALIST

## 2023-05-19 PROCEDURE — 1159F MED LIST DOCD IN RCRD: CPT | Mod: HCNC,CPTII,, | Performed by: HOSPITALIST

## 2023-05-19 PROCEDURE — 3078F DIAST BP <80 MM HG: CPT | Mod: HCNC,CPTII,, | Performed by: HOSPITALIST

## 2023-05-19 PROCEDURE — 99214 OFFICE O/P EST MOD 30 MIN: CPT | Mod: HCNC | Performed by: HOSPITALIST

## 2023-05-19 PROCEDURE — 1101F PT FALLS ASSESS-DOCD LE1/YR: CPT | Mod: HCNC,CPTII,, | Performed by: HOSPITALIST

## 2023-05-19 PROCEDURE — 1126F PR PAIN SEVERITY QUANTIFIED, NO PAIN PRESENT: ICD-10-PCS | Mod: HCNC,CPTII,, | Performed by: HOSPITALIST

## 2023-05-19 PROCEDURE — 3075F PR MOST RECENT SYSTOLIC BLOOD PRESS GE 130-139MM HG: ICD-10-PCS | Mod: HCNC,CPTII,, | Performed by: HOSPITALIST

## 2023-05-19 PROCEDURE — 3288F FALL RISK ASSESSMENT DOCD: CPT | Mod: HCNC,CPTII,, | Performed by: HOSPITALIST

## 2023-05-19 PROCEDURE — 99999 PR PBB SHADOW E&M-EST. PATIENT-LVL IV: ICD-10-PCS | Mod: PBBFAC,HCNC,, | Performed by: HOSPITALIST

## 2023-05-19 PROCEDURE — 3288F PR FALLS RISK ASSESSMENT DOCUMENTED: ICD-10-PCS | Mod: HCNC,CPTII,, | Performed by: HOSPITALIST

## 2023-05-19 PROCEDURE — 3075F SYST BP GE 130 - 139MM HG: CPT | Mod: HCNC,CPTII,, | Performed by: HOSPITALIST

## 2023-05-19 PROCEDURE — 99499 RISK ADDL DX/OHS AUDIT: ICD-10-PCS | Mod: HCNC,S$GLB,, | Performed by: HOSPITALIST

## 2023-05-19 PROCEDURE — 4010F PR ACE/ARB THEARPY RXD/TAKEN: ICD-10-PCS | Mod: HCNC,CPTII,, | Performed by: HOSPITALIST

## 2023-05-19 PROCEDURE — 1126F AMNT PAIN NOTED NONE PRSNT: CPT | Mod: HCNC,CPTII,, | Performed by: HOSPITALIST

## 2023-05-19 PROCEDURE — 36415 COLL VENOUS BLD VENIPUNCTURE: CPT | Mod: HCNC | Performed by: HOSPITALIST

## 2023-05-19 PROCEDURE — 80053 COMPREHEN METABOLIC PANEL: CPT | Mod: HCNC | Performed by: HOSPITALIST

## 2023-05-19 PROCEDURE — 3008F BODY MASS INDEX DOCD: CPT | Mod: HCNC,CPTII,, | Performed by: HOSPITALIST

## 2023-05-19 NOTE — PROGRESS NOTES
MEDICAL ONCOLOGY FOLLOW-UP VISIT.     Best Contact Phone Number(s): [phone] (home)      Cancer/Stage/TNM:    Cancer Staging   Cholangiocarcinoma  Staging form: Intrahepatic Bile Duct, AJCC 8th Edition  - Pathologic: Stage II (pT2, pN0, cM0) - Unsigned       Reason for visit: Rehana Whitten is a 67 y.o. female remote history of HBV who was found to have stage II pT2N0 intrahepatic cholangiocarcinoma in the setting of abdominal pain who underwent R0 resection 12/12/23. She presents to medical oncology clinic prior to C6 of adjuvant capecitabine.      Interval History:       Tolerated capecitabine similarly as prior cycles. Has moderate nausea at the beginning of each cycle. Used ondansetron a few time with moderate effect. Appetite is fine. Mild diarrhea at the end of treatment. Currently constipated. Continues to have waxing and waning rash primarily over the arms. Mild itching. Using topical steroids as needed. Single dose of hydroxyzine was effective but sedating.     Started C6D1 thsi morning 1650 bid.    Recent CT torso with new subcentimeter segment IV lesion.        Oncology History   Cholangiocarcinoma   11/9/2022 Imaging Significant Findings    Abdominal US in the setting of abdominal pain shows a large heterogeneous mass in the right lobe, measures 7.2 x 5.3 x 5.9 cm.     11/9/2022 Imaging Significant Findings    CT a/p:  Irregular right hepatic lobe lesion demonstrating delayed central enhancement and overlying capsular retraction.  Capsular retraction can be seen in cholangiocarcinoma and sclerosing hepatic hemangiomas.  Differential includes metastatic disease and other primary hepatic neoplasms.     11/14/2022 Tumor Markers    CA 19-9: 19.1  AFP: 5.5     11/16/2022 Imaging Significant Findings    CT chest:  1. No specific CT evidence of metastatic disease within the chest.  2. Left lower lobe punctate micro nodules.  Attention on continued surveillance imaging.  3. Other findings as  "above.     11/30/2022 Biopsy    Percutaneous biopsy of liver mass: Moderately differentiated adenocarcinoma. CK7+ Negative for CK20, CDX2, GATA3, TTF1, and PAX 8. Thought consistent with pancraetobiliary primary.     12/12/2022 Surgery    Resection of right hepatic lobe tumor. Pathology shows moderately differentiated adenocarcinoma up to 6.5cm in size c/w intrahepatic cholangiocarcinoma. Tumor was confined to the hepatic parenchyma with associated  LVI but no PNI. R0 resection with 6mm margins. 06/ LN involved. [id number]     1/13/2023 -  Chemotherapy    Treatment Summary   Plan Name: OP CAPECITABINE 1250MG/M2 BID Q3W  Treatment Goal: Control  Status: Active  Start Date: 1/13/2023  End Date: 1/13/2023  Provider: Bennie Moore MD  Chemotherapy: capecitabine (XELODA) 500 MG Tab, 1,250 mg/m2 = 2,000 mg, Oral, 2 times daily, 1 of 1 cycle, Start date: 1/13/2023, End date: --       2/24/2023 -  Chemotherapy    C2D1 capectiabine; DR to 1500mg po bid       3/17/2023 -  Chemotherapy    C3D1 capecitabine 1650 po bid       4/7/2023 -  Chemotherapy    C4D1 capecitabine 1650 po bid       4/28/2023 -  Chemotherapy    C5 capecitabine 1650 po bid       5/15/2023 Imaging Significant Findings    5/15/23: CT torso:  - Postoperative changes from right hepatectomy, cholecystectomy, and portal lymphadenectomy for intrahepatic cholangiocarcinoma.  - No lymphadenopathy.  - New subcentimeter hypodensity in hepatic segment 4B, concerning for metastases.  - Unchanged pulmonary nodules.  - Other findings as described.            Physical Exam:   /65 (BP Location: Left arm, Patient Position: Sitting, BP Method: Small (Automatic))   Pulse 82   Temp 98.5 °F (36.9 °C) (Oral)   Resp 18   Ht 5' 2" (1.575 m)   Wt 63.6 kg (140 lb 1.6 oz)   LMP  (LMP Unknown)   SpO2 98%   BMI 25.63 kg/m²      ECOG Performance Status: (foot note - ECOG PS provided by [employer] Co[employer]cology Group) 1 - Symptomatic but completely " ambulatory    Physical Exam  Constitutional:       General: She is not in acute distress.     Appearance: She is normal weight.   HENT:      Head: Normocephalic.      Mouth/Throat:      Mouth: Mucous membranes are moist.      Pharynx: Oropharynx is clear. No oropharyngeal exudate or posterior oropharyngeal erythema.   Eyes:      General: No scleral icterus.     Conjunctiva/sclera: Conjunctivae normal.   Cardiovascular:      Rate and Rhythm: Normal rate and regular rhythm.      Heart sounds: No murmur heard.    No friction rub. No gallop.   Pulmonary:      Effort: Pulmonary effort is normal. No respiratory distress.      Breath sounds: Normal breath sounds.   Abdominal:      General: There is no distension.      Palpations: Abdomen is soft.      Tenderness: There is no abdominal tenderness.      Comments: Well healed midline surgical scar; small ventral hernia   Musculoskeletal:         General: Normal range of motion.      Cervical back: Normal range of motion and neck supple.      Right lower leg: No edema.      Left lower leg: No edema.   Lymphadenopathy:      Cervical: No cervical adenopathy.   Skin:     General: Skin is warm.      Coloration: Skin is not jaundiced.      Findings: Rash present. No bruising.      Comments: Maculo-papular rash over bilateral arms   Neurological:      General: No focal deficit present.      Mental Status: She is alert and oriented to person, place, and time.      Motor: No weakness.   Psychiatric:         Mood and Affect: Mood normal.         Behavior: Behavior normal.         Thought Content: Thought content normal.         Labs:   Recent Results (from the past 48 hour(s))   Cancer Antigen 19-9    Collection Time: 05/19/23  2:35 PM   Result Value Ref Range    CA 19-9 19.0 0.0 - 40.0 U/mL   CBC Oncology    Collection Time: 05/19/23  2:35 PM   Result Value Ref Range    WBC 7.39 3.90 - 12.70 K/uL    RBC 3.91 (L) 4.00 - 5.40 M/uL    Hemoglobin 12.8 12.0 - 16.0 g/dL    Hematocrit 38.7  37.0 - 48.5 %    MCV 99 (H) 82 - 98 fL    MCH 32.7 (H) 27.0 - 31.0 pg    MCHC 33.1 32.0 - 36.0 g/dL    RDW 20.6 (H) 11.5 - 14.5 %    Platelets 179 150 - 450 K/uL    MPV 10.2 9.2 - 12.9 fL    Gran # (ANC) 5.4 1.8 - 7.7 K/uL    Immature Grans (Abs) 0.04 0.00 - 0.04 K/uL   Comprehensive Metabolic Panel    Collection Time: 05/19/23  2:35 PM   Result Value Ref Range    Sodium 139 136 - 145 mmol/L    Potassium 3.9 3.5 - 5.1 mmol/L    Chloride 103 95 - 110 mmol/L    CO2 27 23 - 29 mmol/L    Glucose 103 70 - 110 mg/dL    BUN 13 8 - 23 mg/dL    Creatinine 0.8 0.5 - 1.4 mg/dL    Calcium 10.0 8.7 - 10.5 mg/dL    Total Protein 7.0 6.0 - 8.4 g/dL    Albumin 4.1 3.5 - 5.2 g/dL    Total Bilirubin 2.3 (H) 0.1 - 1.0 mg/dL    Alkaline Phosphatase 100 55 - 135 U/L    AST 29 10 - 40 U/L    ALT 26 10 - 44 U/L    Anion Gap 9 8 - 16 mmol/L    eGFR >60.0 >60 mL/min/1.73 m^2                    Imaging:     CT Chest Abdomen Pelvis With Contrast  Narrative: EXAMINATION:  CT CHEST ABDOMEN PELVIS WITH CONTRAST (XPD)    CLINICAL HISTORY:  Gallbladder/biliary cancer, staging; Intrahepatic bile duct carcinoma    TECHNIQUE:  Low dose axial images, sagittal and coronal reformations were obtained from the thoracic inlet to the pubic symphysis following the IV administration of 75 mL of Omnipaque 350 and the oral administration of 450 mL Readi-Cat.    COMPARISON:  CT chest 11/16/2022; CT abdomen pelvis 11/09/2022    FINDINGS:  LINES/TUBES:  None.    SOFT TISSUES: No axillary or subpectoral lymphadenopathy. The visualized thyroid gland is unremarkable.  Postoperative changes from left parathyroidectomy.  Postoperative changes in the abdominal wall.    HEART AND MEDIASTINUM: No mediastinal or hilar lymphadenopathy. Heart and pericardium are within normal limits. Left-sided aortic arch. The main pulmonary artery is normal in size.    PLEURA: No pleural effusion or pneumothorax.    LUNGS AND AIRWAYS: Airways are patent. Bibasilar subsegmental atelectasis.   No focal consolidation.  Few solid pulmonary nodules, the largest measuring 5 mm along the right minor fissure.  All are unchanged from 11/16/2022.  No new pulmonary nodules.    HEPATOBILIARY: Postoperative changes from right hepatectomy and cholecystectomy.  Subcentimeter hypodense lesion in segment 4B (03:43), new from prior.  No biliary ductal dilatation.    SPLEEN: No splenomegaly.    PANCREAS: No focal masses or ductal dilatation.    ADRENALS: No adrenal nodules.    KIDNEYS/URETERS: No hydronephrosis, stones, or solid mass lesions.    PELVIC ORGANS/BLADDER: Unremarkable.    PERITONEUM / RETROPERITONEUM: Trace free fluid in the right upper quadrant at the hepatectomy margin.  No free air.  Unchanged fat stranding in the small bowel mesentery.    LYMPH NODES: Postoperative changes from portal lymphadenectomy.  No new abdominopelvic lymphadenopathy.    VESSELS: Residual portal and hepatic veins are patent.    GI TRACT: No distention or wall thickening. Normal appendix.    BONES: Unchanged osteonecrosis in the femoral heads bilaterally.  No articular surface collapse.  Advanced degenerative changes at C7-T1.  Unchanged nonaggressive lytic lesion involving the left lateral 8th rib (605:252).  No aggressive focal lesion.  No fracture.  Impression: Postoperative changes from right hepatectomy, cholecystectomy, and portal lymphadenectomy for intrahepatic cholangiocarcinoma.    No lymphadenopathy.    New subcentimeter hypodensity in hepatic segment 4B, concerning for metastases.    Unchanged pulmonary nodules.    Other findings as described.    Electronically signed by: William Barron  Date:    05/15/2023  Time:    11:54            Diagnoses:       1. Cholangiocarcinoma    2. Essential hypertension    3. Recurrent major depressive disorder, in full remission    4. Hypothyroidism, unspecified type                  Assessment and Plan:     1. Cholangiocarcinoma  Overview:  Stage II (pT2N0) IHCC sp R0 resection 12/12/23.  Started adjuvant capecitabine x6 months per [facility] 2/3/23. Did not tolerated 1250mg/m2 dose; DR down to 1000mg/m2.    Assessment & Plan:  Tolerating currently dose reasonably well. Will continue at current intensity.   Note made of new liver lesion. Discussed with Dr. Blackmon, will reimage after completion of C8 adjuvant treatment. If concern for malignancy may consider ablation techniques.     - Contine C6 adjuvent capecitabine 1650 po bid  - Reimage after completing adjuvant therapy (C8)  - Close monitoring of liver lesion    Orders:  -     BILIRUBIN, DIRECT; Future; Expected date: 05/19/2023    2. Essential hypertension  Overview:  Home medications include HCTZ; recently stopped candesartan and triamterene after episodes of hypotension.      3. Recurrent major depressive disorder, in full remission  Overview:  Home medications include citalopram      4. Hypothyroidism, unspecified type  Overview:  Home medication includes levothyroxine                     Route Chart for Scheduling    Med Onc Chart Routing      Follow up with physician Abby Moore 6/9/23 or 6/8/23   Follow up with [facility]    Infusion scheduling note    Injection scheduling note    Labs CBC, CMP and CA 19-9   Scheduling:  Preferred lab:  Lab interval:     Imaging    Pharmacy appointment    Other referrals           Treatment Plan Information   OP CAPECITABINE 1250MG/M2 BID Q3W   Bennie Moore MD   Upcoming Treatment Dates - OP CAPECITABINE 1250MG/M2 BID Q3W    No upcoming days in selected categories.       Bennie Moore MD  Hematology/Oncology  [facility]

## 2023-05-19 NOTE — PATIENT INSTRUCTIONS
Continue to tolerate this dose of capecitabine with moderate expected side effects. Will continue 1650mg twice daily for the 6th of 8 planned cycles.     CT scan did show an indeterminate subcentimeter lesion. Will continue close monitoring and repeat imaging after the completion of treatment. Should there be concern for a progressive spot of cancer we could consider additional local therapy options like ablation at that time.     Follow up in 3 weeks for cycle 7

## 2023-05-19 NOTE — ASSESSMENT & PLAN NOTE
Tolerating currently dose reasonably well. Will continue at current intensity.   Note made of new liver lesion. Discussed with Dr. Blackmon, will reimage after completion of C8 adjuvant treatment. If concern for malignancy may consider ablation techniques.     - Contine C6 adjuvent capecitabine 1650 po bid  - Reimage after completing adjuvant therapy (C8)  - Close monitoring of liver lesion

## 2023-05-20 ENCOUNTER — PATIENT MESSAGE (OUTPATIENT)
Dept: ADMINISTRATIVE | Facility: OTHER | Age: 68
End: 2023-05-20
Payer: MEDICARE

## 2023-05-20 ENCOUNTER — PATIENT MESSAGE (OUTPATIENT)
Dept: HEMATOLOGY/ONCOLOGY | Facility: CLINIC | Age: 68
End: 2023-05-20
Payer: MEDICARE

## 2023-05-26 DIAGNOSIS — C22.1 CHOLANGIOCARCINOMA: ICD-10-CM

## 2023-05-26 RX ORDER — CAPECITABINE 500 MG/1
1500 TABLET, FILM COATED ORAL 2 TIMES DAILY
Qty: 84 TABLET | Refills: 3 | Status: ACTIVE | OUTPATIENT
Start: 2023-05-26 | End: 2023-07-21

## 2023-05-26 RX ORDER — CAPECITABINE 150 MG/1
150 TABLET, FILM COATED ORAL 2 TIMES DAILY
Qty: 28 TABLET | Refills: 3 | Status: ACTIVE | OUTPATIENT
Start: 2023-05-26 | End: 2023-07-21

## 2023-05-29 ENCOUNTER — PATIENT MESSAGE (OUTPATIENT)
Dept: PHARMACY | Facility: CLINIC | Age: 68
End: 2023-05-29
Payer: MEDICARE

## 2023-05-29 ENCOUNTER — SPECIALTY PHARMACY (OUTPATIENT)
Dept: PHARMACY | Facility: CLINIC | Age: 68
End: 2023-05-29
Payer: MEDICARE

## 2023-05-29 NOTE — TELEPHONE ENCOUNTER
Specialty Pharmacy - Refill Coordination    Specialty Medication Orders Linked to Encounter      Flowsheet Row Most Recent Value   Medication #1 capecitabine (XELODA) 150 MG tablet (Order#[id number], Rx#[id number])   Medication #2 capecitabine (XELODA) 500 MG Tab (Order#[id number], Rx#[id number])            Refill Questions - Documented Responses      Flowsheet Row Most Recent Value   Refill Screening Questions    Changes to allergies? No   Changes to medications? No   New conditions since last clinic visit? No   Unplanned office visit, urgent care, ED, or hospital admission in the last 4 weeks? No   How does patient/caregiver feel medication is working? Good   Financial problems or insurance changes? No   How many doses of your specialty medications were missed in the last 4 weeks? 0   Would patient like to speak to a pharmacist? No   When does the patient need to receive the medication? 06/09/23   Refill Delivery Questions    How will the patient receive the medication? [facility]   When does the patient need to receive the medication? 06/09/23   Shipping Address Home   Address in [facility] confirmed and updated if neccessary? Yes   Expected Copay ($) 42.24   Is the patient able to afford the medication copay? Yes   Payment Method CC on file   Days supply of Refill 21   Supplies needed? No supplies needed   Refill activity completed? Yes   Refill activity plan Refill scheduled   Shipment/Pickup Date: 06/01/23            Current Outpatient Medications   Medication Sig    capecitabine (XELODA) 150 MG tablet Take 1 tablet (150 mg total) by mouth 2 (two) times daily for days 1-14 of every 21 day chemotherapy cycle with 1 other capecitabine prescription for 1,650 mg total.    capecitabine (XELODA) 500 MG Tab Take 3 tablets (1,500 mg total) by mouth 2 (two) times daily for days 1-14 of every 21 day chemotherapy cycle with 1 other capecitabine prescription for 1,650 mg total.    citalopram (CELEXA) 20 MG tablet TAKE 1  TABLET BY MOUTH EVERY DAY    hydroCHLOROthiazide (HYDRODIURIL) 12.5 MG Tab Take 1 tablet (12.5 mg total) by mouth once daily.    hydrOXYzine HCL (ATARAX) 25 MG tablet Take 1 tablet (25 mg total) by mouth 3 (three) times daily as needed for Itching.    levothyroxine (SYNTHROID) 88 MCG tablet TAKE 1 TABLET BY MOUTH EVERY DAY    loperamide (IMODIUM) 2 mg capsule Take two caps at the onset of loose stools.  Then take one cap after every subsequent loose stool.    ondansetron (ZOFRAN) 8 MG tablet Take 1 tablet (8 mg total) by mouth every 8 (eight) hours as needed for Nausea.    rosuvastatin (CRESTOR) 40 MG Tab Take 1 tablet (40 mg total) by mouth every evening.    valACYclovir (VALTREX) 500 MG tablet Take 1 tablet (500 mg total) by mouth once daily.    vitamin D (VITAMIN D3) 1000 units Tab Take 1,000 Units by mouth once daily.   Last reviewed on 5/19/2023  3:26 PM by Jacqueline Martinez MA    Review of patient's allergies indicates:  No Known Allergies Last reviewed on  5/19/2023 3:26 PM by Jacqueline Martinez      Tasks added this encounter   No tasks added.   Tasks due within next 3 months   7/12/2023 - Clinical Assessment (6 month recurrence)     Orin Cooper, PharmD  [facility]  [address] [place], [place] [address]  [facility] [address]  Phone: [phone]  Fax: [phone]

## 2023-06-08 ENCOUNTER — LAB VISIT (OUTPATIENT)
Dept: LAB | Facility: HOSPITAL | Age: 68
End: 2023-06-08
Attending: HOSPITALIST
Payer: MEDICARE

## 2023-06-08 ENCOUNTER — OFFICE VISIT (OUTPATIENT)
Dept: HEMATOLOGY/ONCOLOGY | Facility: CLINIC | Age: 68
End: 2023-06-08
Payer: MEDICARE

## 2023-06-08 VITALS
HEIGHT: 62 IN | SYSTOLIC BLOOD PRESSURE: 133 MMHG | HEART RATE: 81 BPM | BODY MASS INDEX: 25.8 KG/M2 | WEIGHT: 140.19 LBS | DIASTOLIC BLOOD PRESSURE: 64 MMHG

## 2023-06-08 DIAGNOSIS — Z79.899 DRUG-INDUCED IMMUNODEFICIENCY: ICD-10-CM

## 2023-06-08 DIAGNOSIS — R21 RASH: ICD-10-CM

## 2023-06-08 DIAGNOSIS — E03.9 HYPOTHYROIDISM, UNSPECIFIED TYPE: ICD-10-CM

## 2023-06-08 DIAGNOSIS — D84.821 DRUG-INDUCED IMMUNODEFICIENCY: ICD-10-CM

## 2023-06-08 DIAGNOSIS — F33.42 RECURRENT MAJOR DEPRESSIVE DISORDER, IN FULL REMISSION: ICD-10-CM

## 2023-06-08 DIAGNOSIS — C22.1 CHOLANGIOCARCINOMA: Primary | ICD-10-CM

## 2023-06-08 DIAGNOSIS — I10 ESSENTIAL HYPERTENSION: ICD-10-CM

## 2023-06-08 DIAGNOSIS — C22.1 CHOLANGIOCARCINOMA: ICD-10-CM

## 2023-06-08 LAB
ALBUMIN SERPL BCP-MCNC: 4.1 G/DL (ref 3.5–5.2)
ALP SERPL-CCNC: 110 U/L (ref 55–135)
ALT SERPL W/O P-5'-P-CCNC: 37 U/L (ref 10–44)
ANION GAP SERPL CALC-SCNC: 7 MMOL/L (ref 8–16)
AST SERPL-CCNC: 38 U/L (ref 10–40)
BILIRUB SERPL-MCNC: 2.3 MG/DL (ref 0.1–1)
BUN SERPL-MCNC: 13 MG/DL (ref 8–23)
CALCIUM SERPL-MCNC: 9.9 MG/DL (ref 8.7–10.5)
CHLORIDE SERPL-SCNC: 105 MMOL/L (ref 95–110)
CO2 SERPL-SCNC: 30 MMOL/L (ref 23–29)
CREAT SERPL-MCNC: 0.9 MG/DL (ref 0.5–1.4)
ERYTHROCYTE [DISTWIDTH] IN BLOOD BY AUTOMATED COUNT: 20.4 % (ref 11.5–14.5)
EST. GFR  (NO RACE VARIABLE): >60 ML/MIN/1.73 M^2
GLUCOSE SERPL-MCNC: 113 MG/DL (ref 70–110)
HCT VFR BLD AUTO: 37.5 % (ref 37–48.5)
HGB BLD-MCNC: 12.6 G/DL (ref 12–16)
IMM GRANULOCYTES # BLD AUTO: 0.03 K/UL (ref 0–0.04)
MCH RBC QN AUTO: 34.3 PG (ref 27–31)
MCHC RBC AUTO-ENTMCNC: 33.6 G/DL (ref 32–36)
MCV RBC AUTO: 102 FL (ref 82–98)
NEUTROPHILS # BLD AUTO: 3.2 K/UL (ref 1.8–7.7)
PLATELET # BLD AUTO: 190 K/UL (ref 150–450)
PMV BLD AUTO: 9.8 FL (ref 9.2–12.9)
POTASSIUM SERPL-SCNC: 3.8 MMOL/L (ref 3.5–5.1)
PROT SERPL-MCNC: 7 G/DL (ref 6–8.4)
RBC # BLD AUTO: 3.67 M/UL (ref 4–5.4)
SODIUM SERPL-SCNC: 142 MMOL/L (ref 136–145)
WBC # BLD AUTO: 5.17 K/UL (ref 3.9–12.7)

## 2023-06-08 PROCEDURE — 1126F PR PAIN SEVERITY QUANTIFIED, NO PAIN PRESENT: ICD-10-PCS | Mod: HCNC,CPTII,S$GLB, | Performed by: HOSPITALIST

## 2023-06-08 PROCEDURE — 99215 PR OFFICE/OUTPT VISIT, EST, LEVL V, 40-54 MIN: ICD-10-PCS | Mod: HCNC,S$GLB,, | Performed by: HOSPITALIST

## 2023-06-08 PROCEDURE — 3075F PR MOST RECENT SYSTOLIC BLOOD PRESS GE 130-139MM HG: ICD-10-PCS | Mod: HCNC,CPTII,S$GLB, | Performed by: HOSPITALIST

## 2023-06-08 PROCEDURE — 99499 UNLISTED E&M SERVICE: CPT | Mod: HCNC,S$GLB,, | Performed by: HOSPITALIST

## 2023-06-08 PROCEDURE — 99499 RISK ADDL DX/OHS AUDIT: ICD-10-PCS | Mod: HCNC,S$GLB,, | Performed by: HOSPITALIST

## 2023-06-08 PROCEDURE — 4010F ACE/ARB THERAPY RXD/TAKEN: CPT | Mod: HCNC,CPTII,S$GLB, | Performed by: HOSPITALIST

## 2023-06-08 PROCEDURE — 3078F PR MOST RECENT DIASTOLIC BLOOD PRESSURE < 80 MM HG: ICD-10-PCS | Mod: HCNC,CPTII,S$GLB, | Performed by: HOSPITALIST

## 2023-06-08 PROCEDURE — 3288F FALL RISK ASSESSMENT DOCD: CPT | Mod: HCNC,CPTII,S$GLB, | Performed by: HOSPITALIST

## 2023-06-08 PROCEDURE — 3288F PR FALLS RISK ASSESSMENT DOCUMENTED: ICD-10-PCS | Mod: HCNC,CPTII,S$GLB, | Performed by: HOSPITALIST

## 2023-06-08 PROCEDURE — 3075F SYST BP GE 130 - 139MM HG: CPT | Mod: HCNC,CPTII,S$GLB, | Performed by: HOSPITALIST

## 2023-06-08 PROCEDURE — 85027 COMPLETE CBC AUTOMATED: CPT | Mod: HCNC | Performed by: HOSPITALIST

## 2023-06-08 PROCEDURE — 99999 PR PBB SHADOW E&M-EST. PATIENT-LVL III: CPT | Mod: PBBFAC,HCNC,, | Performed by: HOSPITALIST

## 2023-06-08 PROCEDURE — 99999 PR PBB SHADOW E&M-EST. PATIENT-LVL III: ICD-10-PCS | Mod: PBBFAC,HCNC,, | Performed by: HOSPITALIST

## 2023-06-08 PROCEDURE — 99215 OFFICE O/P EST HI 40 MIN: CPT | Mod: HCNC,S$GLB,, | Performed by: HOSPITALIST

## 2023-06-08 PROCEDURE — 1101F PT FALLS ASSESS-DOCD LE1/YR: CPT | Mod: HCNC,CPTII,S$GLB, | Performed by: HOSPITALIST

## 2023-06-08 PROCEDURE — 3008F PR BODY MASS INDEX (BMI) DOCUMENTED: ICD-10-PCS | Mod: HCNC,CPTII,S$GLB, | Performed by: HOSPITALIST

## 2023-06-08 PROCEDURE — 3008F BODY MASS INDEX DOCD: CPT | Mod: HCNC,CPTII,S$GLB, | Performed by: HOSPITALIST

## 2023-06-08 PROCEDURE — 3078F DIAST BP <80 MM HG: CPT | Mod: HCNC,CPTII,S$GLB, | Performed by: HOSPITALIST

## 2023-06-08 PROCEDURE — 80053 COMPREHEN METABOLIC PANEL: CPT | Mod: HCNC | Performed by: HOSPITALIST

## 2023-06-08 PROCEDURE — 1126F AMNT PAIN NOTED NONE PRSNT: CPT | Mod: HCNC,CPTII,S$GLB, | Performed by: HOSPITALIST

## 2023-06-08 PROCEDURE — 36415 COLL VENOUS BLD VENIPUNCTURE: CPT | Mod: HCNC | Performed by: HOSPITALIST

## 2023-06-08 PROCEDURE — 86301 IMMUNOASSAY TUMOR CA 19-9: CPT | Mod: HCNC | Performed by: HOSPITALIST

## 2023-06-08 PROCEDURE — 4010F PR ACE/ARB THEARPY RXD/TAKEN: ICD-10-PCS | Mod: HCNC,CPTII,S$GLB, | Performed by: HOSPITALIST

## 2023-06-08 PROCEDURE — 1101F PR PT FALLS ASSESS DOC 0-1 FALLS W/OUT INJ PAST YR: ICD-10-PCS | Mod: HCNC,CPTII,S$GLB, | Performed by: HOSPITALIST

## 2023-06-08 NOTE — PATIENT INSTRUCTIONS
Continue to tolerate capecitabine chemotherapy well. Labs appropriate to continue cycle 7 starting tomorrow. Plan to complete 8 cycles.     Monitor discomfort over fingers and toes - may be a sign of hand foot syndrome; continue diligent of Udderly Smooth cream.    Follow up in 3 weeks for final C8.   rx sent. Filled med while covering for PCP-- Dr Jose Guerrero when he was on vacation

## 2023-06-08 NOTE — ASSESSMENT & PLAN NOTE
- C7 capectiabine 1650 po bid to start tomorrow  - Follow up in 3 weeks for C8  - Reimage after C8  - Consider biopsy and/or ablation of liver lesion vs surveillance depending on repeat imaging results

## 2023-06-08 NOTE — PROGRESS NOTES
MEDICAL ONCOLOGY FOLLOW-UP VISIT.     Best Contact Phone Number(s): [phone] (home)      Cancer/Stage/TNM:    Cancer Staging   Cholangiocarcinoma  Staging form: Intrahepatic Bile Duct, AJCC 8th Edition  - Pathologic: Stage II (pT2, pN0, cM0) - Unsigned       Reason for visit: Rehana Whitten is a 67 y.o. female remote history of HBV who was found to have stage II pT2N0 intrahepatic cholangiocarcinoma in the setting of abdominal pain who underwent R0 resection 12/12/23. She presents to medical oncology clinic prior to C7 of adjuvant capecitabine.      Interval History:     Capecitabine 1650 bid. Tolerated C6 about the same as prior cycle. Continues to have moderate nausea at the beginning of cycles. No emesis and controlled on antiemetics. Uses ondansetron a few times throughout the week.     Did develop some tenderness over her fingertips and toes at the end of last cycle. Using urea cream prophylactically. Additional rash is mild but persists. Mild 'prickly' type itch. No diarrhea on cycle - continues to get mild diarrhea during her week off. Energy is generally good. Just got back from a few days at [place] yesterday. Doing frequent walks up to three miles at a time.    Moderate nausea at beginning of cycle. Waxing and waning rash over arms. Using steroids prn.          Oncology History   Cholangiocarcinoma   11/9/2022 Imaging Significant Findings    Abdominal US in the setting of abdominal pain shows a large heterogeneous mass in the right lobe, measures 7.2 x 5.3 x 5.9 cm.     11/9/2022 Imaging Significant Findings    CT a/p:  Irregular right hepatic lobe lesion demonstrating delayed central enhancement and overlying capsular retraction.  Capsular retraction can be seen in cholangiocarcinoma and sclerosing hepatic hemangiomas.  Differential includes metastatic disease and other primary hepatic neoplasms.     11/14/2022 Tumor Markers    CA 19-9: 19.1  AFP: 5.5     11/16/2022 Imaging Significant  "Findings    CT chest:  1. No specific CT evidence of metastatic disease within the chest.  2. Left lower lobe punctate micro nodules.  Attention on continued surveillance imaging.  3. Other findings as above.     11/30/2022 Biopsy    Percutaneous biopsy of liver mass: Moderately differentiated adenocarcinoma. CK7+ Negative for CK20, CDX2, GATA3, TTF1, and PAX 8. Thought consistent with pancraetobiliary primary.     12/12/2022 Surgery    Resection of right hepatic lobe tumor. Pathology shows moderately differentiated adenocarcinoma up to 6.5cm in size c/w intrahepatic cholangiocarcinoma. Tumor was confined to the hepatic parenchyma with associated  LVI but no PNI. R0 resection with 6mm margins. 06/ LN involved. [id number]     1/13/2023 -  Chemotherapy    Treatment Summary   Plan Name: OP CAPECITABINE 1250MG/M2 BID Q3W  Treatment Goal: Control  Status: Active  Start Date: 1/13/2023  End Date: 1/13/2023  Provider: Bennie Moore MD  Chemotherapy: capecitabine (XELODA) 500 MG Tab, 1,250 mg/m2 = 2,000 mg, Oral, 2 times daily, 1 of 1 cycle, Start date: 1/13/2023, End date: --     2/24/2023 -  Chemotherapy    C2D1 capectiabine; DR to 1500mg po bid       3/17/2023 -  Chemotherapy    C3D1 capecitabine 1650 po bid       4/7/2023 -  Chemotherapy    C4D1 capecitabine 1650 po bid       4/28/2023 -  Chemotherapy    C5 capecitabine 1650 po bid       5/15/2023 Imaging Significant Findings    5/15/23: CT torso:  - Postoperative changes from right hepatectomy, cholecystectomy, and portal lymphadenectomy for intrahepatic cholangiocarcinoma.  - No lymphadenopathy.  - New subcentimeter hypodensity in hepatic segment 4B, concerning for metastases.  - Unchanged pulmonary nodules.  - Other findings as described.            Physical Exam:   /64 (BP Location: Left arm, Patient Position: Sitting, BP Method: Medium (Automatic))   Pulse 81   Ht 5' 2" (1.575 m)   Wt 63.6 kg (140 lb 3.4 oz)   LMP  (LMP Unknown)   BMI 25.65 kg/m²  "     ECOG Performance Status: (foot note - ECOG PS provided by Eastern Cooperative Oncology Group) 1 - Symptomatic but completely ambulatory    Physical Exam  Constitutional:       General: She is not in acute distress.     Appearance: She is normal weight.   HENT:      Head: Normocephalic.      Mouth/Throat:      Mouth: Mucous membranes are moist.      Pharynx: Oropharynx is clear. No oropharyngeal exudate or posterior oropharyngeal erythema.   Eyes:      General: No scleral icterus.     Conjunctiva/sclera: Conjunctivae normal.   Cardiovascular:      Rate and Rhythm: Normal rate and regular rhythm.      Heart sounds: No murmur heard.    No friction rub. No gallop.   Pulmonary:      Effort: Pulmonary effort is normal. No respiratory distress.      Breath sounds: Normal breath sounds.   Abdominal:      General: There is no distension.      Palpations: Abdomen is soft.      Tenderness: There is no abdominal tenderness.      Comments: Well healed midline surgical scar; small ventral hernia   Musculoskeletal:         General: Normal range of motion.      Cervical back: Normal range of motion and neck supple.      Right lower leg: No edema.      Left lower leg: No edema.   Lymphadenopathy:      Cervical: No cervical adenopathy.   Skin:     General: Skin is warm.      Coloration: Skin is not jaundiced.      Findings: Rash present. No bruising.      Comments: Maculo-papular rash over bilateral arms   Neurological:      General: No focal deficit present.      Mental Status: She is alert and oriented to person, place, and time.      Motor: No weakness.   Psychiatric:         Mood and Affect: Mood normal.         Behavior: Behavior normal.         Thought Content: Thought content normal.         Labs:   Recent Results (from the past 48 hour(s))   CBC Oncology    Collection Time: 06/08/23  1:34 PM   Result Value Ref Range    WBC 5.17 3.90 - 12.70 K/uL    RBC 3.67 (L) 4.00 - 5.40 M/uL    Hemoglobin 12.6 12.0 - 16.0 g/dL     Hematocrit 37.5 37.0 - 48.5 %     (H) 82 - 98 fL    MCH 34.3 (H) 27.0 - 31.0 pg    MCHC 33.6 32.0 - 36.0 g/dL    RDW 20.4 (H) 11.5 - 14.5 %    Platelets 190 150 - 450 K/uL    MPV 9.8 9.2 - 12.9 fL    Gran # (ANC) 3.2 1.8 - 7.7 K/uL    Immature Grans (Abs) 0.03 0.00 - 0.04 K/uL   Comprehensive Metabolic Panel    Collection Time: 06/08/23  1:34 PM   Result Value Ref Range    Sodium 142 136 - 145 mmol/L    Potassium 3.8 3.5 - 5.1 mmol/L    Chloride 105 95 - 110 mmol/L    CO2 30 (H) 23 - 29 mmol/L    Glucose 113 (H) 70 - 110 mg/dL    BUN 13 8 - 23 mg/dL    Creatinine 0.9 0.5 - 1.4 mg/dL    Calcium 9.9 8.7 - 10.5 mg/dL    Total Protein 7.0 6.0 - 8.4 g/dL    Albumin 4.1 3.5 - 5.2 g/dL    Total Bilirubin 2.3 (H) 0.1 - 1.0 mg/dL    Alkaline Phosphatase 110 55 - 135 U/L    AST 38 10 - 40 U/L    ALT 37 10 - 44 U/L    Anion Gap 7 (L) 8 - 16 mmol/L    eGFR >60.0 >60 mL/min/1.73 m^2                      Imaging:     CT Chest Abdomen Pelvis With Contrast  Narrative: EXAMINATION:  CT CHEST ABDOMEN PELVIS WITH CONTRAST (XPD)    CLINICAL HISTORY:  Gallbladder/biliary cancer, staging; Intrahepatic bile duct carcinoma    TECHNIQUE:  Low dose axial images, sagittal and coronal reformations were obtained from the thoracic inlet to the pubic symphysis following the IV administration of 75 mL of Omnipaque 350 and the oral administration of 450 mL Readi-Cat.    COMPARISON:  CT chest 11/16/2022; CT abdomen pelvis 11/09/2022    FINDINGS:  LINES/TUBES:  None.    SOFT TISSUES: No axillary or subpectoral lymphadenopathy. The visualized thyroid gland is unremarkable.  Postoperative changes from left parathyroidectomy.  Postoperative changes in the abdominal wall.    HEART AND MEDIASTINUM: No mediastinal or hilar lymphadenopathy. Heart and pericardium are within normal limits. Left-sided aortic arch. The main pulmonary artery is normal in size.    PLEURA: No pleural effusion or pneumothorax.    LUNGS AND AIRWAYS: Airways are patent.  Bibasilar subsegmental atelectasis.  No focal consolidation.  Few solid pulmonary nodules, the largest measuring 5 mm along the right minor fissure.  All are unchanged from 11/16/2022.  No new pulmonary nodules.    HEPATOBILIARY: Postoperative changes from right hepatectomy and cholecystectomy.  Subcentimeter hypodense lesion in segment 4B (03:43), new from prior.  No biliary ductal dilatation.    SPLEEN: No splenomegaly.    PANCREAS: No focal masses or ductal dilatation.    ADRENALS: No adrenal nodules.    KIDNEYS/URETERS: No hydronephrosis, stones, or solid mass lesions.    PELVIC ORGANS/BLADDER: Unremarkable.    PERITONEUM / RETROPERITONEUM: Trace free fluid in the right upper quadrant at the hepatectomy margin.  No free air.  Unchanged fat stranding in the small bowel mesentery.    LYMPH NODES: Postoperative changes from portal lymphadenectomy.  No new abdominopelvic lymphadenopathy.    VESSELS: Residual portal and hepatic veins are patent.    GI TRACT: No distention or wall thickening. Normal appendix.    BONES: Unchanged osteonecrosis in the femoral heads bilaterally.  No articular surface collapse.  Advanced degenerative changes at C7-T1.  Unchanged nonaggressive lytic lesion involving the left lateral 8th rib (605:252).  No aggressive focal lesion.  No fracture.  Impression: Postoperative changes from right hepatectomy, cholecystectomy, and portal lymphadenectomy for intrahepatic cholangiocarcinoma.    No lymphadenopathy.    New subcentimeter hypodensity in hepatic segment 4B, concerning for metastases.    Unchanged pulmonary nodules.    Other findings as described.    Electronically signed by: William Barron  Date:    05/15/2023  Time:    11:54            Diagnoses:       1. Cholangiocarcinoma    2. Hypothyroidism, unspecified type    3. Essential hypertension    4. Rash    5. Recurrent major depressive disorder, in full remission    6. Drug-induced immunodeficiency                    Assessment and Plan:      1. Cholangiocarcinoma  Overview:  Stage II (pT2N0) IHCC sp R0 resection 12/12/23. Started adjuvant capecitabine x6 months per [facility]IL[facility] 2/3/23. Did not tolerated 1250mg/m2 dose; DR down to 1000mg/m2. Note made of indeterminate subcentimeter hypodensity in segment 4b.    Assessment & Plan:  - C7 capectiabine 1650 po bid to start tomorrow  - Follow up in 3 weeks for C8  - Reimage after C8  - Consider biopsy and/or ablation of liver lesion vs surveillance depending on repeat imaging results    Orders:  -     CT Chest Abdomen Pelvis With Contrast; Future; Expected date: 06/08/2023    2. Hypothyroidism, unspecified type  Overview:  Home medication includes levothyroxine      3. Essential hypertension  Overview:  Home medications include HCTZ; recently stopped candesartan and triamterene after episodes of hypotension.      4. Rash    5. Recurrent major depressive disorder, in full remission  Overview:  Home medications include citalopram      6. Drug-induced immunodeficiency                     Route Chart for Scheduling    Med Onc Chart Routing      Follow up with physician Abby Moore 7/21/23   Follow up with ESTELLA Francis or Hailee 6/29/23   Infusion scheduling note    Injection scheduling note    Labs CBC and CMP   Scheduling:  Preferred lab:  Lab interval:     Imaging CT chest abdomen pelvis   a few days prior to MD estellat 7/21/23   Pharmacy appointment    Other referrals            Treatment Plan Information   OP CAPECITABINE 1250MG/M2 BID Q3W   Bennie Moore MD   Upcoming Treatment Dates - OP CAPECITABINE 1250MG/M2 BID Q3W    No upcoming days in selected categories.       Bennie Moore MD  Hematology/Oncology  [facility]

## 2023-06-09 LAB — CANCER AG19-9 SERPL-ACNC: 18.9 U/ML (ref 0–40)

## 2023-06-15 ENCOUNTER — SPECIALTY PHARMACY (OUTPATIENT)
Dept: PHARMACY | Facility: CLINIC | Age: 68
End: 2023-06-15
Payer: MEDICARE

## 2023-06-15 NOTE — TELEPHONE ENCOUNTER
Outgoing call regarding Xeloda refill, pt states she has a week on hand before starting her off week states her off week starts on 6/23 . Informed pt refill too soon and will follow up on 6/20 to schedule refill and delivery.

## 2023-06-20 NOTE — TELEPHONE ENCOUNTER
Specialty Pharmacy - Refill Coordination    Specialty Medication Orders Linked to Encounter      Flowsheet Row Most Recent Value   Medication #1 capecitabine (XELODA) 150 MG tablet (Order#[id number], Rx#[id number])   Medication #2 capecitabine (XELODA) 500 MG Tab (Order#[id number], Rx#[id number])            Refill Questions - Documented Responses      Flowsheet Row Most Recent Value   Patient Availability and HIPAA Verification    Does patient want to proceed with activity? Yes   HIPAA/medical authority confirmed? Yes   Relationship to patient of person spoken to? Self   Refill Screening Questions    Changes to allergies? No   Changes to medications? No   New conditions since last clinic visit? No   Unplanned office visit, urgent care, ED, or hospital admission in the last 4 weeks? No   How does patient/caregiver feel medication is working? Good   Financial problems or insurance changes? No   How many doses of your specialty medications were missed in the last 4 weeks? 0   Would patient like to speak to a pharmacist? No   When does the patient need to receive the medication? 06/30/23   Refill Delivery Questions    How will the patient receive the medication? [facility]   When does the patient need to receive the medication? 06/30/23   Shipping Address Home   Address in [facility] confirmed and updated if neccessary? Yes   Expected Copay ($) 42.24   Is the patient able to afford the medication copay? Yes   Payment Method CC on file   Days supply of Refill 21   Supplies needed? No supplies needed   Refill activity completed? Yes   Refill activity plan Refill scheduled   Shipment/Pickup Date: 06/21/23            Current Outpatient Medications   Medication Sig    capecitabine (XELODA) 150 MG tablet Take 1 tablet (150 mg total) by mouth 2 (two) times daily for days 1-14 of every 21 day chemotherapy cycle with 1 other capecitabine prescription for 1,650 mg total.    capecitabine (XELODA) 500 MG Tab Take 3 tablets (1,500  mg total) by mouth 2 (two) times daily for days 1-14 of every 21 day chemotherapy cycle with 1 other capecitabine prescription for 1,650 mg total.    citalopram (CELEXA) 20 MG tablet TAKE 1 TABLET BY MOUTH EVERY DAY    hydroCHLOROthiazide (HYDRODIURIL) 12.5 MG Tab Take 1 tablet (12.5 mg total) by mouth once daily.    hydrOXYzine HCL (ATARAX) 25 MG tablet Take 1 tablet (25 mg total) by mouth 3 (three) times daily as needed for Itching.    levothyroxine (SYNTHROID) 88 MCG tablet TAKE 1 TABLET BY MOUTH EVERY DAY    loperamide (IMODIUM) 2 mg capsule Take two caps at the onset of loose stools.  Then take one cap after every subsequent loose stool.    ondansetron (ZOFRAN) 8 MG tablet Take 1 tablet (8 mg total) by mouth every 8 (eight) hours as needed for Nausea.    rosuvastatin (CRESTOR) 40 MG Tab Take 1 tablet (40 mg total) by mouth every evening.    valACYclovir (VALTREX) 500 MG tablet Take 1 tablet (500 mg total) by mouth once daily.    vitamin D (VITAMIN D3) 1000 units Tab Take 1,000 Units by mouth once daily.   Last reviewed on 5/19/2023  3:26 PM by Jacqueline Martinez MA    Review of patient's allergies indicates:  No Known Allergies Last reviewed on  6/8/2023 2:23 PM by Rosalind Verdin      Tasks added this encounter   No tasks added.   Tasks due within next 3 months   7/12/2023 - Clinical Assessment (6 month recurrence)  6/20/2023 - Refill Coordination Outreach (1 time occurrence)     [facility]  [address] [place] [id number]  Phone: [phone]  Fax: [phone]

## 2023-06-28 ENCOUNTER — HOSPITAL ENCOUNTER (OUTPATIENT)
Dept: RADIOLOGY | Facility: HOSPITAL | Age: 68
Discharge: HOME | End: 2023-06-28
Attending: INTERNAL MEDICINE
Payer: MEDICARE

## 2023-06-28 VITALS — WEIGHT: 140 LBS | BODY MASS INDEX: 25.76 KG/M2 | HEIGHT: 62 IN

## 2023-06-28 DIAGNOSIS — Z12.31 ENCOUNTER FOR SCREENING MAMMOGRAM FOR MALIGNANT NEOPLASM OF BREAST: ICD-10-CM

## 2023-06-28 PROCEDURE — 77063 BREAST TOMOSYNTHESIS BI: CPT | Mod: 26,,, | Performed by: RADIOLOGY

## 2023-06-28 PROCEDURE — 77067 SCR MAMMO BI INCL CAD: CPT | Mod: 26,,, | Performed by: RADIOLOGY

## 2023-06-28 PROCEDURE — 77063 MAMMO DIGITAL SCREENING BILAT WITH TOMO: ICD-10-PCS | Mod: 26,,, | Performed by: RADIOLOGY

## 2023-06-28 PROCEDURE — 77067 MAMMO DIGITAL SCREENING BILAT WITH TOMO: ICD-10-PCS | Mod: 26,,, | Performed by: RADIOLOGY

## 2023-06-28 PROCEDURE — 77067 SCR MAMMO BI INCL CAD: CPT | Mod: TC

## 2023-06-29 ENCOUNTER — PATIENT MESSAGE (OUTPATIENT)
Dept: HEMATOLOGY/ONCOLOGY | Facility: CLINIC | Age: 68
End: 2023-06-29

## 2023-06-29 ENCOUNTER — OFFICE VISIT (OUTPATIENT)
Dept: HEMATOLOGY/ONCOLOGY | Facility: CLINIC | Age: 68
End: 2023-06-29
Payer: MEDICARE

## 2023-06-29 ENCOUNTER — LAB VISIT (OUTPATIENT)
Dept: LAB | Facility: HOSPITAL | Age: 68
End: 2023-06-29
Attending: HOSPITALIST
Payer: MEDICARE

## 2023-06-29 ENCOUNTER — PATIENT MESSAGE (OUTPATIENT)
Dept: HEMATOLOGY/ONCOLOGY | Facility: CLINIC | Age: 68
End: 2023-06-29
Payer: MEDICARE

## 2023-06-29 VITALS
SYSTOLIC BLOOD PRESSURE: 144 MMHG | TEMPERATURE: 98 F | DIASTOLIC BLOOD PRESSURE: 72 MMHG | HEIGHT: 62 IN | OXYGEN SATURATION: 100 % | WEIGHT: 142.44 LBS | HEART RATE: 74 BPM | BODY MASS INDEX: 26.21 KG/M2 | RESPIRATION RATE: 18 BRPM

## 2023-06-29 DIAGNOSIS — E78.49 OTHER HYPERLIPIDEMIA: ICD-10-CM

## 2023-06-29 DIAGNOSIS — Z79.899 DRUG-INDUCED IMMUNODEFICIENCY: ICD-10-CM

## 2023-06-29 DIAGNOSIS — C22.1 CHOLANGIOCARCINOMA: ICD-10-CM

## 2023-06-29 DIAGNOSIS — I10 ESSENTIAL HYPERTENSION: ICD-10-CM

## 2023-06-29 DIAGNOSIS — Z79.899 IMMUNODEFICIENCY SECONDARY TO CHEMOTHERAPY: ICD-10-CM

## 2023-06-29 DIAGNOSIS — D84.821 DRUG-INDUCED IMMUNODEFICIENCY: ICD-10-CM

## 2023-06-29 DIAGNOSIS — C22.1 CHOLANGIOCARCINOMA: Primary | ICD-10-CM

## 2023-06-29 DIAGNOSIS — F33.42 RECURRENT MAJOR DEPRESSIVE DISORDER, IN FULL REMISSION: ICD-10-CM

## 2023-06-29 DIAGNOSIS — E03.9 HYPOTHYROIDISM, UNSPECIFIED TYPE: ICD-10-CM

## 2023-06-29 DIAGNOSIS — R21 RASH: ICD-10-CM

## 2023-06-29 DIAGNOSIS — T45.1X5A IMMUNODEFICIENCY SECONDARY TO CHEMOTHERAPY: ICD-10-CM

## 2023-06-29 DIAGNOSIS — D84.821 IMMUNODEFICIENCY SECONDARY TO CHEMOTHERAPY: ICD-10-CM

## 2023-06-29 LAB
ALBUMIN SERPL BCP-MCNC: 4 G/DL (ref 3.5–5.2)
ALP SERPL-CCNC: 103 U/L (ref 55–135)
ALT SERPL W/O P-5'-P-CCNC: 29 U/L (ref 10–44)
ANION GAP SERPL CALC-SCNC: 10 MMOL/L (ref 8–16)
AST SERPL-CCNC: 30 U/L (ref 10–40)
BILIRUB SERPL-MCNC: 2.3 MG/DL (ref 0.1–1)
BUN SERPL-MCNC: 13 MG/DL (ref 8–23)
CALCIUM SERPL-MCNC: 9.6 MG/DL (ref 8.7–10.5)
CANCER AG19-9 SERPL-ACNC: 17.2 U/ML (ref 0–40)
CHLORIDE SERPL-SCNC: 105 MMOL/L (ref 95–110)
CO2 SERPL-SCNC: 28 MMOL/L (ref 23–29)
CREAT SERPL-MCNC: 0.8 MG/DL (ref 0.5–1.4)
ERYTHROCYTE [DISTWIDTH] IN BLOOD BY AUTOMATED COUNT: 18.5 % (ref 11.5–14.5)
EST. GFR  (NO RACE VARIABLE): >60 ML/MIN/1.73 M^2
GLUCOSE SERPL-MCNC: 89 MG/DL (ref 70–110)
HCT VFR BLD AUTO: 37.9 % (ref 37–48.5)
HGB BLD-MCNC: 12.6 G/DL (ref 12–16)
IMM GRANULOCYTES # BLD AUTO: 0.03 K/UL (ref 0–0.04)
MCH RBC QN AUTO: 35.1 PG (ref 27–31)
MCHC RBC AUTO-ENTMCNC: 33.2 G/DL (ref 32–36)
MCV RBC AUTO: 106 FL (ref 82–98)
NEUTROPHILS # BLD AUTO: 3.2 K/UL (ref 1.8–7.7)
PLATELET # BLD AUTO: 172 K/UL (ref 150–450)
PMV BLD AUTO: 9.9 FL (ref 9.2–12.9)
POTASSIUM SERPL-SCNC: 3.6 MMOL/L (ref 3.5–5.1)
PROT SERPL-MCNC: 6.9 G/DL (ref 6–8.4)
RBC # BLD AUTO: 3.59 M/UL (ref 4–5.4)
SODIUM SERPL-SCNC: 143 MMOL/L (ref 136–145)
WBC # BLD AUTO: 5.16 K/UL (ref 3.9–12.7)

## 2023-06-29 PROCEDURE — 80053 COMPREHEN METABOLIC PANEL: CPT | Mod: HCNC | Performed by: HOSPITALIST

## 2023-06-29 PROCEDURE — 86301 IMMUNOASSAY TUMOR CA 19-9: CPT | Mod: HCNC | Performed by: HOSPITALIST

## 2023-06-29 PROCEDURE — 3077F SYST BP >= 140 MM HG: CPT | Mod: HCNC,CPTII,S$GLB, | Performed by: REGISTERED NURSE

## 2023-06-29 PROCEDURE — 3008F PR BODY MASS INDEX (BMI) DOCUMENTED: ICD-10-PCS | Mod: HCNC,CPTII,S$GLB, | Performed by: REGISTERED NURSE

## 2023-06-29 PROCEDURE — 4010F ACE/ARB THERAPY RXD/TAKEN: CPT | Mod: HCNC,CPTII,S$GLB, | Performed by: REGISTERED NURSE

## 2023-06-29 PROCEDURE — 99215 PR OFFICE/OUTPT VISIT, EST, LEVL V, 40-54 MIN: ICD-10-PCS | Mod: HCNC,S$GLB,, | Performed by: REGISTERED NURSE

## 2023-06-29 PROCEDURE — 99999 PR PBB SHADOW E&M-EST. PATIENT-LVL IV: ICD-10-PCS | Mod: PBBFAC,HCNC,, | Performed by: REGISTERED NURSE

## 2023-06-29 PROCEDURE — 1126F AMNT PAIN NOTED NONE PRSNT: CPT | Mod: HCNC,CPTII,S$GLB, | Performed by: REGISTERED NURSE

## 2023-06-29 PROCEDURE — 99999 PR PBB SHADOW E&M-EST. PATIENT-LVL IV: CPT | Mod: PBBFAC,HCNC,, | Performed by: REGISTERED NURSE

## 2023-06-29 PROCEDURE — 1160F RVW MEDS BY RX/DR IN RCRD: CPT | Mod: HCNC,CPTII,S$GLB, | Performed by: REGISTERED NURSE

## 2023-06-29 PROCEDURE — 1159F PR MEDICATION LIST DOCUMENTED IN MEDICAL RECORD: ICD-10-PCS | Mod: HCNC,CPTII,S$GLB, | Performed by: REGISTERED NURSE

## 2023-06-29 PROCEDURE — 3077F PR MOST RECENT SYSTOLIC BLOOD PRESSURE >= 140 MM HG: ICD-10-PCS | Mod: HCNC,CPTII,S$GLB, | Performed by: REGISTERED NURSE

## 2023-06-29 PROCEDURE — 3288F FALL RISK ASSESSMENT DOCD: CPT | Mod: HCNC,CPTII,S$GLB, | Performed by: REGISTERED NURSE

## 2023-06-29 PROCEDURE — 3288F PR FALLS RISK ASSESSMENT DOCUMENTED: ICD-10-PCS | Mod: HCNC,CPTII,S$GLB, | Performed by: REGISTERED NURSE

## 2023-06-29 PROCEDURE — 4010F PR ACE/ARB THEARPY RXD/TAKEN: ICD-10-PCS | Mod: HCNC,CPTII,S$GLB, | Performed by: REGISTERED NURSE

## 2023-06-29 PROCEDURE — 1159F MED LIST DOCD IN RCRD: CPT | Mod: HCNC,CPTII,S$GLB, | Performed by: REGISTERED NURSE

## 2023-06-29 PROCEDURE — 3078F PR MOST RECENT DIASTOLIC BLOOD PRESSURE < 80 MM HG: ICD-10-PCS | Mod: HCNC,CPTII,S$GLB, | Performed by: REGISTERED NURSE

## 2023-06-29 PROCEDURE — 3078F DIAST BP <80 MM HG: CPT | Mod: HCNC,CPTII,S$GLB, | Performed by: REGISTERED NURSE

## 2023-06-29 PROCEDURE — 1101F PR PT FALLS ASSESS DOC 0-1 FALLS W/OUT INJ PAST YR: ICD-10-PCS | Mod: HCNC,CPTII,S$GLB, | Performed by: REGISTERED NURSE

## 2023-06-29 PROCEDURE — 85027 COMPLETE CBC AUTOMATED: CPT | Mod: HCNC | Performed by: HOSPITALIST

## 2023-06-29 PROCEDURE — 1101F PT FALLS ASSESS-DOCD LE1/YR: CPT | Mod: HCNC,CPTII,S$GLB, | Performed by: REGISTERED NURSE

## 2023-06-29 PROCEDURE — 99215 OFFICE O/P EST HI 40 MIN: CPT | Mod: HCNC,S$GLB,, | Performed by: REGISTERED NURSE

## 2023-06-29 PROCEDURE — 1160F PR REVIEW ALL MEDS BY PRESCRIBER/CLIN PHARMACIST DOCUMENTED: ICD-10-PCS | Mod: HCNC,CPTII,S$GLB, | Performed by: REGISTERED NURSE

## 2023-06-29 PROCEDURE — 36415 COLL VENOUS BLD VENIPUNCTURE: CPT | Mod: HCNC | Performed by: HOSPITALIST

## 2023-06-29 PROCEDURE — 1126F PR PAIN SEVERITY QUANTIFIED, NO PAIN PRESENT: ICD-10-PCS | Mod: HCNC,CPTII,S$GLB, | Performed by: REGISTERED NURSE

## 2023-06-29 PROCEDURE — 3008F BODY MASS INDEX DOCD: CPT | Mod: HCNC,CPTII,S$GLB, | Performed by: REGISTERED NURSE

## 2023-06-29 NOTE — PROGRESS NOTES
"MEDICAL ONCOLOGY FOLLOW-UP VISIT    Best Contact Phone Number(s): [phone] (home)      Cancer/Stage/TNM:    Cancer Staging   Cholangiocarcinoma  Staging form: Intrahepatic Bile Duct, AJCC 8th Edition  - Pathologic: Stage II (pT2, pN0, cM0) - Unsigned       Reason for visit: Rehana Whitten is a 67 y.o. female remote history of HBV who was found to have stage II pT2N0 intrahepatic cholangiocarcinoma in the setting of abdominal pain who underwent R0 resection 12/12/23. She presents to medical oncology clinic prior to C8 of adjuvant capecitabine.      Interval History:   Doing well overall. Tolerating treatment without significant side effects and doing better since dose previously reduced. Energy level stable and continues with frequent walks. Somewhat limited d/t heat, however. Continues with moderate nausea at the beginning of each cycle, controlled on antiemetics. Continues with tenderness to tips of fingers. States hands feel leathery but no other rashes, cracks, blisters, etc. Feet feel "raw" with mild erythema noted but no blisters. Using lotions/urea cream. Continues with intermittent rash over arms, using sun protective measures. Denies fever/chills, SOB, CP, palpitations, pain, blood in urine/stool, paresthesias.     Oncology History   Cholangiocarcinoma   11/9/2022 Imaging Significant Findings    Abdominal US in the setting of abdominal pain shows a large heterogeneous mass in the right lobe, measures 7.2 x 5.3 x 5.9 cm.     11/9/2022 Imaging Significant Findings    CT a/p:  Irregular right hepatic lobe lesion demonstrating delayed central enhancement and overlying capsular retraction.  Capsular retraction can be seen in cholangiocarcinoma and sclerosing hepatic hemangiomas.  Differential includes metastatic disease and other primary hepatic neoplasms.     11/14/2022 Tumor Markers    CA 19-9: 19.1  AFP: 5.5     11/16/2022 Imaging Significant Findings    CT chest:  1. No specific CT evidence of " "metastatic disease within the chest.  2. Left lower lobe punctate micro nodules.  Attention on continued surveillance imaging.  3. Other findings as above.     11/30/2022 Biopsy    Percutaneous biopsy of liver mass: Moderately differentiated adenocarcinoma. CK7+ Negative for CK20, CDX2, GATA3, TTF1, and PAX 8. Thought consistent with pancraetobiliary primary.     12/12/2022 Surgery    Resection of right hepatic lobe tumor. Pathology shows moderately differentiated adenocarcinoma up to 6.5cm in size c/w intrahepatic cholangiocarcinoma. Tumor was confined to the hepatic parenchyma with associated  LVI but no PNI. R0 resection with 6mm margins. 06/ LN involved. [id number]     1/13/2023 -  Chemotherapy    Treatment Summary   Plan Name: OP CAPECITABINE 1250MG/M2 BID Q3W  Treatment Goal: Control  Status: Active  Start Date: 1/13/2023  End Date: 1/13/2023  Provider: Bennie Moore MD  Chemotherapy: capecitabine (XELODA) 500 MG Tab, 1,250 mg/m2 = 2,000 mg, Oral, 2 times daily, 1 of 1 cycle, Start date: 1/13/2023, End date: --     2/24/2023 -  Chemotherapy    C2D1 capectiabine; DR to 1500mg po bid       3/17/2023 -  Chemotherapy    C3D1 capecitabine 1650 po bid       4/7/2023 -  Chemotherapy    C4D1 capecitabine 1650 po bid       4/28/2023 -  Chemotherapy    C5 capecitabine 1650 po bid       5/15/2023 Imaging Significant Findings    5/15/23: CT torso:  - Postoperative changes from right hepatectomy, cholecystectomy, and portal lymphadenectomy for intrahepatic cholangiocarcinoma.  - No lymphadenopathy.  - New subcentimeter hypodensity in hepatic segment 4B, concerning for metastases.  - Unchanged pulmonary nodules.  - Other findings as described.        Physical Exam:   BP (!) 171/74 (BP Location: Left arm, Patient Position: Sitting, BP Method: Medium (Automatic))   Pulse 74   Temp 97.5 °F (36.4 °C) (Oral)   Resp 18   Ht 5' 2" (1.575 m)   Wt 64.6 kg (142 lb 6.7 oz)   LMP  (LMP Unknown)   SpO2 100%   BMI 26.05 kg/m²  "     ECOG Performance Status: (foot note - ECOG PS provided by [employer]cology [employer]) 1 - Symptomatic but completely ambulatory    Physical Exam  Vitals reviewed.   Constitutional:       General: She is not in acute distress.     Appearance: She is normal weight. She is not ill-appearing, toxic-appearing or diaphoretic.   HENT:      Head: Normocephalic.      Nose: Nose normal.      Mouth/Throat:      Mouth: Mucous membranes are moist.      Pharynx: Oropharynx is clear. No oropharyngeal exudate or posterior oropharyngeal erythema.   Eyes:      General: No scleral icterus.     Conjunctiva/sclera: Conjunctivae normal.   Cardiovascular:      Rate and Rhythm: Normal rate and regular rhythm.      Heart sounds: No murmur heard.    No friction rub. No gallop.   Pulmonary:      Effort: Pulmonary effort is normal. No respiratory distress.      Breath sounds: Normal breath sounds.   Abdominal:      General: There is no distension.      Palpations: Abdomen is soft.      Tenderness: There is no abdominal tenderness.      Comments: Well healed midline surgical scar; small ventral hernia   Musculoskeletal:         General: Normal range of motion.      Cervical back: Normal range of motion and neck supple.      Right lower leg: No edema.      Left lower leg: No edema.   Lymphadenopathy:      Cervical: No cervical adenopathy.   Skin:     General: Skin is warm.      Coloration: Skin is not jaundiced.      Findings: Rash present. No bruising.      Comments: Maculo-papular rash over bilateral arms   Neurological:      General: No focal deficit present.      Mental Status: She is alert and oriented to person, place, and time.      Motor: No weakness.      Gait: Gait normal.   Psychiatric:         Mood and Affect: Mood normal.         Behavior: Behavior normal.         Thought Content: Thought content normal.       Labs:   Recent Results (from the past 48 hour(s))   CBC Oncology    Collection Time: 06/29/23  1:25 PM   Result  Value Ref Range    WBC 5.16 3.90 - 12.70 K/uL    RBC 3.59 (L) 4.00 - 5.40 M/uL    Hemoglobin 12.6 12.0 - 16.0 g/dL    Hematocrit 37.9 37.0 - 48.5 %     (H) 82 - 98 fL    MCH 35.1 (H) 27.0 - 31.0 pg    MCHC 33.2 32.0 - 36.0 g/dL    RDW 18.5 (H) 11.5 - 14.5 %    Platelets 172 150 - 450 K/uL    MPV 9.9 9.2 - 12.9 fL    Gran # (ANC) 3.2 1.8 - 7.7 K/uL    Immature Grans (Abs) 0.03 0.00 - 0.04 K/uL   Comprehensive Metabolic Panel    Collection Time: 06/29/23  1:25 PM   Result Value Ref Range    Sodium 143 136 - 145 mmol/L    Potassium 3.6 3.5 - 5.1 mmol/L    Chloride 105 95 - 110 mmol/L    CO2 28 23 - 29 mmol/L    Glucose 89 70 - 110 mg/dL    BUN 13 8 - 23 mg/dL    Creatinine 0.8 0.5 - 1.4 mg/dL    Calcium 9.6 8.7 - 10.5 mg/dL    Total Protein 6.9 6.0 - 8.4 g/dL    Albumin 4.0 3.5 - 5.2 g/dL    Total Bilirubin 2.3 (H) 0.1 - 1.0 mg/dL    Alkaline Phosphatase 103 55 - 135 U/L    AST 30 10 - 40 U/L    ALT 29 10 - 44 U/L    eGFR >60.0 >60 mL/min/1.73 m^2    Anion Gap 10 8 - 16 mmol/L     Imaging:     CT Chest Abdomen Pelvis With Contrast  Narrative: EXAMINATION:  CT CHEST ABDOMEN PELVIS WITH CONTRAST (XPD)    CLINICAL HISTORY:  Gallbladder/biliary cancer, staging; Intrahepatic bile duct carcinoma    TECHNIQUE:  Low dose axial images, sagittal and coronal reformations were obtained from the thoracic inlet to the pubic symphysis following the IV administration of 75 mL of Omnipaque 350 and the oral administration of 450 mL Readi-Cat.    COMPARISON:  CT chest 11/16/2022; CT abdomen pelvis 11/09/2022    FINDINGS:  LINES/TUBES:  None.    SOFT TISSUES: No axillary or subpectoral lymphadenopathy. The visualized thyroid gland is unremarkable.  Postoperative changes from left parathyroidectomy.  Postoperative changes in the abdominal wall.    HEART AND MEDIASTINUM: No mediastinal or hilar lymphadenopathy. Heart and pericardium are within normal limits. Left-sided aortic arch. The main pulmonary artery is normal in  size.    PLEURA: No pleural effusion or pneumothorax.    LUNGS AND AIRWAYS: Airways are patent. Bibasilar subsegmental atelectasis.  No focal consolidation.  Few solid pulmonary nodules, the largest measuring 5 mm along the right minor fissure.  All are unchanged from 11/16/2022.  No new pulmonary nodules.    HEPATOBILIARY: Postoperative changes from right hepatectomy and cholecystectomy.  Subcentimeter hypodense lesion in segment 4B (03:43), new from prior.  No biliary ductal dilatation.    SPLEEN: No splenomegaly.    PANCREAS: No focal masses or ductal dilatation.    ADRENALS: No adrenal nodules.    KIDNEYS/URETERS: No hydronephrosis, stones, or solid mass lesions.    PELVIC ORGANS/BLADDER: Unremarkable.    PERITONEUM / RETROPERITONEUM: Trace free fluid in the right upper quadrant at the hepatectomy margin.  No free air.  Unchanged fat stranding in the small bowel mesentery.    LYMPH NODES: Postoperative changes from portal lymphadenectomy.  No new abdominopelvic lymphadenopathy.    VESSELS: Residual portal and hepatic veins are patent.    GI TRACT: No distention or wall thickening. Normal appendix.    BONES: Unchanged osteonecrosis in the femoral heads bilaterally.  No articular surface collapse.  Advanced degenerative changes at C7-T1.  Unchanged nonaggressive lytic lesion involving the left lateral 8th rib (605:252).  No aggressive focal lesion.  No fracture.  Impression: Postoperative changes from right hepatectomy, cholecystectomy, and portal lymphadenectomy for intrahepatic cholangiocarcinoma.    No lymphadenopathy.    New subcentimeter hypodensity in hepatic segment 4B, concerning for metastases.    Unchanged pulmonary nodules.    Other findings as described.    Electronically signed by: William Barron  Date:    05/15/2023  Time:    11:54      Diagnoses:       1. Cholangiocarcinoma    2. Recurrent major depressive disorder, in full remission    3. Essential hypertension    4. Other hyperlipidemia    5.  Drug-induced immunodeficiency    6. Hypothyroidism, unspecified type    7. 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 Cholangiocarcinoma  Overview:  Stage II (pT2N0) IHCC sp R0 resection 12/12/23. Started adjuvant capecitabine x6 months per [facility]IL[facility] 2/3/23. Did not tolerated 1250mg/m2 dose; DR down to 1000mg/m2. Note made of indeterminate subcentimeter hypodensity in segment 4b.    Assessment & Plan:  Cycle 8 capecitabine 1650 mg PO BID to start tomorrow.   Follow up in 3 weeks with imaging.   Reimaging due after this cycle.  Consider biopsy and/or ablation of liver lesion vs surveillance depending on repeat imaging results.       2. Recurrent major depressive disorder, in full remission  Overview:  Home medications include citalopram    Assessment & Plan:  Continue medical management.       3. Essential hypertension  Overview:  Home medications include HCTZ; recently stopped candesartan and triamterene after episodes of hypotension.    Assessment & Plan:  Mildly elevated in clinic today, asymptomatic.   Continue medical management.       4. Other hyperlipidemia  Assessment & Plan:  Continue medical management.       5. Drug-induced immunodeficiency  Assessment & Plan:  Monitor for infection on treatment.       6. Hypothyroidism, unspecified type  Overview:  Home medication includes levothyroxine    Assessment & Plan:  Continue medical management.       7. Rash  Assessment & Plan:  Intermittent, continue to monitor.   Uses skin protective measures.   Continue hydrocortisone and hydroxyzine PRN.       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 All questions were answered to the patient's satisfaction. Pt knows to call clinic if anything is needed before the next clinic visit.    Patient discussed with collaborating physician, Dr. Moore.    At least 40 minutes were spent today on this encounter including face  to face time with the patient, data gathering/interpretation and documentation.       Penny Chen, MSN, APRN, [facility]-  Hematology and Medical Oncology  Clinical Nurse Specialist to Dr. Connors, Dr. De Santiago & Dr. Cheng    Route Chart for Scheduling    Med Onc Chart Routing      Follow up with physician . Keep appointments as scheduled   Follow up with [facility]    Infusion scheduling note    Injection scheduling note    Labs    Imaging    Pharmacy appointment    Other referrals        Treatment Plan Information   OP CAPECITABINE 1250MG/M2 BID Q3W   Bennie Moore MD   Upcoming Treatment Dates - OP CAPECITABINE 1250MG/M2 BID Q3W    No upcoming days in selected categories.

## 2023-06-29 NOTE — ASSESSMENT & PLAN NOTE
Cycle 8 capecitabine 1650 mg PO BID to start tomorrow.   Follow up in 3 weeks with imaging.   Reimaging due after this cycle.  Consider biopsy and/or ablation of liver lesion vs surveillance depending on repeat imaging results.

## 2023-07-03 NOTE — ASSESSMENT & PLAN NOTE
Intermittent, continue to monitor.   Uses skin protective measures.   Continue hydrocortisone and hydroxyzine PRN.

## 2023-07-05 ENCOUNTER — SPECIALTY PHARMACY (OUTPATIENT)
Dept: PHARMACY | Facility: CLINIC | Age: 68
End: 2023-07-05
Payer: MEDICARE

## 2023-07-05 ENCOUNTER — OFFICE VISIT (OUTPATIENT)
Dept: DERMATOLOGY | Facility: CLINIC | Age: 68
End: 2023-07-05
Payer: MEDICARE

## 2023-07-05 DIAGNOSIS — L81.4 LENTIGINES: ICD-10-CM

## 2023-07-05 DIAGNOSIS — L82.1 SK (SEBORRHEIC KERATOSIS): Primary | ICD-10-CM

## 2023-07-05 DIAGNOSIS — Z85.828 HISTORY OF NONMELANOMA SKIN CANCER: ICD-10-CM

## 2023-07-05 DIAGNOSIS — Z12.83 SKIN CANCER SCREENING: ICD-10-CM

## 2023-07-05 DIAGNOSIS — D18.01 CHERRY ANGIOMA: ICD-10-CM

## 2023-07-05 PROCEDURE — 1101F PT FALLS ASSESS-DOCD LE1/YR: CPT | Mod: HCNC,CPTII,S$GLB, | Performed by: DERMATOLOGY

## 2023-07-05 PROCEDURE — 1160F RVW MEDS BY RX/DR IN RCRD: CPT | Mod: HCNC,CPTII,S$GLB, | Performed by: DERMATOLOGY

## 2023-07-05 PROCEDURE — 99213 OFFICE O/P EST LOW 20 MIN: CPT | Mod: HCNC,S$GLB,, | Performed by: DERMATOLOGY

## 2023-07-05 PROCEDURE — 1160F PR REVIEW ALL MEDS BY PRESCRIBER/CLIN PHARMACIST DOCUMENTED: ICD-10-PCS | Mod: HCNC,CPTII,S$GLB, | Performed by: DERMATOLOGY

## 2023-07-05 PROCEDURE — 99213 PR OFFICE/OUTPT VISIT, EST, LEVL III, 20-29 MIN: ICD-10-PCS | Mod: HCNC,S$GLB,, | Performed by: DERMATOLOGY

## 2023-07-05 PROCEDURE — 3288F FALL RISK ASSESSMENT DOCD: CPT | Mod: HCNC,CPTII,S$GLB, | Performed by: DERMATOLOGY

## 2023-07-05 PROCEDURE — 4010F ACE/ARB THERAPY RXD/TAKEN: CPT | Mod: HCNC,CPTII,S$GLB, | Performed by: DERMATOLOGY

## 2023-07-05 PROCEDURE — 1159F PR MEDICATION LIST DOCUMENTED IN MEDICAL RECORD: ICD-10-PCS | Mod: HCNC,CPTII,S$GLB, | Performed by: DERMATOLOGY

## 2023-07-05 PROCEDURE — 4010F PR ACE/ARB THEARPY RXD/TAKEN: ICD-10-PCS | Mod: HCNC,CPTII,S$GLB, | Performed by: DERMATOLOGY

## 2023-07-05 PROCEDURE — 99999 PR PBB SHADOW E&M-EST. PATIENT-LVL III: ICD-10-PCS | Mod: PBBFAC,HCNC,, | Performed by: DERMATOLOGY

## 2023-07-05 PROCEDURE — 3288F PR FALLS RISK ASSESSMENT DOCUMENTED: ICD-10-PCS | Mod: HCNC,CPTII,S$GLB, | Performed by: DERMATOLOGY

## 2023-07-05 PROCEDURE — 99999 PR PBB SHADOW E&M-EST. PATIENT-LVL III: CPT | Mod: PBBFAC,HCNC,, | Performed by: DERMATOLOGY

## 2023-07-05 PROCEDURE — 1101F PR PT FALLS ASSESS DOC 0-1 FALLS W/OUT INJ PAST YR: ICD-10-PCS | Mod: HCNC,CPTII,S$GLB, | Performed by: DERMATOLOGY

## 2023-07-05 PROCEDURE — 1159F MED LIST DOCD IN RCRD: CPT | Mod: HCNC,CPTII,S$GLB, | Performed by: DERMATOLOGY

## 2023-07-05 PROCEDURE — 1126F PR PAIN SEVERITY QUANTIFIED, NO PAIN PRESENT: ICD-10-PCS | Mod: HCNC,CPTII,S$GLB, | Performed by: DERMATOLOGY

## 2023-07-05 PROCEDURE — 1126F AMNT PAIN NOTED NONE PRSNT: CPT | Mod: HCNC,CPTII,S$GLB, | Performed by: DERMATOLOGY

## 2023-07-05 NOTE — PROGRESS NOTES
Subjective:      Patient ID:  Rehana Whitten is a 67 y.o. female who presents for   Chief Complaint   Patient presents with    Skin Check     ubse     HPI  History of Present Illness: The patient presents for  skin check.    The patient was last seen on 6/1/22: for cryosurgery to actinic keratoses which have resolved.     Other skin complaints: none    Had BCC excised from upper lip via Mohs in the '90's.    Pt was recently diagnosed with Bile duct cancer in Dec 2022 and is currently on chemotherapy. Gets a rash from capecitabine on arms, chest- gets red, scaly, itchy, sensitive, then subsides.      Review of Systems   Constitutional:  Negative for fever, chills, weight loss, weight gain, fatigue, night sweats and malaise.   Skin:  Positive for daily sunscreen use, activity-related sunscreen use and wears hat. Negative for recent sunburn.   Hematologic/Lymphatic: Does not bruise/bleed easily.     Objective:   Physical Exam   Constitutional: She appears well-developed and well-nourished. No distress.   Neurological: She is alert and oriented to person, place, and time. She is not disoriented.   Psychiatric: She has a normal mood and affect.   Skin:   Areas Examined (abnormalities noted in diagram):   Head / Face Inspection Performed  Neck Inspection Performed  Chest / Axilla Inspection Performed  Abdomen Inspection Performed  Back Inspection Performed  RUE Inspected  LUE Inspection Performed  RLE Inspected  LLE Inspection Performed               Diagram Legend     Erythematous scaling macule/papule c/w actinic keratosis       Vascular papule c/w angioma      Pigmented verrucoid papule/plaque c/w seborrheic keratosis      Yellow umbilicated papule c/w sebaceous hyperplasia      Irregularly shaped tan macule c/w lentigo     1-2 mm smooth white papules consistent with Milia      Movable subcutaneous cyst with punctum c/w epidermal inclusion cyst      Subcutaneous movable cyst c/w pilar cyst      Firm pink  to brown papule c/w dermatofibroma      Pedunculated fleshy papule(s) c/w skin tag(s)      Evenly pigmented macule c/w junctional nevus     Mildly variegated pigmented, slightly irregular-bordered macule c/w mildly atypical nevus      Flesh colored to evenly pigmented papule c/w intradermal nevus       Pink pearly papule/plaque c/w basal cell carcinoma      Erythematous hyperkeratotic cursted plaque c/w SCC      Surgical scar with no sign of skin cancer recurrence      Open and closed comedones      Inflammatory papules and pustules      Verrucoid papule consistent consistent with wart     Erythematous eczematous patches and plaques     Dystrophic onycholytic nail with subungual debris c/w onychomycosis     Umbilicated papule    Erythematous-base heme-crusted tan verrucoid plaque consistent with inflamed seborrheic keratosis     Erythematous Silvery Scaling Plaque c/w Psoriasis     See annotation      Assessment / Plan:        SK (seborrheic keratosis)  These are benign inherited growths without a malignant potential. Reassurance given to patient. No treatment is necessary.   Treatment of benign, asymptomatic lesions may be considered cosmetic.  Warned about risk of hypo- or hyperpigmentation with treatment and risk of recurrence.    Cherry angioma  This is a benign vascular lesion. Reassurance given. No treatment required. Treatment of benign, asymptomatic lesions may be considered cosmetic.    Lentigines  These are benign sun spots which should be monitored for changes. Patient instructed in importance of daily broad spectrum sunscreen use with spf at least 30. Sun avoidance and topical protection/protective clothing discussed.    Skin cancer screening  History of nonmelanoma skin cancer  Total body skin examination performed today including at least 12 points as noted in physical examination. No lesions suspicious for malignancy noted.  Patient instructed in importance of daily broad spectrum sunscreen use with  spf at least 30. Sun avoidance and topical protection/protective clothing discussed.    *Capecitabine is a 5-FU prodrug which is likely causing her AKs on arms and chest to become inflamed (rash that she described in HPI).    Follow up in about 1 year (around 7/5/2024) for skin check or sooner for any concerns.

## 2023-07-05 NOTE — TELEPHONE ENCOUNTER
Outgoing call regarding Xeloda refill, pt declined refill, stating her last refill was her last round of medication. Informed pt will informed her pharmacist so refill can discontinue.

## 2023-07-11 NOTE — PATIENT INSTRUCTIONS

## 2023-07-12 NOTE — TELEPHONE ENCOUNTER
Pt confirmed she will complete capecitabine on tomorrow and was counseled on proper disposal of unused chemo. Closing encounter.

## 2023-07-18 ENCOUNTER — HOSPITAL ENCOUNTER (OUTPATIENT)
Dept: RADIOLOGY | Facility: HOSPITAL | Age: 68
Discharge: HOME | End: 2023-07-18
Attending: HOSPITALIST
Payer: MEDICARE

## 2023-07-18 DIAGNOSIS — C22.1 CHOLANGIOCARCINOMA: ICD-10-CM

## 2023-07-18 PROCEDURE — 74177 CT ABD & PELVIS W/CONTRAST: CPT | Mod: TC,HCNC

## 2023-07-18 PROCEDURE — 71260 CT THORAX DX C+: CPT | Mod: 26,HCNC,, | Performed by: RADIOLOGY

## 2023-07-18 PROCEDURE — 71260 CT CHEST ABDOMEN PELVIS WITH CONTRAST (XPD): ICD-10-PCS | Mod: 26,HCNC,, | Performed by: RADIOLOGY

## 2023-07-18 PROCEDURE — 74177 CT CHEST ABDOMEN PELVIS WITH CONTRAST (XPD): ICD-10-PCS | Mod: 26,HCNC,, | Performed by: RADIOLOGY

## 2023-07-18 PROCEDURE — 74177 CT ABD & PELVIS W/CONTRAST: CPT | Mod: 26,HCNC,, | Performed by: RADIOLOGY

## 2023-07-18 PROCEDURE — 71260 CT THORAX DX C+: CPT | Mod: TC,HCNC

## 2023-07-18 PROCEDURE — 25500020 PHARM REV CODE 255: Mod: HCNC | Performed by: HOSPITALIST

## 2023-07-18 RX ADMIN — IOHEXOL 75 ML: 350 INJECTION, SOLUTION INTRAVENOUS at 01:07

## 2023-07-21 ENCOUNTER — OFFICE VISIT (OUTPATIENT)
Dept: HEMATOLOGY/ONCOLOGY | Facility: CLINIC | Age: 68
End: 2023-07-21
Payer: MEDICARE

## 2023-07-21 VITALS
HEIGHT: 62 IN | OXYGEN SATURATION: 98 % | HEART RATE: 80 BPM | RESPIRATION RATE: 18 BRPM | WEIGHT: 142.63 LBS | SYSTOLIC BLOOD PRESSURE: 158 MMHG | TEMPERATURE: 97 F | BODY MASS INDEX: 26.25 KG/M2 | DIASTOLIC BLOOD PRESSURE: 72 MMHG

## 2023-07-21 DIAGNOSIS — C22.1 CHOLANGIOCARCINOMA: Primary | ICD-10-CM

## 2023-07-21 DIAGNOSIS — J32.9 SINUSITIS, UNSPECIFIED CHRONICITY, UNSPECIFIED LOCATION: ICD-10-CM

## 2023-07-21 DIAGNOSIS — E03.9 HYPOTHYROIDISM, UNSPECIFIED TYPE: ICD-10-CM

## 2023-07-21 DIAGNOSIS — F33.42 RECURRENT MAJOR DEPRESSIVE DISORDER, IN FULL REMISSION: ICD-10-CM

## 2023-07-21 DIAGNOSIS — I10 ESSENTIAL HYPERTENSION: ICD-10-CM

## 2023-07-21 PROCEDURE — 3077F SYST BP >= 140 MM HG: CPT | Mod: HCNC,CPTII,S$GLB, | Performed by: HOSPITALIST

## 2023-07-21 PROCEDURE — 99214 OFFICE O/P EST MOD 30 MIN: CPT | Mod: HCNC,S$GLB,, | Performed by: HOSPITALIST

## 2023-07-21 PROCEDURE — 3077F PR MOST RECENT SYSTOLIC BLOOD PRESSURE >= 140 MM HG: ICD-10-PCS | Mod: HCNC,CPTII,S$GLB, | Performed by: HOSPITALIST

## 2023-07-21 PROCEDURE — 1126F AMNT PAIN NOTED NONE PRSNT: CPT | Mod: HCNC,CPTII,S$GLB, | Performed by: HOSPITALIST

## 2023-07-21 PROCEDURE — 3078F DIAST BP <80 MM HG: CPT | Mod: HCNC,CPTII,S$GLB, | Performed by: HOSPITALIST

## 2023-07-21 PROCEDURE — 3288F FALL RISK ASSESSMENT DOCD: CPT | Mod: HCNC,CPTII,S$GLB, | Performed by: HOSPITALIST

## 2023-07-21 PROCEDURE — 3288F PR FALLS RISK ASSESSMENT DOCUMENTED: ICD-10-PCS | Mod: HCNC,CPTII,S$GLB, | Performed by: HOSPITALIST

## 2023-07-21 PROCEDURE — 3078F PR MOST RECENT DIASTOLIC BLOOD PRESSURE < 80 MM HG: ICD-10-PCS | Mod: HCNC,CPTII,S$GLB, | Performed by: HOSPITALIST

## 2023-07-21 PROCEDURE — 3008F BODY MASS INDEX DOCD: CPT | Mod: HCNC,CPTII,S$GLB, | Performed by: HOSPITALIST

## 2023-07-21 PROCEDURE — 3008F PR BODY MASS INDEX (BMI) DOCUMENTED: ICD-10-PCS | Mod: HCNC,CPTII,S$GLB, | Performed by: HOSPITALIST

## 2023-07-21 PROCEDURE — 99499 UNLISTED E&M SERVICE: CPT | Mod: HCNC,S$GLB,, | Performed by: HOSPITALIST

## 2023-07-21 PROCEDURE — 1126F PR PAIN SEVERITY QUANTIFIED, NO PAIN PRESENT: ICD-10-PCS | Mod: HCNC,CPTII,S$GLB, | Performed by: HOSPITALIST

## 2023-07-21 PROCEDURE — 99499 RISK ADDL DX/OHS AUDIT: ICD-10-PCS | Mod: HCNC,S$GLB,, | Performed by: HOSPITALIST

## 2023-07-21 PROCEDURE — 99999 PR PBB SHADOW E&M-EST. PATIENT-LVL III: CPT | Mod: PBBFAC,HCNC,, | Performed by: HOSPITALIST

## 2023-07-21 PROCEDURE — 4010F PR ACE/ARB THEARPY RXD/TAKEN: ICD-10-PCS | Mod: HCNC,CPTII,S$GLB, | Performed by: HOSPITALIST

## 2023-07-21 PROCEDURE — 1101F PT FALLS ASSESS-DOCD LE1/YR: CPT | Mod: HCNC,CPTII,S$GLB, | Performed by: HOSPITALIST

## 2023-07-21 PROCEDURE — 99999 PR PBB SHADOW E&M-EST. PATIENT-LVL III: ICD-10-PCS | Mod: PBBFAC,HCNC,, | Performed by: HOSPITALIST

## 2023-07-21 PROCEDURE — 1101F PR PT FALLS ASSESS DOC 0-1 FALLS W/OUT INJ PAST YR: ICD-10-PCS | Mod: HCNC,CPTII,S$GLB, | Performed by: HOSPITALIST

## 2023-07-21 PROCEDURE — 99214 PR OFFICE/OUTPT VISIT, EST, LEVL IV, 30-39 MIN: ICD-10-PCS | Mod: HCNC,S$GLB,, | Performed by: HOSPITALIST

## 2023-07-21 PROCEDURE — 4010F ACE/ARB THERAPY RXD/TAKEN: CPT | Mod: HCNC,CPTII,S$GLB, | Performed by: HOSPITALIST

## 2023-07-21 RX ORDER — AMOXICILLIN AND CLAVULANATE POTASSIUM 875; 125 MG/1; MG/1
1 TABLET, FILM COATED ORAL 2 TIMES DAILY
Qty: 28 TABLET | Refills: 0 | Status: SHIPPED | OUTPATIENT
Start: 2023-07-21 | End: 2023-07-21

## 2023-07-21 RX ORDER — AMOXICILLIN AND CLAVULANATE POTASSIUM 875; 125 MG/1; MG/1
1 TABLET, FILM COATED ORAL 2 TIMES DAILY
Qty: 20 TABLET | Refills: 0 | Status: SHIPPED | OUTPATIENT
Start: 2023-07-21 | End: 2023-07-31

## 2023-07-21 NOTE — PROGRESS NOTES
MEDICAL ONCOLOGY FOLLOW-UP VISIT.     Best Contact Phone Number(s): [phone] (home)      Cancer/Stage/TNM:    Cancer Staging   Cholangiocarcinoma  Staging form: Intrahepatic Bile Duct, AJCC 8th Edition  - Pathologic: Stage II (pT2, pN0, cM0) - Unsigned       Reason for visit: Rehana Whitten is a 68 y.o. female remote history of HBV who was found to have stage II pT2N0 intrahepatic cholangiocarcinoma in the setting of abdominal pain who underwent R0 resection 12/12/23. She presents to medical oncology clinic after completing 8 cycles of adjuvant capecitabine.      Interval History:     Finished 8th cycle last week. Signficant fatigue afterwards with assocaited muscle aches. No N/V. No signficant diarrhea. Mild HFS symptoms. Energy now improving. Appetite is good. No abdominal pain. No fevers or chills.     Developed left maxillary sinus pressure over the last two day. No fevers. Has history of recurrent sinusitis.    CT torso earlier this week without evidence of recurrent disease.      Oncology History   Cholangiocarcinoma   11/9/2022 Imaging Significant Findings    Abdominal US in the setting of abdominal pain shows a large heterogeneous mass in the right lobe, measures 7.2 x 5.3 x 5.9 cm.     11/9/2022 Imaging Significant Findings    CT a/p:  Irregular right hepatic lobe lesion demonstrating delayed central enhancement and overlying capsular retraction.  Capsular retraction can be seen in cholangiocarcinoma and sclerosing hepatic hemangiomas.  Differential includes metastatic disease and other primary hepatic neoplasms.     11/14/2022 Tumor Markers    CA 19-9: 19.1  AFP: 5.5     11/16/2022 Imaging Significant Findings    CT chest:  1. No specific CT evidence of metastatic disease within the chest.  2. Left lower lobe punctate micro nodules.  Attention on continued surveillance imaging.  3. Other findings as above.     11/30/2022 Biopsy    Percutaneous biopsy of liver mass: Moderately differentiated  adenocarcinoma. CK7+ Negative for CK20, CDX2, GATA3, TTF1, and PAX 8. Thought consistent with pancraetobiliary primary.     12/12/2022 Surgery    Resection of right hepatic lobe tumor. Pathology shows moderately differentiated adenocarcinoma up to 6.5cm in size c/w intrahepatic cholangiocarcinoma. Tumor was confined to the hepatic parenchyma with associated  LVI but no PNI. R0 resection with 6mm margins. 06/ LN involved. [id number]     1/13/2023 - 1/13/2023 Chemotherapy    Treatment Summary   Plan Name: OP CAPECITABINE 1250MG/M2 BID Q3W  Treatment Goal: Control  Status: Inactive  Start Date: 1/13/2023  End Date: 1/13/2023  Provider: Bennie Moore MD  Chemotherapy: capecitabine (XELODA) tablet 1,650 mg, 2,000 mg, Oral, 2 times daily, 1 of 1 cycle, Start date: 2/24/2023, End date: --     2/24/2023 -  Chemotherapy    C2D1 capectiabine; DR to 1500mg po bid       3/17/2023 -  Chemotherapy    C3D1 capecitabine 1650 po bid       4/7/2023 -  Chemotherapy    C4D1 capecitabine 1650 po bid       4/28/2023 -  Chemotherapy    C5 capecitabine 1650 po bid       5/15/2023 Imaging Significant Findings    5/15/23: CT torso:  - Postoperative changes from right hepatectomy, cholecystectomy, and portal lymphadenectomy for intrahepatic cholangiocarcinoma.  - No lymphadenopathy.  - New subcentimeter hypodensity in hepatic segment 4B, concerning for metastases.  - Unchanged pulmonary nodules.  - Other findings as described.     7/18/2023 Imaging Significant Findings    CT torso:  1. Postop change of right hepatectomy, cholecystectomy, and portal lymphadenectomy for intrahepatic cholangiocarcinoma.  2. Previously noted subcentimeter hypodensity in the hepatic IVB segment is not well visualized on today's examination.  No new focal hepatic lesions.  3. Stable bilateral pulmonary nodules.  4. Additional findings, as above.               Physical Exam:   BP (!) 158/72 (BP Location: Left arm, Patient Position: Sitting, BP Method: Medium  "(Automatic))   Pulse 80   Temp 97 °F (36.1 °C) (Oral)   Resp 18   Ht 5' 2" (1.575 m)   Wt 64.7 kg (142 lb 10.2 oz)   LMP  (LMP Unknown)   SpO2 98%   BMI 26.09 kg/m²      ECOG Performance Status: (foot note - ECOG PS provided by Eastern Cooperative Oncology Group) 1 - Symptomatic but completely ambulatory    Physical Exam  Constitutional:       General: She is not in acute distress.     Appearance: She is normal weight.   HENT:      Head: Normocephalic.      Mouth/Throat:      Mouth: Mucous membranes are moist.      Pharynx: Oropharynx is clear. No oropharyngeal exudate or posterior oropharyngeal erythema.   Eyes:      General: No scleral icterus.     Conjunctiva/sclera: Conjunctivae normal.   Cardiovascular:      Rate and Rhythm: Normal rate and regular rhythm.      Heart sounds: No murmur heard.    No friction rub. No gallop.   Pulmonary:      Effort: Pulmonary effort is normal. No respiratory distress.      Breath sounds: Normal breath sounds.   Abdominal:      General: There is no distension.      Palpations: Abdomen is soft.      Tenderness: There is no abdominal tenderness.      Comments: Well healed midline surgical scar; small ventral hernia   Musculoskeletal:         General: Normal range of motion.      Cervical back: Normal range of motion and neck supple.      Right lower leg: No edema.      Left lower leg: No edema.   Lymphadenopathy:      Cervical: No cervical adenopathy.   Skin:     General: Skin is warm.      Coloration: Skin is not jaundiced.      Findings: Rash present. No bruising.      Comments: Maculo-papular rash over bilateral arms   Neurological:      General: No focal deficit present.      Mental Status: She is alert and oriented to person, place, and time.      Motor: No weakness.   Psychiatric:         Mood and Affect: Mood normal.         Behavior: Behavior normal.         Thought Content: Thought content normal.         Labs:   No results found for this or any previous visit (from " the past 48 hour(s)).      Lab Results   Component Value Date     07/18/2023    K 4.3 07/18/2023    CREATININE 0.9 07/18/2023    WBC 6.37 07/18/2023    HGB 13.7 07/18/2023     07/18/2023    AST 39 07/18/2023    ALT 38 07/18/2023    ALKPHOS 114 07/18/2023    BILITOT 2.7 (H) 07/18/2023          Imaging:     CT Chest Abdomen Pelvis With Contrast  Narrative: EXAMINATION:  CT CHEST ABDOMEN PELVIS WITH CONTRAST (XPD)    CLINICAL HISTORY:  Gallbladder/biliary cancer, staging; Intrahepatic bile duct carcinoma    TECHNIQUE:  Low dose axial images, sagittal and coronal reformations were obtained from the neck base to the pubic symphysis after the administration of 75 cc Omnipaque 350 intravenous contrast.  Oral contrast was not administered.    COMPARISON:  Chest abdomen pelvis 05/15/2023.    FINDINGS:  Base of Neck: The thyroid is unremarkable.  Postoperative change of left parathyroidectomy.    CHEST:    -Heart: Normal size. No pericardial effusion.  Multi-vessel calcific atherosclerosis of the coronary arteries.    -Pulmonary vasculature: Pulmonary arteries distribute normally.  There are four pulmonary veins.    -Estella/Mediastinum: No pathologic ene enlargement.    -Trachea and Proximal airways: Patent.    -Lungs/Pleura: Biapical fibronodular scarring.  Mucous plugging in the left upper lobe (axial series 3, image 32), similar to prior.  Stable 5 mm pulmonary nodule along the right minor fissure (axial series 3, image 64), previously 5 mm.  Additional stable subpleural pulmonary micronodule measuring 3 mm in the right lower lobe (axial series 3, image 61) and 2 mm pulmonary micronodule in the left lower lobe (axial series 3, image 87).  No new or enlarging pulmonary nodules.  No consolidation, pneumothorax, or mass.  No pleural effusion or thickening.    -Esophagus: Normal course and caliber.    ABDOMEN:    - Liver: Postop change of right hepatectomy.  Previously hypodensity noted in the hepatic segment 4 B  is not well visualized on today's examination.  No new focal hepatic lesions.    - Gallbladder: The gallbladder is surgically absent.    - Bile Ducts: No intra or extrahepatic biliary ductal dilatation.    - Stomach/Duodenum: Unremarkable.    - Spleen: Unremarkable.    - Pancreas: Unremarkable.    - Adrenals: Unremarkable.    - Kidneys/ureters/urinary bladder: Normal in size and location..  No hydronephrosis or stones.  The ureters appear normal in course and caliber without evidence of ureteral dilatation.  The urinary bladder is unremarkable.    - Lymph nodes: No significant adenopathy.  Postop change of portal lymphadenectomy    - Peritoneum: Trace free fluid in the right upper quadrant at the hepatectomy margin, similar to prior.  No free intraperitoneal air.    PELVIS:    - Reproductive: The uterus is unremarkable.    BOWEL/MESENTERY:    No evidence of bowel obstruction or inflammatory process.  Similar appearance of fat stranding in the mid upper abdominal mesentery with few prominent associated mesenteric lymph nodes.    VASCULATURE: Left-sided aortic arch with 3 branch vessels.  No aneurysm and no significant atherosclerosis.  The main portal vein, left portal vein, SMV, and splenic vein are patent.    BONES: No acute fracture or bony destructive process.  Degenerative changes of the osseous structures.  Unchanged osteonecrosis of the bilateral femoral heads.  Stable nonaggressive appearing lytic lesion involving the left lateral 8th rib (sagittal series 602, image 165).  No aggressive focal lesion.    EXTRATHORACIC/EXTRAPERITONEAL SOFT TISSUES: Postoperative change of midline abdominal incision.  Impression: 1. Postop change of right hepatectomy, cholecystectomy, and portal lymphadenectomy for intrahepatic cholangiocarcinoma.  2. Previously noted subcentimeter hypodensity in the hepatic IVB segment is not well visualized on today's examination.  No new focal hepatic lesions.  3. Stable bilateral pulmonary  nodules.  4. Additional findings, as above.    Electronically signed by resident: Aliza Blum  Date:    07/18/2023  Time:    15:00    Electronically signed by: Pavel Song MD  Date:    07/18/2023  Time:    15:34            Diagnoses:       1. Cholangiocarcinoma    2. Sinusitis, unspecified chronicity, unspecified location    3. Essential hypertension    4. Recurrent major depressive disorder, in full remission    5. Hypothyroidism, unspecified type                      Assessment and Plan:     1. Cholangiocarcinoma  Overview:  Stage II (pT2N0) IHCC sp R0 resection 12/12/23. Started adjuvant capecitabine x6 months per BILCAP 2/3/23. Did not tolerated 1250mg/m2 dose; DR down to 1000mg/m2. Copmleted treatment 07/2023. Note made of indeterminate subcentimeter hypodensity in segment 4b during mid treatment imaging. Not detectable on post treatment scans    Assessment & Plan:    - Repeat MR abdomen in 3 months with CT chest    Orders:  -     MRI Abdomen W WO Contrast; Future; Expected date: 07/21/2023  -     CT Chest Without Contrast; Future; Expected date: 07/21/2023  -     BILIRUBIN, DIRECT; Future; Expected date: 07/21/2023    2. Sinusitis, unspecified chronicity, unspecified location  -     Discontinue: amoxicillin-clavulanate 875-125mg (AUGMENTIN) 875-125 mg per tablet; Take 1 tablet by mouth 2 (two) times daily. for 14 days  Dispense: 28 tablet; Refill: 0  -     amoxicillin-clavulanate 875-125mg (AUGMENTIN) 875-125 mg per tablet; Take 1 tablet by mouth 2 (two) times daily. for 10 days  Dispense: 20 tablet; Refill: 0    3. Essential hypertension  Overview:  Home medications include HCTZ; stopped candesartan and triamterene after episodes of hypotension.      4. Recurrent major depressive disorder, in full remission  Overview:  Home medications include citalopram      5. Hypothyroidism, unspecified type  Overview:  Home medication includes levothyroxine                         Route Chart for Scheduling    Med Onc  Chart Routing      Follow up with physician 3 months.   Follow up with [facility]    Infusion scheduling note    Injection scheduling note    Labs CBC, CMP and CA 19-9   Scheduling:  Preferred lab:  Lab interval:  direct bilirubin   Imaging CT chest abdomen pelvis and MRI      Pharmacy appointment    Other referrals                    Bennie Moore MD  Hematology/Oncology  [facility] - [facility]

## 2023-07-21 NOTE — PATIENT INSTRUCTIONS
Finished last round of chemotherapy (!). CT scan shows no evidence of recurrent disease. Notably, the prior subcentimeter spot in the liver was not visible.     Follow up in 3 months with MR liver and CT non con chest    For sinus pain - can take ibuprofen 600mg three times daily for the next few days with food. Also take Claritin. If progressive symptoms can start Augmentin x10 day.

## 2023-07-27 ENCOUNTER — OFFICE VISIT (OUTPATIENT)
Dept: PRIMARY CARE CLINIC | Facility: CLINIC | Age: 68
End: 2023-07-27
Payer: MEDICARE

## 2023-07-27 ENCOUNTER — OFFICE VISIT (OUTPATIENT)
Dept: OBSTETRICS AND GYNECOLOGY | Facility: CLINIC | Age: 68
End: 2023-07-27
Payer: MEDICARE

## 2023-07-27 VITALS
BODY MASS INDEX: 26.76 KG/M2 | TEMPERATURE: 98 F | HEIGHT: 61 IN | SYSTOLIC BLOOD PRESSURE: 142 MMHG | DIASTOLIC BLOOD PRESSURE: 84 MMHG | WEIGHT: 141.75 LBS | HEART RATE: 79 BPM | OXYGEN SATURATION: 97 %

## 2023-07-27 VITALS
DIASTOLIC BLOOD PRESSURE: 82 MMHG | SYSTOLIC BLOOD PRESSURE: 142 MMHG | BODY MASS INDEX: 26.81 KG/M2 | HEIGHT: 61 IN | WEIGHT: 142 LBS

## 2023-07-27 DIAGNOSIS — Z12.4 ENCOUNTER FOR PAPANICOLAOU SMEAR FOR CERVICAL CANCER SCREENING: ICD-10-CM

## 2023-07-27 DIAGNOSIS — Z11.51 SCREENING FOR HPV (HUMAN PAPILLOMAVIRUS): ICD-10-CM

## 2023-07-27 DIAGNOSIS — C22.1 CHOLANGIOCARCINOMA: ICD-10-CM

## 2023-07-27 DIAGNOSIS — I10 BENIGN ESSENTIAL HYPERTENSION: Primary | ICD-10-CM

## 2023-07-27 DIAGNOSIS — Z01.419 WOMEN'S ANNUAL ROUTINE GYNECOLOGICAL EXAMINATION: Primary | ICD-10-CM

## 2023-07-27 DIAGNOSIS — E89.0 POSTOPERATIVE HYPOTHYROIDISM: ICD-10-CM

## 2023-07-27 PROCEDURE — 99999 PR PBB SHADOW E&M-EST. PATIENT-LVL III: ICD-10-PCS | Mod: PBBFAC,HCNC,, | Performed by: NURSE PRACTITIONER

## 2023-07-27 PROCEDURE — G0101 PR CA SCREEN;PELVIC/BREAST EXAM: ICD-10-PCS | Mod: HCNC,S$GLB,, | Performed by: NURSE PRACTITIONER

## 2023-07-27 PROCEDURE — 3008F PR BODY MASS INDEX (BMI) DOCUMENTED: ICD-10-PCS | Mod: HCNC,CPTII,S$GLB, | Performed by: INTERNAL MEDICINE

## 2023-07-27 PROCEDURE — 99214 OFFICE O/P EST MOD 30 MIN: CPT | Mod: HCNC,S$GLB,, | Performed by: INTERNAL MEDICINE

## 2023-07-27 PROCEDURE — 1159F PR MEDICATION LIST DOCUMENTED IN MEDICAL RECORD: ICD-10-PCS | Mod: HCNC,CPTII,S$GLB, | Performed by: NURSE PRACTITIONER

## 2023-07-27 PROCEDURE — 99999 PR PBB SHADOW E&M-EST. PATIENT-LVL IV: ICD-10-PCS | Mod: PBBFAC,HCNC,, | Performed by: INTERNAL MEDICINE

## 2023-07-27 PROCEDURE — 3077F SYST BP >= 140 MM HG: CPT | Mod: HCNC,CPTII,S$GLB, | Performed by: INTERNAL MEDICINE

## 2023-07-27 PROCEDURE — 3288F PR FALLS RISK ASSESSMENT DOCUMENTED: ICD-10-PCS | Mod: HCNC,CPTII,S$GLB, | Performed by: NURSE PRACTITIONER

## 2023-07-27 PROCEDURE — 4010F PR ACE/ARB THEARPY RXD/TAKEN: ICD-10-PCS | Mod: HCNC,CPTII,S$GLB, | Performed by: NURSE PRACTITIONER

## 2023-07-27 PROCEDURE — 3079F PR MOST RECENT DIASTOLIC BLOOD PRESSURE 80-89 MM HG: ICD-10-PCS | Mod: HCNC,CPTII,S$GLB, | Performed by: NURSE PRACTITIONER

## 2023-07-27 PROCEDURE — 3077F PR MOST RECENT SYSTOLIC BLOOD PRESSURE >= 140 MM HG: ICD-10-PCS | Mod: HCNC,CPTII,S$GLB, | Performed by: NURSE PRACTITIONER

## 2023-07-27 PROCEDURE — 3008F BODY MASS INDEX DOCD: CPT | Mod: HCNC,CPTII,S$GLB, | Performed by: NURSE PRACTITIONER

## 2023-07-27 PROCEDURE — 1101F PR PT FALLS ASSESS DOC 0-1 FALLS W/OUT INJ PAST YR: ICD-10-PCS | Mod: HCNC,CPTII,S$GLB, | Performed by: NURSE PRACTITIONER

## 2023-07-27 PROCEDURE — 4010F ACE/ARB THERAPY RXD/TAKEN: CPT | Mod: HCNC,CPTII,S$GLB, | Performed by: NURSE PRACTITIONER

## 2023-07-27 PROCEDURE — 1159F PR MEDICATION LIST DOCUMENTED IN MEDICAL RECORD: ICD-10-PCS | Mod: HCNC,CPTII,S$GLB, | Performed by: INTERNAL MEDICINE

## 2023-07-27 PROCEDURE — 3288F FALL RISK ASSESSMENT DOCD: CPT | Mod: HCNC,CPTII,S$GLB, | Performed by: NURSE PRACTITIONER

## 2023-07-27 PROCEDURE — 1126F AMNT PAIN NOTED NONE PRSNT: CPT | Mod: HCNC,CPTII,S$GLB, | Performed by: INTERNAL MEDICINE

## 2023-07-27 PROCEDURE — 3079F DIAST BP 80-89 MM HG: CPT | Mod: HCNC,CPTII,S$GLB, | Performed by: INTERNAL MEDICINE

## 2023-07-27 PROCEDURE — 87624 HPV HI-RISK TYP POOLED RSLT: CPT | Mod: HCNC | Performed by: NURSE PRACTITIONER

## 2023-07-27 PROCEDURE — 4010F PR ACE/ARB THEARPY RXD/TAKEN: ICD-10-PCS | Mod: HCNC,CPTII,S$GLB, | Performed by: INTERNAL MEDICINE

## 2023-07-27 PROCEDURE — 1124F PR ADV CARE PLAN DISCUSSED, UNABLE/UNWILL DOC PLAN OR SURROGATE: ICD-10-PCS | Mod: HCNC,CPTII,S$GLB, | Performed by: NURSE PRACTITIONER

## 2023-07-27 PROCEDURE — 99214 PR OFFICE/OUTPT VISIT, EST, LEVL IV, 30-39 MIN: ICD-10-PCS | Mod: HCNC,S$GLB,, | Performed by: INTERNAL MEDICINE

## 2023-07-27 PROCEDURE — 1126F PR PAIN SEVERITY QUANTIFIED, NO PAIN PRESENT: ICD-10-PCS | Mod: HCNC,CPTII,S$GLB, | Performed by: INTERNAL MEDICINE

## 2023-07-27 PROCEDURE — 3008F BODY MASS INDEX DOCD: CPT | Mod: HCNC,CPTII,S$GLB, | Performed by: INTERNAL MEDICINE

## 2023-07-27 PROCEDURE — 1126F PR PAIN SEVERITY QUANTIFIED, NO PAIN PRESENT: ICD-10-PCS | Mod: HCNC,CPTII,S$GLB, | Performed by: NURSE PRACTITIONER

## 2023-07-27 PROCEDURE — G0101 CA SCREEN;PELVIC/BREAST EXAM: HCPCS | Mod: HCNC,S$GLB,, | Performed by: NURSE PRACTITIONER

## 2023-07-27 PROCEDURE — 4010F ACE/ARB THERAPY RXD/TAKEN: CPT | Mod: HCNC,CPTII,S$GLB, | Performed by: INTERNAL MEDICINE

## 2023-07-27 PROCEDURE — 3077F SYST BP >= 140 MM HG: CPT | Mod: HCNC,CPTII,S$GLB, | Performed by: NURSE PRACTITIONER

## 2023-07-27 PROCEDURE — 1101F PT FALLS ASSESS-DOCD LE1/YR: CPT | Mod: HCNC,CPTII,S$GLB, | Performed by: NURSE PRACTITIONER

## 2023-07-27 PROCEDURE — 1101F PR PT FALLS ASSESS DOC 0-1 FALLS W/OUT INJ PAST YR: ICD-10-PCS | Mod: HCNC,CPTII,S$GLB, | Performed by: INTERNAL MEDICINE

## 2023-07-27 PROCEDURE — 1124F PR ADV CARE PLAN DISCUSSED, UNABLE/UNWILL DOC PLAN OR SURROGATE: ICD-10-PCS | Mod: HCNC,CPTII,S$GLB, | Performed by: INTERNAL MEDICINE

## 2023-07-27 PROCEDURE — 3288F FALL RISK ASSESSMENT DOCD: CPT | Mod: HCNC,CPTII,S$GLB, | Performed by: INTERNAL MEDICINE

## 2023-07-27 PROCEDURE — 1160F PR REVIEW ALL MEDS BY PRESCRIBER/CLIN PHARMACIST DOCUMENTED: ICD-10-PCS | Mod: HCNC,CPTII,S$GLB, | Performed by: INTERNAL MEDICINE

## 2023-07-27 PROCEDURE — 1159F MED LIST DOCD IN RCRD: CPT | Mod: HCNC,CPTII,S$GLB, | Performed by: INTERNAL MEDICINE

## 2023-07-27 PROCEDURE — 1160F RVW MEDS BY RX/DR IN RCRD: CPT | Mod: HCNC,CPTII,S$GLB, | Performed by: INTERNAL MEDICINE

## 2023-07-27 PROCEDURE — 99999 PR PBB SHADOW E&M-EST. PATIENT-LVL IV: CPT | Mod: PBBFAC,HCNC,, | Performed by: INTERNAL MEDICINE

## 2023-07-27 PROCEDURE — 88175 CYTOPATH C/V AUTO FLUID REDO: CPT | Mod: HCNC | Performed by: NURSE PRACTITIONER

## 2023-07-27 PROCEDURE — 1124F ACP DISCUSS-NO DSCNMKR DOCD: CPT | Mod: HCNC,CPTII,S$GLB, | Performed by: INTERNAL MEDICINE

## 2023-07-27 PROCEDURE — 1124F ACP DISCUSS-NO DSCNMKR DOCD: CPT | Mod: HCNC,CPTII,S$GLB, | Performed by: NURSE PRACTITIONER

## 2023-07-27 PROCEDURE — 1101F PT FALLS ASSESS-DOCD LE1/YR: CPT | Mod: HCNC,CPTII,S$GLB, | Performed by: INTERNAL MEDICINE

## 2023-07-27 PROCEDURE — 3079F DIAST BP 80-89 MM HG: CPT | Mod: HCNC,CPTII,S$GLB, | Performed by: NURSE PRACTITIONER

## 2023-07-27 PROCEDURE — 3077F PR MOST RECENT SYSTOLIC BLOOD PRESSURE >= 140 MM HG: ICD-10-PCS | Mod: HCNC,CPTII,S$GLB, | Performed by: INTERNAL MEDICINE

## 2023-07-27 PROCEDURE — 3288F PR FALLS RISK ASSESSMENT DOCUMENTED: ICD-10-PCS | Mod: HCNC,CPTII,S$GLB, | Performed by: INTERNAL MEDICINE

## 2023-07-27 PROCEDURE — 3079F PR MOST RECENT DIASTOLIC BLOOD PRESSURE 80-89 MM HG: ICD-10-PCS | Mod: HCNC,CPTII,S$GLB, | Performed by: INTERNAL MEDICINE

## 2023-07-27 PROCEDURE — 1159F MED LIST DOCD IN RCRD: CPT | Mod: HCNC,CPTII,S$GLB, | Performed by: NURSE PRACTITIONER

## 2023-07-27 PROCEDURE — 1126F AMNT PAIN NOTED NONE PRSNT: CPT | Mod: HCNC,CPTII,S$GLB, | Performed by: NURSE PRACTITIONER

## 2023-07-27 PROCEDURE — 3008F PR BODY MASS INDEX (BMI) DOCUMENTED: ICD-10-PCS | Mod: HCNC,CPTII,S$GLB, | Performed by: NURSE PRACTITIONER

## 2023-07-27 PROCEDURE — 99999 PR PBB SHADOW E&M-EST. PATIENT-LVL III: CPT | Mod: PBBFAC,HCNC,, | Performed by: NURSE PRACTITIONER

## 2023-07-27 RX ORDER — HYDROCHLOROTHIAZIDE 12.5 MG/1
12.5 TABLET ORAL DAILY
Qty: 90 TABLET | Refills: 3 | Status: SHIPPED | OUTPATIENT
Start: 2023-07-27 | End: 2023-07-27

## 2023-07-27 RX ORDER — HYDROCHLOROTHIAZIDE 25 MG/1
25 TABLET ORAL DAILY
Qty: 90 TABLET | Refills: 3 | Status: SHIPPED | OUTPATIENT
Start: 2023-07-27 | End: 2023-10-26 | Stop reason: SDUPTHER

## 2023-07-27 RX ORDER — LEVOTHYROXINE SODIUM 88 UG/1
88 TABLET ORAL DAILY
Qty: 90 TABLET | Refills: 3 | Status: SHIPPED | OUTPATIENT
Start: 2023-07-27 | End: 2023-10-26 | Stop reason: SDUPTHER

## 2023-07-27 NOTE — PROGRESS NOTES
CC: Annual  HPI: Pt is a 68 y.o.  female who presents for routine annual exam. Pt with bile duct cancer ans is s/p chemo and resection of the liver. She is postmenopausal. Denies any episodes of PMB.  She does not want STD screening.  Reports decreased libido.  She  childhood sweetheart later in life. The patient participates in regular exercise: some.  The patient does not smoke.  The patient wears seatbelts.   Pt denies any domestic violence. Screening MMG due in 6/23 WNL. She did FOBT screening in 22 WNL.        ROS:  GENERAL: Feeling well overall. Denies fever or chills.   SKIN: Denies rash or lesions.   HEAD: Denies head injury or headache.   NODES: Denies enlarged lymph nodes.   CHEST: Denies chest pain or shortness of breath.   CARDIOVASCULAR: Denies palpitations or left sided chest pain.   ABDOMEN: No abdominal pain, constipation, diarrhea, nausea, vomiting or rectal bleeding.   URINARY: No dysuria, hematuria, or burning on urination.  REPRODUCTIVE: See HPI.   BREASTS: Denies pain, lumps, or nipple discharge.   HEMATOLOGIC: No easy bruisability or excessive bleeding.   MUSCULOSKELETAL: Denies joint pain or swelling.   NEUROLOGIC: Denies syncope or weakness.   PSYCHIATRIC: Denies depression, anxiety or mood swings.    PE:   APPEARANCE: Well nourished, well developed, White female in no acute distress.  NODES: no cervical, supraclavicular, or inguinal lymphadenopathy  BREASTS: Symmetrical, no skin changes or visible lesions. No palpable masses, nipple discharge or adenopathy bilaterally.  ABDOMEN: Soft. No tenderness or masses. No distention. No hernias palpated. No CVA tenderness.  VULVA: No lesions. Normal external female genitalia.  URETHRAL MEATUS: Normal size and location, no lesions, no prolapse.  URETHRA: No masses, tenderness, or prolapse.  VAGINA: Moist. No lesions or lacerations noted. No abnormal discharge present. No odor present.   CERVIX: No lesions or discharge. No cervical motion  tenderness.   UTERUS: Normal size, regular shape, mobile, non-tender.  ADNEXA: No tenderness. No fullness or masses palpated in the adnexal regions.   ANUS PERINEUM: Normal.      Diagnosis:  1. Women's annual routine gynecological examination    2. Encounter for Papanicolaou smear for cervical cancer screening    3. Screening for HPV (human papillomavirus)        Plan:   Pap/ HPV- done today due to immunosuppression with chemo over past yr   MMG in 6/24  Discussed low libido encouraged to make special time for spouse.  Ensure healthy lifestyle choices. Improve your diet, get regular exercise and enough sleep, cut down on the alcohol, and reduce stress.  Can refer for possible testosterone pellets if no improvement   Reviewed injectable Vyleesi option     Orders Placed This Encounter    HPV High Risk Genotypes, PCR    Liquid-Based Pap Smear, Screening       Patient was counseled today on the new ACS guidelines for cervical cytology screening as well as the current recommendations for breast cancer screening. She was counseled to follow up with her PCP for other routine health maintenance. Counseling session lasted approximately 10 minutes, and all her questions were answered.    Follow-up with me in 1 year for routine exam    RUSH Wilburn

## 2023-07-27 NOTE — PROGRESS NOTES
Subjective:       Patient ID: Rehana Whitten is a 68 y.o. female.    Chief Complaint: Hypertension    HPI  At our last visit in March, we dec hctz from 25 to 12.5mg daily (BP was lower while on chemo for cholangiocarcinoma). Eventually candesartan had to be stopped also. Part of digital HTN program. BP flowsheet a little higher.  She was also c/o of some SOB and TTE done 3/2023:  The left ventricle is normal in size with normal systolic function.  The estimated ejection fraction is 65%.  The left ventricular global longitudinal strain is -15.4%.  Normal left ventricular diastolic function.  The estimated PA systolic pressure is 29 mmHg.  Normal right ventricular size with normal right ventricular systolic function.  Normal central venous pressure (3 mmHg).  There is no pulmonary hypertension.  Reports no SOB/NUNEZ anymore.     Follows w/ Dr. Moore in H/O - lov 7/21/23. S/P 8th cycle of adjuvant capecitabine. Plan to repeat MRI abdomen w/ CT of chest in 3 mo. Was rx augmentin x 10 days for sinusitis.  CT A/P 7/18/23  1. Postop change of right hepatectomy, cholecystectomy, and portal lymphadenectomy for intrahepatic cholangiocarcinoma.  2. Previously noted subcentimeter hypodensity in the hepatic IVB segment is not well visualized on today's examination.  No new focal hepatic lesions.  3. Stable bilateral pulmonary nodules.  4. Additional findings, as above.    Dry skin of the lower extremity. Some pain in the toes and some irritation in the hands assoc w/ HFS from chemo. Improving.   No nausea/vomiting/abdominal pain.   Sometimes w/ loose stools a few times a day but normal for her since GB was taken out. No blood in stools.   Good appetite.   Reports sinus pressure better. No fevers/chills/cough/ear pain or swollen glands. Did not start augmentin as it sometimes tears up her stomach.     Review of Systems  Comprehensive review of systems otherwise negative. See history/subjective section for more  "details.    Objective:      Physical Exam    BP (!) 142/84 (BP Location: Left arm, Patient Position: Sitting, BP Method: Large (Manual))   Pulse 79   Temp 97.6 °F (36.4 °C) (Oral)   Ht 5' 1" (1.549 m)   Wt 64.3 kg (141 lb 12.1 oz)   LMP  (LMP Unknown)   SpO2 97%   BMI 26.78 kg/m²     GEN - A+OX4, NAD   HEENT - PERRL, EOMI, OP clear. MMM. TM normal. L maxillary sinus w/ slight soreness to palpation.   Neck - No thyromegaly or cervical LAD. No thyroid masses felt.  CV - RRR, no m/r   Chest - CTAB, no wheezing or rhonchi  Abd - S/NT/ND/+BS. Ventral incisional hernia.  Ext - 2+BDP and radial pulses. No C/C/E.  Neuro - 5/5 BUE and BLE strength. Normal gait.   Skin - some redness of BUE and dry skin of shins. R big toe w/ some erythema but no blisters.    Previous labs reviewed.     Assessment/Plan     Rehana was seen today for hypertension.    Diagnoses and all orders for this visit:    Benign essential hypertension  Increase HCTZ from 12.5mg to 25mg daily.   Repeat BMP (lab work - nonfasting) in a month.   I'll set an appointment to remind myself to check your digital HTN flow sheet in 2 weeks.   -     hydroCHLOROthiazide (HYDRODIURIL) 25 MG tablet; Take 1 tablet (25 mg total) by mouth once daily.  -     BASIC METABOLIC PANEL; Future    Postoperative hypothyroidism - will recheck TSH w/ next lab draw.   -     levothyroxine (SYNTHROID) 88 MCG tablet; Take 1 tablet (88 mcg total) by mouth once daily.    Cholangiocarcinoma - s/p chemo. Doing well!!!!!    If sinus symptoms worsen, start augmentin.    Advance Care Planning     Date: 07/27/2023  Patient did not wish or was not able to name a surrogate decision maker or provide an Advance Care Plan.       Follow up in about 6 months (around 1/27/2024).      Bernie Valverde MD  Department of Internal Medicine - [facility]  9:16 AM    "

## 2023-07-27 NOTE — PATIENT INSTRUCTIONS
Increase HCTZ from 12.5mg to 25mg daily.   Repeat BMP (lab work - nonfasting) in a month.   I'll set an appointment to remind myself to check your digital HTN flow sheet in 2 weeks.

## 2023-07-28 ENCOUNTER — PATIENT MESSAGE (OUTPATIENT)
Dept: PRIMARY CARE CLINIC | Facility: CLINIC | Age: 68
End: 2023-07-28
Payer: MEDICARE

## 2023-08-01 LAB
FINAL PATHOLOGIC DIAGNOSIS: NORMAL
HPV HR 12 DNA SPEC QL NAA+PROBE: NEGATIVE
HPV16 AG SPEC QL: NEGATIVE
HPV18 DNA SPEC QL NAA+PROBE: NEGATIVE
Lab: NORMAL

## 2023-08-10 ENCOUNTER — TELEPHONE (OUTPATIENT)
Dept: PRIMARY CARE CLINIC | Facility: CLINIC | Age: 68
End: 2023-08-10
Payer: MEDICARE

## 2023-08-10 NOTE — TELEPHONE ENCOUNTER
Her BP flow sheet readings have been high and not at goal despite increasing HCTZ. See if she's amenable to restarting candesartan 8.

## 2023-08-16 ENCOUNTER — PATIENT MESSAGE (OUTPATIENT)
Dept: ADMINISTRATIVE | Facility: HOSPITAL | Age: 68
End: 2023-08-16
Payer: MEDICARE

## 2023-08-17 ENCOUNTER — PATIENT MESSAGE (OUTPATIENT)
Dept: PRIMARY CARE CLINIC | Facility: CLINIC | Age: 68
End: 2023-08-17
Payer: MEDICARE

## 2023-08-17 DIAGNOSIS — I10 BENIGN ESSENTIAL HYPERTENSION: Primary | ICD-10-CM

## 2023-08-17 RX ORDER — CANDESARTAN 4 MG/1
4 TABLET ORAL DAILY
Qty: 90 TABLET | Refills: 3 | Status: SHIPPED | OUTPATIENT
Start: 2023-08-17 | End: 2023-10-04

## 2023-08-17 NOTE — TELEPHONE ENCOUNTER
Let her know that I'll add back candesartan 4mg daily. BP check along w/ BMP in 2 weeks to check potassium.

## 2023-08-29 ENCOUNTER — TELEPHONE (OUTPATIENT)
Dept: PRIMARY CARE CLINIC | Facility: CLINIC | Age: 68
End: 2023-08-29
Payer: MEDICARE

## 2023-08-29 DIAGNOSIS — Z78.0 POSTMENOPAUSAL ESTROGEN DEFICIENCY: Primary | ICD-10-CM

## 2023-08-29 NOTE — TELEPHONE ENCOUNTER
Patient Questionnaire Submission  --------------------------------     Questionnaire: Preventative Care Screenings     Question: From the screening(s) listed above, please select the ones you would like to schedule:  Answer:   Osteoporosis Screening     Question: Have you completed any of the following screenings at a non-[facility] location recently? Select all that apply or NONE:  Answer:   NONE     Question: When is the best time to call you?  Answer:   10:00 AM - 11:00 AM     Question: What is the best number to reach you?  Answer:   [phone]

## 2023-09-06 ENCOUNTER — OFFICE VISIT (OUTPATIENT)
Dept: INTERNAL MEDICINE | Facility: CLINIC | Age: 68
End: 2023-09-06
Payer: MEDICARE

## 2023-09-06 ENCOUNTER — LAB VISIT (OUTPATIENT)
Dept: LAB | Facility: HOSPITAL | Age: 68
End: 2023-09-06
Attending: INTERNAL MEDICINE
Payer: MEDICARE

## 2023-09-06 VITALS
SYSTOLIC BLOOD PRESSURE: 118 MMHG | BODY MASS INDEX: 27.6 KG/M2 | OXYGEN SATURATION: 97 % | DIASTOLIC BLOOD PRESSURE: 70 MMHG | WEIGHT: 146.19 LBS | HEART RATE: 73 BPM | HEIGHT: 61 IN

## 2023-09-06 DIAGNOSIS — Z79.899 DRUG-INDUCED IMMUNODEFICIENCY: ICD-10-CM

## 2023-09-06 DIAGNOSIS — E89.0 POSTOPERATIVE HYPOTHYROIDISM: ICD-10-CM

## 2023-09-06 DIAGNOSIS — Z00.00 ENCOUNTER FOR PREVENTIVE HEALTH EXAMINATION: Primary | ICD-10-CM

## 2023-09-06 DIAGNOSIS — E03.9 HYPOTHYROIDISM, UNSPECIFIED TYPE: ICD-10-CM

## 2023-09-06 DIAGNOSIS — D84.821 DRUG-INDUCED IMMUNODEFICIENCY: ICD-10-CM

## 2023-09-06 DIAGNOSIS — F32.5 MAJOR DEPRESSIVE DISORDER IN REMISSION, UNSPECIFIED WHETHER RECURRENT: ICD-10-CM

## 2023-09-06 DIAGNOSIS — C22.1 CHOLANGIOCARCINOMA: ICD-10-CM

## 2023-09-06 DIAGNOSIS — I70.0 AORTIC ATHEROSCLEROSIS: ICD-10-CM

## 2023-09-06 DIAGNOSIS — I10 ESSENTIAL HYPERTENSION: ICD-10-CM

## 2023-09-06 DIAGNOSIS — Z00.00 ENCOUNTER FOR MEDICARE ANNUAL WELLNESS EXAM: ICD-10-CM

## 2023-09-06 DIAGNOSIS — E78.49 OTHER HYPERLIPIDEMIA: ICD-10-CM

## 2023-09-06 DIAGNOSIS — I10 BENIGN ESSENTIAL HYPERTENSION: ICD-10-CM

## 2023-09-06 DIAGNOSIS — F41.9 ANXIETY: ICD-10-CM

## 2023-09-06 LAB
ANION GAP SERPL CALC-SCNC: 13 MMOL/L (ref 8–16)
BUN SERPL-MCNC: 18 MG/DL (ref 8–23)
CALCIUM SERPL-MCNC: 9.4 MG/DL (ref 8.7–10.5)
CHLORIDE SERPL-SCNC: 101 MMOL/L (ref 95–110)
CO2 SERPL-SCNC: 25 MMOL/L (ref 23–29)
CREAT SERPL-MCNC: 0.8 MG/DL (ref 0.5–1.4)
EST. GFR  (NO RACE VARIABLE): >60 ML/MIN/1.73 M^2
GLUCOSE SERPL-MCNC: 109 MG/DL (ref 70–110)
POTASSIUM SERPL-SCNC: 4 MMOL/L (ref 3.5–5.1)
SODIUM SERPL-SCNC: 139 MMOL/L (ref 136–145)
TSH SERPL DL<=0.005 MIU/L-ACNC: 2.06 UIU/ML (ref 0.4–4)

## 2023-09-06 PROCEDURE — 3008F PR BODY MASS INDEX (BMI) DOCUMENTED: ICD-10-PCS | Mod: HCNC,CPTII,S$GLB, | Performed by: NURSE PRACTITIONER

## 2023-09-06 PROCEDURE — 1101F PT FALLS ASSESS-DOCD LE1/YR: CPT | Mod: HCNC,CPTII,S$GLB, | Performed by: NURSE PRACTITIONER

## 2023-09-06 PROCEDURE — 36415 COLL VENOUS BLD VENIPUNCTURE: CPT | Mod: HCNC | Performed by: INTERNAL MEDICINE

## 2023-09-06 PROCEDURE — 3078F PR MOST RECENT DIASTOLIC BLOOD PRESSURE < 80 MM HG: ICD-10-PCS | Mod: HCNC,CPTII,S$GLB, | Performed by: NURSE PRACTITIONER

## 2023-09-06 PROCEDURE — 3008F BODY MASS INDEX DOCD: CPT | Mod: HCNC,CPTII,S$GLB, | Performed by: NURSE PRACTITIONER

## 2023-09-06 PROCEDURE — 99999 PR PBB SHADOW E&M-EST. PATIENT-LVL IV: CPT | Mod: PBBFAC,HCNC,, | Performed by: NURSE PRACTITIONER

## 2023-09-06 PROCEDURE — 1126F AMNT PAIN NOTED NONE PRSNT: CPT | Mod: HCNC,CPTII,S$GLB, | Performed by: NURSE PRACTITIONER

## 2023-09-06 PROCEDURE — 1126F PR PAIN SEVERITY QUANTIFIED, NO PAIN PRESENT: ICD-10-PCS | Mod: HCNC,CPTII,S$GLB, | Performed by: NURSE PRACTITIONER

## 2023-09-06 PROCEDURE — 3288F PR FALLS RISK ASSESSMENT DOCUMENTED: ICD-10-PCS | Mod: HCNC,CPTII,S$GLB, | Performed by: NURSE PRACTITIONER

## 2023-09-06 PROCEDURE — 3078F DIAST BP <80 MM HG: CPT | Mod: HCNC,CPTII,S$GLB, | Performed by: NURSE PRACTITIONER

## 2023-09-06 PROCEDURE — 3074F PR MOST RECENT SYSTOLIC BLOOD PRESSURE < 130 MM HG: ICD-10-PCS | Mod: HCNC,CPTII,S$GLB, | Performed by: NURSE PRACTITIONER

## 2023-09-06 PROCEDURE — 4010F ACE/ARB THERAPY RXD/TAKEN: CPT | Mod: HCNC,CPTII,S$GLB, | Performed by: NURSE PRACTITIONER

## 2023-09-06 PROCEDURE — 1101F PR PT FALLS ASSESS DOC 0-1 FALLS W/OUT INJ PAST YR: ICD-10-PCS | Mod: HCNC,CPTII,S$GLB, | Performed by: NURSE PRACTITIONER

## 2023-09-06 PROCEDURE — 3074F SYST BP LT 130 MM HG: CPT | Mod: HCNC,CPTII,S$GLB, | Performed by: NURSE PRACTITIONER

## 2023-09-06 PROCEDURE — G0439 PR MEDICARE ANNUAL WELLNESS SUBSEQUENT VISIT: ICD-10-PCS | Mod: HCNC,S$GLB,, | Performed by: NURSE PRACTITIONER

## 2023-09-06 PROCEDURE — 1170F FXNL STATUS ASSESSED: CPT | Mod: HCNC,CPTII,S$GLB, | Performed by: NURSE PRACTITIONER

## 2023-09-06 PROCEDURE — 80048 BASIC METABOLIC PNL TOTAL CA: CPT | Mod: HCNC | Performed by: INTERNAL MEDICINE

## 2023-09-06 PROCEDURE — 1170F PR FUNCTIONAL STATUS ASSESSED: ICD-10-PCS | Mod: HCNC,CPTII,S$GLB, | Performed by: NURSE PRACTITIONER

## 2023-09-06 PROCEDURE — G0439 PPPS, SUBSEQ VISIT: HCPCS | Mod: HCNC,S$GLB,, | Performed by: NURSE PRACTITIONER

## 2023-09-06 PROCEDURE — 3288F FALL RISK ASSESSMENT DOCD: CPT | Mod: HCNC,CPTII,S$GLB, | Performed by: NURSE PRACTITIONER

## 2023-09-06 PROCEDURE — 84443 ASSAY THYROID STIM HORMONE: CPT | Mod: HCNC | Performed by: INTERNAL MEDICINE

## 2023-09-06 PROCEDURE — 4010F PR ACE/ARB THEARPY RXD/TAKEN: ICD-10-PCS | Mod: HCNC,CPTII,S$GLB, | Performed by: NURSE PRACTITIONER

## 2023-09-06 PROCEDURE — 99999 PR PBB SHADOW E&M-EST. PATIENT-LVL IV: ICD-10-PCS | Mod: PBBFAC,HCNC,, | Performed by: NURSE PRACTITIONER

## 2023-09-06 NOTE — PATIENT INSTRUCTIONS
Counseling and Referral of Other Preventative  (Italic type indicates deductible and co-insurance are waived)    Patient Name: Rehana Whitten  Today's Date: 9/6/2023    Health Maintenance       Date Due Completion Date    Shingles Vaccine (1 of 2) 01/24/2017 11/29/2016    Hemoglobin A1c (Diabetic Prevention Screening) 06/26/2022 6/26/2019    COVID-19 Vaccine (6 - Pfizer series) 10/05/2022 8/10/2022    Influenza Vaccine (1) 09/01/2023 11/2/2022    DEXA Scan 09/20/2023 9/20/2021    Mammogram 06/28/2024 6/28/2023    Colorectal Cancer Screening 11/15/2025 11/15/2022    Override on 1/1/2010: Done    TETANUS VACCINE 03/31/2027 3/31/2017    Override on 3/7/2007: Done    Lipid Panel 08/10/2027 8/10/2022        No orders of the defined types were placed in this encounter.    The following information is provided to all patients.  This information is to help you find resources for any of the problems found today that may be affecting your health:                Living healthy guide: www.[facility].[place].gov      Understanding Diabetes: www.diabetes.org      Eating healthy: www.cdc.gov/healthyweight      CDC home safety checklist: www.cdc.gov/steadi/patient.html      Agency on Aging: www.goea.[place].[facility]      Alcoholics anonymous (AA): www.aa.org      Physical Activity: www.[place].nih.gov/qe3hwtw      Tobacco use: www.quitwithusla.org

## 2023-09-06 NOTE — PROGRESS NOTES
"Rehana Whitten presented for a  Medicare AWV and comprehensive Health Risk Assessment today. The following components were reviewed and updated:    Medical history  Family History  Social history  Allergies and Current Medications  Health Risk Assessment  Health Maintenance  Care Team         ** See Completed Assessments for Annual Wellness Visit within the encounter summary.**         The following assessments were completed:  Living Situation  CAGE  Depression Screening  Timed Get Up and Go  Whisper Test  Cognitive Function Screening      Nutrition Screening  ADL Screening  PAQ Screening  OPIOID Screening: Patient does not have a prescription for narcotics. Patient does not use substance         Vitals:    09/06/23 1031   BP: 118/70   BP Location: Left arm   Pulse: 73   SpO2: 97%   Weight: 66.3 kg (146 lb 2.6 oz)   Height: 5' 1" (1.549 m)     Body mass index is 27.62 kg/m².  Physical Exam  Vitals and nursing note reviewed.   Constitutional:       Appearance: She is well-developed.   HENT:      Head: Normocephalic.   Cardiovascular:      Rate and Rhythm: Normal rate and regular rhythm.      Heart sounds: Normal heart sounds. No murmur heard.  Pulmonary:      Effort: Pulmonary effort is normal.      Breath sounds: Normal breath sounds.   Abdominal:      General: Bowel sounds are normal.      Palpations: Abdomen is soft.   Musculoskeletal:         General: Normal range of motion.   Skin:     General: Skin is warm and dry.   Neurological:      Mental Status: She is alert and oriented to person, place, and time.      Motor: No abnormal muscle tone.   Psychiatric:         Mood and Affect: Mood normal.               Diagnoses and health risks identified today and associated recommendations/orders:    1. Encounter for preventive health examination  Here for Health Risk Assessment/Annual Wellness Visit.  Health maintenance reviewed and updated. Follow up in one year.     2. Essential hypertension  Chronic, stable on " current medications. Followed by PCP.     3. Aortic atherosclerosis  Chronic, stable on current medications. CXR 7/01/21. Followed by PCP.     4. Other hyperlipidemia  Chronic, stable on current medication. Followed by PCP.     5. Cholangiocarcinoma  Stable. Followed by Oncology, PCP.     6. Drug-induced immunodeficiency  Chronic, stable. Followed by Oncology, PCP.     7. Major depressive disorder in remission, unspecified whether recurrent  Chronic, stable on current medication. PHQ-2 score 0. Followed by PCP.     8. Anxiety  Chronic, stable on current medication. Followed by PCP.     9. Hypothyroidism, unspecified type  Chronic, stable on current medication. Followed by PCP.     10. Encounter for Medicare annual wellness exam  - Ambulatory Referral/Consult to Enhanced Annual Wellness Visit (eAWV)      Provided Rehana with a 5-10 year written screening schedule and personal prevention plan. Recommendations were developed using the USPSTF age appropriate recommendations. Education, counseling, and referrals were provided as needed. After Visit Summary printed and given to patient which includes a list of additional screenings\tests needed.    Follow up in about 5 months (around 1/27/2024).with PCP    Angelita Cortes NP

## 2023-09-11 ENCOUNTER — PATIENT MESSAGE (OUTPATIENT)
Dept: ADMINISTRATIVE | Facility: OTHER | Age: 68
End: 2023-09-11
Payer: MEDICARE

## 2023-09-21 ENCOUNTER — HOSPITAL ENCOUNTER (OUTPATIENT)
Dept: RADIOLOGY | Facility: HOSPITAL | Age: 68
Discharge: HOME | End: 2023-09-21
Attending: INTERNAL MEDICINE
Payer: MEDICARE

## 2023-09-21 DIAGNOSIS — Z78.0 POSTMENOPAUSAL ESTROGEN DEFICIENCY: ICD-10-CM

## 2023-09-21 PROCEDURE — 77080 DXA BONE DENSITY AXIAL: CPT | Mod: TC

## 2023-09-21 PROCEDURE — 77080 DXA BONE DENSITY AXIAL SKELETON 1 OR MORE SITES: ICD-10-PCS | Mod: 26,,, | Performed by: INTERNAL MEDICINE

## 2023-09-21 PROCEDURE — 77080 DXA BONE DENSITY AXIAL: CPT | Mod: 26,,, | Performed by: INTERNAL MEDICINE

## 2023-09-23 ENCOUNTER — IMMUNIZATION (OUTPATIENT)
Dept: INTERNAL MEDICINE | Facility: CLINIC | Age: 68
End: 2023-09-23
Payer: MEDICARE

## 2023-09-23 PROCEDURE — 90694 FLU VACCINE - QUADRIVALENT - ADJUVANTED: ICD-10-PCS | Mod: HCNC,S$GLB,, | Performed by: INTERNAL MEDICINE

## 2023-09-23 PROCEDURE — 90694 VACC AIIV4 NO PRSRV 0.5ML IM: CPT | Mod: HCNC,S$GLB,, | Performed by: INTERNAL MEDICINE

## 2023-09-23 PROCEDURE — G0008 ADMIN INFLUENZA VIRUS VAC: HCPCS | Mod: HCNC,S$GLB,, | Performed by: INTERNAL MEDICINE

## 2023-09-23 PROCEDURE — G0008 FLU VACCINE - QUADRIVALENT - ADJUVANTED: ICD-10-PCS | Mod: HCNC,S$GLB,, | Performed by: INTERNAL MEDICINE

## 2023-10-09 DIAGNOSIS — F33.42 RECURRENT MAJOR DEPRESSIVE DISORDER, IN FULL REMISSION: ICD-10-CM

## 2023-10-09 DIAGNOSIS — F41.1 GAD (GENERALIZED ANXIETY DISORDER): ICD-10-CM

## 2023-10-09 RX ORDER — CITALOPRAM 20 MG/1
TABLET, FILM COATED ORAL
Qty: 90 TABLET | Refills: 2 | Status: SHIPPED | OUTPATIENT
Start: 2023-10-09 | End: 2023-10-26 | Stop reason: SDUPTHER

## 2023-10-12 ENCOUNTER — TELEPHONE (OUTPATIENT)
Dept: PRIMARY CARE CLINIC | Facility: CLINIC | Age: 68
End: 2023-10-12
Payer: MEDICARE

## 2023-10-12 NOTE — TELEPHONE ENCOUNTER
Pt started Candesartan 8mg  x 1 week ago , digital med numbers were high since stopping chemo.  10/10 numbers 129/72, 132/71   Pt also mention she went to  for dog bite. Stated that she is on abx, and is up to date on tetanus, as well as the dog has had all shots. She stated it is healing well at this time.     Made appt with Dr Millard on 10/26 to check in on meds, and check dog bite

## 2023-10-17 ENCOUNTER — HOSPITAL ENCOUNTER (OUTPATIENT)
Dept: RADIOLOGY | Facility: HOSPITAL | Age: 68
Discharge: HOME | End: 2023-10-17
Attending: HOSPITALIST
Payer: MEDICARE

## 2023-10-17 DIAGNOSIS — C22.1 CHOLANGIOCARCINOMA: ICD-10-CM

## 2023-10-17 PROCEDURE — 71250 CT THORAX DX C-: CPT | Mod: 26,HCNC,, | Performed by: STUDENT IN AN ORGANIZED HEALTH CARE EDUCATION/TRAINING PROGRAM

## 2023-10-17 PROCEDURE — 74183 MRI ABD W/O CNTR FLWD CNTR: CPT | Mod: 26,HCNC,, | Performed by: RADIOLOGY

## 2023-10-17 PROCEDURE — 74183 MRI ABDOMEN W WO CONTRAST: ICD-10-PCS | Mod: 26,HCNC,, | Performed by: RADIOLOGY

## 2023-10-17 PROCEDURE — 71250 CT THORAX DX C-: CPT | Mod: TC,HCNC

## 2023-10-17 PROCEDURE — A9585 GADOBUTROL INJECTION: HCPCS | Mod: HCNC | Performed by: HOSPITALIST

## 2023-10-17 PROCEDURE — 71250 CT CHEST WITHOUT CONTRAST: ICD-10-PCS | Mod: 26,HCNC,, | Performed by: STUDENT IN AN ORGANIZED HEALTH CARE EDUCATION/TRAINING PROGRAM

## 2023-10-17 PROCEDURE — 25500020 PHARM REV CODE 255: Mod: HCNC | Performed by: HOSPITALIST

## 2023-10-17 PROCEDURE — 74183 MRI ABD W/O CNTR FLWD CNTR: CPT | Mod: TC,HCNC

## 2023-10-17 RX ORDER — GADOBUTROL 604.72 MG/ML
8 INJECTION INTRAVENOUS
Status: COMPLETED | OUTPATIENT
Start: 2023-10-17 | End: 2023-10-17

## 2023-10-17 RX ADMIN — GADOBUTROL 8 ML: 604.72 INJECTION INTRAVENOUS at 09:10

## 2023-10-20 ENCOUNTER — OFFICE VISIT (OUTPATIENT)
Dept: HEMATOLOGY/ONCOLOGY | Facility: CLINIC | Age: 68
End: 2023-10-20
Payer: MEDICARE

## 2023-10-20 VITALS
WEIGHT: 149.06 LBS | RESPIRATION RATE: 18 BRPM | SYSTOLIC BLOOD PRESSURE: 158 MMHG | BODY MASS INDEX: 28.14 KG/M2 | HEIGHT: 61 IN | DIASTOLIC BLOOD PRESSURE: 69 MMHG | HEART RATE: 77 BPM | OXYGEN SATURATION: 98 %

## 2023-10-20 DIAGNOSIS — E03.9 HYPOTHYROIDISM, UNSPECIFIED TYPE: ICD-10-CM

## 2023-10-20 DIAGNOSIS — C22.1 CHOLANGIOCARCINOMA: Primary | ICD-10-CM

## 2023-10-20 DIAGNOSIS — I10 ESSENTIAL HYPERTENSION: ICD-10-CM

## 2023-10-20 PROCEDURE — 1101F PR PT FALLS ASSESS DOC 0-1 FALLS W/OUT INJ PAST YR: ICD-10-PCS | Mod: HCNC,CPTII,S$GLB, | Performed by: HOSPITALIST

## 2023-10-20 PROCEDURE — 3288F PR FALLS RISK ASSESSMENT DOCUMENTED: ICD-10-PCS | Mod: HCNC,CPTII,S$GLB, | Performed by: HOSPITALIST

## 2023-10-20 PROCEDURE — 3008F BODY MASS INDEX DOCD: CPT | Mod: HCNC,CPTII,S$GLB, | Performed by: HOSPITALIST

## 2023-10-20 PROCEDURE — 3078F PR MOST RECENT DIASTOLIC BLOOD PRESSURE < 80 MM HG: ICD-10-PCS | Mod: HCNC,CPTII,S$GLB, | Performed by: HOSPITALIST

## 2023-10-20 PROCEDURE — 99214 OFFICE O/P EST MOD 30 MIN: CPT | Mod: HCNC,S$GLB,, | Performed by: HOSPITALIST

## 2023-10-20 PROCEDURE — 99999 PR PBB SHADOW E&M-EST. PATIENT-LVL III: ICD-10-PCS | Mod: PBBFAC,HCNC,, | Performed by: HOSPITALIST

## 2023-10-20 PROCEDURE — 3288F FALL RISK ASSESSMENT DOCD: CPT | Mod: HCNC,CPTII,S$GLB, | Performed by: HOSPITALIST

## 2023-10-20 PROCEDURE — 99214 PR OFFICE/OUTPT VISIT, EST, LEVL IV, 30-39 MIN: ICD-10-PCS | Mod: HCNC,S$GLB,, | Performed by: HOSPITALIST

## 2023-10-20 PROCEDURE — 3008F PR BODY MASS INDEX (BMI) DOCUMENTED: ICD-10-PCS | Mod: HCNC,CPTII,S$GLB, | Performed by: HOSPITALIST

## 2023-10-20 PROCEDURE — 1126F PR PAIN SEVERITY QUANTIFIED, NO PAIN PRESENT: ICD-10-PCS | Mod: HCNC,CPTII,S$GLB, | Performed by: HOSPITALIST

## 2023-10-20 PROCEDURE — 3077F SYST BP >= 140 MM HG: CPT | Mod: HCNC,CPTII,S$GLB, | Performed by: HOSPITALIST

## 2023-10-20 PROCEDURE — 3077F PR MOST RECENT SYSTOLIC BLOOD PRESSURE >= 140 MM HG: ICD-10-PCS | Mod: HCNC,CPTII,S$GLB, | Performed by: HOSPITALIST

## 2023-10-20 PROCEDURE — 3078F DIAST BP <80 MM HG: CPT | Mod: HCNC,CPTII,S$GLB, | Performed by: HOSPITALIST

## 2023-10-20 PROCEDURE — 1101F PT FALLS ASSESS-DOCD LE1/YR: CPT | Mod: HCNC,CPTII,S$GLB, | Performed by: HOSPITALIST

## 2023-10-20 PROCEDURE — 4010F ACE/ARB THERAPY RXD/TAKEN: CPT | Mod: HCNC,CPTII,S$GLB, | Performed by: HOSPITALIST

## 2023-10-20 PROCEDURE — 99999 PR PBB SHADOW E&M-EST. PATIENT-LVL III: CPT | Mod: PBBFAC,HCNC,, | Performed by: HOSPITALIST

## 2023-10-20 PROCEDURE — 1126F AMNT PAIN NOTED NONE PRSNT: CPT | Mod: HCNC,CPTII,S$GLB, | Performed by: HOSPITALIST

## 2023-10-20 PROCEDURE — 4010F PR ACE/ARB THEARPY RXD/TAKEN: ICD-10-PCS | Mod: HCNC,CPTII,S$GLB, | Performed by: HOSPITALIST

## 2023-10-20 NOTE — PROGRESS NOTES
MEDICAL ONCOLOGY FOLLOW-UP VISIT.     Best Contact Phone Number(s): [phone] (home)      Cancer/Stage/TNM:    Cancer Staging   Cholangiocarcinoma  Staging form: Intrahepatic Bile Duct, AJCC 8th Edition  - Pathologic: Stage II (pT2, pN0, cM0) - Unsigned       Reason for visit:     History of Present Illness: Rehana Whitten is a 68 y.o. female remote history of HBV who was found to have stage II pT2N0 intrahepatic cholangiocarcinoma in the setting of abdominal pain who underwent R0 resection 12/12/23. She completed 8 cycles of adjuvant capecitabine 07/2023. She presents to medical oncology clinic for routine surveillance.      Interval History:     Traveling over last few months. No concerns today. Appetite is good. Occasional bouts of constipation managed with miralax. Occaisional nausea. No abdominal pain.       Oncology History   Cholangiocarcinoma   11/9/2022 Imaging Significant Findings    Abdominal US in the setting of abdominal pain shows a large heterogeneous mass in the right lobe, measures 7.2 x 5.3 x 5.9 cm.     11/9/2022 Imaging Significant Findings    CT a/p:  Irregular right hepatic lobe lesion demonstrating delayed central enhancement and overlying capsular retraction.  Capsular retraction can be seen in cholangiocarcinoma and sclerosing hepatic hemangiomas.  Differential includes metastatic disease and other primary hepatic neoplasms.     11/14/2022 Tumor Markers    CA 19-9: 19.1  AFP: 5.5     11/16/2022 Imaging Significant Findings    CT chest:  1. No specific CT evidence of metastatic disease within the chest.  2. Left lower lobe punctate micro nodules.  Attention on continued surveillance imaging.  3. Other findings as above.     11/30/2022 Biopsy    Percutaneous biopsy of liver mass: Moderately differentiated adenocarcinoma. CK7+ Negative for CK20, CDX2, GATA3, TTF1, and PAX 8. Thought consistent with pancraetobiliary primary.     12/12/2022 Surgery    Resection of right hepatic lobe  tumor. Pathology shows moderately differentiated adenocarcinoma up to 6.5cm in size c/w intrahepatic cholangiocarcinoma. Tumor was confined to the hepatic parenchyma with associated  LVI but no PNI. R0 resection with 6mm margins. 06/ LN involved. [id number]     1/13/2023 - 1/13/2023 Chemotherapy    Treatment Summary   Plan Name: OP CAPECITABINE 1250MG/M2 BID Q3W  Treatment Goal: Control  Status: Inactive  Start Date: 1/13/2023  End Date: 1/13/2023  Provider: Bennie Moore MD  Chemotherapy: capecitabine (XELODA) tablet 1,650 mg, 2,000 mg, Oral, 2 times daily, 1 of 1 cycle, Start date: 2/24/2023, End date: --     2/24/2023 -  Chemotherapy    C2D1 capectiabine; DR to 1500mg po bid       3/17/2023 -  Chemotherapy    C3D1 capecitabine 1650 po bid       4/7/2023 -  Chemotherapy    C4D1 capecitabine 1650 po bid       4/28/2023 -  Chemotherapy    C5 capecitabine 1650 po bid       5/15/2023 Imaging Significant Findings    5/15/23: CT torso:  - Postoperative changes from right hepatectomy, cholecystectomy, and portal lymphadenectomy for intrahepatic cholangiocarcinoma.  - No lymphadenopathy.  - New subcentimeter hypodensity in hepatic segment 4B, concerning for metastases.  - Unchanged pulmonary nodules.  - Other findings as described.     7/18/2023 Imaging Significant Findings    CT torso:  1. Postop change of right hepatectomy, cholecystectomy, and portal lymphadenectomy for intrahepatic cholangiocarcinoma.  2. Previously noted subcentimeter hypodensity in the hepatic IVB segment is not well visualized on today's examination.  No new focal hepatic lesions.  3. Stable bilateral pulmonary nodules.  4. Additional findings, as above.        10/17/2023 Imaging Significant Findings    MRI Abdomen  Impression:   Postsurgical changes of cholecystectomy and right hepatectomy in this patient with history of cholangiocarcinoma.  No abnormal enhancing lesions to suggest residual or recurrent disease.    CT Chest  Impression:  1.  "Grossly stable pulmonary exam with multiple bilateral pulmonary nodules, the largest measuring 4 mm.            Physical Exam:   BP (!) 158/69 (BP Location: Left arm, Patient Position: Sitting, BP Method: Medium (Automatic))   Pulse 77   Resp 18   Ht 5' 1" (1.549 m)   Wt 67.6 kg (149 lb 0.5 oz)   LMP  (LMP Unknown)   SpO2 98%   BMI 28.16 kg/m²      ECOG Performance Status: (foot note - ECOG PS provided by Eastern Cooperative Oncology Group) 0 - Asymptomatic    Physical Exam  Constitutional:       General: She is not in acute distress.     Appearance: She is normal weight.   HENT:      Head: Normocephalic.      Mouth/Throat:      Mouth: Mucous membranes are moist.      Pharynx: Oropharynx is clear.   Eyes:      General: No scleral icterus.     Conjunctiva/sclera: Conjunctivae normal.   Cardiovascular:      Rate and Rhythm: Normal rate and regular rhythm.      Heart sounds: No murmur heard.     No gallop.   Pulmonary:      Effort: Pulmonary effort is normal. No respiratory distress.      Breath sounds: Normal breath sounds. No wheezing.   Abdominal:      General: There is no distension.      Palpations: Abdomen is soft.   Musculoskeletal:         General: Normal range of motion.      Cervical back: Normal range of motion and neck supple.      Right lower leg: No edema.      Left lower leg: No edema.   Skin:     General: Skin is warm.      Coloration: Skin is not jaundiced.      Findings: No bruising or rash.   Neurological:      General: No focal deficit present.      Mental Status: She is alert and oriented to person, place, and time.      Motor: No weakness.   Psychiatric:         Mood and Affect: Mood normal.         Behavior: Behavior normal.         Thought Content: Thought content normal.           Labs:   No results found for this or any previous visit (from the past 48 hour(s)).      Lab Results   Component Value Date     10/17/2023    K 4.2 10/17/2023    CREATININE 0.7 10/17/2023    WBC 4.07 " 10/17/2023    HGB 13.6 10/17/2023     10/17/2023    AST 24 10/17/2023    ALT 24 10/17/2023    ALKPHOS 111 10/17/2023    BILITOT 1.0 10/17/2023          Imaging:     CT Chest Without Contrast  Narrative: EXAMINATION:  CT CHEST WITHOUT CONTRAST    CLINICAL HISTORY:  Cholangiocarcinoma; Intrahepatic bile duct carcinoma    TECHNIQUE:  Low dose axial images, sagittal and coronal reformations were obtained from the thoracic inlet to the lung bases. Contrast was not administered.    COMPARISON:  CT chest abdomen pelvis 07/18/2023, 05/15/2023    FINDINGS:  Base of Neck: No significant abnormality.  Postoperative changes from prior left parathyroidectomy.  The thyroid appears within normal limits.  No axillary or supraclavicular lymphadenopathy.    Thoracic soft tissues: Normal.    Aorta: Three-vessel aortic arch with normal caliber.  Mild calcific atherosclerosis of the thoracic aorta.    Heart: Normal size.  No pericardial effusion. Mild to moderate calcified coronary artery calcific atherosclerosis.    Pulmonary vasculature: Unremarkable.    Estella/Mediastinum: No pathologic ene enlargement.    Airways: Central airways are patent.    Lungs/Pleura: Biapical fibronodular scarring.  Bibasilar subsegmental atelectasis.  Stable mucous plugging in the left upper lobe.  No focal consolidation, pneumothorax, or pleural fluid.    Stable micronodule in the left lower lobe, measuring 2 mm (series 4, image 377).  Stable 4 mm nodule right upper lobe (series 4, image 271).  Stable 3 mm subpleural nodule in the right lower lung (series 4, image 247).  Stable 2 mm nodule in the left lower lobe (series 4, image 351).  Few calcified granulomas in bilateral lung fields, unchanged.  No new or enlarging nodules.  Subsegmental atelectasis in the left lower lobe and lingula.    Esophagus: Normal.    Upper Abdomen: Postoperative changes from right hepatectomy.  No significant abnormalities in the visualized portions of the upper  abdomen.    Bones: No acute fracture or destructive lesions.  Age appropriate degenerative changes of the spine.  Stable nonaggressive appearing lytic lesion involving the left lateral 8th rib (series 602, image 259).  Impression: 1. Grossly stable pulmonary exam with multiple bilateral pulmonary nodules, the largest measuring 4 mm.    Electronically signed by resident: Meliton Pena  Date:    10/18/2023  Time:    13:56    Electronically signed by: Kelvin Yates  Date:    10/18/2023  Time:    16:15            Diagnoses:       1. Cholangiocarcinoma    2. Essential hypertension                        Assessment and Plan:     1. Cholangiocarcinoma  Overview:  Stage II (pT2N0) IHCC sp R0 resection 12/12/23. Started adjuvant capecitabine x6 months per [facility] 2/3/23. Did not tolerated 1250mg/m2 dose; DR down to 1000mg/m2. Copmleted treatment 07/2023. Note made of indeterminate subcentimeter hypodensity in segment 4b during mid treatment imaging. Not detectable on post treatment scans    Assessment & Plan:  - No evidence of recurrent disease  - FU in 3 months with CT torso  - Imaging q3 months through year 2 with alternating CT and MR; then space to q6 month through year 5    Orders:  -     CT Chest Abdomen Pelvis With Contrast; Future; Expected date: 10/20/2023    2. Essential hypertension  Overview:  Home medications include HCTZ and candesartan.                           Route Chart for Scheduling    Med Onc Chart Routing      Follow up with physician 3 months.   Follow up with [facility]    Infusion scheduling note    Injection scheduling note    Labs CBC, CMP and CA 19-9   Scheduling:  Preferred lab:  Lab interval:     Imaging CT chest abdomen pelvis      Pharmacy appointment    Other referrals                          Bennie Moore MD  Hematology/Oncology  [facility]

## 2023-10-20 NOTE — PATIENT INSTRUCTIONS
Labs and recent imaging without any evidence of cancer recurrence.    Follow up in 3 months with repeat labs and CT torso.

## 2023-10-20 NOTE — ASSESSMENT & PLAN NOTE
- No evidence of recurrent disease  - FU in 3 months with CT torso  - Imaging q3 months through year 2 with alternating CT and MR; then space to q6 month through year 5

## 2023-10-26 ENCOUNTER — PATIENT MESSAGE (OUTPATIENT)
Dept: PRIMARY CARE CLINIC | Facility: CLINIC | Age: 68
End: 2023-10-26

## 2023-10-26 ENCOUNTER — LAB VISIT (OUTPATIENT)
Dept: LAB | Facility: HOSPITAL | Age: 68
End: 2023-10-26
Attending: HOSPITALIST
Payer: MEDICARE

## 2023-10-26 ENCOUNTER — OFFICE VISIT (OUTPATIENT)
Dept: PRIMARY CARE CLINIC | Facility: CLINIC | Age: 68
End: 2023-10-26
Payer: MEDICARE

## 2023-10-26 VITALS
HEART RATE: 82 BPM | SYSTOLIC BLOOD PRESSURE: 124 MMHG | OXYGEN SATURATION: 96 % | HEIGHT: 61 IN | WEIGHT: 148.81 LBS | BODY MASS INDEX: 28.1 KG/M2 | TEMPERATURE: 98 F | DIASTOLIC BLOOD PRESSURE: 68 MMHG

## 2023-10-26 DIAGNOSIS — E89.0 POSTOPERATIVE HYPOTHYROIDISM: ICD-10-CM

## 2023-10-26 DIAGNOSIS — D84.821 DRUG-INDUCED IMMUNODEFICIENCY: Primary | ICD-10-CM

## 2023-10-26 DIAGNOSIS — D84.821 DRUG-INDUCED IMMUNODEFICIENCY: ICD-10-CM

## 2023-10-26 DIAGNOSIS — F33.42 RECURRENT MAJOR DEPRESSIVE DISORDER, IN FULL REMISSION: ICD-10-CM

## 2023-10-26 DIAGNOSIS — C22.1 CHOLANGIOCARCINOMA: ICD-10-CM

## 2023-10-26 DIAGNOSIS — I10 ESSENTIAL HYPERTENSION: ICD-10-CM

## 2023-10-26 DIAGNOSIS — R21 RASH: ICD-10-CM

## 2023-10-26 DIAGNOSIS — F41.1 GAD (GENERALIZED ANXIETY DISORDER): ICD-10-CM

## 2023-10-26 DIAGNOSIS — Z79.899 DRUG-INDUCED IMMUNODEFICIENCY: Primary | ICD-10-CM

## 2023-10-26 DIAGNOSIS — E78.5 HYPERLIPIDEMIA, UNSPECIFIED HYPERLIPIDEMIA TYPE: ICD-10-CM

## 2023-10-26 DIAGNOSIS — Z79.899 DRUG-INDUCED IMMUNODEFICIENCY: ICD-10-CM

## 2023-10-26 DIAGNOSIS — I10 BENIGN ESSENTIAL HYPERTENSION: ICD-10-CM

## 2023-10-26 PROBLEM — Z90.49 H/O RESECTION OF LIVER: Status: RESOLVED | Noted: 2022-12-13 | Resolved: 2023-10-26

## 2023-10-26 PROBLEM — E83.39 HYPOPHOSPHATEMIA: Status: RESOLVED | Noted: 2022-12-13 | Resolved: 2023-10-26

## 2023-10-26 LAB
ESTIMATED AVG GLUCOSE: 114 MG/DL (ref 68–131)
HBA1C MFR BLD: 5.6 % (ref 4–5.6)

## 2023-10-26 PROCEDURE — 3288F FALL RISK ASSESSMENT DOCD: CPT | Mod: HCNC,CPTII,S$GLB, | Performed by: HOSPITALIST

## 2023-10-26 PROCEDURE — 99211 OFF/OP EST MAY X REQ PHY/QHP: CPT | Mod: HCNC,S$GLB,, | Performed by: HOSPITALIST

## 2023-10-26 PROCEDURE — 3008F PR BODY MASS INDEX (BMI) DOCUMENTED: ICD-10-PCS | Mod: HCNC,CPTII,S$GLB, | Performed by: HOSPITALIST

## 2023-10-26 PROCEDURE — 1159F PR MEDICATION LIST DOCUMENTED IN MEDICAL RECORD: ICD-10-PCS | Mod: HCNC,CPTII,S$GLB, | Performed by: HOSPITALIST

## 2023-10-26 PROCEDURE — 99999 PR PBB SHADOW E&M-EST. PATIENT-LVL III: CPT | Mod: PBBFAC,HCNC,, | Performed by: HOSPITALIST

## 2023-10-26 PROCEDURE — 99999 PR PBB SHADOW E&M-EST. PATIENT-LVL III: ICD-10-PCS | Mod: PBBFAC,HCNC,, | Performed by: HOSPITALIST

## 2023-10-26 PROCEDURE — 1126F PR PAIN SEVERITY QUANTIFIED, NO PAIN PRESENT: ICD-10-PCS | Mod: HCNC,CPTII,S$GLB, | Performed by: HOSPITALIST

## 2023-10-26 PROCEDURE — 3008F BODY MASS INDEX DOCD: CPT | Mod: HCNC,CPTII,S$GLB, | Performed by: HOSPITALIST

## 2023-10-26 PROCEDURE — 83036 HEMOGLOBIN GLYCOSYLATED A1C: CPT | Mod: HCNC | Performed by: HOSPITALIST

## 2023-10-26 PROCEDURE — 1126F AMNT PAIN NOTED NONE PRSNT: CPT | Mod: HCNC,CPTII,S$GLB, | Performed by: HOSPITALIST

## 2023-10-26 PROCEDURE — 3288F PR FALLS RISK ASSESSMENT DOCUMENTED: ICD-10-PCS | Mod: HCNC,CPTII,S$GLB, | Performed by: HOSPITALIST

## 2023-10-26 PROCEDURE — 3074F PR MOST RECENT SYSTOLIC BLOOD PRESSURE < 130 MM HG: ICD-10-PCS | Mod: HCNC,CPTII,S$GLB, | Performed by: HOSPITALIST

## 2023-10-26 PROCEDURE — 1101F PT FALLS ASSESS-DOCD LE1/YR: CPT | Mod: HCNC,CPTII,S$GLB, | Performed by: HOSPITALIST

## 2023-10-26 PROCEDURE — 4010F ACE/ARB THERAPY RXD/TAKEN: CPT | Mod: HCNC,CPTII,S$GLB, | Performed by: HOSPITALIST

## 2023-10-26 PROCEDURE — 3074F SYST BP LT 130 MM HG: CPT | Mod: HCNC,CPTII,S$GLB, | Performed by: HOSPITALIST

## 2023-10-26 PROCEDURE — 4010F PR ACE/ARB THEARPY RXD/TAKEN: ICD-10-PCS | Mod: HCNC,CPTII,S$GLB, | Performed by: HOSPITALIST

## 2023-10-26 PROCEDURE — 99211 PR OFFICE/OUTPT VISIT, EST, LEVL I: ICD-10-PCS | Mod: HCNC,S$GLB,, | Performed by: HOSPITALIST

## 2023-10-26 PROCEDURE — 36415 COLL VENOUS BLD VENIPUNCTURE: CPT | Mod: HCNC,PN | Performed by: HOSPITALIST

## 2023-10-26 PROCEDURE — 3078F PR MOST RECENT DIASTOLIC BLOOD PRESSURE < 80 MM HG: ICD-10-PCS | Mod: HCNC,CPTII,S$GLB, | Performed by: HOSPITALIST

## 2023-10-26 PROCEDURE — 1101F PR PT FALLS ASSESS DOC 0-1 FALLS W/OUT INJ PAST YR: ICD-10-PCS | Mod: HCNC,CPTII,S$GLB, | Performed by: HOSPITALIST

## 2023-10-26 PROCEDURE — 1159F MED LIST DOCD IN RCRD: CPT | Mod: HCNC,CPTII,S$GLB, | Performed by: HOSPITALIST

## 2023-10-26 PROCEDURE — 3078F DIAST BP <80 MM HG: CPT | Mod: HCNC,CPTII,S$GLB, | Performed by: HOSPITALIST

## 2023-10-26 RX ORDER — LEVOTHYROXINE SODIUM 88 UG/1
88 TABLET ORAL DAILY
Qty: 90 TABLET | Refills: 3 | Status: SHIPPED | OUTPATIENT
Start: 2023-10-26

## 2023-10-26 RX ORDER — CITALOPRAM 20 MG/1
20 TABLET, FILM COATED ORAL DAILY
Qty: 90 TABLET | Refills: 2 | Status: SHIPPED | OUTPATIENT
Start: 2023-10-26

## 2023-10-26 RX ORDER — CANDESARTAN 8 MG/1
8 TABLET ORAL DAILY
Qty: 90 TABLET | Refills: 3 | Status: SHIPPED | OUTPATIENT
Start: 2023-10-26 | End: 2024-10-25

## 2023-10-26 RX ORDER — ROSUVASTATIN CALCIUM 40 MG/1
40 TABLET, COATED ORAL NIGHTLY
Qty: 90 TABLET | Refills: 3 | Status: SHIPPED | OUTPATIENT
Start: 2023-10-26

## 2023-10-26 RX ORDER — HYDROCHLOROTHIAZIDE 25 MG/1
25 TABLET ORAL DAILY
Qty: 90 TABLET | Refills: 3 | Status: SHIPPED | OUTPATIENT
Start: 2023-10-26 | End: 2023-11-15 | Stop reason: ALTCHOICE

## 2023-10-26 RX ORDER — HYDROXYZINE HYDROCHLORIDE 25 MG/1
25 TABLET, FILM COATED ORAL 3 TIMES DAILY PRN
Qty: 60 TABLET | Refills: 0 | Status: SHIPPED | OUTPATIENT
Start: 2023-10-26

## 2023-10-26 NOTE — PROGRESS NOTES
Primary Care Provider Appointment - [facility]  Bryce Millard MD      Subjective:      Patient ID: Rehana Whitten is a 68 y.o. 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    Pt reports BP meds increased a few wks ago by [facility]; now up to 8mg Candesartan, which she was taking prior to starting chemotherapy.  She's finished chemo and is cancer-free according to most recent scan.  Recent dog bite by elianjacques on L calf while taking a walk in neighborhood.  Took abx and healing well.        4Ms for Medical Decision-Making in Older Adults    Last Completed EAWV: 2023    MOBILITY:  Get Up and Go:      2023    10:41 AM   Get Up and Go   Trial 1 9 seconds     Activities of Daily Livin/6/2023    10:42 AM   Activities of Daily Living   Ambulation Independent   Dressing Independent   Transfers Independent   Toileting Continent of bladder;Continent of bowel   Feeding Independent   Cleaning home/Chores Independent   Telephone use Independent   Shopping Independent   Paying bills Independent   Taking meds Independent     Whisper Test:      2023    10:41 AM   Whisper Test   Whisper Test Normal     Disability Status:      2023    10:43 AM   Disability Status   Are you deaf or do you have serious difficulty hearing? N   Are you blind or do you have serious difficulty seeing, even when wearing glasses? N   Because of a physical, mental, or emotional condition, do you have serious difficulty concentrating, remembering,  or making decisions? N   Do you have serious difficulty walking or climbing stairs? N   Do you have difficulty dressing or bathing? N   Because of a physical, mental, or emotional condition, do you have difficulty doing errands alone such as visiting a doctor's office or shopping? N     Nutrition Screenin/6/2023    10:42 AM   Nutrition Screening   Has food intake declined over the past three months due to loss of appetite, digestive problems, chewing or swallowing difficulties? No decrease in food intake   Involuntary weight loss during the last 3 months? No weight loss   Mobility? Goes out   Has the patient suffered psychological stress or acute disease in the past three months? Yes   Neuropsychological problems? No psychological problems   Body Mass Index (BMI)?  BMI 23 or greater   Screening Score 12   Interpretation Normal nutritional status    Screening Score: 0-7 Malnourished, 8-11 At Risk, 12-14 Normal    MENTATION:   Depression Patient Health Questionnaire:      2023    10:39 AM   Depression Patient Health Questionnaire   Over the last two weeks how often have you been bothered by little interest or pleasure in doing things Not at all   Over the last two weeks how often have you been bothered by feeling down, depressed or hopeless Not at all   PHQ-2 Total Score 0     Has Dementia Dx: No    Cognitive Function Screenin/6/2023    10:41 AM   Cognitive Function Screening   Clock Drawing Test 2   Mini-Cog 3 Minute Recall 3   Cognitive Function Screening 5     Cognitive Function Screening Total - Less than 4 = Abnormal,  Greater than or equal to 4 = Normal    MEDICATIONS:  High Risk Medications:  Total Active Medications: 1  citalopram - 20 MG    WHAT MATTERS MOST:  Advance Care Planning   ACP Status:   Patient has had an ACP conversation  Living Will: No  Power of : No  LaPOST: No    Social History     Socioeconomic History    Marital status:    Occupational History     Occupation: Care Coordinator     Comment: [employer]   Tobacco Use    Smoking status: Former     Current packs/day: 0.00     Average packs/day: 0.5 packs/day for 10.0 years (5.0 ttl pk-yrs)     Types: Cigarettes     Start date: 3/17/1979     Quit date: 3/17/1989     Years since quittin.6    Smokeless tobacco: Never   Substance and Sexual Activity    Alcohol use: Not Currently     Alcohol/week: 2.0 standard drinks of alcohol     Types: 2 Glasses of wine per week     Comment: maybe 2 wine weeily    Drug use: No    Sexual activity: Yes     Partners: Male     Birth control/protection: Post-menopausal   Social History Narrative    Lives in [place] with  Robb. Has adult son and stepson. Retired LPN.      Social Determinants of Health     Financial Resource Strain: Low Risk  (2023)    Overall Financial Resource Strain (CARDIA)     Difficulty of Paying Living Expenses: Not hard at all   Food Insecurity: No Food Insecurity (2023)    Hunger Vital Sign     Worried About Running Out of Food in the Last Year: Never true     Ran Out of Food in the Last Year: Never true   Transportation Needs: No Transportation Needs (2023)    PRAPARE - Transportation     Lack of Transportation (Medical): No     Lack of Transportation (Non-Medical): No   Physical Activity: Insufficiently Active (2023)    Exercise Vital Sign     Days of Exercise per Week: 3 days     Minutes of Exercise per Session: 30 min   Stress: Stress Concern Present (2023)    [employer] [employer] - Occupational Stress Questionnaire     Feeling of Stress : To some extent   Social Connections: Moderately Isolated (2023)    Social Connection and Isolation Panel [NHANES]     Frequency of Communication with Friends and Family: More than three times a week     Frequency of Social Gatherings with Friends and Family: Once a week     Attends Tenriism Services: Never     Active Member of Clubs or Organizations: No      "Attends Club or Organization Meetings: Never     Marital Status:    Housing Stability: Unknown (9/6/2023)    Housing Stability Vital Sign     Unable to Pay for Housing in the Last Year: No     Unstable Housing in the Last Year: No       Review of Systems   Constitutional:  Negative for activity change, appetite change, chills and fever.   HENT:  Negative for nasal congestion and sore throat.    Eyes:  Negative for photophobia and visual disturbance.   Respiratory:  Negative for cough and shortness of breath.    Cardiovascular:  Negative for chest pain and leg swelling.   Gastrointestinal:  Positive for nausea (rare). Negative for abdominal pain, constipation, diarrhea and vomiting.   Genitourinary:  Negative for dysuria and hematuria.   Musculoskeletal:  Negative for arthralgias and myalgias.   Integumentary:  Positive for wound. Negative for rash.   Neurological:  Negative for dizziness and weakness.        Objective:   /68 (BP Location: Right arm, Patient Position: Sitting, BP Method: Large (Manual))   Pulse 82   Temp 98.4 °F (36.9 °C) (Oral)   Ht 5' 1" (1.549 m)   Wt 67.5 kg (148 lb 13 oz)   LMP  (LMP Unknown)   SpO2 96%   BMI 28.12 kg/m²     Physical Exam  Vitals reviewed.   Constitutional:       General: She is not in acute distress.     Appearance: Normal appearance.   HENT:      Head: Normocephalic and atraumatic.      Right Ear: Ear canal and external ear normal.      Left Ear: Ear canal and external ear normal.      Nose: Nose normal.      Mouth/Throat:      Mouth: Mucous membranes are moist.      Pharynx: Oropharynx is clear.   Eyes:      General: No scleral icterus.     Conjunctiva/sclera: Conjunctivae normal.   Cardiovascular:      Rate and Rhythm: Normal rate and regular rhythm.      Heart sounds: Normal heart sounds.   Pulmonary:      Effort: Pulmonary effort is normal. No respiratory distress.   Abdominal:      General: There is no distension.      Palpations: Abdomen is soft.      " Tenderness: There is no abdominal tenderness. There is no guarding.   Musculoskeletal:         General: Normal range of motion.      Right lower leg: No edema.      Left lower leg: No edema.   Skin:     General: Skin is warm and dry.      Findings: No rash.      Comments: Small circular scab on L lateral calf c/w well-healing small dog bite.   Neurological:      General: No focal deficit present.      Mental Status: She is alert and oriented to person, place, and time.              Lab Results   Component Value Date    WBC 4.07 10/17/2023    HGB 13.6 10/17/2023    HCT 42.4 10/17/2023     10/17/2023    CHOL 162 08/10/2022    TRIG 134 08/10/2022    HDL 59 08/10/2022    ALT 24 10/17/2023    AST 24 10/17/2023     10/17/2023    K 4.2 10/17/2023     10/17/2023    CREATININE 0.7 10/17/2023    BUN 13 10/17/2023    CO2 27 10/17/2023    TSH 2.065 09/06/2023    INR 1.0 12/15/2022    HGBA1C 5.6 06/26/2019       Current Outpatient Medications on File Prior to Visit   Medication Sig Dispense Refill    ondansetron (ZOFRAN) 8 MG tablet Take 1 tablet (8 mg total) by mouth every 8 (eight) hours as needed for Nausea. 90 tablet 11    valACYclovir (VALTREX) 500 MG tablet Take 1 tablet (500 mg total) by mouth once daily. (Patient taking differently: Take 500 mg by mouth daily as needed.) 90 tablet 4    vitamin D (VITAMIN D3) 1000 units Tab Take 1,000 Units by mouth once daily.      [DISCONTINUED] candesartan (ATACAND) 4 MG tablet Take 2 tablets (8 mg total) by mouth once daily. 180 tablet 3    [DISCONTINUED] citalopram (CELEXA) 20 MG tablet TAKE 1 TABLET BY MOUTH EVERY DAY 90 tablet 2    [DISCONTINUED] hydroCHLOROthiazide (HYDRODIURIL) 25 MG tablet Take 1 tablet (25 mg total) by mouth once daily. 90 tablet 3    [DISCONTINUED] hydrOXYzine HCL (ATARAX) 25 MG tablet Take 1 tablet (25 mg total) by mouth 3 (three) times daily as needed for Itching. 60 tablet 0    [DISCONTINUED] levothyroxine (SYNTHROID) 88 MCG tablet Take 1  tablet (88 mcg total) by mouth once daily. 90 tablet 3    [DISCONTINUED] loperamide (IMODIUM) 2 mg capsule Take two caps at the onset of loose stools.  Then take one cap after every subsequent loose stool. 60 capsule 5    [DISCONTINUED] rosuvastatin (CRESTOR) 40 MG Tab Take 1 tablet (40 mg total) by mouth every evening. 90 tablet 3     No current facility-administered medications on file prior to visit.         Assessment:   68 y.o. female with multiple co-morbid illnesses here to follow-up with PCP and continue work-up of chronic issues    Plan:     Problem List Items Addressed This Visit       Essential hypertension     BPs the last 3 days have been at goal, but despite downward trend since medication change 2-3 w/a was above goal prior to that.  Will have pt let us know how her BPs are doing in another 1-2 wk and if overall not at goal will plan to change HCTZ to chlorthalidone.         Hypothyroidism    Relevant Medications    levothyroxine (SYNTHROID) 88 MCG tablet    Depression    Relevant Medications    citalopram (CELEXA) 20 MG tablet    Cholangiocarcinoma    Relevant Medications    hydrOXYzine HCL (ATARAX) 25 MG tablet    Drug-induced immunodeficiency - Primary    Relevant Orders    Hemoglobin A1C     Other Visit Diagnoses       Benign essential hypertension        Relevant Medications    hydroCHLOROthiazide (HYDRODIURIL) 25 MG tablet    candesartan (ATACAND) 8 MG tablet    Rash        Relevant Medications    hydrOXYzine HCL (ATARAX) 25 MG tablet    DIXIE (generalized anxiety disorder)        Relevant Medications    citalopram (CELEXA) 20 MG tablet    Hyperlipidemia, unspecified hyperlipidemia type        Relevant Medications    rosuvastatin (CRESTOR) 40 MG Tab            Health Maintenance         Date Due Completion Date    Shingles Vaccine (1 of 2) 01/24/2017 11/29/2016    Hemoglobin A1c (Diabetic Prevention Screening) 06/26/2022 6/26/2019    COVID-19 Vaccine (6 - 2023-24 season) 09/01/2023 8/10/2022     Mammogram 06/28/2024 6/28/2023    High Dose Statin 10/26/2024 10/26/2023    DEXA Scan 09/21/2025 9/21/2023    Colorectal Cancer Screening 11/15/2025 11/15/2022    Override on 1/1/2010: Done    TETANUS VACCINE 03/31/2027 3/31/2017    Override on 3/7/2007: Done    Lipid Panel 08/10/2027 8/10/2022        A1c ordered today, and pt planning to get COVID booster today along with RSV shot, and later on shingles vaccine.    Future Appointments   Date Time Provider Department Center   10/26/2023 11:50 AM LAB[facility] [place]         Follow up in about 6 months (around 4/26/2024). Total clinical care time was 30 min.    Bryce Millard MD  [facility]

## 2023-10-26 NOTE — PATIENT INSTRUCTIONS
Try to get the shingles shots when you can.  Let us know in a week or two how your blood pressures are doing and we can see if we need to make any other changes to your medications.

## 2023-10-26 NOTE — ASSESSMENT & PLAN NOTE
BPs the last 3 days have been at goal, but despite downward trend since medication change 2-3 w/a was above goal prior to that.  Will have pt let us know how her BPs are doing in another 1-2 wk and if overall not at goal will plan to change HCTZ to chlorthalidone.

## 2023-10-27 ENCOUNTER — PATIENT MESSAGE (OUTPATIENT)
Dept: PRIMARY CARE CLINIC | Facility: CLINIC | Age: 68
End: 2023-10-27
Payer: MEDICARE

## 2023-11-14 ENCOUNTER — PATIENT MESSAGE (OUTPATIENT)
Dept: PRIMARY CARE CLINIC | Facility: CLINIC | Age: 68
End: 2023-11-14
Payer: MEDICARE

## 2023-11-15 ENCOUNTER — OFFICE VISIT (OUTPATIENT)
Dept: PRIMARY CARE CLINIC | Facility: CLINIC | Age: 68
End: 2023-11-15
Payer: MEDICARE

## 2023-11-15 ENCOUNTER — PATIENT MESSAGE (OUTPATIENT)
Dept: PRIMARY CARE CLINIC | Facility: CLINIC | Age: 68
End: 2023-11-15
Payer: MEDICARE

## 2023-11-15 ENCOUNTER — TELEPHONE (OUTPATIENT)
Dept: PRIMARY CARE CLINIC | Facility: CLINIC | Age: 68
End: 2023-11-15

## 2023-11-15 DIAGNOSIS — I10 ESSENTIAL HYPERTENSION: Primary | ICD-10-CM

## 2023-11-15 PROCEDURE — 99499 UNLISTED E&M SERVICE: CPT | Mod: HCNC,95,, | Performed by: HOSPITALIST

## 2023-11-15 PROCEDURE — 99499 NO LOS: ICD-10-PCS | Mod: HCNC,95,, | Performed by: HOSPITALIST

## 2023-11-15 RX ORDER — CHLORTHALIDONE 25 MG/1
25 TABLET ORAL DAILY
Qty: 90 TABLET | Refills: 3 | Status: SHIPPED | OUTPATIENT
Start: 2023-11-15 | End: 2024-11-14

## 2023-11-15 NOTE — PROGRESS NOTES
Established Patient - Audio Only Telehealth Visit     The patient location is: home    The chief complaint leading to consultation is: f/u HTN  Visit type: Virtual visit with audio only (telephone)  Total time spent with patient: 10 min       The reason for the audio only service rather than synchronous audio and video virtual visit was related to technical difficulties or patient preference/necessity.     Each patient to whom I provide medical services by telemedicine is:  (1) informed of the relationship between the physician and patient and the respective role of any other health care provider with respect to management of the patient; and (2) notified that they may decline to receive medical services by telemedicine and may withdraw from such care at any time. Patient verbally consented to receive this service via voice-only telephone call.       HPI: Pt reports feeling fine, but is otherwise worried about her BPs routinely having systolics in the 140s.  Digital [facility] log reviewed, showing a slight upward trend.  As discussed at last visit will change HCTZ to chlorthalidone 25mg anticipating better efficacy.  Will check BMP, magnesium in 2 wks to make sure supplementation not needed.  Pt agreeable to this plan.                             This service was not originating from a related E/M service provided within the previous 7 days nor will  to an E/M service or procedure within the next 24 hours or my soonest available appointment.  Prevailing standard of care was able to be met in this audio-only visit.

## 2023-11-15 NOTE — TELEPHONE ENCOUNTER
----- Message from Bryce Millard MD sent at 11/15/2023  2:32 PM CST -----  Phone f/u in 4 wks, labs in 2 wks

## 2023-11-27 ENCOUNTER — PATIENT MESSAGE (OUTPATIENT)
Dept: HEMATOLOGY/ONCOLOGY | Facility: CLINIC | Age: 68
End: 2023-11-27
Payer: MEDICARE

## 2023-12-01 ENCOUNTER — LAB VISIT (OUTPATIENT)
Dept: LAB | Facility: HOSPITAL | Age: 68
End: 2023-12-01
Attending: HOSPITALIST
Payer: MEDICARE

## 2023-12-01 DIAGNOSIS — I10 ESSENTIAL HYPERTENSION: ICD-10-CM

## 2023-12-01 LAB
ANION GAP SERPL CALC-SCNC: 10 MMOL/L (ref 8–16)
BUN SERPL-MCNC: 13 MG/DL (ref 8–23)
CALCIUM SERPL-MCNC: 10 MG/DL (ref 8.7–10.5)
CHLORIDE SERPL-SCNC: 100 MMOL/L (ref 95–110)
CO2 SERPL-SCNC: 27 MMOL/L (ref 23–29)
CREAT SERPL-MCNC: 0.8 MG/DL (ref 0.5–1.4)
EST. GFR  (NO RACE VARIABLE): >60 ML/MIN/1.73 M^2
GLUCOSE SERPL-MCNC: 105 MG/DL (ref 70–110)
MAGNESIUM SERPL-MCNC: 1.8 MG/DL (ref 1.6–2.6)
POTASSIUM SERPL-SCNC: 3.4 MMOL/L (ref 3.5–5.1)
SODIUM SERPL-SCNC: 137 MMOL/L (ref 136–145)

## 2023-12-01 PROCEDURE — 83735 ASSAY OF MAGNESIUM: CPT | Mod: HCNC | Performed by: HOSPITALIST

## 2023-12-01 PROCEDURE — 80048 BASIC METABOLIC PNL TOTAL CA: CPT | Mod: HCNC | Performed by: HOSPITALIST

## 2023-12-01 PROCEDURE — 36415 COLL VENOUS BLD VENIPUNCTURE: CPT | Performed by: HOSPITALIST

## 2023-12-04 DIAGNOSIS — E87.6 DIURETIC-INDUCED HYPOKALEMIA: Primary | ICD-10-CM

## 2023-12-04 DIAGNOSIS — T50.2X5A DIURETIC-INDUCED HYPOKALEMIA: Primary | ICD-10-CM

## 2023-12-04 RX ORDER — POTASSIUM CHLORIDE 750 MG/1
10 TABLET, EXTENDED RELEASE ORAL DAILY
Qty: 90 TABLET | Refills: 3 | Status: SHIPPED | OUTPATIENT
Start: 2023-12-04 | End: 2024-12-03

## 2023-12-14 ENCOUNTER — OFFICE VISIT (OUTPATIENT)
Dept: PRIMARY CARE CLINIC | Facility: CLINIC | Age: 68
End: 2023-12-14
Payer: MEDICARE

## 2023-12-14 DIAGNOSIS — I10 ESSENTIAL HYPERTENSION: Primary | ICD-10-CM

## 2023-12-14 PROCEDURE — 99499 UNLISTED E&M SERVICE: CPT | Mod: HCNC,95,, | Performed by: HOSPITALIST

## 2023-12-14 PROCEDURE — 99499 NO LOS: ICD-10-PCS | Mod: HCNC,95,, | Performed by: HOSPITALIST

## 2023-12-14 NOTE — PROGRESS NOTES
Established Patient - Audio Only Telehealth Visit     The patient location is: home  The chief complaint leading to consultation is: f/u BP  Visit type: Virtual visit with audio only (telephone)  Total time spent with patient: 5 min       The reason for the audio only service rather than synchronous audio and video virtual visit was related to technical difficulties or patient preference/necessity.     Each patient to whom I provide medical services by telemedicine is:  (1) informed of the relationship between the physician and patient and the respective role of any other health care provider with respect to management of the patient; and (2) notified that they may decline to receive medical services by telemedicine and may withdraw from such care at any time. Patient verbally consented to receive this service via voice-only telephone call.       HPI: Pt reports doing well so far.  She has started exercising again and tracking her steps per day.  No problems with the medications.  Wasn't able to submit any BPs last week due to being on vacation at her sister's house.  BP today was 130/68.  It was a little higher yesterday (143/65), but had a busy day with her son visiting.  That BP was also taken in the evening because she didn't have an opportunity to do so at her usual time.  Review of Digital Medicine log shows downward trend over the past month, moving into goal range.  Will call again in 2 wks to review BP again.                             This service was not originating from a related E/M service provided within the previous 7 days nor will  to an E/M service or procedure within the next 24 hours or my soonest available appointment.  Prevailing standard of care was able to be met in this audio-only visit.

## 2023-12-27 ENCOUNTER — PATIENT MESSAGE (OUTPATIENT)
Dept: HEMATOLOGY/ONCOLOGY | Facility: CLINIC | Age: 68
End: 2023-12-27
Payer: MEDICARE

## 2024-01-03 ENCOUNTER — PATIENT MESSAGE (OUTPATIENT)
Dept: PRIMARY CARE CLINIC | Facility: CLINIC | Age: 69
End: 2024-01-03
Payer: MEDICARE

## 2024-01-05 ENCOUNTER — CLINICAL SUPPORT (OUTPATIENT)
Dept: PRIMARY CARE CLINIC | Facility: CLINIC | Age: 69
End: 2024-01-05
Payer: MEDICARE

## 2024-01-05 ENCOUNTER — TELEPHONE (OUTPATIENT)
Dept: PRIMARY CARE CLINIC | Facility: CLINIC | Age: 69
End: 2024-01-05
Payer: MEDICARE

## 2024-01-05 VITALS — DIASTOLIC BLOOD PRESSURE: 68 MMHG | SYSTOLIC BLOOD PRESSURE: 138 MMHG

## 2024-01-05 DIAGNOSIS — I10 ESSENTIAL HYPERTENSION: Primary | ICD-10-CM

## 2024-01-05 PROCEDURE — 99999 PR PBB SHADOW E&M-EST. PATIENT-LVL III: CPT | Mod: PBBFAC,,,

## 2024-01-05 NOTE — PROGRESS NOTES
Rehana Whitten 68 y.o. female is here today for Blood Pressure check.   History of HTN yes.    Review of patient's allergies indicates:  No Known Allergies  Creatinine   Date Value Ref Range Status   12/01/2023 0.8 0.5 - 1.4 mg/dL Final     Sodium   Date Value Ref Range Status   12/01/2023 137 136 - 145 mmol/L Final     Potassium   Date Value Ref Range Status   12/01/2023 3.4 (L) 3.5 - 5.1 mmol/L Final   ]  Patient verifies taking blood pressure medications on a regular basis at the same time of the day.     Current Outpatient Medications:     candesartan (ATACAND) 8 MG tablet, Take 1 tablet (8 mg total) by mouth once daily., Disp: 90 tablet, Rfl: 3    chlorthalidone (HYGROTEN) 25 MG Tab, Take 1 tablet (25 mg total) by mouth once daily., Disp: 90 tablet, Rfl: 3    citalopram (CELEXA) 20 MG tablet, Take 1 tablet (20 mg total) by mouth once daily., Disp: 90 tablet, Rfl: 2    hydrOXYzine HCL (ATARAX) 25 MG tablet, Take 1 tablet (25 mg total) by mouth 3 (three) times daily as needed for Itching., Disp: 60 tablet, Rfl: 0    levothyroxine (SYNTHROID) 88 MCG tablet, Take 1 tablet (88 mcg total) by mouth once daily., Disp: 90 tablet, Rfl: 3    ondansetron (ZOFRAN) 8 MG tablet, Take 1 tablet (8 mg total) by mouth every 8 (eight) hours as needed for Nausea., Disp: 90 tablet, Rfl: 11    potassium chloride SA (K-DUR,KLOR-CON M) 10 MEQ tablet, Take 1 tablet (10 mEq total) by mouth once daily., Disp: 90 tablet, Rfl: 3    rosuvastatin (CRESTOR) 40 MG Tab, Take 1 tablet (40 mg total) by mouth every evening., Disp: 90 tablet, Rfl: 3    valACYclovir (VALTREX) 500 MG tablet, Take 1 tablet (500 mg total) by mouth once daily. (Patient taking differently: Take 500 mg by mouth daily as needed.), Disp: 90 tablet, Rfl: 4    vitamin D (VITAMIN D3) 1000 units Tab, Take 1,000 Units by mouth once daily., Disp: , Rfl:   Does patient have record of home blood pressure readings yes. Readings have been averaging higher than normal  Last  dose of blood pressure medication was taken this morning.  Patient is asymptomatic.     BP with automatic machine provided to her from DM, 178/94.    Blood pressure reading after 15 minutes was 138/68.  MD notified. Pt scheduled for follow up visit with PCP later this month.     PANCHITO

## 2024-01-05 NOTE — TELEPHONE ENCOUNTER
Patient verifies taking blood pressure medications on a regular basis at the same time of the day.      Current Outpatient Medications:     candesartan (ATACAND) 8 MG tablet, Take 1 tablet (8 mg total) by mouth once daily., Disp: 90 tablet, Rfl: 3    chlorthalidone (HYGROTEN) 25 MG Tab, Take 1 tablet (25 mg total) by mouth once daily., Disp: 90 tablet, Rfl: 3    citalopram (CELEXA) 20 MG tablet, Take 1 tablet (20 mg total) by mouth once daily., Disp: 90 tablet, Rfl: 2    hydrOXYzine HCL (ATARAX) 25 MG tablet, Take 1 tablet (25 mg total) by mouth 3 (three) times daily as needed for Itching., Disp: 60 tablet, Rfl: 0    levothyroxine (SYNTHROID) 88 MCG tablet, Take 1 tablet (88 mcg total) by mouth once daily., Disp: 90 tablet, Rfl: 3    ondansetron (ZOFRAN) 8 MG tablet, Take 1 tablet (8 mg total) by mouth every 8 (eight) hours as needed for Nausea., Disp: 90 tablet, Rfl: 11    potassium chloride SA (K-DUR,KLOR-CON M) 10 MEQ tablet, Take 1 tablet (10 mEq total) by mouth once daily., Disp: 90 tablet, Rfl: 3    rosuvastatin (CRESTOR) 40 MG Tab, Take 1 tablet (40 mg total) by mouth every evening., Disp: 90 tablet, Rfl: 3    valACYclovir (VALTREX) 500 MG tablet, Take 1 tablet (500 mg total) by mouth once daily. (Patient taking differently: Take 500 mg by mouth daily as needed.), Disp: 90 tablet, Rfl: 4    vitamin D (VITAMIN D3) 1000 units Tab, Take 1,000 Units by mouth once daily., Disp: , Rfl:   Does patient have record of home blood pressure readings yes. Readings have been averaging higher than normal  Last dose of blood pressure medication was taken this morning.  Patient is asymptomatic.      BP with automatic machine provided to her from DM, 178/94.     Blood pressure reading after 15 minutes was 138/68.  MD notified. Pt scheduled for follow up visit with PCP later this month.      PANCHITO

## 2024-01-22 ENCOUNTER — HOSPITAL ENCOUNTER (OUTPATIENT)
Dept: RADIOLOGY | Facility: HOSPITAL | Age: 69
Discharge: HOME | End: 2024-01-22
Attending: HOSPITALIST
Payer: MEDICARE

## 2024-01-22 DIAGNOSIS — C22.1 CHOLANGIOCARCINOMA: ICD-10-CM

## 2024-01-22 PROCEDURE — 74177 CT ABD & PELVIS W/CONTRAST: CPT | Mod: TC

## 2024-01-22 PROCEDURE — 71260 CT THORAX DX C+: CPT | Mod: 26,,, | Performed by: STUDENT IN AN ORGANIZED HEALTH CARE EDUCATION/TRAINING PROGRAM

## 2024-01-22 PROCEDURE — 25500020 PHARM REV CODE 255: Performed by: HOSPITALIST

## 2024-01-22 PROCEDURE — A9698 NON-RAD CONTRAST MATERIALNOC: HCPCS | Performed by: HOSPITALIST

## 2024-01-22 PROCEDURE — 74177 CT ABD & PELVIS W/CONTRAST: CPT | Mod: 26,,, | Performed by: STUDENT IN AN ORGANIZED HEALTH CARE EDUCATION/TRAINING PROGRAM

## 2024-01-22 RX ADMIN — IOHEXOL 75 ML: 350 INJECTION, SOLUTION INTRAVENOUS at 01:01

## 2024-01-22 RX ADMIN — Medication 450 ML: at 12:01

## 2024-01-23 ENCOUNTER — OFFICE VISIT (OUTPATIENT)
Dept: HEMATOLOGY/ONCOLOGY | Facility: CLINIC | Age: 69
End: 2024-01-23
Payer: MEDICARE

## 2024-01-23 VITALS
SYSTOLIC BLOOD PRESSURE: 180 MMHG | HEIGHT: 61 IN | RESPIRATION RATE: 18 BRPM | OXYGEN SATURATION: 98 % | WEIGHT: 148.25 LBS | BODY MASS INDEX: 27.99 KG/M2 | DIASTOLIC BLOOD PRESSURE: 74 MMHG | HEART RATE: 85 BPM

## 2024-01-23 DIAGNOSIS — F32.5 MAJOR DEPRESSIVE DISORDER IN REMISSION, UNSPECIFIED WHETHER RECURRENT: ICD-10-CM

## 2024-01-23 DIAGNOSIS — Z85.09 HISTORY OF CHOLANGIOCARCINOMA: Primary | ICD-10-CM

## 2024-01-23 DIAGNOSIS — I10 ESSENTIAL HYPERTENSION: ICD-10-CM

## 2024-01-23 PROBLEM — D84.821 DRUG-INDUCED IMMUNODEFICIENCY: Status: RESOLVED | Noted: 2023-04-13 | Resolved: 2024-01-23

## 2024-01-23 PROBLEM — Z79.899 DRUG-INDUCED IMMUNODEFICIENCY: Status: RESOLVED | Noted: 2023-04-13 | Resolved: 2024-01-23

## 2024-01-23 PROCEDURE — 3008F BODY MASS INDEX DOCD: CPT | Mod: CPTII,S$GLB,, | Performed by: HOSPITALIST

## 2024-01-23 PROCEDURE — 3077F SYST BP >= 140 MM HG: CPT | Mod: CPTII,S$GLB,, | Performed by: HOSPITALIST

## 2024-01-23 PROCEDURE — 99215 OFFICE O/P EST HI 40 MIN: CPT | Mod: S$GLB,,, | Performed by: HOSPITALIST

## 2024-01-23 PROCEDURE — 4010F ACE/ARB THERAPY RXD/TAKEN: CPT | Mod: CPTII,S$GLB,, | Performed by: HOSPITALIST

## 2024-01-23 PROCEDURE — 1126F AMNT PAIN NOTED NONE PRSNT: CPT | Mod: CPTII,S$GLB,, | Performed by: HOSPITALIST

## 2024-01-23 PROCEDURE — 3288F FALL RISK ASSESSMENT DOCD: CPT | Mod: CPTII,S$GLB,, | Performed by: HOSPITALIST

## 2024-01-23 PROCEDURE — 3078F DIAST BP <80 MM HG: CPT | Mod: CPTII,S$GLB,, | Performed by: HOSPITALIST

## 2024-01-23 PROCEDURE — 1101F PT FALLS ASSESS-DOCD LE1/YR: CPT | Mod: CPTII,S$GLB,, | Performed by: HOSPITALIST

## 2024-01-23 PROCEDURE — 99999 PR PBB SHADOW E&M-EST. PATIENT-LVL III: CPT | Mod: PBBFAC,,, | Performed by: HOSPITALIST

## 2024-01-23 NOTE — PROGRESS NOTES
MEDICAL ONCOLOGY FOLLOW-UP VISIT.     Best Contact Phone Number(s): [phone] (home)      Cancer/Stage/TNM:    Cancer Staging   History of cholangiocarcinoma  Staging form: Intrahepatic Bile Duct, AJCC 8th Edition  - Pathologic: Stage II (pT2, pN0, cM0) - Unsigned       Reason for visit:     History of Present Illness: Rehana Whitten is a 68 y.o. female remote history of HBV who was found to have stage II pT2N0 intrahepatic cholangiocarcinoma in the setting of abdominal pain who underwent R0 resection 12/12/23. She completed 8 cycles of adjuvant capecitabine 07/2023. She presents to medical oncology clinic for routine surveillance.      Interval History:       Ongoing home blood pressure monitoring - consistently has elevated pressures with machine readings. Has been active with digital medicine team - on candesartan 8 and chlorthalidone 25. May be discrepancy between machine and manual readings. No CP or SOB. No headaches.. Very active - walks up to 3 miles several times a week. Has been following with primary care.     Overall feels quite well. Appetite is good. No N/V. No abdominal pain. Has ongiong bowel frequency with 2-3 loose stools per day. This has been stable since her surgery. No fevers or chill. Does note cloudy vision in her right eye. Prior history cataract surgery to both eyes. Awaiting ophthalmology appt.       Oncology History   History of cholangiocarcinoma   11/9/2022 Imaging Significant Findings    Abdominal US in the setting of abdominal pain shows a large heterogeneous mass in the right lobe, measures 7.2 x 5.3 x 5.9 cm.     11/9/2022 Imaging Significant Findings    CT a/p:  Irregular right hepatic lobe lesion demonstrating delayed central enhancement and overlying capsular retraction.  Capsular retraction can be seen in cholangiocarcinoma and sclerosing hepatic hemangiomas.  Differential includes metastatic disease and other primary hepatic neoplasms.     11/14/2022 Tumor Markers     CA 19-9: 19.1  AFP: 5.5     11/16/2022 Imaging Significant Findings    CT chest:  1. No specific CT evidence of metastatic disease within the chest.  2. Left lower lobe punctate micro nodules.  Attention on continued surveillance imaging.  3. Other findings as above.     11/30/2022 Biopsy    Percutaneous biopsy of liver mass: Moderately differentiated adenocarcinoma. CK7+ Negative for CK20, CDX2, GATA3, TTF1, and PAX 8. Thought consistent with pancraetobiliary primary.     12/12/2022 Surgery    Resection of right hepatic lobe tumor. Pathology shows moderately differentiated adenocarcinoma up to 6.5cm in size c/w intrahepatic cholangiocarcinoma. Tumor was confined to the hepatic parenchyma with associated  LVI but no PNI. R0 resection with 6mm margins. 06/ LN involved. [id number]     1/13/2023 - 1/13/2023 Chemotherapy    Treatment Summary   Plan Name: OP CAPECITABINE 1250MG/M2 BID Q3W  Treatment Goal: Control  Status: Inactive  Start Date: 1/13/2023  End Date: 1/13/2023  Provider: Bennie Moore MD  Chemotherapy: capecitabine (XELODA) tablet 1,650 mg, 2,000 mg, Oral, 2 times daily, 1 of 1 cycle, Start date: 2/24/2023, End date: --     2/24/2023 -  Chemotherapy    C2D1 capectiabine; DR to 1500mg po bid       3/17/2023 -  Chemotherapy    C3D1 capecitabine 1650 po bid       4/7/2023 -  Chemotherapy    C4D1 capecitabine 1650 po bid       4/28/2023 -  Chemotherapy    C5 capecitabine 1650 po bid       5/15/2023 Imaging Significant Findings    5/15/23: CT torso:  - Postoperative changes from right hepatectomy, cholecystectomy, and portal lymphadenectomy for intrahepatic cholangiocarcinoma.  - No lymphadenopathy.  - New subcentimeter hypodensity in hepatic segment 4B, concerning for metastases.  - Unchanged pulmonary nodules.  - Other findings as described.     7/18/2023 Imaging Significant Findings    CT torso:  1. Postop change of right hepatectomy, cholecystectomy, and portal lymphadenectomy for intrahepatic  "cholangiocarcinoma.  2. Previously noted subcentimeter hypodensity in the hepatic IVB segment is not well visualized on today's examination.  No new focal hepatic lesions.  3. Stable bilateral pulmonary nodules.  4. Additional findings, as above.        10/17/2023 Imaging Significant Findings    MRI Abdomen  Impression:   Postsurgical changes of cholecystectomy and right hepatectomy in this patient with history of cholangiocarcinoma.  No abnormal enhancing lesions to suggest residual or recurrent disease.    CT Chest  Impression:  1. Grossly stable pulmonary exam with multiple bilateral pulmonary nodules, the largest measuring 4 mm.            Physical Exam:   BP (!) 180/74   Pulse 85   Resp 18   Ht 5' 1" (1.549 m)   Wt 67.3 kg (148 lb 4.2 oz)   LMP  (LMP Unknown)   SpO2 98%   BMI 28.01 kg/m²      ECOG Performance Status: (foot note - ECOG PS provided by Eastern Cooperative Oncology Group) 0 - Asymptomatic    Physical Exam  Constitutional:       General: She is not in acute distress.     Appearance: She is normal weight.   HENT:      Head: Normocephalic.      Mouth/Throat:      Mouth: Mucous membranes are moist.      Pharynx: Oropharynx is clear.   Eyes:      General: No scleral icterus.     Conjunctiva/sclera: Conjunctivae normal.   Cardiovascular:      Rate and Rhythm: Normal rate and regular rhythm.      Heart sounds: No murmur heard.     No gallop.   Pulmonary:      Effort: Pulmonary effort is normal. No respiratory distress.      Breath sounds: Normal breath sounds. No wheezing.   Abdominal:      General: There is no distension.      Palpations: Abdomen is soft.   Musculoskeletal:         General: Normal range of motion.      Cervical back: Normal range of motion and neck supple.      Right lower leg: No edema.      Left lower leg: No edema.   Skin:     General: Skin is warm.      Coloration: Skin is not jaundiced.      Findings: No bruising or rash.   Neurological:      General: No focal deficit " present.      Mental Status: She is alert and oriented to person, place, and time.      Motor: No weakness.   Psychiatric:         Mood and Affect: Mood normal.         Behavior: Behavior normal.         Thought Content: Thought content normal.           Labs:   Recent Results (from the past 48 hour(s))   Cancer Antigen 19-9    Collection Time: 01/22/24 11:32 AM   Result Value Ref Range    CA 19-9 20.7 0.0 - 40.0 U/mL   CBC Oncology    Collection Time: 01/22/24 11:32 AM   Result Value Ref Range    WBC 5.17 3.90 - 12.70 K/uL    RBC 4.58 4.00 - 5.40 M/uL    Hemoglobin 14.1 12.0 - 16.0 g/dL    Hematocrit 44.1 37.0 - 48.5 %    MCV 96 82 - 98 fL    MCH 30.8 27.0 - 31.0 pg    MCHC 32.0 32.0 - 36.0 g/dL    RDW 13.2 11.5 - 14.5 %    Platelets 228 150 - 450 K/uL    MPV 10.9 9.2 - 12.9 fL    Gran # (ANC) 3.2 1.8 - 7.7 K/uL    Immature Grans (Abs) 0.06 (H) 0.00 - 0.04 K/uL   Comprehensive Metabolic Panel    Collection Time: 01/22/24 11:32 AM   Result Value Ref Range    Sodium 138 136 - 145 mmol/L    Potassium 3.8 3.5 - 5.1 mmol/L    Chloride 104 95 - 110 mmol/L    CO2 26 23 - 29 mmol/L    Glucose 106 70 - 110 mg/dL    BUN 14 8 - 23 mg/dL    Creatinine 0.7 0.5 - 1.4 mg/dL    Calcium 10.0 8.7 - 10.5 mg/dL    Total Protein 7.5 6.0 - 8.4 g/dL    Albumin 4.3 3.5 - 5.2 g/dL    Total Bilirubin 1.4 (H) 0.1 - 1.0 mg/dL    Alkaline Phosphatase 111 55 - 135 U/L    AST 23 10 - 40 U/L    ALT 20 10 - 44 U/L    eGFR >60.0 >60 mL/min/1.73 m^2    Anion Gap 8 8 - 16 mmol/L         Lab Results   Component Value Date     01/22/2024    K 3.8 01/22/2024    CREATININE 0.7 01/22/2024    WBC 5.17 01/22/2024    HGB 14.1 01/22/2024     01/22/2024    AST 23 01/22/2024    ALT 20 01/22/2024    ALKPHOS 111 01/22/2024    BILITOT 1.4 (H) 01/22/2024          Imaging:     CT Chest Abdomen Pelvis With IV Contrast (XPD) Routine Oral Contrast  Narrative: EXAMINATION:  CT CHEST ABDOMEN PELVIS WITH IV CONTRAST (XPD)    CLINICAL  HISTORY:  Gallbladder/biliary cancer, staging; Intrahepatic bile duct carcinoma    TECHNIQUE:  Axial images of the chest, abdomen, and pelvis were acquired  after the use of 75 cc [id number] IV contrast. 450 mL of barium was administered orally.  Coronal and sagittal reconstructions were also obtained    COMPARISON:  Comparison made with multiple prior studies, the most recent CT and MRI from 10/17/2023.    FINDINGS:  Thoracic soft tissues: Unremarkable.    Aorta: Thoracic aorta is normal in caliber and contour with minimal calcific atherosclerosis of the arch.    Heart: Normal in size. No pericardial effusion. There is calcific coronary atherosclerosis predominantly involving the LAD.    Estella/Mediastinum: No significant lymphadenopathy    Lungs: Central airways are patent.  No pleural effusion.  Stable mild biapical fibronodular changes.  Several stable bilateral pulmonary nodules, largest measuring 0.5 cm (series 6, image 236).  No new or enlarging pulmonary nodules.    Liver: Postoperative changes of partial right hepatectomy.  0.7 cm hypodense lesion in hepatic segment IV, correlating with the T2 hyperintense lesion described on prior MRI possibly representing a cyst.  This lesion is stable compared to CT 05/15/2023.  No new lesion.  Portal and hepatic veins are patent.    Gallbladder: Surgically absent.    Bile Ducts: No evidence of dilated ducts.    Pancreas: No mass or peripancreatic fat stranding.    Spleen: Unremarkable.    Stomach and duodenum: Unremarkable.    Adrenals: Unremarkable.    Kidneys/ Ureters: Normal in size and location. Normal enhancement. No hydronephrosis or nephrolithiasis. No ureteral dilatation.    Bladder: No evidence of wall thickening.    Reproductive organs: Unremarkable.    Bowel/Mesentery: Small bowel is normal in caliber with no evidence of obstruction.  No evidence of inflammation or wall thickening.  Colon demonstrates no focal wall thickening.    Lymph nodes: Status post portal  lymphadenectomy.  No pathologic lymphadenapathy.  Normal sized lymph nodes scattered throughout the mesentery with mild surrounding fat stranding, similar to priors.    Abdominal wall:  Unremarkable.    3    Vasculature: No aneurysm. No significant calcific atherosclerosis.    Bones: No acute fracture. No new aggressive osseous lesion.  Stable nonaggressive appearing lytic lesion involving the left lateral 8th rib.  Persistent osteonecrosis of the bilateral femoral heads.  Impression: History of cholangiocarcinoma status post right hepatectomy and cholecystectomy.  Stable subcentimeter hypodense lesion within hepatic segment IV, corresponding with the nonenhancing lesion described on prior MRI favored to represent a cyst.  No new lesion.    Several stable bilateral pulmonary nodules.    No new suspicious lesion.    Additional findings in the body of the report.    Electronically signed by resident: Bryan Rios  Date:    01/22/2024  Time:    14:01    Electronically signed by: Onur Forrester  Date:    01/22/2024  Time:    15:17            Diagnoses:       1. History of cholangiocarcinoma    2. Major depressive disorder in remission, unspecified whether recurrent    3. Essential hypertension                          Assessment and Plan:     1. History of cholangiocarcinoma  Overview:  Stage II (pT2N0) IHCC sp R0 resection 12/12/23. Started adjuvant capecitabine x6 months per BILCAP 2/3/23. Did not tolerated 1250mg/m2 dose; DR down to 1000mg/m2. Copmleted treatment 07/2023. Note made of indeterminate subcentimeter hypodensity in segment 4b during mid treatment imaging. Not detectable on post treatment scans    Assessment & Plan:  - Follow up 3 months with MR abdomen and CT Chest    Orders:  -     MRI Abdomen W WO Contrast; Future; Expected date: 01/23/2024  -     CT Chest Without Contrast; Future; Expected date: 01/23/2024    2. Major depressive disorder in remission, unspecified whether recurrent  Overview:  Home  medications include citalopram      3. Essential hypertension  Overview:  Home medications include HCTZ and candesartan.    Assessment & Plan:  Continues to have labile blood pressures. Follows closely with her PCP and digital medicine.  - Will defer to digital medicine team                             Route Chart for Scheduling    Med Onc Chart Routing      Follow up with physician 3 months.   Follow up with [facility]    Infusion scheduling note    Injection scheduling note    Labs CBC, CMP and CA 19-9   Scheduling:  Preferred lab:  Lab interval:     Imaging CT chest abdomen pelvis and MRI      Pharmacy appointment    Other referrals                          Bennie Moore MD  Hematology/Oncology  [facility] - [facility]

## 2024-01-23 NOTE — ASSESSMENT & PLAN NOTE
Continues to have labile blood pressures. Follows closely with her PCP and digital medicine.  - Will defer to digital medicine team

## 2024-01-26 ENCOUNTER — LAB VISIT (OUTPATIENT)
Dept: LAB | Facility: HOSPITAL | Age: 69
End: 2024-01-26
Attending: INTERNAL MEDICINE
Payer: MEDICARE

## 2024-01-26 ENCOUNTER — OFFICE VISIT (OUTPATIENT)
Dept: PRIMARY CARE CLINIC | Facility: CLINIC | Age: 69
End: 2024-01-26
Payer: MEDICARE

## 2024-01-26 VITALS
DIASTOLIC BLOOD PRESSURE: 72 MMHG | HEIGHT: 61 IN | BODY MASS INDEX: 25.43 KG/M2 | OXYGEN SATURATION: 100 % | HEART RATE: 77 BPM | TEMPERATURE: 98 F | WEIGHT: 134.69 LBS | SYSTOLIC BLOOD PRESSURE: 134 MMHG

## 2024-01-26 DIAGNOSIS — E03.9 HYPOTHYROIDISM, UNSPECIFIED TYPE: ICD-10-CM

## 2024-01-26 DIAGNOSIS — I10 BENIGN ESSENTIAL HYPERTENSION: Primary | ICD-10-CM

## 2024-01-26 DIAGNOSIS — E87.6 DIURETIC-INDUCED HYPOKALEMIA: ICD-10-CM

## 2024-01-26 DIAGNOSIS — M87.859 OTHER OSTEONECROSIS, UNSPECIFIED FEMUR: ICD-10-CM

## 2024-01-26 DIAGNOSIS — T50.2X5A DIURETIC-INDUCED HYPOKALEMIA: ICD-10-CM

## 2024-01-26 DIAGNOSIS — I70.0 AORTIC ATHEROSCLEROSIS: ICD-10-CM

## 2024-01-26 DIAGNOSIS — E78.49 OTHER HYPERLIPIDEMIA: ICD-10-CM

## 2024-01-26 DIAGNOSIS — Z85.09 HISTORY OF CHOLANGIOCARCINOMA: ICD-10-CM

## 2024-01-26 DIAGNOSIS — F33.42 RECURRENT MAJOR DEPRESSIVE DISORDER, IN FULL REMISSION: ICD-10-CM

## 2024-01-26 LAB
CHOLEST SERPL-MCNC: 149 MG/DL (ref 120–199)
CHOLEST/HDLC SERPL: 2.7 {RATIO} (ref 2–5)
HDLC SERPL-MCNC: 56 MG/DL (ref 40–75)
HDLC SERPL: 37.6 % (ref 20–50)
LDLC SERPL CALC-MCNC: 71.6 MG/DL (ref 63–159)
NONHDLC SERPL-MCNC: 93 MG/DL
TRIGL SERPL-MCNC: 107 MG/DL (ref 30–150)
TSH SERPL DL<=0.005 MIU/L-ACNC: 0.94 UIU/ML (ref 0.4–4)

## 2024-01-26 PROCEDURE — 3078F DIAST BP <80 MM HG: CPT | Mod: CPTII,S$GLB,, | Performed by: INTERNAL MEDICINE

## 2024-01-26 PROCEDURE — 1101F PT FALLS ASSESS-DOCD LE1/YR: CPT | Mod: CPTII,S$GLB,, | Performed by: INTERNAL MEDICINE

## 2024-01-26 PROCEDURE — 4010F ACE/ARB THERAPY RXD/TAKEN: CPT | Mod: CPTII,S$GLB,, | Performed by: INTERNAL MEDICINE

## 2024-01-26 PROCEDURE — 1126F AMNT PAIN NOTED NONE PRSNT: CPT | Mod: CPTII,S$GLB,, | Performed by: INTERNAL MEDICINE

## 2024-01-26 PROCEDURE — 3008F BODY MASS INDEX DOCD: CPT | Mod: CPTII,S$GLB,, | Performed by: INTERNAL MEDICINE

## 2024-01-26 PROCEDURE — 99999 PR PBB SHADOW E&M-EST. PATIENT-LVL IV: CPT | Mod: PBBFAC,,, | Performed by: INTERNAL MEDICINE

## 2024-01-26 PROCEDURE — 84443 ASSAY THYROID STIM HORMONE: CPT | Performed by: INTERNAL MEDICINE

## 2024-01-26 PROCEDURE — 1160F RVW MEDS BY RX/DR IN RCRD: CPT | Mod: CPTII,S$GLB,, | Performed by: INTERNAL MEDICINE

## 2024-01-26 PROCEDURE — 3075F SYST BP GE 130 - 139MM HG: CPT | Mod: CPTII,S$GLB,, | Performed by: INTERNAL MEDICINE

## 2024-01-26 PROCEDURE — 80061 LIPID PANEL: CPT | Performed by: INTERNAL MEDICINE

## 2024-01-26 PROCEDURE — 99214 OFFICE O/P EST MOD 30 MIN: CPT | Mod: S$GLB,,, | Performed by: INTERNAL MEDICINE

## 2024-01-26 PROCEDURE — 1159F MED LIST DOCD IN RCRD: CPT | Mod: CPTII,S$GLB,, | Performed by: INTERNAL MEDICINE

## 2024-01-26 PROCEDURE — 3288F FALL RISK ASSESSMENT DOCD: CPT | Mod: CPTII,S$GLB,, | Performed by: INTERNAL MEDICINE

## 2024-01-26 PROCEDURE — 1158F ADVNC CARE PLAN TLK DOCD: CPT | Mod: CPTII,S$GLB,, | Performed by: INTERNAL MEDICINE

## 2024-01-26 PROCEDURE — 36415 COLL VENOUS BLD VENIPUNCTURE: CPT | Mod: PN | Performed by: INTERNAL MEDICINE

## 2024-01-26 NOTE — PROGRESS NOTES
Subjective:       Patient ID: Rehana Whitten is a 68 y.o. female.    Chief Complaint: Hypertension    HPI  HTN - chlorthalidone 25mg daily, candesartan 8mg daily.   Diuretic induced hypoK - Kdur 10mEq daily.   Part of digital HTN program. BP cuff at home was way off. Changed cuff but was still high. Walks 3 mi 2-3 x/week. Already watching salt. Getting irritated when she gets all the phone calls.   Feeling back to normal.     MDD - controlled on celexa 20mg daily.   Hypothyroidism - synthroid 88mcg daily. TSH 9/6/23 WNL.    Cholangiocarcinoma s/p R partial hepatectomy and chemo. Had clear scan recently. 1/23/24 f/u w/ Dr. Moore.  CT C/A/P w/ contrast 1/22/24 -   Thoracic soft tissues: Unremarkable.  Aorta: Thoracic aorta is normal in caliber and contour with minimal calcific atherosclerosis of the arch.  Heart: Normal in size. No pericardial effusion. There is calcific coronary atherosclerosis predominantly involving the LAD.  Estella/Mediastinum: No significant lymphadenopathy  Lungs: Central airways are patent.  No pleural effusion.  Stable mild biapical fibronodular changes.  Several stable bilateral pulmonary nodules, largest measuring 0.5 cm (series 6, image 236).  No new or enlarging pulmonary nodules.  Liver: Postoperative changes of partial right hepatectomy.  0.7 cm hypodense lesion in hepatic segment IV, correlating with the T2 hyperintense lesion described on prior MRI possibly representing a cyst.  This lesion is stable compared to CT 05/15/2023.  No new lesion.  Portal and hepatic veins are patent.  Gallbladder: Surgically absent.  Bile Ducts: No evidence of dilated ducts.  Pancreas: No mass or peripancreatic fat stranding.  Spleen: Unremarkable.  Stomach and duodenum: Unremarkable.  Adrenals: Unremarkable.  Kidneys/ Ureters: Normal in size and location. Normal enhancement. No hydronephrosis or nephrolithiasis. No ureteral dilatation.  Bladder: No evidence of wall thickening.  Reproductive  "organs: Unremarkable.  Bowel/Mesentery: Small bowel is normal in caliber with no evidence of obstruction.  No evidence of inflammation or wall thickening.  Colon demonstrates no focal wall thickening.  Lymph nodes: Status post portal lymphadenectomy.  No pathologic lymphadenapathy.  Normal sized lymph nodes scattered throughout the mesentery with mild surrounding fat stranding, similar to priors.  Abdominal wall:  Unremarkable.  Vasculature: No aneurysm. No significant calcific atherosclerosis.  Bones: No acute fracture. No new aggressive osseous lesion.  Stable nonaggressive appearing lytic lesion involving the left lateral 8th rib.  Persistent osteonecrosis of the bilateral femoral heads.    No nausea/vomiting/diarrhea/abdominal pain. Back stiffness sometimes but otherwise not painful. No hip pains.     HLD - crestor 40mg daily. Occasional muscle pain but not very bothersome. B/c she takes the pill at night sometimes may forget as she takes everything else during the AM.   Aortic atherosclerosis - CT a/p 1/22/24. LAD calcific coronary atherosclerosis.     Review of Systems  Comprehensive review of systems otherwise negative. See history/subjective section for more details.      Objective:      Physical Exam    /82 (BP Location: Right arm, Patient Position: Sitting, BP Method: Large (Manual))   Pulse 77   Temp 98.4 °F (36.9 °C) (Oral)   Ht 5' 1" (1.549 m)   Wt 61.1 kg (134 lb 11.2 oz)   LMP  (LMP Unknown)   SpO2 100%   BMI 25.45 kg/m²     GEN - A+OX4, NAD   HEENT - PERRL, EOMI, OP clear. TM normal.   Neck - No thyromegaly or cervical LAD. No thyroid masses felt.  CV - RRR, no m/r   Chest - CTAB, no wheezing or rhonchi  Abd - S/NT/ND/+BS.   Ext - 2+BDP and radial pulses. No C/C/E.  Neuro - 5/5 BUE and BLE strength. 2+ DTRs. Normal gait.   MSK - no spinal or CVA tenderness to palpation.   Skin - No rash. Freckles.    Previous labs reviewed.     Assessment/Plan     Rehana was seen today for " hypertension.    Diagnoses and all orders for this visit:    Benign essential hypertension - Stable and controlled. Continue current medications.  If she has questions/worries about BP, can always come in for a nurse's visit for manual BP check.     Diuretic-induced hypokalemia - cont K.     Other hyperlipidemia - cont high dose statin.   -     Lipid Panel; Future    Aortic atherosclerosis - cont crestor 40mg daily.   -     Lipid Panel; Future    Hypothyroidism, unspecified type - cont synthroid.   -     TSH; Future    History of cholangiocarcinoma - in remission! F/u w/ H/O.     Recurrent major depressive disorder, in full remission - controlled on celexa.     Other osteonecrosis, unspecified femur - no pain. Getting surveillance scans w/ H/O every few months.     Advance Care Planning     Date: 01/26/2024    Power of   I initiated the process of voluntary advance care planning today and explained the importance of this process to the patient.  I introduced the concept of advance directives to the patient, as well. Then the patient received detailed information about the importance of designating a Health Care Power of  (HCPOA). She was also instructed to communicate with this person about their wishes for future healthcare, should she become sick and lose decision-making capacity. The patient has not previously appointed a HCPOA. After our discussion, the patient has decided to complete a HCPOA and has appointed her significant other, health care agent:  Robb Whitten  & health care agent number:  [phone]  I spent a total time of 3 minutes discussing this issue with the patient.       Follow up in about 3 months (around 4/26/2024).      Bernie Valverde MD  Department of Internal Medicine - [facility]  9:41 AM

## 2024-02-12 ENCOUNTER — TELEPHONE (OUTPATIENT)
Dept: PRIMARY CARE CLINIC | Facility: CLINIC | Age: 69
End: 2024-02-12
Payer: MEDICARE

## 2024-02-12 ENCOUNTER — OFFICE VISIT (OUTPATIENT)
Dept: PRIMARY CARE CLINIC | Facility: CLINIC | Age: 69
End: 2024-02-12
Payer: MEDICARE

## 2024-02-12 ENCOUNTER — CLINICAL SUPPORT (OUTPATIENT)
Dept: PRIMARY CARE CLINIC | Facility: CLINIC | Age: 69
End: 2024-02-12
Payer: MEDICARE

## 2024-02-12 DIAGNOSIS — U07.1 COVID-19 VIRUS INFECTION: Primary | ICD-10-CM

## 2024-02-12 DIAGNOSIS — R68.89 FLU-LIKE SYMPTOMS: Primary | ICD-10-CM

## 2024-02-12 LAB
CTP QC/QA: YES
CTP QC/QA: YES
POC MOLECULAR INFLUENZA A AGN: NEGATIVE
POC MOLECULAR INFLUENZA B AGN: NEGATIVE
SARS-COV-2 AG RESP QL IA.RAPID: POSITIVE

## 2024-02-12 PROCEDURE — 87502 INFLUENZA DNA AMP PROBE: CPT | Mod: QW,HCNC,S$GLB, | Performed by: HOSPITALIST

## 2024-02-12 PROCEDURE — 99499 UNLISTED E&M SERVICE: CPT | Mod: HCNC,95,, | Performed by: HOSPITALIST

## 2024-02-12 PROCEDURE — 87811 SARS-COV-2 COVID19 W/OPTIC: CPT | Mod: QW,HCNC,S$GLB, | Performed by: HOSPITALIST

## 2024-02-12 RX ORDER — GUAIFENESIN AND DEXTROMETHORPHAN HYDROBROMIDE 600; 30 MG/1; MG/1
1 TABLET, EXTENDED RELEASE ORAL 2 TIMES DAILY PRN
Qty: 14 TABLET | Refills: 0 | Status: SHIPPED | OUTPATIENT
Start: 2024-02-12 | End: 2024-02-19

## 2024-02-12 NOTE — TELEPHONE ENCOUNTER
----- Message from Manisha Vega sent at 2/12/2024  2:19 PM CST -----  Contact: [phone]  Patient called, would like a call back from nurse in regards patient not feeling good, running nose, sore throat, headache, patient stated that she is a cancer survivor and would like to talk with  nurse (patient has been asked if she want to be seen today, but she requested for nurse to call her). . Thank you.

## 2024-02-12 NOTE — PROGRESS NOTES
Spoke w/ pt by phone today. Has been @ hospital w/  who is sick.  Pt c/o runny nose, cough, sore throat, headache.   Pt here for Covid / flu swab   Will notify covering physician of symptoms and swab results.  Pt to have phone visit with physician to discuss results and treatment.

## 2024-02-12 NOTE — PROGRESS NOTES
Established Patient - Audio Only Telehealth Visit     The patient location is: home  The chief complaint leading to consultation is: URI  Visit type: Virtual visit with audio only (telephone)  Total time spent with patient: 11 min       The reason for the audio only service rather than synchronous audio and video virtual visit was related to technical difficulties or patient preference/necessity.     Each patient to whom I provide medical services by telemedicine is:  (1) informed of the relationship between the physician and patient and the respective role of any other health care provider with respect to management of the patient; and (2) notified that they may decline to receive medical services by telemedicine and may withdraw from such care at any time. Patient verbally consented to receive this service via voice-only telephone call.       HPI: pt has been with her  over the past week since he was hospitalized with PNA, sepsis, and a-fib.  She started feeling bad yesterday.  No fever; mostly weakness and malaise.  Didn't feel well enough to go to hospital today, but he is being discharged today.     Assessment and plan:  Patient tested positive at nurse visit today for COVID; flu negative.  Her  was COVID and flu negative in the hospital.  Advised her to isolate from him as much as possible once he gets home in order to reduce the risk of him catching it, especially since he has COPD.  She was advised to mask in any shared space in the house and for her and her  to observe good hand hygiene practices reduce the risk of spread to him.  Will prescribe course of Paxlovid for patient; labs reviewed showing normal kidney function.  She was advised to hold her rosuvastatin until a few days after finishing the course of Paxlovid.  As she does not feel well enough to go to the pharmacy to  the medication, I will submit it to our pharmacy to be delivered to her along with some Mucinex DM to  help with symptoms.                        This service was not originating from a related E/M service provided within the previous 7 days nor will  to an E/M service or procedure within the next 24 hours or my soonest available appointment.  Prevailing standard of care was able to be met in this audio-only visit.

## 2024-02-12 NOTE — TELEPHONE ENCOUNTER
Pt coming in for nurse visit swab for covid / flu. Has been at hospital w/  who is sick.  Please put on for MD phone call, she will be at clinic in about 30-40 mins

## 2024-02-19 ENCOUNTER — OFFICE VISIT (OUTPATIENT)
Dept: OPTOMETRY | Facility: CLINIC | Age: 69
End: 2024-02-19
Payer: MEDICARE

## 2024-02-19 DIAGNOSIS — Z96.1 PSEUDOPHAKIA OF BOTH EYES: Primary | ICD-10-CM

## 2024-02-19 DIAGNOSIS — H26.493 PCO (POSTERIOR CAPSULAR OPACIFICATION), BILATERAL: ICD-10-CM

## 2024-02-19 PROCEDURE — 1157F ADVNC CARE PLAN IN RCRD: CPT | Mod: HCNC,CPTII,S$GLB, | Performed by: OPTOMETRIST

## 2024-02-19 PROCEDURE — 3288F FALL RISK ASSESSMENT DOCD: CPT | Mod: HCNC,CPTII,S$GLB, | Performed by: OPTOMETRIST

## 2024-02-19 PROCEDURE — 1101F PT FALLS ASSESS-DOCD LE1/YR: CPT | Mod: HCNC,CPTII,S$GLB, | Performed by: OPTOMETRIST

## 2024-02-19 PROCEDURE — 99999 PR PBB SHADOW E&M-EST. PATIENT-LVL III: CPT | Mod: PBBFAC,HCNC,, | Performed by: OPTOMETRIST

## 2024-02-19 PROCEDURE — 1159F MED LIST DOCD IN RCRD: CPT | Mod: HCNC,CPTII,S$GLB, | Performed by: OPTOMETRIST

## 2024-02-19 PROCEDURE — 4010F ACE/ARB THERAPY RXD/TAKEN: CPT | Mod: HCNC,CPTII,S$GLB, | Performed by: OPTOMETRIST

## 2024-02-19 PROCEDURE — 1160F RVW MEDS BY RX/DR IN RCRD: CPT | Mod: HCNC,CPTII,S$GLB, | Performed by: OPTOMETRIST

## 2024-02-19 PROCEDURE — 92004 COMPRE OPH EXAM NEW PT 1/>: CPT | Mod: HCNC,S$GLB,, | Performed by: OPTOMETRIST

## 2024-02-19 PROCEDURE — 1126F AMNT PAIN NOTED NONE PRSNT: CPT | Mod: HCNC,CPTII,S$GLB, | Performed by: OPTOMETRIST

## 2024-02-19 NOTE — PROGRESS NOTES
HPI    AMOL: 2 years ago, [facility]  Chief complaint (CC): 68 y.o. female presents for blurred VA OD>OS x 1   year. Patient was diagnosed with Bile duct cancer last year and reports   blurred VA OD>OS since starting chemotherapy. Pt denies any other ocular   or visual complaints today.     Glasses? No  Contacts? No  H/o eye surgery, injections or laser: cataract 2 years ago [facility]  H/o eye injury: No   Known eye conditions? No  Family h/o eye conditions? No  Eye gtts? No    (-) Flashes (-)  Floaters (-) Mucous   (-)  Tearing (-) Itching (-) Burning   (-) Headaches (-) Eye Pain/discomfort (-) Irritation   (-)  Redness (-) Double vision (+) Blurry vision    Diabetic? No  A1c? Lab Results       Component                Value               Date                       HGBA1C                   5.6                 10/26/2023                  Last edited by Lashanda Sherwood, OD on 2/19/2024 10:19 AM.            Assessment /Plan     For exam results, see Encounter Report.        Pseudophakia of both eyes  PCO (posterior capsular opacification), bilateral   - Visually significant PCO OD>>OS   - Pt educated on condition.    - RTC next available for YAG Capsulotomy Rosaal

## 2024-02-23 ENCOUNTER — PATIENT MESSAGE (OUTPATIENT)
Dept: OPTOMETRY | Facility: CLINIC | Age: 69
End: 2024-02-23
Payer: MEDICARE

## 2024-03-02 NOTE — ANESTHESIA PROCEDURE NOTES
Arterial    Diagnosis: Liver mass    Patient location during procedure: done in OR  Procedure start time: 12/12/2022 7:21 AM  Timeout: 12/12/2022 7:20 AM  Procedure end time: 12/12/2022 7:22 AM    Staffing  Authorizing Provider: Bennie Michel MD  Performing Provider: Bennie Michel MD    Anesthesiologist was present at the time of the procedure.    Preanesthetic Checklist  Completed: patient identified, IV checked, site marked, risks and benefits discussed, surgical consent, monitors and equipment checked, pre-op evaluation, timeout performed and anesthesia consent givenArterial  Skin Prep: chlorhexidine gluconate  Local Infiltration: none  Orientation: right  Location: radial    Catheter Size: 20 G  Catheter placement by Ultrasound guidance. Heme positive aspiration all ports.   Vessel Caliber: small, medium, patent, compressibility normal  Vascular Doppler:  not done  Needle advanced into vessel with real time Ultrasound guidance.  Guidewire confirmed in vessel.  Sterile sheath used.  Image recorded and saved.Insertion Attempts: 1  Assessment  Dressing: secured with tape and tegaderm  Patient: Tolerated well           
Intubation    Date/Time: 12/12/2022 7:20 AM  Performed by: Dasha Hogan CRNA  Authorized by: Bennie Michel MD     Intubation:     Induction:  Intravenous    Intubated:  Postinduction    Mask Ventilation:  Easy mask    Attempts:  1    Attempted By:  CRNA    Method of Intubation:  Video laryngoscopy    Blade:  Huang 3    Laryngeal View Grade: Grade I - full view of cords      Difficult Airway Encountered?: No      Complications:  None    Airway Device:  Oral endotracheal tube    Airway Device Size:  7.5    Style/Cuff Inflation:  Cuffed (inflated to minimal occlusive pressure)    Tube secured:  22    Secured at:  The lips    Placement Verified By:  Capnometry    Complicating Factors:  None    Findings Post-Intubation:  BS equal bilateral and atraumatic/condition of teeth unchanged    
Fall

## 2024-04-08 ENCOUNTER — OFFICE VISIT (OUTPATIENT)
Dept: PRIMARY CARE CLINIC | Facility: CLINIC | Age: 69
End: 2024-04-08
Payer: MEDICARE

## 2024-04-08 VITALS
TEMPERATURE: 98 F | BODY MASS INDEX: 25.45 KG/M2 | SYSTOLIC BLOOD PRESSURE: 125 MMHG | OXYGEN SATURATION: 99 % | HEIGHT: 61 IN | HEART RATE: 69 BPM | DIASTOLIC BLOOD PRESSURE: 78 MMHG

## 2024-04-08 DIAGNOSIS — I10 BENIGN ESSENTIAL HYPERTENSION: Primary | ICD-10-CM

## 2024-04-08 DIAGNOSIS — Z12.31 ENCOUNTER FOR SCREENING MAMMOGRAM FOR MALIGNANT NEOPLASM OF BREAST: ICD-10-CM

## 2024-04-08 PROCEDURE — 3288F FALL RISK ASSESSMENT DOCD: CPT | Mod: CPTII,S$GLB,, | Performed by: INTERNAL MEDICINE

## 2024-04-08 PROCEDURE — 3008F BODY MASS INDEX DOCD: CPT | Mod: CPTII,S$GLB,, | Performed by: INTERNAL MEDICINE

## 2024-04-08 PROCEDURE — 1126F AMNT PAIN NOTED NONE PRSNT: CPT | Mod: CPTII,S$GLB,, | Performed by: INTERNAL MEDICINE

## 2024-04-08 PROCEDURE — 1159F MED LIST DOCD IN RCRD: CPT | Mod: CPTII,S$GLB,, | Performed by: INTERNAL MEDICINE

## 2024-04-08 PROCEDURE — 99211 OFF/OP EST MAY X REQ PHY/QHP: CPT | Mod: S$GLB,,, | Performed by: INTERNAL MEDICINE

## 2024-04-08 PROCEDURE — 4010F ACE/ARB THERAPY RXD/TAKEN: CPT | Mod: CPTII,S$GLB,, | Performed by: INTERNAL MEDICINE

## 2024-04-08 PROCEDURE — 1160F RVW MEDS BY RX/DR IN RCRD: CPT | Mod: CPTII,S$GLB,, | Performed by: INTERNAL MEDICINE

## 2024-04-08 PROCEDURE — 99999 PR PBB SHADOW E&M-EST. PATIENT-LVL III: CPT | Mod: PBBFAC,,, | Performed by: INTERNAL MEDICINE

## 2024-04-08 PROCEDURE — 3074F SYST BP LT 130 MM HG: CPT | Mod: CPTII,S$GLB,, | Performed by: INTERNAL MEDICINE

## 2024-04-08 PROCEDURE — 3078F DIAST BP <80 MM HG: CPT | Mod: CPTII,S$GLB,, | Performed by: INTERNAL MEDICINE

## 2024-04-08 PROCEDURE — 1101F PT FALLS ASSESS-DOCD LE1/YR: CPT | Mod: CPTII,S$GLB,, | Performed by: INTERNAL MEDICINE

## 2024-04-08 PROCEDURE — 1157F ADVNC CARE PLAN IN RCRD: CPT | Mod: CPTII,S$GLB,, | Performed by: INTERNAL MEDICINE

## 2024-04-08 NOTE — PROGRESS NOTES
"Subjective:       Patient ID: Rehana Whitten is a 68 y.o. female.    Chief Complaint: HTN f/u    HPI  HTN - chlorthalidone 25mg daily, candesartan 8mg daily.  Previously on digital HTN program but BP cuff w/ digital program always 10+ points higher than manual despite exchanging for a new cuff.   BP at home has been doing well - 130/70s. Checks BP once a week.      had pneumonia and afib and was depending on oxygen right before Aleksandr Ronak.   COVID positive (think due to being in the hospital w/ him) and had audio visit w/ Dr. Millard 2/12/24. Had chills/muscle aches. Didn't end up getting paxlovid due to it going to the wrong pharmacy.   No remnant symptoms from COVID. No SOB/wheezing.   Still doing 3 miles daily x 3x/week.     Has surveillance CT and MRI scheduled.   Sometimes w/     Review of Systems  Comprehensive review of systems otherwise negative. See history/subjective section for more details.    Objective:      Physical Exam    /78   Pulse 69   Temp 97.8 °F (36.6 °C) (Oral)   Ht 5' 1" (1.549 m)   LMP  (LMP Unknown)   SpO2 99%   BMI 25.45 kg/m²     GEN - A+OX4, NAD   HEENT - PERRL, EOMI, OP clear. MMM. TM normal.   Neck - No thyromegaly  CV - RRR, no m/r   Chest - CTAB, no wheezing or rhonchi  Abd - S/NT/ND/+BS.   Ext - 2+BDP and radial pulses. No C/C/E.  MSK - no spinal tenderness to palpation. Normal gait.   Skin - No rash.    Previous labs reviewed.     Assessment/Plan     Diagnoses and all orders for this visit:    Benign essential hypertension - Stable and controlled. Continue current medications.    Encounter for screening mammogram for malignant neoplasm of breast  -     Mammo Digital Screening Bilat w/ Jeancarlos; Future        Follow up in about 6 months (around 10/8/2024).      Bernie Valverde MD  Department of Internal Medicine - [facility]  10:11 AM    "

## 2024-04-09 DIAGNOSIS — I10 BENIGN ESSENTIAL HYPERTENSION: ICD-10-CM

## 2024-04-09 RX ORDER — CANDESARTAN 8 MG/1
TABLET ORAL
Qty: 90 TABLET | Refills: 3 | Status: SHIPPED | OUTPATIENT
Start: 2024-04-09

## 2024-04-11 NOTE — LETTER
Sammie 3, 2019                 [facility]  Internal Medicine  [address]  [facility] [address]  Phone: [phone]  Fax: [phone]   Sammie 3, 2019     Patient: Rehana Polanco   YOB: 1955   Date of Visit: 6/3/2019       To Whom it May Concern:    Rehana Polanco was seen in my clinic on 6/3/2019.     Please excuse her from any classes or work missed.    If you have any questions or concerns, please don't hesitate to call.    Sincerely,             Anna Blanco PA-C      I have reviewed and confirmed nurses' notes...

## 2024-04-12 ENCOUNTER — OFFICE VISIT (OUTPATIENT)
Dept: OPHTHALMOLOGY | Facility: CLINIC | Age: 69
End: 2024-04-12
Payer: MEDICARE

## 2024-04-12 DIAGNOSIS — H26.493 POSTERIOR CAPSULAR OPACIFICATION, BILATERAL: Primary | ICD-10-CM

## 2024-04-12 PROCEDURE — 1101F PT FALLS ASSESS-DOCD LE1/YR: CPT | Mod: CPTII,S$GLB,, | Performed by: OPHTHALMOLOGY

## 2024-04-12 PROCEDURE — 66821 AFTER CATARACT LASER SURGERY: CPT | Mod: 50,S$GLB,, | Performed by: OPHTHALMOLOGY

## 2024-04-12 PROCEDURE — 99999 PR PBB SHADOW E&M-EST. PATIENT-LVL III: CPT | Mod: PBBFAC,,, | Performed by: OPHTHALMOLOGY

## 2024-04-12 PROCEDURE — 1160F RVW MEDS BY RX/DR IN RCRD: CPT | Mod: CPTII,S$GLB,, | Performed by: OPHTHALMOLOGY

## 2024-04-12 PROCEDURE — 1159F MED LIST DOCD IN RCRD: CPT | Mod: CPTII,S$GLB,, | Performed by: OPHTHALMOLOGY

## 2024-04-12 PROCEDURE — 3288F FALL RISK ASSESSMENT DOCD: CPT | Mod: CPTII,S$GLB,, | Performed by: OPHTHALMOLOGY

## 2024-04-12 PROCEDURE — 4010F ACE/ARB THERAPY RXD/TAKEN: CPT | Mod: CPTII,S$GLB,, | Performed by: OPHTHALMOLOGY

## 2024-04-12 PROCEDURE — 1126F AMNT PAIN NOTED NONE PRSNT: CPT | Mod: CPTII,S$GLB,, | Performed by: OPHTHALMOLOGY

## 2024-04-12 PROCEDURE — 1157F ADVNC CARE PLAN IN RCRD: CPT | Mod: CPTII,S$GLB,, | Performed by: OPHTHALMOLOGY

## 2024-04-12 PROCEDURE — 99214 OFFICE O/P EST MOD 30 MIN: CPT | Mod: 57,S$GLB,, | Performed by: OPHTHALMOLOGY

## 2024-04-12 NOTE — PROGRESS NOTES
HPI    Patient is here today for a YAG evaluation. Last sen on 02/19/2024 with   Dr. Sherwood. Patient states vision looks cloudy. No pain, occasional   tearing associated with allergies. Occasional floaters.   Last edited by Reshma Gant on 4/12/2024  3:15 PM.            Assessment /Plan     For exam results, see Encounter Report.    Posterior capsular opacification, bilateral      Visually significant posterior capsular opacity present.  Discussed risks, benefits, and alternatives to laser surgery.  YAG laser capsulotomy Procedure Note:   Informed consent obtained and correct eye(s) verified with patient.  1 drop of topical Proparacaine and Iopidine instilled, and eye(s) dilated with 1% Tropicamide 2.5% Phenylephrine.  YAG laser applied to posterior capsule in cruciate pattern OU  Patient tolerated procedure well. No complications. Follow up in 1 month/PRN.

## 2024-04-17 ENCOUNTER — HOSPITAL ENCOUNTER (OUTPATIENT)
Dept: RADIOLOGY | Facility: HOSPITAL | Age: 69
Discharge: HOME | End: 2024-04-17
Attending: HOSPITALIST
Payer: MEDICARE

## 2024-04-17 DIAGNOSIS — Z85.09 HISTORY OF CHOLANGIOCARCINOMA: ICD-10-CM

## 2024-04-17 PROCEDURE — 71250 CT THORAX DX C-: CPT | Mod: 26,,, | Performed by: RADIOLOGY

## 2024-04-17 PROCEDURE — A9585 GADOBUTROL INJECTION: HCPCS | Performed by: HOSPITALIST

## 2024-04-17 PROCEDURE — 71250 CT THORAX DX C-: CPT | Mod: TC

## 2024-04-17 PROCEDURE — 25500020 PHARM REV CODE 255: Performed by: HOSPITALIST

## 2024-04-17 PROCEDURE — 74183 MRI ABD W/O CNTR FLWD CNTR: CPT | Mod: 26,,, | Performed by: STUDENT IN AN ORGANIZED HEALTH CARE EDUCATION/TRAINING PROGRAM

## 2024-04-17 PROCEDURE — 74183 MRI ABD W/O CNTR FLWD CNTR: CPT | Mod: TC

## 2024-04-17 RX ORDER — GADOBUTROL 604.72 MG/ML
10 INJECTION INTRAVENOUS
Status: COMPLETED | OUTPATIENT
Start: 2024-04-17 | End: 2024-04-17

## 2024-04-17 RX ADMIN — GADOBUTROL 10 ML: 604.72 INJECTION INTRAVENOUS at 10:04

## 2024-04-20 ENCOUNTER — PATIENT MESSAGE (OUTPATIENT)
Dept: DERMATOLOGY | Facility: CLINIC | Age: 69
End: 2024-04-20
Payer: MEDICARE

## 2024-04-24 ENCOUNTER — OFFICE VISIT (OUTPATIENT)
Dept: HEMATOLOGY/ONCOLOGY | Facility: CLINIC | Age: 69
End: 2024-04-24
Payer: MEDICARE

## 2024-04-24 ENCOUNTER — LAB VISIT (OUTPATIENT)
Dept: LAB | Facility: HOSPITAL | Age: 69
End: 2024-04-24
Attending: HOSPITALIST
Payer: MEDICARE

## 2024-04-24 VITALS
WEIGHT: 151.44 LBS | HEART RATE: 76 BPM | TEMPERATURE: 99 F | OXYGEN SATURATION: 98 % | DIASTOLIC BLOOD PRESSURE: 59 MMHG | HEIGHT: 61 IN | BODY MASS INDEX: 28.59 KG/M2 | SYSTOLIC BLOOD PRESSURE: 124 MMHG

## 2024-04-24 DIAGNOSIS — Z85.09 HISTORY OF CHOLANGIOCARCINOMA: Primary | ICD-10-CM

## 2024-04-24 DIAGNOSIS — I10 ESSENTIAL HYPERTENSION: ICD-10-CM

## 2024-04-24 DIAGNOSIS — E03.9 HYPOTHYROIDISM, UNSPECIFIED TYPE: ICD-10-CM

## 2024-04-24 DIAGNOSIS — C22.1 CHOLANGIOCARCINOMA: ICD-10-CM

## 2024-04-24 LAB
ALBUMIN SERPL BCP-MCNC: 4.3 G/DL (ref 3.5–5.2)
ALP SERPL-CCNC: 106 U/L (ref 55–135)
ALT SERPL W/O P-5'-P-CCNC: 18 U/L (ref 10–44)
ANION GAP SERPL CALC-SCNC: 12 MMOL/L (ref 8–16)
AST SERPL-CCNC: 19 U/L (ref 10–40)
BILIRUB SERPL-MCNC: 1.1 MG/DL (ref 0.1–1)
BUN SERPL-MCNC: 19 MG/DL (ref 8–23)
CALCIUM SERPL-MCNC: 10.2 MG/DL (ref 8.7–10.5)
CANCER AG19-9 SERPL-ACNC: 12.1 U/ML (ref 0–40)
CHLORIDE SERPL-SCNC: 99 MMOL/L (ref 95–110)
CO2 SERPL-SCNC: 29 MMOL/L (ref 23–29)
CREAT SERPL-MCNC: 0.8 MG/DL (ref 0.5–1.4)
ERYTHROCYTE [DISTWIDTH] IN BLOOD BY AUTOMATED COUNT: 13.7 % (ref 11.5–14.5)
EST. GFR  (NO RACE VARIABLE): >60 ML/MIN/1.73 M^2
GLUCOSE SERPL-MCNC: 114 MG/DL (ref 70–110)
HCT VFR BLD AUTO: 41.6 % (ref 37–48.5)
HGB BLD-MCNC: 13.5 G/DL (ref 12–16)
IMM GRANULOCYTES # BLD AUTO: 0.02 K/UL (ref 0–0.04)
MCH RBC QN AUTO: 30.8 PG (ref 27–31)
MCHC RBC AUTO-ENTMCNC: 32.5 G/DL (ref 32–36)
MCV RBC AUTO: 95 FL (ref 82–98)
NEUTROPHILS # BLD AUTO: 3.1 K/UL (ref 1.8–7.7)
PLATELET # BLD AUTO: 232 K/UL (ref 150–450)
PMV BLD AUTO: 11 FL (ref 9.2–12.9)
POTASSIUM SERPL-SCNC: 3.6 MMOL/L (ref 3.5–5.1)
PROT SERPL-MCNC: 7.6 G/DL (ref 6–8.4)
RBC # BLD AUTO: 4.39 M/UL (ref 4–5.4)
SODIUM SERPL-SCNC: 140 MMOL/L (ref 136–145)
WBC # BLD AUTO: 5.04 K/UL (ref 3.9–12.7)

## 2024-04-24 PROCEDURE — 3078F DIAST BP <80 MM HG: CPT | Mod: CPTII,S$GLB,, | Performed by: HOSPITALIST

## 2024-04-24 PROCEDURE — 86301 IMMUNOASSAY TUMOR CA 19-9: CPT | Performed by: HOSPITALIST

## 2024-04-24 PROCEDURE — 3288F FALL RISK ASSESSMENT DOCD: CPT | Mod: CPTII,S$GLB,, | Performed by: HOSPITALIST

## 2024-04-24 PROCEDURE — 1159F MED LIST DOCD IN RCRD: CPT | Mod: CPTII,S$GLB,, | Performed by: HOSPITALIST

## 2024-04-24 PROCEDURE — 4010F ACE/ARB THERAPY RXD/TAKEN: CPT | Mod: CPTII,S$GLB,, | Performed by: HOSPITALIST

## 2024-04-24 PROCEDURE — 1101F PT FALLS ASSESS-DOCD LE1/YR: CPT | Mod: CPTII,S$GLB,, | Performed by: HOSPITALIST

## 2024-04-24 PROCEDURE — 36415 COLL VENOUS BLD VENIPUNCTURE: CPT | Performed by: HOSPITALIST

## 2024-04-24 PROCEDURE — 99214 OFFICE O/P EST MOD 30 MIN: CPT | Mod: S$GLB,,, | Performed by: HOSPITALIST

## 2024-04-24 PROCEDURE — 85027 COMPLETE CBC AUTOMATED: CPT | Performed by: HOSPITALIST

## 2024-04-24 PROCEDURE — 1126F AMNT PAIN NOTED NONE PRSNT: CPT | Mod: CPTII,S$GLB,, | Performed by: HOSPITALIST

## 2024-04-24 PROCEDURE — 99999 PR PBB SHADOW E&M-EST. PATIENT-LVL III: CPT | Mod: PBBFAC,,, | Performed by: HOSPITALIST

## 2024-04-24 PROCEDURE — 1157F ADVNC CARE PLAN IN RCRD: CPT | Mod: CPTII,S$GLB,, | Performed by: HOSPITALIST

## 2024-04-24 PROCEDURE — 3008F BODY MASS INDEX DOCD: CPT | Mod: CPTII,S$GLB,, | Performed by: HOSPITALIST

## 2024-04-24 PROCEDURE — 3074F SYST BP LT 130 MM HG: CPT | Mod: CPTII,S$GLB,, | Performed by: HOSPITALIST

## 2024-04-24 PROCEDURE — 80053 COMPREHEN METABOLIC PANEL: CPT | Performed by: HOSPITALIST

## 2024-04-24 RX ORDER — TRETINOIN 0.5 MG/G
CREAM TOPICAL NIGHTLY
Qty: 20 G | Refills: 3 | Status: SHIPPED | OUTPATIENT
Start: 2024-04-24

## 2024-04-24 NOTE — ASSESSMENT & PLAN NOTE
Imaging remains LUIS EDUARDO and patient without particular symptoms. Overall doing quite well. Per NCCN surveillance plan for cross sectional imaging q3-6 months x2 years; then q6-12 months through year 5.  - FU in 3 months with CT torso and labs

## 2024-04-24 NOTE — PROGRESS NOTES
MEDICAL ONCOLOGY FOLLOW-UP VISIT.     Best Contact Phone Number(s): [phone] (home)      Cancer/Stage/TNM:    Cancer Staging   History of cholangiocarcinoma  Staging form: Intrahepatic Bile Duct, AJCC 8th Edition  - Pathologic: Stage II (pT2, pN0, cM0) - Unsigned       Reason for visit:     History of Present Illness: Rehana Whitten is a 68 y.o. female remote history of HBV who was found to have stage II pT2N0 intrahepatic cholangiocarcinoma in the setting of abdominal pain who underwent R0 resection 12/12/23. She completed 8 cycles of adjuvant capecitabine 07/2023. She presents to medical oncology clinic for routine surveillance.      Interval History:     Overall patient feels well. Recently recovered from her first bout of COVID in 02/2024.  was actually hospitalized with fairly severe cardiac complications.     No particular GI concerns. Has some intermittent constipation. Remains quite active, walking up to 2-3 miles a few times a week. Hoping to start swimming soon. Appetite is good. No N/V. No abdominal pain. No fevers or chills.       Oncology History   History of cholangiocarcinoma   11/9/2022 Imaging Significant Findings    Abdominal US in the setting of abdominal pain shows a large heterogeneous mass in the right lobe, measures 7.2 x 5.3 x 5.9 cm.     11/9/2022 Imaging Significant Findings    CT a/p:  Irregular right hepatic lobe lesion demonstrating delayed central enhancement and overlying capsular retraction.  Capsular retraction can be seen in cholangiocarcinoma and sclerosing hepatic hemangiomas.  Differential includes metastatic disease and other primary hepatic neoplasms.     11/14/2022 Tumor Markers    CA 19-9: 19.1  AFP: 5.5     11/16/2022 Imaging Significant Findings    CT chest:  1. No specific CT evidence of metastatic disease within the chest.  2. Left lower lobe punctate micro nodules.  Attention on continued surveillance imaging.  3. Other findings as above.      11/30/2022 Biopsy    Percutaneous biopsy of liver mass: Moderately differentiated adenocarcinoma. CK7+ Negative for CK20, CDX2, GATA3, TTF1, and PAX 8. Thought consistent with pancraetobiliary primary.     12/12/2022 Surgery    Resection of right hepatic lobe tumor. Pathology shows moderately differentiated adenocarcinoma up to 6.5cm in size c/w intrahepatic cholangiocarcinoma. Tumor was confined to the hepatic parenchyma with associated  LVI but no PNI. R0 resection with 6mm margins. 06/ LN involved. [id number]     1/13/2023 - 1/13/2023 Chemotherapy    Treatment Summary   Plan Name: OP CAPECITABINE 1250MG/M2 BID Q3W  Treatment Goal: Control  Status: Inactive  Start Date: 1/13/2023  End Date: 1/13/2023  Provider: Bennie Moore MD  Chemotherapy: capecitabine (XELODA) tablet 1,650 mg, 2,000 mg, Oral, 2 times daily, 1 of 1 cycle, Start date: 2/24/2023, End date: --     2/24/2023 -  Chemotherapy    C2D1 capectiabine; DR to 1500mg po bid       3/17/2023 -  Chemotherapy    C3D1 capecitabine 1650 po bid       4/7/2023 -  Chemotherapy    C4D1 capecitabine 1650 po bid       4/28/2023 -  Chemotherapy    C5 capecitabine 1650 po bid       5/15/2023 Imaging Significant Findings    5/15/23: CT torso:  - Postoperative changes from right hepatectomy, cholecystectomy, and portal lymphadenectomy for intrahepatic cholangiocarcinoma.  - No lymphadenopathy.  - New subcentimeter hypodensity in hepatic segment 4B, concerning for metastases.  - Unchanged pulmonary nodules.  - Other findings as described.     7/18/2023 Imaging Significant Findings    CT torso:  1. Postop change of right hepatectomy, cholecystectomy, and portal lymphadenectomy for intrahepatic cholangiocarcinoma.  2. Previously noted subcentimeter hypodensity in the hepatic IVB segment is not well visualized on today's examination.  No new focal hepatic lesions.  3. Stable bilateral pulmonary nodules.  4. Additional findings, as above.        10/17/2023 Imaging  "Significant Findings    MRI Abdomen  Impression:   Postsurgical changes of cholecystectomy and right hepatectomy in this patient with history of cholangiocarcinoma.  No abnormal enhancing lesions to suggest residual or recurrent disease.    CT Chest  Impression:  1. Grossly stable pulmonary exam with multiple bilateral pulmonary nodules, the largest measuring 4 mm.      Imaging Significant Findings    4/17/24 CT Chest  Impression:  1. Grossly stable bilateral pulmonary nodules with the largest measuring 5 mm located in the right middle lobe.  2. No evidence of new pulmonary nodule or mass.  3. Postoperative changes of partial right hepatectomy with no focal lesion in this noncontrast study.  For more details please refer to MRI of the abdomen with contrast performed concomitantly.  4. Additional findings.  Please see the above discussion.    4/17/24 MR Abdomen  Impression:  Postsurgical change including cholecystectomy and right hepatectomy in this patient with history of cholangiocarcinoma.  No abnormal enhancing lesions to suggest residual or recurrent disease.            Physical Exam:   BP (!) 124/59 (BP Location: Left arm, Patient Position: Sitting, BP Method: Medium (Automatic))   Pulse 76   Temp 98.5 °F (36.9 °C) (Oral)   Ht 5' 1" (1.549 m)   Wt 68.7 kg (151 lb 7.3 oz)   LMP  (LMP Unknown)   SpO2 98%   BMI 28.62 kg/m²      ECOG Performance Status: (foot note - ECOG PS provided by Eastern Cooperative Oncology Group) 0 - Asymptomatic    Physical Exam  Constitutional:       General: She is not in acute distress.     Appearance: She is normal weight.   HENT:      Head: Normocephalic.      Mouth/Throat:      Mouth: Mucous membranes are moist.      Pharynx: Oropharynx is clear.   Eyes:      General: No scleral icterus.     Conjunctiva/sclera: Conjunctivae normal.   Cardiovascular:      Rate and Rhythm: Normal rate and regular rhythm.      Heart sounds: No murmur heard.     No gallop.   Pulmonary:      " Effort: Pulmonary effort is normal. No respiratory distress.      Breath sounds: Normal breath sounds. No wheezing.   Abdominal:      General: There is no distension.      Palpations: Abdomen is soft.   Musculoskeletal:         General: Normal range of motion.      Cervical back: Normal range of motion and neck supple.      Right lower leg: No edema.      Left lower leg: No edema.   Skin:     General: Skin is warm.      Coloration: Skin is not jaundiced.      Findings: No bruising or rash.   Neurological:      General: No focal deficit present.      Mental Status: She is alert and oriented to person, place, and time.      Motor: No weakness.   Psychiatric:         Mood and Affect: Mood normal.         Behavior: Behavior normal.         Thought Content: Thought content normal.           Labs:   Recent Results (from the past 48 hour(s))   Cancer Antigen 19-9    Collection Time: 04/24/24  9:15 AM   Result Value Ref Range    CA 19-9 12.1 0.0 - 40.0 U/mL   CBC Oncology    Collection Time: 04/24/24  9:15 AM   Result Value Ref Range    WBC 5.04 3.90 - 12.70 K/uL    RBC 4.39 4.00 - 5.40 M/uL    Hemoglobin 13.5 12.0 - 16.0 g/dL    Hematocrit 41.6 37.0 - 48.5 %    MCV 95 82 - 98 fL    MCH 30.8 27.0 - 31.0 pg    MCHC 32.5 32.0 - 36.0 g/dL    RDW 13.7 11.5 - 14.5 %    Platelets 232 150 - 450 K/uL    MPV 11.0 9.2 - 12.9 fL    Gran # (ANC) 3.1 1.8 - 7.7 K/uL    Immature Grans (Abs) 0.02 0.00 - 0.04 K/uL   Comprehensive Metabolic Panel    Collection Time: 04/24/24  9:15 AM   Result Value Ref Range    Sodium 140 136 - 145 mmol/L    Potassium 3.6 3.5 - 5.1 mmol/L    Chloride 99 95 - 110 mmol/L    CO2 29 23 - 29 mmol/L    Glucose 114 (H) 70 - 110 mg/dL    BUN 19 8 - 23 mg/dL    Creatinine 0.8 0.5 - 1.4 mg/dL    Calcium 10.2 8.7 - 10.5 mg/dL    Total Protein 7.6 6.0 - 8.4 g/dL    Albumin 4.3 3.5 - 5.2 g/dL    Total Bilirubin 1.1 (H) 0.1 - 1.0 mg/dL    Alkaline Phosphatase 106 55 - 135 U/L    AST 19 10 - 40 U/L    ALT 18 10 - 44 U/L     "eGFR >60.0 >60 mL/min/1.73 m^2    Anion Gap 12 8 - 16 mmol/L           Lab Results   Component Value Date     04/24/2024    K 3.6 04/24/2024    CREATININE 0.8 04/24/2024    WBC 5.04 04/24/2024    HGB 13.5 04/24/2024     04/24/2024    AST 19 04/24/2024    ALT 18 04/24/2024    ALKPHOS 106 04/24/2024    BILITOT 1.1 (H) 04/24/2024          Imaging:     CT Chest Without Contrast  Narrative: EXAMINATION:  CT CHEST WITHOUT CONTRAST    CLINICAL HISTORY:  "Hepatocellular carcinoma, staging;"    COMPARISON:  CT of the chest abdomen and pelvis with IV contrast dated 01/22/2024    TECHNIQUE:  Volumetric data acquisition of the chest from the lung apices to the adrenals was obtained without intravenous contrast. Sagittal and coronal multiplanar reconstructions were performed. Lack of IV contrast material limits the assessment of mediastinal and abdominal structures.    FINDINGS:  Lungs and large airways: Grossly stable bilateral pulmonary nodules, with the largest measuring 5 mm located in the right middle lobe adjacent to the fissure (axial image 231 series 4).  No evidence of new pulmonary nodule.  No evidence of acute airspace disease.  The minimal ground-glass opacities in the bilateral lower lobes, left greater than right.  The trachea and main bronchi are patent.  The biapical pleuroparenchymal scarring again noted.    Pleura: No pneumothorax or pleural effusion is noted.    Heart and pericardium: Cardiac silhouette is within normal limits with no evidence of pericardial effusion.    Mediastinum and rocky: No evidence of mediastinal or hilar lymphadenopathy according to the CT criteria.    Chest wall and lower neck: Clips in the left lower neck.  No evidence of lymphadenopathy the axillary regions.    Vessels: Left-sided aortic arch.  Aorta is normal in caliber.  The mild aortic and coronary atheromatous calcifications.  Main pulmonary and right and left pulmonary arteries are normal in size with no evidence of " focal lesions.    Bones: Mild degenerative changes in the spine.  Grossly stable nonaggressive appearing lytic lesion in the left lateral 8th rib (axial image 392 series 4).  Remainder regional bones are unchanged.    Upper abdomen: Postoperative changes of partial right hepatectomy.  No focal lesion in liver is noted in this noncontrast study.  Spleen and pancreas are normal in size with no evidence of focal lesion.  Adrenal glands are normal in size.  The visualized kidneys are normal in size with no evidence of focal lesions or hydronephrosis.  Hyperdensity in the right pelvis may represent contrast (gadolinium) material from concomitant MRI of the abdomen.  Impression: 1. Grossly stable bilateral pulmonary nodules with the largest measuring 5 mm located in the right middle lobe.  2. No evidence of new pulmonary nodule or mass.  3. Postoperative changes of partial right hepatectomy with no focal lesion in this noncontrast study.  For more details please refer to MRI of the abdomen with contrast performed concomitantly.  4. Additional findings.  Please see the above discussion.    Electronically signed by: Wiley Meade  Date:    04/17/2024  Time:    10:54  MRI Abdomen W WO Contrast  Narrative: EXAMINATION:  MRI ABDOMEN W WO CONTRAST    CLINICAL HISTORY:  Metastatic disease evaluation;  Personal history of malignant neoplasm of other digestive organs    TECHNIQUE:  Multisequence, multiplanar MRI of the abdomen performed per liver protocol before and after administration of 10 mL Gadavist intravenous contrast.    COMPARISON:  CT chest abdomen pelvis with contrast 01/22/2024, MRI abdomen with without contrast 10/17/2023    FINDINGS:  Heart is normal in size.  No pleural fluid.  No consolidation.    Liver: Postsurgical changes including right hepatectomy.  Similar subcentimeter T2 hyperintense nonenhancing focus in hepatic segment IV.  No new or enhancing lesions.  Hepatic and portal veins are  patent.    Biliary: Gallbladder is surgically absent.  Stable mild prominence of the common bile duct.  No intrahepatic ductal dilatation.    Pancreas: Unremarkable.  No pancreatic ductal dilatation.    Spleen: Normal size.  No focal lesions.    Adrenal glands: Unremarkable.    Kidneys: No renal masses.  No hydronephrosis.    Miscellaneous: Visualized bowel loops are unremarkable.  No evidence for lymphadenopathy.    Visualized aorta is normal in course and caliber.    Osseous structures demonstrate normal marrow signal.  Impression: Postsurgical change including cholecystectomy and right hepatectomy in this patient with history of cholangiocarcinoma.  No abnormal enhancing lesions to suggest residual or recurrent disease.    Electronically signed by: Hilario Perez  Date:    04/17/2024  Time:    10:45            Diagnoses:       1. History of cholangiocarcinoma    2. Hypothyroidism, unspecified type    3. Essential hypertension                            Assessment and Plan:     1. History of cholangiocarcinoma  Overview:  Stage II (pT2N0) IHCC sp R0 resection 12/12/23. Started adjuvant capecitabine x6 months per BILCAP 2/3/23. Did not tolerated 1250mg/m2 dose; DR down to 1000mg/m2. Copmleted treatment 07/2023. Note made of indeterminate subcentimeter hypodensity in segment 4b during mid treatment imaging. Not detectable on post treatment scans    Assessment & Plan:  Imaging remains LUIS EDUARDO and patient without particular symptoms. Overall doing quite well. Per NCCN surveillance plan for cross sectional imaging q3-6 months x2 years; then q6-12 months through year 5.  - FU in 3 months with CT torso and labs        Orders:  -     CT Chest Abdomen Pelvis With IV Contrast (XPD) Routine Oral Contrast; Future; Expected date: 04/24/2024    2. Hypothyroidism, unspecified type  Overview:  Home medication includes levothyroxine      3. Essential hypertension  Overview:  Home medications include HCTZ and  candesartan.                               Route Chart for Scheduling    Med Onc Chart Routing      Follow up with physician 3 months.   Follow up with [facility]    Infusion scheduling note    Injection scheduling note    Labs CBC, CMP and CA 19-9   Scheduling:  Preferred lab:  Lab interval:     Imaging CT chest abdomen pelvis      Pharmacy appointment    Other referrals                          Bennie Moore MD  Hematology/Oncology  [facility] - [facility]

## 2024-04-25 DIAGNOSIS — Z85.09 HISTORY OF CHOLANGIOCARCINOMA: Primary | ICD-10-CM

## 2024-05-30 ENCOUNTER — TELEPHONE (OUTPATIENT)
Dept: PRIMARY CARE CLINIC | Facility: CLINIC | Age: 69
End: 2024-05-30
Payer: MEDICARE

## 2024-05-30 DIAGNOSIS — I10 ESSENTIAL HYPERTENSION: Primary | ICD-10-CM

## 2024-05-30 DIAGNOSIS — E03.9 HYPOTHYROIDISM, UNSPECIFIED TYPE: ICD-10-CM

## 2024-05-30 NOTE — TELEPHONE ENCOUNTER
----- Message from Ambika Caballero RN sent at 2024 10:46 AM CDT -----  Regardinmo follow up needed in October  Please call patient to schedule f/u appt in October

## 2024-05-31 ENCOUNTER — TELEPHONE (OUTPATIENT)
Dept: PRIMARY CARE CLINIC | Facility: CLINIC | Age: 69
End: 2024-05-31
Payer: MEDICARE

## 2024-07-03 ENCOUNTER — PATIENT MESSAGE (OUTPATIENT)
Dept: PRIMARY CARE CLINIC | Facility: CLINIC | Age: 69
End: 2024-07-03
Payer: MEDICARE

## 2024-07-09 ENCOUNTER — PATIENT MESSAGE (OUTPATIENT)
Dept: PRIMARY CARE CLINIC | Facility: CLINIC | Age: 69
End: 2024-07-09
Payer: MEDICARE

## 2024-07-10 ENCOUNTER — HOSPITAL ENCOUNTER (OUTPATIENT)
Dept: RADIOLOGY | Facility: HOSPITAL | Age: 69
Discharge: HOME | End: 2024-07-10
Attending: INTERNAL MEDICINE
Payer: MEDICARE

## 2024-07-10 VITALS — BODY MASS INDEX: 28.51 KG/M2 | HEIGHT: 61 IN | WEIGHT: 151 LBS

## 2024-07-10 DIAGNOSIS — Z12.31 ENCOUNTER FOR SCREENING MAMMOGRAM FOR MALIGNANT NEOPLASM OF BREAST: ICD-10-CM

## 2024-07-10 PROCEDURE — 77067 SCR MAMMO BI INCL CAD: CPT | Mod: TC

## 2024-07-22 ENCOUNTER — HOSPITAL ENCOUNTER (OUTPATIENT)
Dept: RADIOLOGY | Facility: HOSPITAL | Age: 69
Discharge: HOME | End: 2024-07-22
Attending: HOSPITALIST
Payer: MEDICARE

## 2024-07-22 DIAGNOSIS — Z85.09 HISTORY OF CHOLANGIOCARCINOMA: ICD-10-CM

## 2024-07-22 PROCEDURE — 74177 CT ABD & PELVIS W/CONTRAST: CPT | Mod: 26,,, | Performed by: RADIOLOGY

## 2024-07-22 PROCEDURE — 71260 CT THORAX DX C+: CPT | Mod: TC

## 2024-07-22 PROCEDURE — 71260 CT THORAX DX C+: CPT | Mod: 26,,, | Performed by: RADIOLOGY

## 2024-07-22 PROCEDURE — 74177 CT ABD & PELVIS W/CONTRAST: CPT | Mod: TC

## 2024-07-22 PROCEDURE — 25500020 PHARM REV CODE 255: Performed by: HOSPITALIST

## 2024-07-22 PROCEDURE — A9698 NON-RAD CONTRAST MATERIALNOC: HCPCS | Performed by: HOSPITALIST

## 2024-07-22 RX ADMIN — IOHEXOL 100 ML: 350 INJECTION, SOLUTION INTRAVENOUS at 12:07

## 2024-07-22 RX ADMIN — BARIUM SULFATE 450 ML: 20 SUSPENSION ORAL at 11:07

## 2024-07-24 ENCOUNTER — OFFICE VISIT (OUTPATIENT)
Dept: HEMATOLOGY/ONCOLOGY | Facility: CLINIC | Age: 69
End: 2024-07-24
Payer: MEDICARE

## 2024-07-24 VITALS
WEIGHT: 158.19 LBS | OXYGEN SATURATION: 96 % | BODY MASS INDEX: 29.89 KG/M2 | HEART RATE: 86 BPM | DIASTOLIC BLOOD PRESSURE: 63 MMHG | SYSTOLIC BLOOD PRESSURE: 138 MMHG

## 2024-07-24 DIAGNOSIS — Z85.09 HISTORY OF CHOLANGIOCARCINOMA: Primary | ICD-10-CM

## 2024-07-24 DIAGNOSIS — I10 ESSENTIAL HYPERTENSION: ICD-10-CM

## 2024-07-24 PROCEDURE — 99999 PR PBB SHADOW E&M-EST. PATIENT-LVL III: CPT | Mod: PBBFAC,,, | Performed by: HOSPITALIST

## 2024-07-24 PROCEDURE — 3288F FALL RISK ASSESSMENT DOCD: CPT | Mod: CPTII,S$GLB,, | Performed by: HOSPITALIST

## 2024-07-24 PROCEDURE — 1159F MED LIST DOCD IN RCRD: CPT | Mod: CPTII,S$GLB,, | Performed by: HOSPITALIST

## 2024-07-24 PROCEDURE — 1157F ADVNC CARE PLAN IN RCRD: CPT | Mod: CPTII,S$GLB,, | Performed by: HOSPITALIST

## 2024-07-24 PROCEDURE — 1101F PT FALLS ASSESS-DOCD LE1/YR: CPT | Mod: CPTII,S$GLB,, | Performed by: HOSPITALIST

## 2024-07-24 PROCEDURE — 4010F ACE/ARB THERAPY RXD/TAKEN: CPT | Mod: CPTII,S$GLB,, | Performed by: HOSPITALIST

## 2024-07-24 PROCEDURE — 3075F SYST BP GE 130 - 139MM HG: CPT | Mod: CPTII,S$GLB,, | Performed by: HOSPITALIST

## 2024-07-24 PROCEDURE — 3008F BODY MASS INDEX DOCD: CPT | Mod: CPTII,S$GLB,, | Performed by: HOSPITALIST

## 2024-07-24 PROCEDURE — 99214 OFFICE O/P EST MOD 30 MIN: CPT | Mod: S$GLB,,, | Performed by: HOSPITALIST

## 2024-07-24 PROCEDURE — 3078F DIAST BP <80 MM HG: CPT | Mod: CPTII,S$GLB,, | Performed by: HOSPITALIST

## 2024-07-24 PROCEDURE — 1126F AMNT PAIN NOTED NONE PRSNT: CPT | Mod: CPTII,S$GLB,, | Performed by: HOSPITALIST

## 2024-07-24 NOTE — ASSESSMENT & PLAN NOTE
Remains LUIS EDUARDO with normal labs and imaging. FU 3 months CT torso; space to q6 months after 2 years

## 2024-07-24 NOTE — PROGRESS NOTES
MEDICAL ONCOLOGY FOLLOW-UP VISIT.     Best Contact Phone Number(s): [phone] (home)      Cancer/Stage/TNM:    Cancer Staging   History of cholangiocarcinoma  Staging form: Intrahepatic Bile Duct, AJCC 8th Edition  - Pathologic: Stage II (pT2, pN0, cM0) - Unsigned       Reason for visit: History of IHCC sp resection and adjuvant chemotherapy    History of Present Illness: Rehana Whitten is a 69 y.o. female remote history of HBV who was found to have stage II pT2N0 intrahepatic cholangiocarcinoma in the setting of abdominal pain who underwent R0 resection 12/12/23. She completed 8 cycles of adjuvant capecitabine 07/2023. She presents to medical oncology clinic for routine surveillance.      Interval History:     Had recent screening mammogram; awaiting diagnostic mammogram. Otherwise no new concerns or complaints. Appetite is good. No N/V. Reports normal bowel movements. Weight is stable. No CP, SOB or cough.         Oncology History   History of cholangiocarcinoma   11/9/2022 Imaging Significant Findings    Abdominal US in the setting of abdominal pain shows a large heterogeneous mass in the right lobe, measures 7.2 x 5.3 x 5.9 cm.     11/9/2022 Imaging Significant Findings    CT a/p:  Irregular right hepatic lobe lesion demonstrating delayed central enhancement and overlying capsular retraction.  Capsular retraction can be seen in cholangiocarcinoma and sclerosing hepatic hemangiomas.  Differential includes metastatic disease and other primary hepatic neoplasms.     11/14/2022 Tumor Markers    CA 19-9: 19.1  AFP: 5.5     11/16/2022 Imaging Significant Findings    CT chest:  1. No specific CT evidence of metastatic disease within the chest.  2. Left lower lobe punctate micro nodules.  Attention on continued surveillance imaging.  3. Other findings as above.     11/30/2022 Biopsy    Percutaneous biopsy of liver mass: Moderately differentiated adenocarcinoma. CK7+ Negative for CK20, CDX2, GATA3, TTF1,  and PAX 8. Thought consistent with pancraetobiliary primary.     12/12/2022 Surgery    Resection of right hepatic lobe tumor. Pathology shows moderately differentiated adenocarcinoma up to 6.5cm in size c/w intrahepatic cholangiocarcinoma. Tumor was confined to the hepatic parenchyma with associated  LVI but no PNI. R0 resection with 6mm margins. 06/ LN involved. [id number]     1/13/2023 - 1/13/2023 Chemotherapy    Treatment Summary   Plan Name: OP CAPECITABINE 1250MG/M2 BID Q3W  Treatment Goal: Control  Status: Inactive  Start Date: 1/13/2023  End Date: 1/13/2023  Provider: Bennie Moore MD  Chemotherapy: capecitabine (XELODA) tablet 1,650 mg, 2,000 mg, Oral, 2 times daily, 1 of 1 cycle, Start date: 2/24/2023, End date: --     2/24/2023 -  Chemotherapy    C2D1 capectiabine; DR to 1500mg po bid       3/17/2023 -  Chemotherapy    C3D1 capecitabine 1650 po bid       4/7/2023 -  Chemotherapy    C4D1 capecitabine 1650 po bid       4/28/2023 -  Chemotherapy    C5 capecitabine 1650 po bid       5/15/2023 Imaging Significant Findings    5/15/23: CT torso:  - Postoperative changes from right hepatectomy, cholecystectomy, and portal lymphadenectomy for intrahepatic cholangiocarcinoma.  - No lymphadenopathy.  - New subcentimeter hypodensity in hepatic segment 4B, concerning for metastases.  - Unchanged pulmonary nodules.  - Other findings as described.     7/18/2023 Imaging Significant Findings    CT torso:  1. Postop change of right hepatectomy, cholecystectomy, and portal lymphadenectomy for intrahepatic cholangiocarcinoma.  2. Previously noted subcentimeter hypodensity in the hepatic IVB segment is not well visualized on today's examination.  No new focal hepatic lesions.  3. Stable bilateral pulmonary nodules.  4. Additional findings, as above.        10/17/2023 Imaging Significant Findings    MRI Abdomen  Impression:   Postsurgical changes of cholecystectomy and right hepatectomy in this patient with history of  cholangiocarcinoma.  No abnormal enhancing lesions to suggest residual or recurrent disease.    CT Chest  Impression:  1. Grossly stable pulmonary exam with multiple bilateral pulmonary nodules, the largest measuring 4 mm.      Imaging Significant Findings    4/17/24 CT Chest  Impression:  1. Grossly stable bilateral pulmonary nodules with the largest measuring 5 mm located in the right middle lobe.  2. No evidence of new pulmonary nodule or mass.  3. Postoperative changes of partial right hepatectomy with no focal lesion in this noncontrast study.  For more details please refer to MRI of the abdomen with contrast performed concomitantly.  4. Additional findings.  Please see the above discussion.    4/17/24 MR Abdomen  Impression:  Postsurgical change including cholecystectomy and right hepatectomy in this patient with history of cholangiocarcinoma.  No abnormal enhancing lesions to suggest residual or recurrent disease.     7/22/2024 Imaging Significant Findings    CT torso 7/22/24  Impression:  - In this patient with history of cholangiocarcinoma status post right hepatectomy, no suspicious lesion to suggest recurrence or metastasis.  - Stable segment 4 hypodense lesion.  This lesion described on prior MRI as benign, like atypical hemangioma or cyst.  - Stable 5 mm nodule in the right middle lobe.            Physical Exam:   /63 (BP Location: Left arm, Patient Position: Sitting, BP Method: Large (Automatic))   Pulse 86   Wt 71.7 kg (158 lb 2.9 oz)   LMP  (LMP Unknown)   SpO2 96%   BMI 29.89 kg/m²      ECOG Performance Status: (foot note - ECOG PS provided by [employer]cology [employer]) 0 - Asymptomatic    Physical Exam  Constitutional:       General: She is not in acute distress.     Appearance: She is normal weight.   HENT:      Head: Normocephalic.      Mouth/Throat:      Mouth: Mucous membranes are moist.      Pharynx: Oropharynx is clear.   Eyes:      General: No scleral icterus.      Conjunctiva/sclera: Conjunctivae normal.   Cardiovascular:      Rate and Rhythm: Normal rate and regular rhythm.      Heart sounds: No murmur heard.     No gallop.   Pulmonary:      Effort: Pulmonary effort is normal. No respiratory distress.      Breath sounds: Normal breath sounds. No wheezing.   Abdominal:      General: There is no distension.      Palpations: Abdomen is soft.   Musculoskeletal:         General: Normal range of motion.      Cervical back: Normal range of motion and neck supple.      Right lower leg: No edema.      Left lower leg: No edema.   Skin:     General: Skin is warm.      Coloration: Skin is not jaundiced.      Findings: No bruising or rash.   Neurological:      General: No focal deficit present.      Mental Status: She is alert and oriented to person, place, and time.      Motor: No weakness.   Psychiatric:         Mood and Affect: Mood normal.         Behavior: Behavior normal.         Thought Content: Thought content normal.           Labs:   No results found for this or any previous visit (from the past 48 hour(s)).          Lab Results   Component Value Date     07/22/2024    K 4.0 07/22/2024    CREATININE 0.8 07/22/2024    WBC 5.75 07/22/2024    HGB 13.4 07/22/2024     07/22/2024    AST 21 07/22/2024    ALT 24 07/22/2024    ALKPHOS 105 07/22/2024    BILITOT 1.0 07/22/2024          Imaging:     CT Chest Abdomen Pelvis With IV Contrast (XPD) Routine Oral Contrast  Narrative: EXAMINATION:  CT CHEST ABDOMEN PELVIS WITH IV CONTRAST (XPD)    CLINICAL HISTORY:  Gallbladder/biliary cancer, staging; Personal history of malignant neoplasm of other digestive organs    TECHNIQUE:  Axial images of the chest, abdomen, and pelvis were acquired after the use of 100 cc Skji320 IV contrast. 450 mL of barium sulfate oral contrast.  Coronal and sagittal reconstructions were also obtained    COMPARISON:  CT chest 04/17/2024, MRI abdomen 04/17/2024, CT chest and pelvis  01/22/2024    FINDINGS:  Thoracic soft tissues: Normal thyroid.    Aorta: Thoracic aorta is normal in caliber and contour with mild calcific atherosclerosis.    Heart: Normal in size. No pericardial effusion. Mild calcific coronary atherosclerosis.    Estella/Mediastinum: No significant mediastinal, hilar, or axillary lymphadenopathy    Lungs: Trachea and bronchi are patent.  Lungs are well aerated, without consolidation or pleural fluid. Stable 5 mm nodule in the right middle lobe    Liver: Postoperative changes of partial right hepatectomy.  Focal hypodensity in segment 4 (axial series 3 image 45).  On prior MRI, this lesion is bright on T2 and demonstrated partial filling on prior MRI delayed 5 minutes images without pseudo capsule, washout or diffusion restriction.  04/17/2024).  No new hepatic lesion.    Gallbladder: Surgically absent.    Bile Ducts: No evidence of dilated ducts.    Pancreas: No mass or peripancreatic fat stranding.    Spleen: Unremarkable.    Stomach and duodenum: Unremarkable.    Adrenals: Unremarkable.    Kidneys/ Ureters: Normal in size and location. Normal enhancement. No hydronephrosis or nephrolithiasis. No ureteral dilatation.    Bladder: No evidence of wall thickening.    Reproductive organs: Uterus and adnexa are unremarkable.    GI/Mesentery: Small bowel is normal in caliber with no evidence of obstruction.  No evidence of inflammation or wall thickening.  Colon demonstrates no focal wall thickening.  No free air.  No ascites.    Lymph nodes: No lymphadenapathy.    Abdominal wall: Postoperative changes.    Vasculature: No aneurysm. No significant calcific atherosclerosis.    Bones: No acute fracture. No suspicious osseous lesions.  Impression: In this patient with history of cholangiocarcinoma status post right hepatectomy, no suspicious lesion to suggest recurrence or metastasis.    Stable segment 4 hypodense lesion.  This lesion described on prior MRI as benign, like atypical  hemangioma or cyst.    Stable 5 mm nodule in the right middle lobe.    Additional findings as above.    Electronically signed by resident: Priscila Huerta  Date:    07/22/2024  Time:    14:59    Electronically signed by: Alexx Street MD  Date:    07/22/2024  Time:    17:26            Diagnoses:       1. History of cholangiocarcinoma    2. Essential hypertension                              Assessment and Plan:     1. History of cholangiocarcinoma  Overview:  Stage II (pT2N0) IHCC sp R0 resection 12/12/23. Started adjuvant capecitabine x6 months per BILCAP 2/3/23. Did not tolerated 1250mg/m2 dose; DR down to 1000mg/m2. Copmleted treatment 07/2023. Note made of indeterminate subcentimeter hypodensity in segment 4b during mid treatment imaging. Not detectable on post treatment scans    Assessment & Plan:  Remains LUIS EDUARDO with normal labs and imaging. FU 3 months CT torso; space to q6 months after 2 years    Orders:  -     CT Chest Abdomen Pelvis With IV Contrast (XPD) Routine Oral Contrast; Future; Expected date: 07/24/2024  -     Cancer Antigen 19-9; Future; Expected date: 07/24/2024  -     CBC Auto Differential; Future; Expected date: 07/24/2024  -     Comprehensive Metabolic Panel; Standing    2. Essential hypertension  Overview:  Home medications include HCTZ and candesartan.                                 Route Chart for Scheduling    Med Onc Chart Routing      Follow up with physician 3 months.   Follow up with [facility]    Infusion scheduling note    Injection scheduling note    Labs CBC, CMP and CA 19-9   Scheduling:  Preferred lab:  Lab interval:     Imaging CT chest abdomen pelvis      Pharmacy appointment    Other referrals                          Bennie Moore MD  Hematology/Oncology  [facility] - [facility]

## 2024-08-05 ENCOUNTER — HOSPITAL ENCOUNTER (OUTPATIENT)
Dept: RADIOLOGY | Facility: HOSPITAL | Age: 69
Discharge: HOME | End: 2024-08-05
Attending: INTERNAL MEDICINE
Payer: MEDICARE

## 2024-08-05 DIAGNOSIS — R92.8 ABNORMAL MAMMOGRAM: ICD-10-CM

## 2024-08-05 PROCEDURE — 77061 BREAST TOMOSYNTHESIS UNI: CPT | Mod: TC,RT

## 2024-08-05 PROCEDURE — 77065 DX MAMMO INCL CAD UNI: CPT | Mod: 26,RT,, | Performed by: RADIOLOGY

## 2024-08-05 PROCEDURE — 77065 DX MAMMO INCL CAD UNI: CPT | Mod: TC,RT

## 2024-08-05 PROCEDURE — 77061 BREAST TOMOSYNTHESIS UNI: CPT | Mod: 26,RT,, | Performed by: RADIOLOGY

## 2024-09-06 DIAGNOSIS — Z85.09 HISTORY OF CHOLANGIOCARCINOMA: Primary | ICD-10-CM

## 2024-09-09 RX ORDER — ONDANSETRON HYDROCHLORIDE 8 MG/1
8 TABLET, FILM COATED ORAL EVERY 8 HOURS PRN
Qty: 90 TABLET | Refills: 3 | Status: SHIPPED | OUTPATIENT
Start: 2024-09-09

## 2024-09-24 ENCOUNTER — LAB VISIT (OUTPATIENT)
Dept: LAB | Facility: HOSPITAL | Age: 69
End: 2024-09-24
Attending: INTERNAL MEDICINE
Payer: MEDICARE

## 2024-09-24 DIAGNOSIS — I10 ESSENTIAL HYPERTENSION: ICD-10-CM

## 2024-09-24 DIAGNOSIS — E03.9 HYPOTHYROIDISM, UNSPECIFIED TYPE: ICD-10-CM

## 2024-09-24 LAB
ALBUMIN SERPL BCP-MCNC: 4.1 G/DL (ref 3.5–5.2)
ALP SERPL-CCNC: 110 U/L (ref 55–135)
ALT SERPL W/O P-5'-P-CCNC: 21 U/L (ref 10–44)
ANION GAP SERPL CALC-SCNC: 11 MMOL/L (ref 8–16)
AST SERPL-CCNC: 21 U/L (ref 10–40)
BILIRUB SERPL-MCNC: 1.1 MG/DL (ref 0.1–1)
BUN SERPL-MCNC: 13 MG/DL (ref 8–23)
CALCIUM SERPL-MCNC: 9.8 MG/DL (ref 8.7–10.5)
CHLORIDE SERPL-SCNC: 100 MMOL/L (ref 95–110)
CO2 SERPL-SCNC: 27 MMOL/L (ref 23–29)
CREAT SERPL-MCNC: 0.9 MG/DL (ref 0.5–1.4)
EST. GFR  (NO RACE VARIABLE): >60 ML/MIN/1.73 M^2
GLUCOSE SERPL-MCNC: 114 MG/DL (ref 70–110)
POTASSIUM SERPL-SCNC: 4 MMOL/L (ref 3.5–5.1)
PROT SERPL-MCNC: 7.3 G/DL (ref 6–8.4)
SODIUM SERPL-SCNC: 138 MMOL/L (ref 136–145)
TSH SERPL DL<=0.005 MIU/L-ACNC: 1.66 UIU/ML (ref 0.4–4)

## 2024-09-24 PROCEDURE — 80053 COMPREHEN METABOLIC PANEL: CPT | Performed by: INTERNAL MEDICINE

## 2024-09-24 PROCEDURE — 36415 COLL VENOUS BLD VENIPUNCTURE: CPT | Performed by: INTERNAL MEDICINE

## 2024-09-24 PROCEDURE — 84443 ASSAY THYROID STIM HORMONE: CPT | Performed by: INTERNAL MEDICINE

## 2024-10-01 ENCOUNTER — OFFICE VISIT (OUTPATIENT)
Dept: PRIMARY CARE CLINIC | Facility: CLINIC | Age: 69
End: 2024-10-01
Payer: MEDICARE

## 2024-10-01 VITALS
HEART RATE: 75 BPM | SYSTOLIC BLOOD PRESSURE: 126 MMHG | OXYGEN SATURATION: 97 % | BODY MASS INDEX: 29.13 KG/M2 | HEIGHT: 61 IN | WEIGHT: 154.31 LBS | DIASTOLIC BLOOD PRESSURE: 68 MMHG

## 2024-10-01 DIAGNOSIS — I10 BENIGN ESSENTIAL HYPERTENSION: ICD-10-CM

## 2024-10-01 DIAGNOSIS — F33.42 MDD (MAJOR DEPRESSIVE DISORDER), RECURRENT, IN FULL REMISSION: ICD-10-CM

## 2024-10-01 DIAGNOSIS — Z85.09 HISTORY OF CHOLANGIOCARCINOMA: ICD-10-CM

## 2024-10-01 DIAGNOSIS — F41.1 GAD (GENERALIZED ANXIETY DISORDER): ICD-10-CM

## 2024-10-01 DIAGNOSIS — E03.9 HYPOTHYROIDISM, UNSPECIFIED TYPE: ICD-10-CM

## 2024-10-01 DIAGNOSIS — Z23 NEED FOR VACCINATION: ICD-10-CM

## 2024-10-01 DIAGNOSIS — M65.341 TRIGGER RING FINGER OF RIGHT HAND: ICD-10-CM

## 2024-10-01 DIAGNOSIS — G47.00 INSOMNIA, UNSPECIFIED TYPE: Primary | ICD-10-CM

## 2024-10-01 DIAGNOSIS — E78.5 HYPERLIPIDEMIA, UNSPECIFIED HYPERLIPIDEMIA TYPE: ICD-10-CM

## 2024-10-01 PROCEDURE — 90653 IIV ADJUVANT VACCINE IM: CPT | Mod: S$GLB,,, | Performed by: INTERNAL MEDICINE

## 2024-10-01 PROCEDURE — 99999 PR PBB SHADOW E&M-EST. PATIENT-LVL IV: CPT | Mod: PBBFAC,,, | Performed by: INTERNAL MEDICINE

## 2024-10-01 PROCEDURE — 3288F FALL RISK ASSESSMENT DOCD: CPT | Mod: CPTII,S$GLB,, | Performed by: INTERNAL MEDICINE

## 2024-10-01 PROCEDURE — 1125F AMNT PAIN NOTED PAIN PRSNT: CPT | Mod: CPTII,S$GLB,, | Performed by: INTERNAL MEDICINE

## 2024-10-01 PROCEDURE — 1159F MED LIST DOCD IN RCRD: CPT | Mod: CPTII,S$GLB,, | Performed by: INTERNAL MEDICINE

## 2024-10-01 PROCEDURE — 4010F ACE/ARB THERAPY RXD/TAKEN: CPT | Mod: CPTII,S$GLB,, | Performed by: INTERNAL MEDICINE

## 2024-10-01 PROCEDURE — 1160F RVW MEDS BY RX/DR IN RCRD: CPT | Mod: CPTII,S$GLB,, | Performed by: INTERNAL MEDICINE

## 2024-10-01 PROCEDURE — 3074F SYST BP LT 130 MM HG: CPT | Mod: CPTII,S$GLB,, | Performed by: INTERNAL MEDICINE

## 2024-10-01 PROCEDURE — 3078F DIAST BP <80 MM HG: CPT | Mod: CPTII,S$GLB,, | Performed by: INTERNAL MEDICINE

## 2024-10-01 PROCEDURE — 3008F BODY MASS INDEX DOCD: CPT | Mod: CPTII,S$GLB,, | Performed by: INTERNAL MEDICINE

## 2024-10-01 PROCEDURE — G0008 ADMIN INFLUENZA VIRUS VAC: HCPCS | Mod: S$GLB,,, | Performed by: INTERNAL MEDICINE

## 2024-10-01 PROCEDURE — 1101F PT FALLS ASSESS-DOCD LE1/YR: CPT | Mod: CPTII,S$GLB,, | Performed by: INTERNAL MEDICINE

## 2024-10-01 PROCEDURE — 1157F ADVNC CARE PLAN IN RCRD: CPT | Mod: CPTII,S$GLB,, | Performed by: INTERNAL MEDICINE

## 2024-10-01 PROCEDURE — 99214 OFFICE O/P EST MOD 30 MIN: CPT | Mod: S$GLB,,, | Performed by: INTERNAL MEDICINE

## 2024-10-01 RX ORDER — CITALOPRAM 10 MG/1
10 TABLET ORAL DAILY
Start: 2024-10-01 | End: 2025-10-01

## 2024-10-01 NOTE — PROGRESS NOTES
Subjective:       Patient ID: Rehana Whitten is a 69 y.o. female.    Chief Complaint: Annual Exam    HPI  Started going to the gym last week - cardio and strengthening classes. Feels sore all over.  Does feel tired some days like she has to take a nap. Still have trouble on and off sleeping at night. No longer taking anything to go to sleep and for years she did previously. Takes hydroxyzine 25mg nightly prn - may feel a little bit of a hangover the next day.   Good appetite.     DIXIE/MDD - celexa 20mg daily.   Tried coming off of it a long time ago and ended up being on it again.   Doing well in terms of anxiety/depression currently.   Reports doesn't like the weight on.     HTN - chlorthalidone 25mg daily and candesartan 8mg daily.   hypoK - kdur 10mEq daily.   Home BPs usually 130s/60s. No lightheadedness/CP/palpations/SOB.    Hypothyroidism - synthroid 88mcg daily.   TSH 9/24/24 WNL.    HLD - crestor 40mg qhs.   Mild aortic atherosclerosis on CT.     Reports R ring finger sometimes get stuck and would have to pop it back open. Happening more frequently. Daily w/ holding toothbrush.    H/o cholangiocarcinoma.   Reviewed CT C/A/P 7/2024.  Liver: Postoperative changes of partial right hepatectomy.  Focal hypodensity in segment 4 (axial series 3 image 45).  On prior MRI, this lesion is bright on T2 and demonstrated partial filling on prior MRI delayed 5 minutes images without pseudo capsule, washout or diffusion restriction.  04/17/2024).  No new hepatic lesion.  Gallbladder: Surgically absent.    Review of Systems   Constitutional:  Negative for chills and fever.   HENT:  Positive for postnasal drip. Negative for congestion, rhinorrhea and sore throat.    Eyes:  Negative for visual disturbance (had laser after cataract surgery.).   Respiratory:  Negative for cough and shortness of breath.    Cardiovascular:  Negative for chest pain, palpitations and leg swelling.   Gastrointestinal:  Negative for  "abdominal pain, blood in stool, constipation, diarrhea, nausea and vomiting.   Endocrine: Negative for polydipsia and polyuria.   Genitourinary:  Negative for dysuria and frequency.   Musculoskeletal:  Positive for arthralgias (intermittently. does not stop her from doing anything.).        R 4th finger has trigger finger   Skin:  Negative for rash.   Neurological:  Negative for dizziness, weakness, light-headedness, numbness and headaches.   Hematological:  Does not bruise/bleed easily.   Psychiatric/Behavioral:  Positive for sleep disturbance. Negative for dysphoric mood. The patient is not nervous/anxious.          Objective:      Physical Exam    /68   Pulse 75   Ht 5' 1" (1.549 m)   Wt 70 kg (154 lb 5.2 oz)   LMP  (LMP Unknown)   SpO2 97%   BMI 29.16 kg/m²     GEN - A+OX4, NAD   HEENT - PERRL, EOMI, OP clear. MMM.   Neck - No thyromegaly or cervical LAD. No thyroid masses felt.  CV - RRR, no m/r   Chest - CTAB, no wheezing or rhonchi  Abd - S/NT/ND/+BS.   Ext - 2+BDP and radial pulses. No LE edema.   Neuro - 5/5 BUE and BLE strength. Normal gait. No spinal tenderness to palpation.   LN - No axillary or inguinal LAD appreciated.  Skin - No rash. Tanned skin.     Previous labs reviewed.     Assessment/Plan     Rehana Mace" was seen today for annual exam.    Diagnoses and all orders for this visit:    Insomnia, unspecified type - managing. Thinks will improve w/ exercise, which she just started this past week.    MDD (major depressive disorder), recurrent, in full remission/DIXIE (generalized anxiety disorder) - trial of decreasing celexa from 20 to 10mg daily. Previously tried to stop it altogether and ended up having to be back on it. May also help w/ weight.     Benign essential hypertension - Stable and controlled. Continue current medications.    Hyperlipidemia, unspecified hyperlipidemia type - cont crestor 40.    Hypothyroidism, unspecified type - cont synthroid. TSH WNL.     Trigger ring " finger of right hand - referral for injection.  -     Ambulatory referral to Orthopedics; Future    Need for vaccination  -     influenza (adjuvanted) (Fluad) 45 mcg/0.5 mL IM vaccine (> or = 64 yo) 0.5 mL    History of cholangiocarcinoma - doing well. Surveillance w/ H/O.     Other orders  -     citalopram (CELEXA) 10 MG tablet; Take 1 tablet (10 mg total) by mouth once daily.        Follow up in about 6 months (around 4/1/2025).      Bernie Valverde MD  Department of Internal Medicine - [facility]  10:22 AM

## 2024-10-11 ENCOUNTER — PATIENT MESSAGE (OUTPATIENT)
Dept: PRIMARY CARE CLINIC | Facility: CLINIC | Age: 69
End: 2024-10-11
Payer: MEDICARE

## 2024-10-11 RX ORDER — CITALOPRAM 10 MG/1
10 TABLET ORAL DAILY
Start: 2024-10-11 | End: 2025-10-11

## 2024-10-17 ENCOUNTER — HOSPITAL ENCOUNTER (OUTPATIENT)
Dept: RADIOLOGY | Facility: HOSPITAL | Age: 69
Discharge: HOME | End: 2024-10-17
Attending: HOSPITALIST
Payer: MEDICARE

## 2024-10-17 DIAGNOSIS — Z85.09 HISTORY OF CHOLANGIOCARCINOMA: ICD-10-CM

## 2024-10-17 PROCEDURE — 71260 CT THORAX DX C+: CPT | Mod: TC

## 2024-10-17 PROCEDURE — A9698 NON-RAD CONTRAST MATERIALNOC: HCPCS | Performed by: HOSPITALIST

## 2024-10-17 PROCEDURE — 74177 CT ABD & PELVIS W/CONTRAST: CPT | Mod: TC

## 2024-10-17 PROCEDURE — 25500020 PHARM REV CODE 255: Performed by: HOSPITALIST

## 2024-10-17 RX ADMIN — IOHEXOL 75 ML: 350 INJECTION, SOLUTION INTRAVENOUS at 03:10

## 2024-10-17 RX ADMIN — BARIUM SULFATE 450 ML: 20 SUSPENSION ORAL at 03:10

## 2024-10-21 ENCOUNTER — PATIENT MESSAGE (OUTPATIENT)
Dept: PRIMARY CARE CLINIC | Facility: CLINIC | Age: 69
End: 2024-10-21
Payer: MEDICARE

## 2024-10-23 ENCOUNTER — OFFICE VISIT (OUTPATIENT)
Dept: HEMATOLOGY/ONCOLOGY | Facility: CLINIC | Age: 69
End: 2024-10-23
Payer: MEDICARE

## 2024-10-23 VITALS
HEART RATE: 85 BPM | SYSTOLIC BLOOD PRESSURE: 150 MMHG | OXYGEN SATURATION: 95 % | BODY MASS INDEX: 29.99 KG/M2 | TEMPERATURE: 98 F | DIASTOLIC BLOOD PRESSURE: 70 MMHG | WEIGHT: 158.75 LBS | RESPIRATION RATE: 18 BRPM

## 2024-10-23 DIAGNOSIS — Z85.09 HISTORY OF CHOLANGIOCARCINOMA: Primary | ICD-10-CM

## 2024-10-23 PROCEDURE — 99999 PR PBB SHADOW E&M-EST. PATIENT-LVL III: CPT | Mod: PBBFAC,,, | Performed by: HOSPITALIST

## 2024-10-23 RX ORDER — CITALOPRAM 20 MG/1
20 TABLET, FILM COATED ORAL DAILY
Start: 2024-10-23 | End: 2025-10-23

## 2024-10-23 NOTE — ASSESSMENT & PLAN NOTE
Remains LUIS EDUARDO. Nearing 2 years out from her surgery. Will follow up in 3 months with CT and labs. Afterwards will spread to 6 month surveillance.

## 2024-10-23 NOTE — PROGRESS NOTES
MEDICAL ONCOLOGY FOLLOW-UP VISIT.     Best Contact Phone Number(s): [phone] (home)      Cancer/Stage/TNM:    Cancer Staging   History of cholangiocarcinoma  Staging form: Intrahepatic Bile Duct, AJCC 8th Edition  - Pathologic: Stage II (pT2, pN0, cM0) - Unsigned       Reason for visit: History of IHCC sp resection and adjuvant chemotherapy    History of Present Illness: Rehana Whitten is a 69 y.o. female remote history of HBV who was found to have stage II pT2N0 intrahepatic cholangiocarcinoma in the setting of abdominal pain who underwent R0 resection 12/12/22. She completed 8 cycles of adjuvant capecitabine 07/2023. She presents to medical oncology clinic for routine surveillance.      Interval History:     Currently year 2 of surveillance. No new concerns today. Has joined [employer]; going to the gym a few times a week. Has some occaisional nausea, no emesis. Appetite is good. No abdominal pain. Reports normal bowel movements. No CP SOB or cough. No FC.     FU 3 months CT scan. Then transistion to 6 month scans     Oncology History   History of cholangiocarcinoma   11/9/2022 Imaging Significant Findings    Abdominal US in the setting of abdominal pain shows a large heterogeneous mass in the right lobe, measures 7.2 x 5.3 x 5.9 cm.     11/9/2022 Imaging Significant Findings    CT a/p:  Irregular right hepatic lobe lesion demonstrating delayed central enhancement and overlying capsular retraction.  Capsular retraction can be seen in cholangiocarcinoma and sclerosing hepatic hemangiomas.  Differential includes metastatic disease and other primary hepatic neoplasms.     11/14/2022 Tumor Markers    CA 19-9: 19.1  AFP: 5.5     11/16/2022 Imaging Significant Findings    CT chest:  1. No specific CT evidence of metastatic disease within the chest.  2. Left lower lobe punctate micro nodules.  Attention on continued surveillance imaging.  3. Other findings as above.     11/30/2022 Biopsy     Percutaneous biopsy of liver mass: Moderately differentiated adenocarcinoma. CK7+ Negative for CK20, CDX2, GATA3, TTF1, and PAX 8. Thought consistent with pancraetobiliary primary.     12/12/2022 Surgery    Resection of right hepatic lobe tumor. Pathology shows moderately differentiated adenocarcinoma up to 6.5cm in size c/w intrahepatic cholangiocarcinoma. Tumor was confined to the hepatic parenchyma with associated  LVI but no PNI. R0 resection with 6mm margins. 06/ LN involved. [id number]     1/13/2023 - 1/13/2023 Chemotherapy    Treatment Summary   Plan Name: OP CAPECITABINE 1250MG/M2 BID Q3W  Treatment Goal: Control  Status: Inactive  Start Date: 1/13/2023  End Date: 1/13/2023  Provider: Bennie Moore MD  Chemotherapy: capecitabine (XELODA) tablet 1,650 mg, 2,000 mg, Oral, 2 times daily, 1 of 1 cycle, Start date: 2/24/2023, End date: --     2/24/2023 -  Chemotherapy    C2D1 capectiabine; DR to 1500mg po bid       3/17/2023 -  Chemotherapy    C3D1 capecitabine 1650 po bid       4/7/2023 -  Chemotherapy    C4D1 capecitabine 1650 po bid       4/28/2023 -  Chemotherapy    C5 capecitabine 1650 po bid       5/15/2023 Imaging Significant Findings    5/15/23: CT torso:  - Postoperative changes from right hepatectomy, cholecystectomy, and portal lymphadenectomy for intrahepatic cholangiocarcinoma.  - No lymphadenopathy.  - New subcentimeter hypodensity in hepatic segment 4B, concerning for metastases.  - Unchanged pulmonary nodules.  - Other findings as described.     7/18/2023 Imaging Significant Findings    CT torso:  1. Postop change of right hepatectomy, cholecystectomy, and portal lymphadenectomy for intrahepatic cholangiocarcinoma.  2. Previously noted subcentimeter hypodensity in the hepatic IVB segment is not well visualized on today's examination.  No new focal hepatic lesions.  3. Stable bilateral pulmonary nodules.  4. Additional findings, as above.        10/17/2023 Imaging Significant Findings    MRI  Abdomen  Impression:   Postsurgical changes of cholecystectomy and right hepatectomy in this patient with history of cholangiocarcinoma.  No abnormal enhancing lesions to suggest residual or recurrent disease.    CT Chest  Impression:  1. Grossly stable pulmonary exam with multiple bilateral pulmonary nodules, the largest measuring 4 mm.      Imaging Significant Findings    4/17/24 CT Chest  Impression:  1. Grossly stable bilateral pulmonary nodules with the largest measuring 5 mm located in the right middle lobe.  2. No evidence of new pulmonary nodule or mass.  3. Postoperative changes of partial right hepatectomy with no focal lesion in this noncontrast study.  For more details please refer to MRI of the abdomen with contrast performed concomitantly.  4. Additional findings.  Please see the above discussion.    4/17/24 MR Abdomen  Impression:  Postsurgical change including cholecystectomy and right hepatectomy in this patient with history of cholangiocarcinoma.  No abnormal enhancing lesions to suggest residual or recurrent disease.     7/22/2024 Imaging Significant Findings    CT torso 7/22/24  Impression:  - In this patient with history of cholangiocarcinoma status post right hepatectomy, no suspicious lesion to suggest recurrence or metastasis.  - Stable segment 4 hypodense lesion.  This lesion described on prior MRI as benign, like atypical hemangioma or cyst.  - Stable 5 mm nodule in the right middle lobe.            Physical Exam:   BP (!) 150/70 (BP Location: Right arm, Patient Position: Sitting)   Pulse 85   Temp 98 °F (36.7 °C)   Resp 18   Wt 72 kg (158 lb 11.7 oz)   LMP  (LMP Unknown)   SpO2 95%   BMI 29.99 kg/m²      ECOG Performance Status: (foot note - ECOG PS provided by Eastern Cooperative Oncology Group) 0 - Asymptomatic    Physical Exam  Constitutional:       General: She is not in acute distress.     Appearance: She is normal weight.   HENT:      Head: Normocephalic.      Mouth/Throat:       Mouth: Mucous membranes are moist.      Pharynx: Oropharynx is clear.   Eyes:      General: No scleral icterus.     Conjunctiva/sclera: Conjunctivae normal.   Cardiovascular:      Rate and Rhythm: Normal rate and regular rhythm.      Heart sounds: No murmur heard.     No gallop.   Pulmonary:      Effort: Pulmonary effort is normal. No respiratory distress.      Breath sounds: Normal breath sounds. No wheezing.   Abdominal:      General: There is no distension.      Palpations: Abdomen is soft.   Musculoskeletal:         General: Normal range of motion.      Cervical back: Normal range of motion and neck supple.      Right lower leg: No edema.      Left lower leg: No edema.   Skin:     General: Skin is warm.      Coloration: Skin is not jaundiced.      Findings: No bruising or rash.   Neurological:      General: No focal deficit present.      Mental Status: She is alert and oriented to person, place, and time.      Motor: No weakness.   Psychiatric:         Mood and Affect: Mood normal.         Behavior: Behavior normal.         Thought Content: Thought content normal.           Labs:   No results found for this or any previous visit (from the past 48 hours).          Lab Results   Component Value Date     10/17/2024    K 3.5 10/17/2024    CREATININE 0.9 10/17/2024    WBC 5.64 10/17/2024    HGB 12.8 10/17/2024     10/17/2024    AST 25 10/17/2024    ALT 26 10/17/2024    ALKPHOS 92 10/17/2024    BILITOT 1.1 (H) 10/17/2024          Imaging:     CT Chest Abdomen Pelvis With IV Contrast (XPD) Routine Oral Contrast  Narrative: EXAMINATION:  CT CHEST ABDOMEN PELVIS WITH IV CONTRAST (XPD)    CLINICAL HISTORY:  Gallbladder/biliary cancer, staging; Personal history of malignant neoplasm of other digestive organs    TECHNIQUE:  Axial images of the chest, abdomen, and pelvis were acquired after the use of 75 cc [id number] IV contrast. Oral contrast was also administered.  Coronal and sagittal reconstructions were  also obtained    COMPARISON:  MRI 04/17/2024, CT 07/22/2024, 04/17/2024, 11/09/2022    FINDINGS:  Thoracic soft tissues: Normal thyroid. No axillary lymphadenopathy.    Estella/Mediastinum: No mediastinal or hilar lymphadenopathy.    Aorta: Thoracic aorta is normal in caliber and contour with no significant calcific atherosclerosis.    Heart: Normal in size. No pericardial effusion. No significant calcific coronary atherosclerosis.    Lungs: Stable solid 0.4 cm nodule at the right lung base (7-251).  Peripheral ground-glass at the dependent areas of the lungs bilaterally, similar to prior.  No pleural fluid or pneumothorax.    Esophagus: Unremarkable.    Stomach and duodenum: Unremarkable.    Liver: Operative change of partial right hepatectomy.  Stable hypodensity in segment 4 of the liver measuring 0.7 cm, previously characterized as a probable hemangioma (4-42) on MRI.    No new or suspicious lesions identified.  No evidence of local disease recurrence.    Gallbladder: Cholecystectomy.    Bile Ducts: No evidence of dilated ducts.    Pancreas: No mass or peripancreatic fat stranding.    Spleen: Unremarkable.    Adrenals: Unremarkable.    Kidneys/Ureters: Normal in size and location. Normal enhancement. No hydronephrosis or nephrolithiasis. No ureteral dilatation.    Bladder: No evidence of wall thickening.    Reproductive organs: Unremarkable.    Bowel/Mesentery: No obstruction or inflammation.  Appendix is normal.    Peritoneum: No intraperitoneal free air or fluid.    Lymph nodes: No lymphadenopathy.    Abdominal wall:  Unremarkable.    Vasculature: No aneurysm. No significant calcific atherosclerosis.    Bones: No acute fracture. No suspicious osseous lesions.  Degenerative endplate changes at C7-T1.  Impression: Patient with cholangiocarcinoma status post right hepatectomy.  No evidence of local disease recurrence or metastatic disease.    Stable hypodensity in segment 4 of the liver, previously characterized as  a probable hemangioma by MRI.    Stable pulmonary nodule in the right lung base.  Attention on follow-up.    Electronically signed by resident: Coral Cancino  Date:    10/17/2024  Time:    15:39    Electronically signed by: Dionicio Pena MD  Date:    10/17/2024  Time:    17:04            Diagnoses:       1. History of cholangiocarcinoma                      Assessment and Plan:     1. History of cholangiocarcinoma  Overview:  Stage II (pT2N0) IHCC sp R0 resection 12/12/23. Started adjuvant capecitabine x6 months per BILCAP 2/3/23. Did not tolerated 1250mg/m2 dose; DR down to 1000mg/m2. Copmleted treatment 07/2023. Note made of indeterminate subcentimeter hypodensity in segment 4b during mid treatment imaging. Not detectable on post treatment scans    Assessment & Plan:  Remains LUIS EDUARDO. Nearing 2 years out from her surgery. Will follow up in 3 months with CT and labs. Afterwards will spread to 6 month surveillance.    Orders:  -     Cancer Antigen 19-9; Standing  -     CBC Oncology; Standing  -     Comprehensive Metabolic Panel; Standing  -     CT Chest Abdomen Pelvis With IV Contrast (XPD) Routine Oral Contrast; Future; Expected date: 10/23/2024                           Route Chart for Scheduling    Med Onc Chart Routing      Follow up with physician 3 months.   Follow up with [facility]    Infusion scheduling note    Injection scheduling note    Labs CBC, CMP and CA 19-9   Scheduling:  Preferred lab:  Lab interval:     Imaging    Pharmacy appointment    Other referrals                          Bennie Moore MD  Hematology/Oncology  [facility]

## 2024-10-30 DIAGNOSIS — E78.5 HYPERLIPIDEMIA, UNSPECIFIED HYPERLIPIDEMIA TYPE: ICD-10-CM

## 2024-10-30 DIAGNOSIS — E89.0 POSTOPERATIVE HYPOTHYROIDISM: ICD-10-CM

## 2024-10-30 DIAGNOSIS — I10 ESSENTIAL HYPERTENSION: ICD-10-CM

## 2024-10-30 RX ORDER — CITALOPRAM 20 MG/1
20 TABLET, FILM COATED ORAL DAILY
Qty: 30 TABLET | Refills: 11
Start: 2024-10-30 | End: 2025-10-30

## 2024-10-30 RX ORDER — ROSUVASTATIN CALCIUM 40 MG/1
40 TABLET, COATED ORAL NIGHTLY
Qty: 90 TABLET | Refills: 3 | Status: SHIPPED | OUTPATIENT
Start: 2024-10-30

## 2024-10-30 RX ORDER — LEVOTHYROXINE SODIUM 88 UG/1
88 TABLET ORAL DAILY
Qty: 90 TABLET | Refills: 3 | Status: SHIPPED | OUTPATIENT
Start: 2024-10-30

## 2024-10-30 RX ORDER — CHLORTHALIDONE 25 MG/1
25 TABLET ORAL DAILY
Qty: 90 TABLET | Refills: 3 | Status: SHIPPED | OUTPATIENT
Start: 2024-10-30 | End: 2025-10-30

## 2024-11-04 ENCOUNTER — HOSPITAL ENCOUNTER (OUTPATIENT)
Dept: RADIOLOGY | Facility: HOSPITAL | Age: 69
Discharge: HOME | End: 2024-11-04
Attending: ORTHOPAEDIC SURGERY
Payer: MEDICARE

## 2024-11-04 ENCOUNTER — OFFICE VISIT (OUTPATIENT)
Dept: ORTHOPEDICS | Facility: CLINIC | Age: 69
End: 2024-11-04
Payer: MEDICARE

## 2024-11-04 DIAGNOSIS — M18.11 PRIMARY OSTEOARTHRITIS OF FIRST CARPOMETACARPAL JOINT OF RIGHT HAND: ICD-10-CM

## 2024-11-04 DIAGNOSIS — M79.641 CHRONIC PAIN OF RIGHT HAND: ICD-10-CM

## 2024-11-04 DIAGNOSIS — M65.341 TRIGGER RING FINGER OF RIGHT HAND: Primary | ICD-10-CM

## 2024-11-04 DIAGNOSIS — M65.311 TRIGGER FINGER OF RIGHT THUMB: ICD-10-CM

## 2024-11-04 DIAGNOSIS — M65.341 TRIGGER RING FINGER OF RIGHT HAND: ICD-10-CM

## 2024-11-04 DIAGNOSIS — G89.29 CHRONIC PAIN OF RIGHT HAND: ICD-10-CM

## 2024-11-04 PROCEDURE — 1159F MED LIST DOCD IN RCRD: CPT | Mod: CPTII,S$GLB,, | Performed by: ORTHOPAEDIC SURGERY

## 2024-11-04 PROCEDURE — 99204 OFFICE O/P NEW MOD 45 MIN: CPT | Mod: 25,S$GLB,, | Performed by: ORTHOPAEDIC SURGERY

## 2024-11-04 PROCEDURE — 3288F FALL RISK ASSESSMENT DOCD: CPT | Mod: CPTII,S$GLB,, | Performed by: ORTHOPAEDIC SURGERY

## 2024-11-04 PROCEDURE — 73130 X-RAY EXAM OF HAND: CPT | Mod: TC,RT

## 2024-11-04 PROCEDURE — 1160F RVW MEDS BY RX/DR IN RCRD: CPT | Mod: CPTII,S$GLB,, | Performed by: ORTHOPAEDIC SURGERY

## 2024-11-04 PROCEDURE — 73130 X-RAY EXAM OF HAND: CPT | Mod: 26,RT,, | Performed by: RADIOLOGY

## 2024-11-04 PROCEDURE — 4010F ACE/ARB THERAPY RXD/TAKEN: CPT | Mod: CPTII,S$GLB,, | Performed by: ORTHOPAEDIC SURGERY

## 2024-11-04 PROCEDURE — 99999 PR PBB SHADOW E&M-EST. PATIENT-LVL III: CPT | Mod: PBBFAC,,, | Performed by: ORTHOPAEDIC SURGERY

## 2024-11-04 PROCEDURE — 1125F AMNT PAIN NOTED PAIN PRSNT: CPT | Mod: CPTII,S$GLB,, | Performed by: ORTHOPAEDIC SURGERY

## 2024-11-04 PROCEDURE — 1101F PT FALLS ASSESS-DOCD LE1/YR: CPT | Mod: CPTII,S$GLB,, | Performed by: ORTHOPAEDIC SURGERY

## 2024-11-04 PROCEDURE — 1157F ADVNC CARE PLAN IN RCRD: CPT | Mod: CPTII,S$GLB,, | Performed by: ORTHOPAEDIC SURGERY

## 2024-11-04 PROCEDURE — 20550 NJX 1 TENDON SHEATH/LIGAMENT: CPT | Mod: RT,S$GLB,, | Performed by: ORTHOPAEDIC SURGERY

## 2024-11-04 RX ADMIN — METHYLPREDNISOLONE ACETATE 40 MG: 40 INJECTION, SUSPENSION INTRA-ARTICULAR; INTRALESIONAL; INTRAMUSCULAR; SOFT TISSUE at 02:11

## 2024-11-25 DIAGNOSIS — T50.2X5A DIURETIC-INDUCED HYPOKALEMIA: ICD-10-CM

## 2024-11-25 DIAGNOSIS — E87.6 DIURETIC-INDUCED HYPOKALEMIA: ICD-10-CM

## 2024-11-26 RX ORDER — POTASSIUM CHLORIDE 750 MG/1
10 TABLET, EXTENDED RELEASE ORAL DAILY
Qty: 90 TABLET | Refills: 3 | Status: SHIPPED | OUTPATIENT
Start: 2024-11-26 | End: 2025-11-26

## 2024-12-02 ENCOUNTER — PATIENT MESSAGE (OUTPATIENT)
Dept: HEMATOLOGY/ONCOLOGY | Facility: CLINIC | Age: 69
End: 2024-12-02
Payer: MEDICARE

## 2024-12-02 ENCOUNTER — PATIENT MESSAGE (OUTPATIENT)
Dept: ORTHOPEDICS | Facility: CLINIC | Age: 69
End: 2024-12-02
Payer: MEDICARE

## 2024-12-04 ENCOUNTER — OFFICE VISIT (OUTPATIENT)
Dept: ORTHOPEDICS | Facility: CLINIC | Age: 69
End: 2024-12-04
Payer: MEDICARE

## 2024-12-04 DIAGNOSIS — M65.311 TRIGGER FINGER OF RIGHT THUMB: ICD-10-CM

## 2024-12-04 DIAGNOSIS — M65.341 TRIGGER RING FINGER OF RIGHT HAND: Primary | ICD-10-CM

## 2024-12-04 PROCEDURE — 1160F RVW MEDS BY RX/DR IN RCRD: CPT | Mod: CPTII,95,, | Performed by: ORTHOPAEDIC SURGERY

## 2024-12-04 PROCEDURE — 1159F MED LIST DOCD IN RCRD: CPT | Mod: CPTII,95,, | Performed by: ORTHOPAEDIC SURGERY

## 2024-12-04 PROCEDURE — 99441 PR PHYSICIAN TELEPHONE EVALUATION 5-10 MIN: CPT | Mod: 95,,, | Performed by: ORTHOPAEDIC SURGERY

## 2024-12-04 PROCEDURE — 1157F ADVNC CARE PLAN IN RCRD: CPT | Mod: CPTII,95,, | Performed by: ORTHOPAEDIC SURGERY

## 2024-12-04 RX ORDER — METHYLPREDNISOLONE ACETATE 40 MG/ML
40 INJECTION, SUSPENSION INTRA-ARTICULAR; INTRALESIONAL; INTRAMUSCULAR; SOFT TISSUE
Status: DISCONTINUED | OUTPATIENT
Start: 2024-11-04 | End: 2024-12-04 | Stop reason: HOSPADM

## 2024-12-04 NOTE — PROCEDURES
Tendon Sheath    Date/Time: 11/4/2024 2:30 PM    Performed by: Mary Maciel MD  Authorized by: Mary Maciel MD    Consent Done?:  Yes (Verbal)  Indications:  Pain  Timeout: prior to procedure the correct patient, procedure, and site was verified    Prep: patient was prepped and draped in usual sterile fashion      Local anesthesia used?: Yes    Anesthesia:  Local infiltration  Local anesthetic:  Lidocaine 1% without epinephrine  Anesthetic total (ml):  1    Location:  Thumb  Site:  R thumb flexor tendon sheath  Ultrasonic guidance for needle placement?: No    Needle size:  25 G  Approach:  Volar  Medications:  40 mg methylPREDNISolone acetate 40 mg/mL  Patient tolerance:  Patient tolerated the procedure well with no immediate complications  Tendon Sheath    Date/Time: 11/4/2024 2:30 PM    Performed by: Mary Maciel MD  Authorized by: Mary Maciel MD    Consent Done?:  Yes (Verbal)  Indications:  Pain  Timeout: prior to procedure the correct patient, procedure, and site was verified    Prep: patient was prepped and draped in usual sterile fashion      Local anesthesia used?: Yes    Anesthesia:  Local infiltration  Local anesthetic:  Lidocaine 1% without epinephrine  Anesthetic total (ml):  1    Location:  Ring finger  Site:  R ring flexor tendon sheath  Ultrasonic guidance for needle placement?: No    Needle size:  25 G  Approach:  Volar  Medications:  40 mg methylPREDNISolone acetate 40 mg/mL  Patient tolerance:  Patient tolerated the procedure well with no immediate complications

## 2024-12-04 NOTE — PROGRESS NOTES
Established Patient - Audio Only Telehealth Visit     The patient location is: [place]  The chief complaint leading to consultation is: right hand trigger fingers  Visit type: Virtual visit with audio only (telephone)  Total time spent with patient: 5 mins       The reason for the audio only service rather than synchronous audio and video virtual visit was related to technical difficulties or patient preference/necessity.     Each patient to whom I provide medical services by telemedicine is:  (1) informed of the relationship between the physician and patient and the respective role of any other health care provider with respect to management of the patient; and (2) notified that they may decline to receive medical services by telemedicine and may withdraw from such care at any time. Patient verbally consented to receive this service via voice-only telephone call.       HPI: She reports that the ring finger still gets stuck, not as bad, not painful. She also states that the thumb is still clicking, no pain.     Assessment and plan:  We have discussed continued observation versus further intervention, follow up in clinic.                        This service was not originating from a related E/M service provided within the previous 7 days nor will  to an E/M service or procedure within the next 24 hours or my soonest available appointment.  Prevailing standard of care was able to be met in this audio-only visit.

## 2024-12-04 NOTE — PROGRESS NOTES
[facility]  History & Physical  Orthopedics    SUBJECTIVE:      Chief Complaint:   Chief Complaint   Patient presents with    Right Hand - Pain       Referring Provider: Bernie Valverde MD   Patient is a 69 y.o. right hand dominant female who presents today with complaints of right ring finger catching and pain for 6 months.   History of Present Illness    She reports a persistent issue with her finger, which has been immobile for the past 6 months. She experiences difficulty in bending it, particularly noticeable during her morning routine of brushing her teeth. To open it, she has to use her other finger. She does not experience any sensations of numbness or tingling. She also mentions a previous injury to her finger from playing volleyball.      PMH. Gallbladder cancer.  She is now cancer-free. She was on oral chemotherapy for 6 months and did not have any metastasis.    SOCIAL HISTORY  She is a retired nurse.    FAMILY HISTORY  She has a family history of heart issues.    Vitals:    11/04/24 1506   PainSc:   3   PainLoc: Hand     The patient denies any fevers, chills, N/V, D/C and presents for evaluation.    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-->hypothyroidism    Hypertension     Hypothyroidism     hypothyroidism    Insomnia     previously on restoril    Liver mass 11/14/2022    Vertigo      Past Surgical History:   Procedure Laterality Date    BREAST BIOPSY Right     exc bx benign per pt    BREAST BIOPSY Left     core bx benign per pt    BREAST SURGERY      CHOLECYSTECTOMY  12/12/2022    Procedure: CHOLECYSTECTOMY;  Surgeon: Tacos Blackmon MD;  [facility] OR [address] [place] [address];  Service: Transplant;;    EXCISION, LIVER N/A 12/12/2022    Procedure: EXCISION,  LIVER (Right lobe) 4 hours Open;  Surgeon: Tacos Blackmon MD;  Location: [facility] OR 2ND FLR;  Service: Transplant;  Laterality: N/A;    LYMPH NODE BIOPSY      LYMPHADENECTOMY N/A 12/12/2022    Procedure: LYMPHADENECTOMY, PORTAL;  Surgeon: Tacos Blackmon MD;  Location: [facility] OR 2ND FLR;  Service: Transplant;  Laterality: N/A;    NASAL SINUS SURGERY      PARATHYROIDECTOMY      TUBAL LIGATION       Review of patient's allergies indicates:  No Known Allergies  Social History     Social History Narrative    Lives in [place] with  Robb. Has adult son and stepson. Retired LPN.      Family History   Problem Relation Name Age of Onset    Heart disease Mother Corinna 67        MI    Depression Mother Corinna     Diabetes Mother Corinna     Heart disease Father Delvin Padilla 56        CAD    Hypertension Father Delvin Padilla     No Known Problems Sister      Hyperlipidemia Sister      Hypertension Sister      Melanoma Neg Hx      Liver cancer Neg Hx      Pancreatic cancer Neg Hx           Current Outpatient Medications:     candesartan (ATACAND) 8 MG tablet, TAKE 1 TABLET(8 MG) BY MOUTH EVERY DAY, Disp: 90 tablet, Rfl: 3    chlorthalidone (HYGROTEN) 25 MG Tab, Take 1 tablet (25 mg total) by mouth once daily., Disp: 90 tablet, Rfl: 3    citalopram (CELEXA) 20 MG tablet, Take 1 tablet (20 mg total) by mouth once daily., Disp: 30 tablet, Rfl: 11    hydrOXYzine HCL (ATARAX) 25 MG tablet, Take 1 tablet (25 mg total) by mouth 3 (three) times daily as needed for Itching., Disp: 60 tablet, Rfl: 0    levothyroxine (SYNTHROID) 88 MCG tablet, Take 1 tablet (88 mcg total) by mouth once daily., Disp: 90 tablet, Rfl: 3    ondansetron (ZOFRAN) 8 MG tablet, TAKE 1 TABLET BY MOUTH EVERY 8 HOURS AS NEEDED FOR NAUSEA, Disp: 90 tablet, Rfl: 3    potassium chloride SA (K-DUR,KLOR-CON M) 10 MEQ tablet, Take 1 tablet (10 mEq total) by mouth once daily., Disp: 90 tablet, Rfl: 3    rosuvastatin (CRESTOR) 40 MG Tab, Take 1 tablet (40 mg total) by mouth  every evening., Disp: 90 tablet, Rfl: 3    tretinoin (RETIN-A) 0.05 % cream, Apply topically every evening., Disp: 20 g, Rfl: 3    valACYclovir (VALTREX) 500 MG tablet, Take 1 tablet (500 mg total) by mouth once daily. (Patient taking differently: Take 500 mg by mouth daily as needed.), Disp: 90 tablet, Rfl: 4    vitamin D (VITAMIN D3) 1000 units Tab, Take 1,000 Units by mouth once daily., Disp: , Rfl:     ROS    Review of Systems:  Constitutional: no fever or chills  Eyes: no visual changes  ENT: no nasal congestion or sore throat  Respiratory: no cough or shortness of breath  Cardiovascular: no chest pain  Gastrointestinal: no nausea or vomiting, tolerating diet  Musculoskeletal: pain and soreness    OBJECTIVE:      Vital Signs (Most Recent):  There were no vitals filed for this visit.  There is no height or weight on file to calculate BMI.    Physical Exam    Physical Exam:  Constitutional: The patient appears well-developed and well-nourished. No distress.   Head: Normocephalic and atraumatic.   Nose: Nose normal.   Eyes: Conjunctivae and EOM are normal.   Neck: No tracheal deviation present.   Cardiovascular: Normal rate and intact distal pulses.    Pulmonary/Chest: Effort normal. No respiratory distress.   Abdominal: There is no guarding.   Lymphatic: Negative for adenopathy   Neurological: The patient is alert.   Psychiatric: The patient has a normal mood and affect.     Bilateral Hand/Wrist Examination:    Observation/Inspection:  Swelling  none    Deformity  none  Discoloration  none     Scars   none    Atrophy  none    HAND/WRIST EXAMINATION:  Finkelstein's Test   Neg  WHAT Test    Neg  Snuff box tenderness   Neg  Toussaint's Test    Neg  Hook of Hamate Tenderness  Neg  CMC grind    Neg  Circumduction test   Neg  TFCC Compression Test  Neg    Tender to palpation right ring and thumb A1 pulleys, demonstrable catching and locking, otherwise bilateral ROM hand/wrist/elbow full  Physical Exam      Neurovascular  Exam:  Digits WWP, brisk CR < 3s throughout  NVI motor/LTS to M/R/U nerves, radial pulse 2+  2+ biceps and brachioradialis reflexes  Tinel's Test - Carpal Tunnel  Neg  Tinel's Test - Cubital Tunnel  Neg  Phalen's Test    Neg  Median Nerve Compression Test Neg    Diagnostic studies and other clinical records review:  X-rays AP, lateral and oblique right hand taken today are independently reviewed by me and shows Eaton II basilar thumb arthritis.    ASSESSMENT/PLAN:    69 y.o. yo female with   Encounter Diagnoses   Name Primary?    Trigger ring finger of right hand     Trigger finger of right thumb Yes    Primary osteoarthritis of first carpometacarpal joint of right hand     Chronic pain of right hand       Plan: The patient and I had a thorough discussion today. We discussed the working diagnosis as well as several other potential alternative diagnoses. Treatment options were discussed, both conservative and surgical. Conservative treatment options would include things such as activity modifications, workplace modifications, a period of rest, oral vs topical OTC and prescription anti-inflammatory medications, occupational therapy, splinting/bracing, immobilization, corticosteroid injections, and others. Surgical options were discussed as well.     -I have offered her a selective injection. I have explained the risks, benefits, and alternatives of the procedure in detail.  The patient voices understanding and all questions have been answered. The patient agrees to proceed as planned. So after a sterile prep of the skin in the normal fashion the right thumb and ring flexor sheaths injected using a 25 gauge needle with a combination of 1cc 1% plain lidocaine and 40 mg of methylprednisolone.  The patient is cautioned and immediate relief of pain is secondary to the local anesthetic and will be temporary.  After the anesthetic wears off there may be a increase in pain that may last for a few hours or a few days and they  should use ice to help alleviate this flair up of pain. Patient tolerated the procedure well.    Follow up in 4 weeks virtual audio or sooner for any worsening of symptoms  Call with any questions/concerns in the interim       The patient's pathophysiology was explained in detail with reference to x-rays, models, other visual aids as appropriate.  Questions were invited and answered to the patient's satisfaction. I reviewed Primary care , and other specialty's notes to better coordinate patient's care.          Mary Maciel MD    Please be aware that this note has been generated with the assistance of [employer] voice-to-text.  Please excuse any spelling or grammatical errors.  This note was generated with the assistance of ambient listening technology. Verbal consent was obtained by the patient and accompanying visitor(s) for the recording of patient appointment to facilitate this note. I attest to having reviewed and edited the generated note for accuracy, though some syntax or spelling errors may persist. Please contact the author of this note for any clarification.

## 2024-12-13 ENCOUNTER — PATIENT MESSAGE (OUTPATIENT)
Dept: PRIMARY CARE CLINIC | Facility: CLINIC | Age: 69
End: 2024-12-13
Payer: MEDICARE

## 2025-01-09 ENCOUNTER — PATIENT MESSAGE (OUTPATIENT)
Dept: PRIMARY CARE CLINIC | Facility: CLINIC | Age: 70
End: 2025-01-09
Payer: MEDICARE

## 2025-01-09 DIAGNOSIS — F32.5 MAJOR DEPRESSIVE DISORDER IN REMISSION, UNSPECIFIED WHETHER RECURRENT: ICD-10-CM

## 2025-01-09 DIAGNOSIS — F41.9 ANXIETY: Primary | ICD-10-CM

## 2025-01-13 ENCOUNTER — OFFICE VISIT (OUTPATIENT)
Dept: PRIMARY CARE CLINIC | Facility: CLINIC | Age: 70
End: 2025-01-13
Payer: MEDICARE

## 2025-01-13 VITALS
HEART RATE: 78 BPM | HEIGHT: 61 IN | BODY MASS INDEX: 28.31 KG/M2 | SYSTOLIC BLOOD PRESSURE: 130 MMHG | WEIGHT: 149.94 LBS | RESPIRATION RATE: 16 BRPM | OXYGEN SATURATION: 98 % | DIASTOLIC BLOOD PRESSURE: 82 MMHG

## 2025-01-13 DIAGNOSIS — F41.9 ANXIETY: ICD-10-CM

## 2025-01-13 DIAGNOSIS — I10 ESSENTIAL HYPERTENSION: ICD-10-CM

## 2025-01-13 DIAGNOSIS — F32.5 MAJOR DEPRESSIVE DISORDER IN REMISSION, UNSPECIFIED WHETHER RECURRENT: ICD-10-CM

## 2025-01-13 DIAGNOSIS — F33.0 MDD (MAJOR DEPRESSIVE DISORDER), RECURRENT EPISODE, MILD: Primary | ICD-10-CM

## 2025-01-13 DIAGNOSIS — Z85.09 HISTORY OF CHOLANGIOCARCINOMA: ICD-10-CM

## 2025-01-13 DIAGNOSIS — F41.1 GAD (GENERALIZED ANXIETY DISORDER): ICD-10-CM

## 2025-01-13 DIAGNOSIS — E03.9 HYPOTHYROIDISM, UNSPECIFIED TYPE: ICD-10-CM

## 2025-01-13 DIAGNOSIS — Z00.00 ENCOUNTER FOR PREVENTIVE HEALTH EXAMINATION: Primary | ICD-10-CM

## 2025-01-13 DIAGNOSIS — F51.01 PRIMARY INSOMNIA: ICD-10-CM

## 2025-01-13 DIAGNOSIS — Z87.891 FORMER SMOKER: ICD-10-CM

## 2025-01-13 DIAGNOSIS — E78.49 OTHER HYPERLIPIDEMIA: ICD-10-CM

## 2025-01-13 DIAGNOSIS — I70.0 AORTIC ATHEROSCLEROSIS: ICD-10-CM

## 2025-01-13 PROCEDURE — 1126F AMNT PAIN NOTED NONE PRSNT: CPT | Mod: CPTII,S$GLB,, | Performed by: NURSE PRACTITIONER

## 2025-01-13 PROCEDURE — 1160F RVW MEDS BY RX/DR IN RCRD: CPT | Mod: CPTII,S$GLB,, | Performed by: NURSE PRACTITIONER

## 2025-01-13 PROCEDURE — 3079F DIAST BP 80-89 MM HG: CPT | Mod: CPTII,S$GLB,, | Performed by: NURSE PRACTITIONER

## 2025-01-13 PROCEDURE — 1123F ACP DISCUSS/DSCN MKR DOCD: CPT | Mod: CPTII,S$GLB,, | Performed by: NURSE PRACTITIONER

## 2025-01-13 PROCEDURE — 99999 PR PBB SHADOW E&M-EST. PATIENT-LVL IV: CPT | Mod: PBBFAC,,, | Performed by: NURSE PRACTITIONER

## 2025-01-13 PROCEDURE — 3075F SYST BP GE 130 - 139MM HG: CPT | Mod: CPTII,S$GLB,, | Performed by: NURSE PRACTITIONER

## 2025-01-13 PROCEDURE — G0439 PPPS, SUBSEQ VISIT: HCPCS | Mod: S$GLB,,, | Performed by: NURSE PRACTITIONER

## 2025-01-13 PROCEDURE — 1159F MED LIST DOCD IN RCRD: CPT | Mod: CPTII,S$GLB,, | Performed by: NURSE PRACTITIONER

## 2025-01-13 RX ORDER — BUSPIRONE HYDROCHLORIDE 5 MG/1
TABLET ORAL
Qty: 90 TABLET | Refills: 3 | Status: SHIPPED | OUTPATIENT
Start: 2025-01-13

## 2025-01-13 RX ORDER — CITALOPRAM 20 MG/1
20 TABLET, FILM COATED ORAL DAILY
Qty: 90 TABLET | Refills: 3 | Status: SHIPPED | OUTPATIENT
Start: 2025-01-13 | End: 2026-01-13

## 2025-01-13 NOTE — PROGRESS NOTES
"Rehana Whitten presented for a follow-up Medicare AWV today. The following components were reviewed and updated:    Medical history  Family History  Social history  Allergies and Current Medications  Health Risk Assessment  Health Maintenance  Care Team    **See Completed Assessments for Annual Wellness visit with in the encounter summary    The following assessments were completed:  Depression Screening  Cognitive function Screening  Timed Get Up Test  Whisper Test      Opioid documentation:      Patient does not have a current opioid prescription.          Vitals:    01/13/25 0901   BP: 130/82   BP Location: Right arm   Patient Position: Sitting   Pulse: 78   Resp: 16   SpO2: 98%   Weight: 68 kg (149 lb 14.6 oz)   Height: 5' 1" (1.549 m)     Body mass index is 28.33 kg/m².       Physical Exam  Vitals and nursing note reviewed.   Constitutional:       General: She is not in acute distress.     Appearance: Normal appearance. She is not ill-appearing.   HENT:      Head: Normocephalic and atraumatic.      Nose: Nose normal.      Mouth/Throat:      Mouth: Mucous membranes are moist.   Eyes:      Pupils: Pupils are equal, round, and reactive to light.   Cardiovascular:      Rate and Rhythm: Normal rate and regular rhythm.      Heart sounds: Normal heart sounds.   Pulmonary:      Effort: Pulmonary effort is normal. No respiratory distress.      Breath sounds: Normal breath sounds.   Abdominal:      Palpations: Abdomen is soft.   Musculoskeletal:         General: Normal range of motion.      Cervical back: Normal range of motion.   Skin:     General: Skin is warm and dry.   Neurological:      General: No focal deficit present.      Mental Status: She is alert and oriented to person, place, and time.   Psychiatric:         Mood and Affect: Mood normal.         Behavior: Behavior normal.         Thought Content: Thought content normal.         Judgment: Judgment normal.         Diagnoses and health risks identified " today and associated recommendations/orders:  1. Encounter for preventive health examination      2. Other hyperlipidemia  Stable on crestor, will continue to monitor    3. Essential hypertension  Stable on chlorthalidone, candesartan, will continue to monitor  /82    4. Aortic atherosclerosis  Stable, will continue to monitor    5. Hypothyroidism, unspecified type  Stable on synthroid, will continue to monitor    6. Major depressive disorder in remission, unspecified whether recurrent  - citalopram (CELEXA) 20 MG tablet; Take 1 tablet (20 mg total) by mouth once daily.  Dispense: 90 tablet; Refill: 3  Has been stable on celexa, buspar, will continue to monitor    7. Anxiety  - citalopram (CELEXA) 20 MG tablet; Take 1 tablet (20 mg total) by mouth once daily.  Dispense: 90 tablet; Refill: 3  Has been stable on celexa and buspar, will continue to monitor    8. Primary insomnia  Has been stable, will continue to monitor    9. History of cholangiocarcinoma  Stable, will continue to monitor    10. Former smoker; quit 1980, 1/2 pack x 10 years  stable    Provided Rehana with a 5-10 year written screening schedule and personal prevention plan. Recommendations were developed using the USPSTF age appropriate recommendations. Education, counseling, and referrals were provided as needed.  After Visit Summary printed and given to patient which includes a list of additional screenings\tests needed.    No follow-ups on file.      Liliana Lynch NP    I offered to discuss advanced care planning, including how to pick a person who would make decisions for you if you were unable to make them for yourself, called a health care power of , and what kind of decisions you might make such as use of life sustaining treatments such as ventilators and tube feeding when faced with a life limiting illness recorded on a living will that they will need to know. (How you want to be cared for as you near the end of your natural  life)     X  Patient has advanced directives on file, which we reviewed, and they do not wish to make changes.

## 2025-01-13 NOTE — PROGRESS NOTES
Established Patient - Audio Only Telehealth Visit     The patient location is: [place], [place]  The chief complaint leading to consultation is: anxiety  Visit type: Virtual visit with audio only (telephone)  Total time spent with patient: 12 min       The reason for the audio only service rather than synchronous audio and video virtual visit was related to technical difficulties or patient preference/necessity.     Each patient to whom I provide medical services by telemedicine is:  (1) informed of the relationship between the physician and patient and the respective role of any other health care provider with respect to management of the patient; and (2) notified that they may decline to receive medical services by telemedicine and may withdraw from such care at any time. Patient verbally consented to receive this service via voice-only telephone call.       HPI:   MDD/DIXIE - celexa 20mg daily. Was previously doing well.    recently diagnosed with ALS in Nov (saw Dr. Valenzuela) and in the process of getting into ALS clinic. 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   Phq9 of 8 and GAD7 of 6  Last week had some breakthrough anxiety symptoms. Better this week.   Atarax caused her drowsy the next day.   When she was dx w/ cholangiocarcinoma, she had rare use of xanax and did ok.   Good support system. Would like to see a therapist.      Assessment and plan:    Diagnoses and all orders for this visit:    MDD (major depressive disorder), recurrent episode, mild  -     busPIRone (BUSPAR) 5 MG Tab; Take 1 tab twice daily and another pill prn panic attacks.  -     Ambulatory referral/consult to [facility]; Future    DIXIE (generalized anxiety disorder)  -     Ambulatory referral/consult to [facility]; Future    Advance Care Planning     Date: 01/13/2025    ACP Reviewed/No Changes  Voluntary advance care planning discussion had today with  patient. Previously completed HCPOA in electronic medical record is current, no changes made. However does plan on changing HCPOA in the future since  dx w/ ALS in Nov - likely to sister or son.      A total of 3 min was spent on advance care planning, goals of care discussion, emotional support, formulating and communicating prognosis and exploring burden/benefit of various approaches of treatment. This discussion occurred on a fully voluntary basis with the verbal consent of the patient and/or family.       F/u in 4-6 weeks for anxiety. Referred to LCSW. Plan for in person but due to caretaker duties, if need to, can change to virtual.    Bernie Valverde MD  Department of Internal Medicine - [facility] 65+  10:22 AM       This service was not originating from a related E/M service provided within the previous 7 days nor will  to an E/M service or procedure within the next 24 hours or my soonest available appointment.  Prevailing standard of care was able to be met in this audio-only visit.

## 2025-01-14 ENCOUNTER — PATIENT MESSAGE (OUTPATIENT)
Dept: PRIMARY CARE CLINIC | Facility: CLINIC | Age: 70
End: 2025-01-14
Payer: MEDICARE

## 2025-01-24 ENCOUNTER — LAB VISIT (OUTPATIENT)
Dept: LAB | Facility: HOSPITAL | Age: 70
End: 2025-01-24
Attending: HOSPITALIST
Payer: MEDICARE

## 2025-01-24 DIAGNOSIS — Z85.09 HISTORY OF CHOLANGIOCARCINOMA: ICD-10-CM

## 2025-01-24 LAB
ALBUMIN SERPL BCP-MCNC: 4.1 G/DL (ref 3.5–5.2)
ALP SERPL-CCNC: 93 U/L (ref 40–150)
ALT SERPL W/O P-5'-P-CCNC: 19 U/L (ref 10–44)
ANION GAP SERPL CALC-SCNC: 10 MMOL/L (ref 8–16)
AST SERPL-CCNC: 24 U/L (ref 10–40)
BILIRUB SERPL-MCNC: 1.2 MG/DL (ref 0.1–1)
BUN SERPL-MCNC: 11 MG/DL (ref 8–23)
CALCIUM SERPL-MCNC: 9.4 MG/DL (ref 8.7–10.5)
CANCER AG19-9 SERPL-ACNC: 14.3 U/ML (ref 0–40)
CHLORIDE SERPL-SCNC: 101 MMOL/L (ref 95–110)
CO2 SERPL-SCNC: 27 MMOL/L (ref 23–29)
CREAT SERPL-MCNC: 0.7 MG/DL (ref 0.5–1.4)
ERYTHROCYTE [DISTWIDTH] IN BLOOD BY AUTOMATED COUNT: 13.4 % (ref 11.5–14.5)
EST. GFR  (NO RACE VARIABLE): >60 ML/MIN/1.73 M^2
GLUCOSE SERPL-MCNC: 99 MG/DL (ref 70–110)
HCT VFR BLD AUTO: 38.8 % (ref 37–48.5)
HGB BLD-MCNC: 12.6 G/DL (ref 12–16)
IMM GRANULOCYTES # BLD AUTO: 0.02 K/UL (ref 0–0.04)
MCH RBC QN AUTO: 30.2 PG (ref 27–31)
MCHC RBC AUTO-ENTMCNC: 32.5 G/DL (ref 32–36)
MCV RBC AUTO: 93 FL (ref 82–98)
NEUTROPHILS # BLD AUTO: 4 K/UL (ref 1.8–7.7)
PLATELET # BLD AUTO: 219 K/UL (ref 150–450)
PMV BLD AUTO: 11.3 FL (ref 9.2–12.9)
POTASSIUM SERPL-SCNC: 3.3 MMOL/L (ref 3.5–5.1)
PROT SERPL-MCNC: 7.4 G/DL (ref 6–8.4)
RBC # BLD AUTO: 4.17 M/UL (ref 4–5.4)
SODIUM SERPL-SCNC: 138 MMOL/L (ref 136–145)
WBC # BLD AUTO: 5.53 K/UL (ref 3.9–12.7)

## 2025-01-24 PROCEDURE — 80053 COMPREHEN METABOLIC PANEL: CPT | Performed by: HOSPITALIST

## 2025-01-24 PROCEDURE — 85027 COMPLETE CBC AUTOMATED: CPT | Performed by: HOSPITALIST

## 2025-01-24 PROCEDURE — 86301 IMMUNOASSAY TUMOR CA 19-9: CPT | Performed by: HOSPITALIST

## 2025-01-24 PROCEDURE — 36415 COLL VENOUS BLD VENIPUNCTURE: CPT | Performed by: HOSPITALIST

## 2025-01-25 ENCOUNTER — HOSPITAL ENCOUNTER (OUTPATIENT)
Dept: RADIOLOGY | Facility: HOSPITAL | Age: 70
Discharge: HOME | End: 2025-01-25
Attending: HOSPITALIST
Payer: MEDICARE

## 2025-01-25 DIAGNOSIS — Z85.09 HISTORY OF CHOLANGIOCARCINOMA: ICD-10-CM

## 2025-01-25 PROCEDURE — A9698 NON-RAD CONTRAST MATERIALNOC: HCPCS | Performed by: HOSPITALIST

## 2025-01-25 PROCEDURE — 74177 CT ABD & PELVIS W/CONTRAST: CPT | Mod: TC

## 2025-01-25 PROCEDURE — 25500020 PHARM REV CODE 255: Performed by: HOSPITALIST

## 2025-01-25 RX ADMIN — BARIUM SULFATE 450 ML: 20 SUSPENSION ORAL at 09:01

## 2025-01-25 RX ADMIN — IOHEXOL 75 ML: 350 INJECTION, SOLUTION INTRAVENOUS at 09:01

## 2025-01-25 NOTE — PATIENT INSTRUCTIONS
Counseling and Referral of Other Preventative  (Italic type indicates deductible and co-insurance are waived)    Patient Name: Rehana Whitten  Today's Date: 1/25/2025    Health Maintenance       Date Due Completion Date    COVID-19 Vaccine (7 - 2024-25 season) 09/01/2024 10/26/2023    Shingles Vaccine (1 of 2) 01/26/2025 (Originally 1/24/2017) 11/29/2016    Mammogram 08/05/2025 8/5/2024    DEXA Scan 09/21/2025 9/21/2023    Colorectal Cancer Screening 11/15/2025 11/15/2022    Override on 1/1/2010: Done    High Dose Statin 01/13/2026 1/13/2025    Hemoglobin A1c (Diabetic Prevention Screening) 10/26/2026 10/26/2023    TETANUS VACCINE 03/31/2027 3/31/2017    Override on 3/7/2007: Done    Lipid Panel 01/26/2029 1/26/2024        No orders of the defined types were placed in this encounter.    The following information is provided to all patients.  This information is to help you find resources for any of the problems found today that may be affecting your health:                  Living healthy guide: www.[facility].[place].gov      Understanding Diabetes: www.diabetes.org      Eating healthy: www.cdc.gov/healthyweight      CDC home safety checklist: www.cdc.gov/steadi/patient.html      Agency on Aging: www.goea.[place].[facility]      Alcoholics anonymous (AA): www.aa.org      Physical Activity: www.[place].nih.gov/jz0szuk      Tobacco use: www.quitwithusla.org

## 2025-01-29 ENCOUNTER — OFFICE VISIT (OUTPATIENT)
Dept: HEMATOLOGY/ONCOLOGY | Facility: CLINIC | Age: 70
End: 2025-01-29
Payer: MEDICARE

## 2025-01-29 ENCOUNTER — TELEPHONE (OUTPATIENT)
Dept: PRIMARY CARE CLINIC | Facility: CLINIC | Age: 70
End: 2025-01-29
Payer: MEDICARE

## 2025-01-29 VITALS
WEIGHT: 148.81 LBS | BODY MASS INDEX: 28.12 KG/M2 | OXYGEN SATURATION: 100 % | HEART RATE: 70 BPM | SYSTOLIC BLOOD PRESSURE: 138 MMHG | TEMPERATURE: 98 F | RESPIRATION RATE: 18 BRPM | DIASTOLIC BLOOD PRESSURE: 63 MMHG

## 2025-01-29 DIAGNOSIS — Z85.09 HISTORY OF CHOLANGIOCARCINOMA: Primary | ICD-10-CM

## 2025-01-29 DIAGNOSIS — F32.5 MAJOR DEPRESSIVE DISORDER IN REMISSION, UNSPECIFIED WHETHER RECURRENT: ICD-10-CM

## 2025-01-29 DIAGNOSIS — I10 ESSENTIAL HYPERTENSION: ICD-10-CM

## 2025-01-29 PROCEDURE — 99999 PR PBB SHADOW E&M-EST. PATIENT-LVL III: CPT | Mod: PBBFAC,,, | Performed by: HOSPITALIST

## 2025-01-29 NOTE — ASSESSMENT & PLAN NOTE
- Remains LUIS EDUARDO on imaging now 2 years out from her surgery. Will extend to q6 month imaging.  - FU 6 months with imaging and labs.

## 2025-01-29 NOTE — PROGRESS NOTES
MEDICAL ONCOLOGY FOLLOW-UP VISIT.     Best Contact Phone Number(s): [phone] (home)      Cancer/Stage/TNM:    Cancer Staging   History of cholangiocarcinoma  Staging form: Intrahepatic Bile Duct, AJCC 8th Edition  - Pathologic: Stage II (pT2, pN0, cM0) - Unsigned       Reason for visit: History of IHCC sp resection and adjuvant chemotherapy    History of Present Illness: Rehana Whitten is a 69 y.o. female remote history of HBV who was found to have stage II pT2N0 intrahepatic cholangiocarcinoma in the setting of abdominal pain who underwent R0 resection 12/12/22. She completed 8 cycles of adjuvant capecitabine 07/2023. She presents to medical oncology clinic for routine surveillance.      Interval History:     Medically has been doing well. Signficant increased home stressors.  recently had hip fracture and was diagnosed with ALS. Also has husbands elderly uncle living with her and  who has been having his own medical issues. Continues on celexa, PCP recently added Buspar.  Hoping to get back into working out more frequently. Appetite is good. Occasional nausea, no emesis.     Oncology History   History of cholangiocarcinoma   11/9/2022 Imaging Significant Findings    Abdominal US in the setting of abdominal pain shows a large heterogeneous mass in the right lobe, measures 7.2 x 5.3 x 5.9 cm.     11/9/2022 Imaging Significant Findings    CT a/p:  Irregular right hepatic lobe lesion demonstrating delayed central enhancement and overlying capsular retraction.  Capsular retraction can be seen in cholangiocarcinoma and sclerosing hepatic hemangiomas.  Differential includes metastatic disease and other primary hepatic neoplasms.     11/14/2022 Tumor Markers    CA 19-9: 19.1  AFP: 5.5     11/16/2022 Imaging Significant Findings    CT chest:  1. No specific CT evidence of metastatic disease within the chest.  2. Left lower lobe punctate micro nodules.  Attention on continued surveillance  imaging.  3. Other findings as above.     11/30/2022 Biopsy    Percutaneous biopsy of liver mass: Moderately differentiated adenocarcinoma. CK7+ Negative for CK20, CDX2, GATA3, TTF1, and PAX 8. Thought consistent with pancraetobiliary primary.     12/12/2022 Surgery    Resection of right hepatic lobe tumor. Pathology shows moderately differentiated adenocarcinoma up to 6.5cm in size c/w intrahepatic cholangiocarcinoma. Tumor was confined to the hepatic parenchyma with associated  LVI but no PNI. R0 resection with 6mm margins. 06/ LN involved. [id number]     1/13/2023 - 1/13/2023 Chemotherapy    Treatment Summary   Plan Name: OP CAPECITABINE 1250MG/M2 BID Q3W  Treatment Goal: Control  Status: Inactive  Start Date: 1/13/2023  End Date: 1/13/2023  Provider: Bennie Moore MD  Chemotherapy: capecitabine (XELODA) tablet 1,650 mg, 2,000 mg, Oral, 2 times daily, 1 of 1 cycle, Start date: 2/24/2023, End date: --     2/24/2023 -  Chemotherapy    C2D1 capectiabine; DR to 1500mg po bid       3/17/2023 -  Chemotherapy    C3D1 capecitabine 1650 po bid       4/7/2023 -  Chemotherapy    C4D1 capecitabine 1650 po bid       4/28/2023 -  Chemotherapy    C5 capecitabine 1650 po bid       5/15/2023 Imaging Significant Findings    5/15/23: CT torso:  - Postoperative changes from right hepatectomy, cholecystectomy, and portal lymphadenectomy for intrahepatic cholangiocarcinoma.  - No lymphadenopathy.  - New subcentimeter hypodensity in hepatic segment 4B, concerning for metastases.  - Unchanged pulmonary nodules.  - Other findings as described.     7/18/2023 Imaging Significant Findings    CT torso:  1. Postop change of right hepatectomy, cholecystectomy, and portal lymphadenectomy for intrahepatic cholangiocarcinoma.  2. Previously noted subcentimeter hypodensity in the hepatic IVB segment is not well visualized on today's examination.  No new focal hepatic lesions.  3. Stable bilateral pulmonary nodules.  4. Additional findings, as  above.        10/17/2023 Imaging Significant Findings    MRI Abdomen  Impression:   Postsurgical changes of cholecystectomy and right hepatectomy in this patient with history of cholangiocarcinoma.  No abnormal enhancing lesions to suggest residual or recurrent disease.    CT Chest  Impression:  1. Grossly stable pulmonary exam with multiple bilateral pulmonary nodules, the largest measuring 4 mm.      Imaging Significant Findings    4/17/24 CT Chest  Impression:  1. Grossly stable bilateral pulmonary nodules with the largest measuring 5 mm located in the right middle lobe.  2. No evidence of new pulmonary nodule or mass.  3. Postoperative changes of partial right hepatectomy with no focal lesion in this noncontrast study.  For more details please refer to MRI of the abdomen with contrast performed concomitantly.  4. Additional findings.  Please see the above discussion.    4/17/24 MR Abdomen  Impression:  Postsurgical change including cholecystectomy and right hepatectomy in this patient with history of cholangiocarcinoma.  No abnormal enhancing lesions to suggest residual or recurrent disease.     7/22/2024 Imaging Significant Findings    CT torso 7/22/24  Impression:  - In this patient with history of cholangiocarcinoma status post right hepatectomy, no suspicious lesion to suggest recurrence or metastasis.  - Stable segment 4 hypodense lesion.  This lesion described on prior MRI as benign, like atypical hemangioma or cyst.  - Stable 5 mm nodule in the right middle lobe.     1/25/2025 Imaging Significant Findings    1/25/25 CT Torso  Impression:  - In patient with history of cholangiocarcinoma status post right partial hepatectomy, no evidence of local recurrence or metastatic disease.  - Stable subcentimeter hypodensity in the right hepatic lobe, previously characterized as probable hemangioma.  No new focal hepatic lesions.  - Stable 4 mm pulmonary nodule in the right lung.  No new pulmonary nodules.             Physical Exam:   /63 (BP Location: Left arm, Patient Position: Sitting)   Pulse 70   Temp 97.9 °F (36.6 °C) (Temporal)   Resp 18   Wt 67.5 kg (148 lb 13 oz)   LMP  (LMP Unknown)   SpO2 100%   BMI 28.12 kg/m²      ECOG Performance Status: (foot note - ECOG PS provided by Eastern Cooperative Oncology Group) 0 - Asymptomatic    Physical Exam  Constitutional:       General: She is not in acute distress.     Appearance: She is normal weight.   HENT:      Head: Normocephalic.      Mouth/Throat:      Mouth: Mucous membranes are moist.      Pharynx: Oropharynx is clear.   Eyes:      General: No scleral icterus.     Conjunctiva/sclera: Conjunctivae normal.   Cardiovascular:      Rate and Rhythm: Normal rate and regular rhythm.      Heart sounds: No murmur heard.     No gallop.   Pulmonary:      Effort: Pulmonary effort is normal. No respiratory distress.      Breath sounds: Normal breath sounds. No wheezing.   Abdominal:      General: There is no distension.      Palpations: Abdomen is soft.   Musculoskeletal:         General: Normal range of motion.      Cervical back: Normal range of motion and neck supple.      Right lower leg: No edema.      Left lower leg: No edema.   Skin:     General: Skin is warm.      Coloration: Skin is not jaundiced.      Findings: No bruising or rash.   Neurological:      General: No focal deficit present.      Mental Status: She is alert and oriented to person, place, and time.      Motor: No weakness.   Psychiatric:         Mood and Affect: Mood normal.         Behavior: Behavior normal.         Thought Content: Thought content normal.           Labs:   No results found for this or any previous visit (from the past 48 hours).          Lab Results   Component Value Date     01/24/2025    K 3.3 (L) 01/24/2025    CREATININE 0.7 01/24/2025    WBC 5.53 01/24/2025    HGB 12.6 01/24/2025     01/24/2025    AST 24 01/24/2025    ALT 19 01/24/2025    ALKPHOS 93 01/24/2025     BILITOT 1.2 (H) 01/24/2025          Imaging:     CT Chest Abdomen Pelvis With IV Contrast (XPD) Routine Oral Contrast  Narrative: EXAMINATION:  CT CHEST ABDOMEN PELVIS WITH IV CONTRAST (XPD)    CLINICAL HISTORY:  Metastatic disease evaluation; Personal history of malignant neoplasm of other digestive organs    TECHNIQUE:  Axial images of the chest, abdomen, and pelvis were acquired  after the use of 75 cc [id number] IV contrast. 450 cc of Readi-Cat oral contrast was administered.  Coronal and sagittal reconstructions were also obtained    COMPARISON:  None    FINDINGS:  Thoracic soft tissues: Normal thyroid.    Aorta: Thoracic aorta is normal in caliber and contour with no significant calcific atherosclerosis.    Heart: Normal in size. No pericardial effusion. No significant calcific coronary atherosclerosis.    Estella/Mediastinum: No significant mediastinal, hilar, or axillary lymphadenopathy    Lungs: Trachea and bronchi are patent.  Lungs are well aerated, without consolidation or pleural fluid. Stable 4 mm pulmonary nodule in the right lung (series 3, image 228).    Liver: Postsurgical changes status post partial right hepatectomy.  0.6 cm hypodensity of the right hepatic lobe, previously measuring 0.7 cm (series 3, image 36), previously characterized as probable hemangioma.  No new focal hepatic lesions.    Gallbladder: Surgically absent.    Bile Ducts: No evidence of dilated ducts.    Pancreas: No mass or peripancreatic fat stranding.    Spleen: Unremarkable.    Stomach and duodenum: Unremarkable.    Adrenals: Unremarkable.    Kidneys/ Ureters: Normal in size and location. Normal enhancement. No hydronephrosis or nephrolithiasis. No ureteral dilatation.    Bladder: No evidence of wall thickening.    Reproductive organs: Unremarkable.    Bowel/Mesentery: Small bowel is normal in caliber with no evidence of obstruction.  No evidence of inflammation or wall thickening.  Colon demonstrates no focal wall  thickening.    Lymph nodes: No lymphadenapathy.    Abdominal wall:  Unremarkable.    Vasculature: No aneurysm. No significant calcific atherosclerosis.    Bones: No acute fracture. No suspicious osseous lesions.  Degenerative changes of C7-T1.  Impression: In patient with history of cholangiocarcinoma status post right partial hepatectomy, no evidence of local recurrence or metastatic disease.    Stable subcentimeter hypodensity in the right hepatic lobe, previously characterized as probable hemangioma.  No new focal hepatic lesions.    Stable 4 mm pulmonary nodule in the right lung.  No new pulmonary nodules.    Electronically signed by: Dionicio Pena MD  Date:    01/25/2025  Time:    15:15            Diagnoses:       1. History of cholangiocarcinoma    2. Essential hypertension    3. Major depressive disorder in remission, unspecified whether recurrent                        Assessment and Plan:     1. History of cholangiocarcinoma  Overview:  Stage II (pT2N0) IHCC sp R0 resection 12/12/23. Started adjuvant capecitabine x6 months per BILCAP 2/3/23. Did not tolerated 1250mg/m2 dose; DR down to 1000mg/m2. Copmleted treatment 07/2023. Note made of indeterminate subcentimeter hypodensity in segment 4b during mid treatment imaging. Not detectable on post treatment scans    Assessment & Plan:  - Remains LUIS EDUARDO on imaging now 2 years out from her surgery. Will extend to q6 month imaging.  - FU 6 months with imaging and labs.      2. Essential hypertension  Overview:  Home medications include HCTZ and candesartan.      3. Major depressive disorder in remission, unspecified whether recurrent  Overview:  Home medications include citalopram                          Route Chart for Scheduling    Med Onc Chart Routing      Follow up with physician 6 months.   Follow up with [facility]    Infusion scheduling note    Injection scheduling note    Labs CBC, CMP and CA 19-9   Scheduling:  Preferred lab:  Lab interval:     Imaging CT chest  abdomen pelvis      Pharmacy appointment    Other referrals                          Bennie Moore MD  Hematology/Oncology  [facility] - [facility]

## 2025-01-29 NOTE — TELEPHONE ENCOUNTER
LCSW received referral from Dr. Valverde.  LCSW called patient and left voicemail requesting a return call to discuss referral.

## 2025-02-04 ENCOUNTER — TELEPHONE (OUTPATIENT)
Dept: PRIMARY CARE CLINIC | Facility: CLINIC | Age: 70
End: 2025-02-04
Payer: MEDICARE

## 2025-02-04 NOTE — TELEPHONE ENCOUNTER
LCSW received VM from patient returning call to schedule intake.  LCSW contacted patient regarding referral from  for behavioral health services.  LCSW explained the role of therapist.  Patient verbalized understanding of service provided. LCSW informed patient that this LCSW will be on leave beginning 3/19 for appx 6 weeks.  She was in agreement to schedule an initial appointment.  Patient is scheduled 2/19 for intake.  Patient was encouraged to contact LCSW with any need to reschedule.

## 2025-02-19 ENCOUNTER — CLINICAL SUPPORT (OUTPATIENT)
Dept: PRIMARY CARE CLINIC | Facility: CLINIC | Age: 70
End: 2025-02-19
Payer: MEDICARE

## 2025-02-19 ENCOUNTER — PATIENT MESSAGE (OUTPATIENT)
Dept: PRIMARY CARE CLINIC | Facility: CLINIC | Age: 70
End: 2025-02-19

## 2025-02-19 DIAGNOSIS — Z63.6 CAREGIVER STRESS: Primary | ICD-10-CM

## 2025-02-19 PROBLEM — F33.0 MDD (MAJOR DEPRESSIVE DISORDER), RECURRENT EPISODE, MILD: Status: ACTIVE | Noted: 2025-02-19

## 2025-02-19 PROCEDURE — 99999 PR PBB SHADOW E&M-EST. PATIENT-LVL I: CPT | Mod: PBBFAC,,,

## 2025-02-19 SDOH — SOCIAL DETERMINANTS OF HEALTH (SDOH): DEPENDENT RELATIVE NEEDING CARE AT HOME: Z63.6
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
## 2025-03-10 ENCOUNTER — PATIENT MESSAGE (OUTPATIENT)
Dept: PRIMARY CARE CLINIC | Facility: CLINIC | Age: 70
End: 2025-03-10
Payer: MEDICARE

## 2025-03-10 ENCOUNTER — TELEPHONE (OUTPATIENT)
Dept: PRIMARY CARE CLINIC | Facility: CLINIC | Age: 70
End: 2025-03-10
Payer: MEDICARE

## 2025-03-10 DIAGNOSIS — F43.20 ADJUSTMENT DISORDER, UNSPECIFIED TYPE: Primary | ICD-10-CM

## 2025-03-10 NOTE — TELEPHONE ENCOUNTER
"Dr. Valverde Staff:  Can you please place a referral to the ALS LCSW/therapist for this patient? I spoke with him: [email or web address]  Phone# [phone]  Ambulatory referral/consult to Behavioral Health - REF [phone]  Or Ambulatory referral/consult to Social Work -  REF [phone]  "I can search / sort and assign to myself."     Thanks!  "

## 2025-03-13 ENCOUNTER — PATIENT MESSAGE (OUTPATIENT)
Dept: NEUROLOGY | Facility: CLINIC | Age: 70
End: 2025-03-13
Payer: MEDICARE

## 2025-03-21 ENCOUNTER — TELEPHONE (OUTPATIENT)
Dept: ORTHOPEDICS | Facility: CLINIC | Age: 70
End: 2025-03-21
Payer: MEDICARE

## 2025-03-21 NOTE — TELEPHONE ENCOUNTER
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

## 2025-03-27 DIAGNOSIS — R21 RASH: Primary | ICD-10-CM

## 2025-03-27 RX ORDER — HYDROXYZINE HYDROCHLORIDE 25 MG/1
25 TABLET, FILM COATED ORAL 3 TIMES DAILY PRN
Qty: 90 TABLET | Refills: 5 | Status: SHIPPED | OUTPATIENT
Start: 2025-03-27

## 2025-04-21 ENCOUNTER — OFFICE VISIT (OUTPATIENT)
Dept: PRIMARY CARE CLINIC | Facility: CLINIC | Age: 70
End: 2025-04-21
Payer: MEDICARE

## 2025-04-21 VITALS
OXYGEN SATURATION: 97 % | WEIGHT: 153.69 LBS | HEIGHT: 61 IN | BODY MASS INDEX: 29.02 KG/M2 | SYSTOLIC BLOOD PRESSURE: 127 MMHG | HEART RATE: 74 BPM | DIASTOLIC BLOOD PRESSURE: 76 MMHG

## 2025-04-21 DIAGNOSIS — I70.0 AORTIC ATHEROSCLEROSIS: ICD-10-CM

## 2025-04-21 DIAGNOSIS — I10 ESSENTIAL HYPERTENSION: Primary | ICD-10-CM

## 2025-04-21 DIAGNOSIS — F33.0 MDD (MAJOR DEPRESSIVE DISORDER), RECURRENT EPISODE, MILD: ICD-10-CM

## 2025-04-21 DIAGNOSIS — E78.49 OTHER HYPERLIPIDEMIA: ICD-10-CM

## 2025-04-21 DIAGNOSIS — E03.9 HYPOTHYROIDISM, UNSPECIFIED TYPE: ICD-10-CM

## 2025-04-21 DIAGNOSIS — F41.9 ANXIETY: ICD-10-CM

## 2025-04-21 PROCEDURE — 3008F BODY MASS INDEX DOCD: CPT | Mod: CPTII,S$GLB,, | Performed by: INTERNAL MEDICINE

## 2025-04-21 PROCEDURE — 99999 PR PBB SHADOW E&M-EST. PATIENT-LVL IV: CPT | Mod: PBBFAC,,, | Performed by: INTERNAL MEDICINE

## 2025-04-21 PROCEDURE — 1123F ACP DISCUSS/DSCN MKR DOCD: CPT | Mod: CPTII,S$GLB,, | Performed by: INTERNAL MEDICINE

## 2025-04-21 PROCEDURE — 1101F PT FALLS ASSESS-DOCD LE1/YR: CPT | Mod: CPTII,S$GLB,, | Performed by: INTERNAL MEDICINE

## 2025-04-21 PROCEDURE — 3074F SYST BP LT 130 MM HG: CPT | Mod: CPTII,S$GLB,, | Performed by: INTERNAL MEDICINE

## 2025-04-21 PROCEDURE — 3288F FALL RISK ASSESSMENT DOCD: CPT | Mod: CPTII,S$GLB,, | Performed by: INTERNAL MEDICINE

## 2025-04-21 PROCEDURE — 1159F MED LIST DOCD IN RCRD: CPT | Mod: CPTII,S$GLB,, | Performed by: INTERNAL MEDICINE

## 2025-04-21 PROCEDURE — 1160F RVW MEDS BY RX/DR IN RCRD: CPT | Mod: CPTII,S$GLB,, | Performed by: INTERNAL MEDICINE

## 2025-04-21 PROCEDURE — 3078F DIAST BP <80 MM HG: CPT | Mod: CPTII,S$GLB,, | Performed by: INTERNAL MEDICINE

## 2025-04-21 PROCEDURE — 4010F ACE/ARB THERAPY RXD/TAKEN: CPT | Mod: CPTII,S$GLB,, | Performed by: INTERNAL MEDICINE

## 2025-04-21 PROCEDURE — 1126F AMNT PAIN NOTED NONE PRSNT: CPT | Mod: CPTII,S$GLB,, | Performed by: INTERNAL MEDICINE

## 2025-04-21 PROCEDURE — 99214 OFFICE O/P EST MOD 30 MIN: CPT | Mod: S$GLB,,, | Performed by: INTERNAL MEDICINE
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
## 2025-04-23 ENCOUNTER — LAB VISIT (OUTPATIENT)
Dept: LAB | Facility: HOSPITAL | Age: 70
End: 2025-04-23
Attending: INTERNAL MEDICINE
Payer: MEDICARE

## 2025-04-23 DIAGNOSIS — F33.0 MDD (MAJOR DEPRESSIVE DISORDER), RECURRENT EPISODE, MILD: ICD-10-CM

## 2025-04-23 DIAGNOSIS — E78.49 OTHER HYPERLIPIDEMIA: ICD-10-CM

## 2025-04-23 DIAGNOSIS — E03.9 HYPOTHYROIDISM, UNSPECIFIED TYPE: ICD-10-CM

## 2025-04-23 DIAGNOSIS — F41.9 ANXIETY: ICD-10-CM

## 2025-04-23 DIAGNOSIS — I70.0 AORTIC ATHEROSCLEROSIS: ICD-10-CM

## 2025-04-23 DIAGNOSIS — I10 ESSENTIAL HYPERTENSION: ICD-10-CM

## 2025-04-23 LAB
ABSOLUTE EOSINOPHIL (OHS): 0.08 K/UL
ABSOLUTE MONOCYTE (OHS): 0.28 K/UL (ref 0.3–1)
ABSOLUTE NEUTROPHIL COUNT (OHS): 3.26 K/UL (ref 1.8–7.7)
ALBUMIN SERPL BCP-MCNC: 4.1 G/DL (ref 3.5–5.2)
ALP SERPL-CCNC: 103 UNIT/L (ref 40–150)
ALT SERPL W/O P-5'-P-CCNC: 15 UNIT/L (ref 10–44)
ANION GAP (OHS): 11 MMOL/L (ref 8–16)
AST SERPL-CCNC: 17 UNIT/L (ref 11–45)
BASOPHILS # BLD AUTO: 0.05 K/UL
BASOPHILS NFR BLD AUTO: 1 %
BILIRUB SERPL-MCNC: 1.2 MG/DL (ref 0.1–1)
BUN SERPL-MCNC: 20 MG/DL (ref 8–23)
CALCIUM SERPL-MCNC: 9.8 MG/DL (ref 8.7–10.5)
CHLORIDE SERPL-SCNC: 101 MMOL/L (ref 95–110)
CHOLEST SERPL-MCNC: 167 MG/DL (ref 120–199)
CHOLEST/HDLC SERPL: 2.9 {RATIO} (ref 2–5)
CO2 SERPL-SCNC: 28 MMOL/L (ref 23–29)
CREAT SERPL-MCNC: 0.8 MG/DL (ref 0.5–1.4)
ERYTHROCYTE [DISTWIDTH] IN BLOOD BY AUTOMATED COUNT: 13.8 % (ref 11.5–14.5)
GFR SERPLBLD CREATININE-BSD FMLA CKD-EPI: >60 ML/MIN/1.73/M2
GLUCOSE SERPL-MCNC: 118 MG/DL (ref 70–110)
HCT VFR BLD AUTO: 43.4 % (ref 37–48.5)
HDLC SERPL-MCNC: 58 MG/DL (ref 40–75)
HDLC SERPL: 34.7 % (ref 20–50)
HGB BLD-MCNC: 13.7 GM/DL (ref 12–16)
IMM GRANULOCYTES # BLD AUTO: 0.02 K/UL (ref 0–0.04)
IMM GRANULOCYTES NFR BLD AUTO: 0.4 % (ref 0–0.5)
LDLC SERPL CALC-MCNC: 80 MG/DL (ref 63–159)
LYMPHOCYTES # BLD AUTO: 1.27 K/UL (ref 1–4.8)
MCH RBC QN AUTO: 29.7 PG (ref 27–31)
MCHC RBC AUTO-ENTMCNC: 31.6 G/DL (ref 32–36)
MCV RBC AUTO: 94 FL (ref 82–98)
NONHDLC SERPL-MCNC: 109 MG/DL
NUCLEATED RBC (/100WBC) (OHS): 0 /100 WBC
PLATELET # BLD AUTO: 221 K/UL (ref 150–450)
PMV BLD AUTO: 12.6 FL (ref 9.2–12.9)
POTASSIUM SERPL-SCNC: 4 MMOL/L (ref 3.5–5.1)
PROT SERPL-MCNC: 7.6 GM/DL (ref 6–8.4)
RBC # BLD AUTO: 4.61 M/UL (ref 4–5.4)
RELATIVE EOSINOPHIL (OHS): 1.6 %
RELATIVE LYMPHOCYTE (OHS): 25.6 % (ref 18–48)
RELATIVE MONOCYTE (OHS): 5.6 % (ref 4–15)
RELATIVE NEUTROPHIL (OHS): 65.8 % (ref 38–73)
SODIUM SERPL-SCNC: 140 MMOL/L (ref 136–145)
T4 FREE SERPL-MCNC: 0.92 NG/DL (ref 0.71–1.51)
TRIGL SERPL-MCNC: 145 MG/DL (ref 30–150)
TSH SERPL-ACNC: 4.74 UIU/ML (ref 0.4–4)
WBC # BLD AUTO: 4.96 K/UL (ref 3.9–12.7)

## 2025-04-23 PROCEDURE — 84439 ASSAY OF FREE THYROXINE: CPT

## 2025-04-23 PROCEDURE — 84075 ASSAY ALKALINE PHOSPHATASE: CPT

## 2025-04-23 PROCEDURE — 36415 COLL VENOUS BLD VENIPUNCTURE: CPT

## 2025-04-23 PROCEDURE — 80061 LIPID PANEL: CPT

## 2025-04-23 PROCEDURE — 85025 COMPLETE CBC W/AUTO DIFF WBC: CPT

## 2025-04-23 PROCEDURE — 84443 ASSAY THYROID STIM HORMONE: CPT

## 2025-04-28 ENCOUNTER — RESULTS FOLLOW-UP (OUTPATIENT)
Dept: PRIMARY CARE CLINIC | Facility: CLINIC | Age: 70
End: 2025-04-28

## 2025-04-28 ENCOUNTER — TELEPHONE (OUTPATIENT)
Dept: PRIMARY CARE CLINIC | Facility: CLINIC | Age: 70
End: 2025-04-28
Payer: MEDICARE

## 2025-04-28 DIAGNOSIS — E03.9 HYPOTHYROIDISM, UNSPECIFIED TYPE: Primary | ICD-10-CM

## 2025-04-28 DIAGNOSIS — E89.0 POSTOPERATIVE HYPOTHYROIDISM: ICD-10-CM

## 2025-04-28 NOTE — TELEPHONE ENCOUNTER
Please call to let pt know that labs look ok except TSH is mildly elevated. Please confirm she's taking synthroid 88mcg daily and correctly. If taking correctly and consistently, inc to 2 tabs on Sundays instead of 1 tab daily. Repeat labs prior to f/u visit in July - can link to H/O labs then. Let me know if I have to change MAR.

## 2025-04-30 RX ORDER — LEVOTHYROXINE SODIUM 88 UG/1
88 TABLET ORAL DAILY
Start: 2025-04-30

## 2025-05-02 DIAGNOSIS — I10 BENIGN ESSENTIAL HYPERTENSION: ICD-10-CM

## 2025-05-02 RX ORDER — CANDESARTAN 8 MG/1
8 TABLET ORAL DAILY
Qty: 90 TABLET | Refills: 3 | Status: SHIPPED | OUTPATIENT
Start: 2025-05-02

## 2025-05-20 DIAGNOSIS — F33.0 MDD (MAJOR DEPRESSIVE DISORDER), RECURRENT EPISODE, MILD: ICD-10-CM

## 2025-05-20 RX ORDER — BUSPIRONE HYDROCHLORIDE 5 MG/1
TABLET ORAL
Qty: 90 TABLET | Refills: 3 | Status: SHIPPED | OUTPATIENT
Start: 2025-05-20

## 2025-06-20 ENCOUNTER — PATIENT MESSAGE (OUTPATIENT)
Dept: DERMATOLOGY | Facility: CLINIC | Age: 70
End: 2025-06-20
Payer: MEDICARE

## 2025-06-27 ENCOUNTER — OFFICE VISIT (OUTPATIENT)
Dept: DERMATOLOGY | Facility: CLINIC | Age: 70
End: 2025-06-27
Payer: MEDICARE

## 2025-06-27 DIAGNOSIS — L57.0 MULTIPLE ACTINIC KERATOSES: Primary | ICD-10-CM

## 2025-06-27 DIAGNOSIS — L82.1 SEBORRHEIC KERATOSES: ICD-10-CM

## 2025-06-27 DIAGNOSIS — D18.01 CHERRY ANGIOMA: ICD-10-CM

## 2025-06-27 DIAGNOSIS — Z85.828 HISTORY OF SKIN CANCER: ICD-10-CM

## 2025-06-27 PROCEDURE — 99999 PR PBB SHADOW E&M-EST. PATIENT-LVL III: CPT | Mod: PBBFAC,,, | Performed by: DERMATOLOGY

## 2025-06-27 RX ORDER — TRETINOIN 0.5 MG/G
CREAM TOPICAL NIGHTLY
Qty: 20 G | Refills: 3 | Status: SHIPPED | OUTPATIENT
Start: 2025-06-27

## 2025-06-27 NOTE — PROGRESS NOTES
Subjective:      Patient ID:  Rehana Whitten is a 69 y.o. female who presents for   Chief Complaint   Patient presents with    Skin Check     UBSE     Would like skin check, new spots on upper lip and on right hand.  See previous notes Dr Galan.         Review of Systems   Constitutional:  Negative for fever, chills, weight loss, weight gain, fatigue, night sweats and malaise.   Skin:  Positive for daily sunscreen use and activity-related sunscreen use. Negative for itching, rash and wears hat.   Hematologic/Lymphatic: Does not bruise/bleed easily.       Objective:   Physical Exam   Constitutional: She appears well-developed and well-nourished. No distress.   Neurological: She is alert and oriented to person, place, and time. She is not disoriented.   Psychiatric: She has a normal mood and affect.   Skin:   Areas Examined (abnormalities noted in diagram):   Head / Face Inspection Performed  Neck Inspection Performed  Chest / Axilla Inspection Performed  Abdomen Inspection Performed  Back Inspection Performed  RUE Inspected  LUE Inspection Performed  RLE Inspected  LLE Inspection Performed                 Diagram Legend     Erythematous scaling macule/papule c/w actinic keratosis       Vascular papule c/w angioma      Pigmented verrucoid papule/plaque c/w seborrheic keratosis      Yellow umbilicated papule c/w sebaceous hyperplasia      Irregularly shaped tan macule c/w lentigo     1-2 mm smooth white papules consistent with Milia      Movable subcutaneous cyst with punctum c/w epidermal inclusion cyst      Subcutaneous movable cyst c/w pilar cyst      Firm pink to brown papule c/w dermatofibroma      Pedunculated fleshy papule(s) c/w skin tag(s)      Evenly pigmented macule c/w junctional nevus     Mildly variegated pigmented, slightly irregular-bordered macule c/w mildly atypical nevus      Flesh colored to evenly pigmented papule c/w intradermal nevus       Pink pearly papule/plaque c/w basal cell  carcinoma      Erythematous hyperkeratotic cursted plaque c/w SCC      Surgical scar with no sign of skin cancer recurrence      Open and closed comedones      Inflammatory papules and pustules      Verrucoid papule consistent consistent with wart     Erythematous eczematous patches and plaques     Dystrophic onycholytic nail with subungual debris c/w onychomycosis     Umbilicated papule    Erythematous-base heme-crusted tan verrucoid plaque consistent with inflamed seborrheic keratosis     Erythematous Silvery Scaling Plaque c/w Psoriasis     See annotation      Assessment / Plan:        Multiple actinic keratoses   Cryosurgery Procedure Note    Verbal consent from the patient is obtained and the patient is aware of the precancerous quality and need for treatment of these lesions. Liquid nitrogen cryosurgery is applied to the 2 actinic keratoses, as detailed in the physical exam, to produce a freeze injury.    -     tretinoin (RETIN-A) 0.05 % cream; Apply topically every evening.  Dispense: 20 g; Refill: 3  use on face hs      Cherry angiomas  This is a benign vascular lesion. Reassurance given. No treatment required.       Seborrheic keratoses  Seborrheic keratosis scattered, told benign no treatment needed.  Brochure provided.     History of skin cancer  Bcc left upper cutaneous lip  Area(s) of previous NMSC evaluated with no signs of recurrence.  . No lesions suspicious for malignancy noted.    Recommend daily sun protection/avoidance and use of at least SPF 30, broad spectrum sunscreen (OTC drug).                Follow up in about 6 months (around 12/27/2025).

## 2025-06-30 ENCOUNTER — PATIENT MESSAGE (OUTPATIENT)
Dept: DERMATOLOGY | Facility: CLINIC | Age: 70
End: 2025-06-30
Payer: MEDICARE

## 2025-07-09 NOTE — SUBJECTIVE & OBJECTIVE
Scheduled Meds:   citalopram  20 mg Oral Daily    docusate sodium  100 mg Oral BID    enoxaparin  40 mg Subcutaneous Daily    [START ON 12/16/2022] k phos di & mono-sod phos mono  500 mg Oral Daily    levothyroxine  88 mcg Oral Before breakfast    magnesium oxide  400 mg Oral Daily    pantoprazole  40 mg Oral Daily    triamterene-hydrochlorothiazide 37.5-25 mg  1 capsule Oral QAM    valACYclovir  500 mg Oral Daily     Continuous Infusions:  PRN Meds:acetaminophen, ondansetron, oxyCODONE-acetaminophen, oxyCODONE-acetaminophen, polyethylene glycol    Review of Systems   Constitutional:  Negative for appetite change, chills, fatigue and fever.   Respiratory:  Negative for cough, chest tightness and shortness of breath.    Cardiovascular:  Negative for chest pain, palpitations and leg swelling.   Gastrointestinal:  Positive for abdominal pain. Negative for abdominal distention, constipation, diarrhea, nausea and vomiting.   Genitourinary:  Negative for difficulty urinating, dysuria, frequency and urgency.   Musculoskeletal:  Negative for back pain and neck pain.   Skin:  Positive for wound. Negative for color change, pallor and rash.   Neurological:  Positive for weakness. Negative for dizziness and headaches.   Psychiatric/Behavioral:  Negative for confusion and sleep disturbance.    Objective:     Vital Signs (Most Recent):  Temp: 98.4 °F (36.9 °C) (12/15/22 1048)  Pulse: 76 (12/15/22 1048)  Resp: 14 (12/15/22 1313)  BP: (!) 123/59 (12/15/22 1048)  SpO2: (!) 94 % (12/15/22 1048)   Vital Signs (24h Range):  Temp:  [98 °F (36.7 °C)-98.4 °F (36.9 °C)] 98.4 °F (36.9 °C)  Pulse:  [65-76] 76  Resp:  [14-18] 14  SpO2:  [94 %-97 %] 94 %  BP: (120-150)/(58-71) 123/59     Weight: 64.2 kg (141 lb 8.6 oz)  Body mass index is 26.74 kg/m².    Intake/Output - Last 3 Shifts         12/13 0700  12/14 0659 12/14 0700  12/15 0659 12/15 0700 12/16 0659    P.O. 0172 2729 409    I.V. (mL/kg) 2003.9 (31)      IV Piggyback 200      Total  Intake(mL/kg) 3788.9 (58.7) 1675 (26.1) 480 (7.5)    Urine (mL/kg/hr) 1475 (1) 1800 (1.2) 1700 (3.7)    Stool 0 0 0    Total Output 1475 1800 1700    Net +2313.9 -125 -1220           Stool Occurrence 0 x 0 x 0 x            Physical Exam  Vitals and nursing note reviewed.   Constitutional:       Appearance: Normal appearance.   Cardiovascular:      Rate and Rhythm: Normal rate and regular rhythm.      Pulses: Normal pulses.   Pulmonary:      Effort: Pulmonary effort is normal. No respiratory distress.      Breath sounds: Normal breath sounds. No decreased breath sounds, wheezing or rales.   Abdominal:      General: Bowel sounds are normal. There is no distension.      Palpations: Abdomen is soft.      Tenderness: There is abdominal tenderness (incisional). There is no guarding.       Musculoskeletal:         General: Normal range of motion.   Skin:     General: Skin is warm and dry.   Neurological:      Mental Status: She is alert and oriented to person, place, and time.   Psychiatric:         Behavior: Behavior is cooperative.       Laboratory:  Immunosuppressants       None          CBC:   Recent Labs   Lab 12/15/22  0637   WBC 7.16   RBC 3.11*   HGB 9.4*   HCT 29.7*      MCV 96   MCH 30.2   MCHC 31.6*     CMP:   Recent Labs   Lab 12/15/22  0637   *   CALCIUM 8.7   ALBUMIN 3.2*   PROT 5.6*   *   K 3.2*   CO2 26   CL 98   BUN 13   CREATININE 0.7   ALKPHOS 107   *   *   BILITOT 1.1*     Coagulation:   Recent Labs   Lab 12/15/22  0637   INR 1.0     Labs within the past 24 hours have been reviewed.    Diagnostic Results:  I have personally reviewed all pertinent imaging studies.    Debility/Functional status: No debility noted.   [Immunizations are up to date by report] : Immunizations are up to date by report [Fatigue] : fatigue [Negative] : Psychiatric [Fever] : no fever [Chills] : no chills [Sweating] : no sweating [Decreased Appetite] : normal appetite [Petechiae] : no petechiae [Ecchymoses] : no ecchymoses [Jaundice] : no jaundice [de-identified] : eczema

## 2025-07-11 ENCOUNTER — PATIENT MESSAGE (OUTPATIENT)
Dept: ORTHOPEDICS | Facility: CLINIC | Age: 70
End: 2025-07-11
Payer: MEDICARE

## 2025-07-14 ENCOUNTER — CLINICAL SUPPORT (OUTPATIENT)
Dept: REHABILITATION | Facility: HOSPITAL | Age: 70
End: 2025-07-14
Payer: MEDICARE

## 2025-07-14 ENCOUNTER — HOSPITAL ENCOUNTER (OUTPATIENT)
Dept: RADIOLOGY | Facility: HOSPITAL | Age: 70
Discharge: HOME | End: 2025-07-14
Attending: ORTHOPAEDIC SURGERY
Payer: MEDICARE

## 2025-07-14 ENCOUNTER — PATIENT MESSAGE (OUTPATIENT)
Facility: CLINIC | Age: 70
End: 2025-07-14

## 2025-07-14 ENCOUNTER — OFFICE VISIT (OUTPATIENT)
Facility: CLINIC | Age: 70
End: 2025-07-14
Payer: MEDICARE

## 2025-07-14 VITALS — WEIGHT: 153.69 LBS | BODY MASS INDEX: 29.02 KG/M2 | HEIGHT: 61 IN

## 2025-07-14 DIAGNOSIS — M18.12 PRIMARY OSTEOARTHRITIS OF FIRST CARPOMETACARPAL JOINT OF LEFT HAND: ICD-10-CM

## 2025-07-14 DIAGNOSIS — M18.11 PRIMARY OSTEOARTHRITIS OF FIRST CARPOMETACARPAL JOINT OF RIGHT HAND: ICD-10-CM

## 2025-07-14 DIAGNOSIS — M65.311 TRIGGER FINGER OF RIGHT THUMB: ICD-10-CM

## 2025-07-14 DIAGNOSIS — M65.341 TRIGGER RING FINGER OF RIGHT HAND: ICD-10-CM

## 2025-07-14 DIAGNOSIS — M65.342 TRIGGER RING FINGER OF LEFT HAND: ICD-10-CM

## 2025-07-14 DIAGNOSIS — M65.331 TRIGGER MIDDLE FINGER OF RIGHT HAND: ICD-10-CM

## 2025-07-14 DIAGNOSIS — M65.341 TRIGGER RING FINGER OF RIGHT HAND: Primary | ICD-10-CM

## 2025-07-14 DIAGNOSIS — M79.645 BILATERAL THUMB PAIN: Primary | ICD-10-CM

## 2025-07-14 DIAGNOSIS — M79.644 BILATERAL THUMB PAIN: Primary | ICD-10-CM

## 2025-07-14 PROCEDURE — 73130 X-RAY EXAM OF HAND: CPT | Mod: 26,RT,, | Performed by: RADIOLOGY

## 2025-07-14 PROCEDURE — 97760 ORTHOTIC MGMT&TRAING 1ST ENC: CPT

## 2025-07-14 PROCEDURE — L3923 HFO WITHOUT JOINTS PRE CST: HCPCS

## 2025-07-14 PROCEDURE — 73130 X-RAY EXAM OF HAND: CPT | Mod: TC,RT

## 2025-07-14 PROCEDURE — 99999 PR PBB SHADOW E&M-EST. PATIENT-LVL III: CPT | Mod: PBBFAC,,, | Performed by: ORTHOPAEDIC SURGERY

## 2025-07-14 NOTE — PROGRESS NOTES
Rehana Whitten presents for follow up evaluation of   Encounter Diagnoses   Name Primary?    Trigger ring finger of right hand Yes    Trigger middle finger of right hand     Primary osteoarthritis of first carpometacarpal joint of right hand     Primary osteoarthritis of first carpometacarpal joint of left hand     Trigger ring finger of left hand      History of Present Illness    HPI:  Patient presents with trigger finger affecting multiple digits. Her previously affected finger remains problematic, and the adjacent finger is now experiencing symptoms. She reports pulling sensations in the distal interphalangeal joint. The ring finger is more swollen, which she attributes to pushing wheelchair wheels and picking up objects. These two fingers are painful, likely due to her daily activities. Her long finger is also affected. The left ring finger has recently started to catch as well. She reports difficulty protecting her hands due to the nature of her activities but continues to maintain her gym routine despite these issues.    Her ability to seek medical care has been limited due to her caregiving responsibilities. She is caring for her , who has been diagnosed with ALS since her last visit, as well as an uncle. She is also managing her own cancer follow-ups, with her next checkup scheduled for Thursday. She is 2.5 years out from her cancer treatment, with good results thus far.    Vitals:    07/14/25 1457   PainSc:   3   PainLoc: Hand       PE:    AA&O x 4.  NAD  HEENT:  NCAT, sclera nonicteric  Lungs:  Respirations are equal and unlabored.  CV:  2+ bilateral upper and lower extremity pulses.      MSK:  Tender to palpation right long and ring flexor sheath, tender to palpation left ring A1 pulley, demonstrable catching and locking right long and ring fingers as well as left ring finger.  Positive grind test bilateral basilar thumb joints.  Neurovascularly intact bilaterally.  5/5 thenar and  intrinsic musculature strength.  Full range of motion hands, wrists and elbows.    Diagnostic studies and other clinical records review:  X-rays AP, lateral and oblique right hand taken today are independently reviewed by me and shows Eaton stage II basilar thumb arthritis.     Assessment/Plan:   Encounter Diagnoses   Name Primary?    Trigger ring finger of right hand Yes    Trigger middle finger of right hand     Primary osteoarthritis of first carpometacarpal joint of right hand     Primary osteoarthritis of first carpometacarpal joint of left hand     Trigger ring finger of left hand      The patient and I had a thorough discussion today. We discussed the working diagnosis as well as several other potential alternative diagnoses. Treatment options were discussed, both conservative and surgical. Conservative treatment options would include things such as activity modifications, workplace modifications, a period of rest, oral vs topical OTC and prescription anti-inflammatory medications, occupational therapy, splinting/bracing, immobilization, corticosteroid injections, and others. Surgical options were discussed as well. I have recommended a trigger finger splint for her right ring trigger finger. I have referred patient to Occupational therapy for bilateral comfort cool braces.    -I have offered her a selective injection. I have explained the risks, benefits, and alternatives of the procedure in detail.  The patient voices understanding and all questions have been answered. The patient agrees to proceed as planned. So after a sterile prep of the skin in the normal fashion the right long and ring, left ring flexor tendon sheaths injected using a 25 gauge needle with a combination of 1cc 1% plain lidocaine and 40 mg of methylprednisolone.  The patient is cautioned and immediate relief of pain is secondary to the local anesthetic and will be temporary.  After the anesthetic wears off there may be a increase in pain  that may last for a few hours or a few days and they should use ice to help alleviate this flair up of pain. Patient tolerated the procedure well.    Call with any questions/concerns in the interim      - Provide a splint for the swollen finger to be worn at night for a few weeks.  - Recommend thumb braces to offload the thumb joints, to be worn during workouts and when pushing the wheelchair.    PATIENT INSTRUCTIONS:  - Use thumb braces during wheelchair use and workouts to offload thumb joints.              Mary Maciel MD    Please be aware that this note has been generated with the assistance of [employer] voice-to-text.  Please excuse any spelling or grammatical errors.  This note was generated with the assistance of ambient listening technology. Verbal consent was obtained by the patient and accompanying visitor(s) for the recording of patient appointment to facilitate this note. I attest to having reviewed and edited the generated note for accuracy, though some syntax or spelling errors may persist. Please contact the author of this note for any clarification.

## 2025-07-15 PROBLEM — M79.645 BILATERAL THUMB PAIN: Status: ACTIVE | Noted: 2025-07-15

## 2025-07-15 PROBLEM — M79.644 BILATERAL THUMB PAIN: Status: ACTIVE | Noted: 2025-07-15

## 2025-07-16 NOTE — PROGRESS NOTES
Outpatient Rehab    Occupational Therapy Evaluation (only) - Orthotic    Patient Name: Joanne Whitten  MRN: [id number]  YOB: 1955  Encounter Date: 7/14/2025    Therapy Diagnosis:   Encounter Diagnosis   Name Primary?    Bilateral thumb pain Yes     Physician: Dr. Mary Maciel    Physician Orders: Eval and Treat  Medical Diagnosis:   Surgical Diagnosis: Not applicable for this Episode   Surgical Date: Not applicable for this Episode  Days Since Last Surgery: Not applicable for this Episode    Visit # / Visits Authorized: 1/1  Insurance Authorization Period: 7/14/25       Time In: 1600   Time Out: 1620  Total Time (in minutes): 20   Total Billable Time (in minutes): 20    Intake Outcome Measure for FOTO Survey    Therapist reviewed FOTO scores for Joanne Whitten on 7/14/2025.   FOTO report - see Media section or FOTO account episode details.     Intake Score: Not applicable for this Episode%    Precautions:  Additional Precaution and Protocol Details: Standard    Subjective   History of Present Illness  Joanne is a 69 y.o. female who reports to occupational therapy with a chief concern of bilateral thumb pain.     The patient reports a medical diagnosis of M18.11 (ICD-10-CM) - Primary osteoarthritis of first carpometacarpal joint of right hand.            History of Present Condition/Illness: Pt arrived as walk over after ortho appointment, for prefab comfort cool thumb CMC braces with custom fitted insert. She reports she has had recent increased Bilateral thumb pain as well as trigger fingers due to increased use recently with assisting at home and with her .          Past Medical History/Physical Systems Review:   Rehana Whitten  has a past medical history of Anorexia, Anxiety, Basal cell carcinoma, Depression, Former smoker; quit 1980, 1/2 pack x 10 years, H/O resection of liver, Hypercalcemia, Hypertension, Hypothyroidism, Insomnia, Liver mass, and Vertigo.    Rehana  Randy Whitten  has a past surgical history that includes Parathyroidectomy; Tubal ligation; Lymph node biopsy; Nasal sinus surgery; Breast biopsy (Right); Breast biopsy (Left); Breast surgery; excision, liver (N/A, 12/12/2022); Cholecystectomy (12/12/2022); and lymphadenectomy (N/A, 12/12/2022).    Rehana has a current medication list which includes the following prescription(s): buspirone, candesartan, chlorthalidone, citalopram, hydroxyzine hcl, levothyroxine, ondansetron, potassium chloride sa, rosuvastatin, tretinoin, valacyclovir, and vitamin d.    Review of patient's allergies indicates:  No Known Allergies     Objective   Orthosis Details  In this visit, actions taken with the first orthosis variety included Orthosis education and Issued pre-fabricated orthosis. Number issued of this variety was 2.         Orthosis Laterality: Bilateral  Fabrication Status: Pre-fabricated  Orthosis Type: Static  Orthosis Design: Hand-based   - Short opponens/thumb spica            Orthosis Position: Prefab bilateral comfort cool thumb CMC braces with custom fitted inserts.     Orthosis Purpose  Purpose of the patient's orthosis is to Provide support to a painful body part and Reduce pain.      Orthosis Education  Orthosis education was provided to Patient. The education recipient's understanding level was Demonstrates understanding and Verbalizes understanding. Provided instruction to wear the orthosis As needed for pain/support and During functional activity.   Also provided education regarding Clinic contact information, Don/doff, Hygiene with orthosis, Orthosis care, Orthosis purpose, and Skin checks.                Time Entry(in minutes):  Orthotic Management Training Time Entry: 8    Assessment & Plan   Assessment    Assessment Details: Pt reported good fit of orthoses today. Patient demonstrated independence with donning and doffing it. Reviewed precautions, and wearing orthoses as needed with functional activity  and for pain.  No further OT recommended at this time.    Plan         Plan details: One time visit for prefab thumb CMC comfort cools with custom fitted inserts today per OT orders. Orthosis checks PRN. No further OT at this time.           The patient's spiritual, cultural, and educational needs were considered, and the patient is agreeable to the plan of care and goals.           Goals: n/a    TRAVIS Darling, RASHIDAT

## 2025-07-17 ENCOUNTER — HOSPITAL ENCOUNTER (OUTPATIENT)
Dept: RADIOLOGY | Facility: HOSPITAL | Age: 70
Discharge: HOME | End: 2025-07-17
Attending: HOSPITALIST
Payer: MEDICARE

## 2025-07-17 DIAGNOSIS — Z85.09 HISTORY OF CHOLANGIOCARCINOMA: ICD-10-CM

## 2025-07-17 PROCEDURE — 74177 CT ABD & PELVIS W/CONTRAST: CPT | Mod: TC

## 2025-07-17 PROCEDURE — A9698 NON-RAD CONTRAST MATERIALNOC: HCPCS | Performed by: HOSPITALIST

## 2025-07-17 PROCEDURE — 25500020 PHARM REV CODE 255: Performed by: HOSPITALIST

## 2025-07-17 RX ADMIN — BARIUM SULFATE 450 ML: 20 SUSPENSION ORAL at 11:07

## 2025-07-17 RX ADMIN — IOHEXOL 75 ML: 350 INJECTION, SOLUTION INTRAVENOUS at 12:07

## 2025-07-18 ENCOUNTER — PATIENT MESSAGE (OUTPATIENT)
Dept: PRIMARY CARE CLINIC | Facility: CLINIC | Age: 70
End: 2025-07-18
Payer: MEDICARE

## 2025-07-18 DIAGNOSIS — M87.052 AVASCULAR NECROSIS OF BONE OF HIP, LEFT: Primary | ICD-10-CM

## 2025-07-19 ENCOUNTER — E-CONSULT (OUTPATIENT)
Dept: ORTHOPEDICS | Facility: HOSPITAL | Age: 70
End: 2025-07-19
Payer: MEDICARE

## 2025-07-19 DIAGNOSIS — M87.052 AVASCULAR NECROSIS OF BONE OF LEFT HIP: Primary | ICD-10-CM

## 2025-07-19 PROCEDURE — 99446 NTRPROF PH1/NTRNET/EHR 5-10: CPT | Mod: ,,, | Performed by: ORTHOPAEDIC SURGERY

## 2025-07-19 NOTE — CONSULTS
[facility]  Response for E-Consult     Patient Name: Rehana Whitten  MRN: [id number]  Primary Care Provider: Bernie Valverde MD   Requesting Provider: Bernie Valverde MD  E-Consult to Orthopedics  Consult performed by: Chano Thapa MD  Consult ordered by: Bernie Valverde MD  Reason for consult: Left hip AVN          Recommendation:  No treatment needed.  Likely incidental finding of some age indeterminate AVN of the left hip.  We had let the patient continued to do exercise as tolerated.  If she starts having more hip pain, then further evaluation with x-rays and possibly MRI would be warranted at that time.  If the patient is asymptomatic in the left hip, we would let her continue life and exercise as normal.    Additional future steps to consider:  X-ray, MRI if symptomatic    Total time of Consultation: 10 minute    I did not speak to the requesting provider verbally about this.     *This eConsult is based on the clinical data available to me and is furnished without benefit of a physical examination. The eConsult will need to be interpreted in light of any clinical issues or changes in patient status not available to me at the time of filing this eConsults. Significant changes in patient condition or level of acuity should result in immediate formal consultation and reevaluation. Please alert me if you have further questions.    Thank you for this eConsult referral.     Chano Thapa MD  [facility]

## 2025-07-21 ENCOUNTER — OFFICE VISIT (OUTPATIENT)
Dept: HEMATOLOGY/ONCOLOGY | Facility: CLINIC | Age: 70
End: 2025-07-21
Payer: MEDICARE

## 2025-07-21 ENCOUNTER — PATIENT MESSAGE (OUTPATIENT)
Dept: PRIMARY CARE CLINIC | Facility: CLINIC | Age: 70
End: 2025-07-21
Payer: MEDICARE

## 2025-07-21 VITALS
HEART RATE: 74 BPM | HEIGHT: 61 IN | OXYGEN SATURATION: 97 % | DIASTOLIC BLOOD PRESSURE: 69 MMHG | WEIGHT: 154.19 LBS | BODY MASS INDEX: 29.11 KG/M2 | SYSTOLIC BLOOD PRESSURE: 155 MMHG

## 2025-07-21 DIAGNOSIS — Z85.09 HISTORY OF CHOLANGIOCARCINOMA: Primary | ICD-10-CM

## 2025-07-21 PROCEDURE — 4010F ACE/ARB THERAPY RXD/TAKEN: CPT | Mod: CPTII,S$GLB,, | Performed by: HOSPITALIST

## 2025-07-21 PROCEDURE — 1159F MED LIST DOCD IN RCRD: CPT | Mod: CPTII,S$GLB,, | Performed by: HOSPITALIST

## 2025-07-21 PROCEDURE — 3008F BODY MASS INDEX DOCD: CPT | Mod: CPTII,S$GLB,, | Performed by: HOSPITALIST

## 2025-07-21 PROCEDURE — 1126F AMNT PAIN NOTED NONE PRSNT: CPT | Mod: CPTII,S$GLB,, | Performed by: HOSPITALIST

## 2025-07-21 PROCEDURE — 1101F PT FALLS ASSESS-DOCD LE1/YR: CPT | Mod: CPTII,S$GLB,, | Performed by: HOSPITALIST

## 2025-07-21 PROCEDURE — 3077F SYST BP >= 140 MM HG: CPT | Mod: CPTII,S$GLB,, | Performed by: HOSPITALIST

## 2025-07-21 PROCEDURE — 3078F DIAST BP <80 MM HG: CPT | Mod: CPTII,S$GLB,, | Performed by: HOSPITALIST

## 2025-07-21 PROCEDURE — 3288F FALL RISK ASSESSMENT DOCD: CPT | Mod: CPTII,S$GLB,, | Performed by: HOSPITALIST

## 2025-07-21 PROCEDURE — 99999 PR PBB SHADOW E&M-EST. PATIENT-LVL III: CPT | Mod: PBBFAC,,, | Performed by: HOSPITALIST

## 2025-07-21 PROCEDURE — 99214 OFFICE O/P EST MOD 30 MIN: CPT | Mod: S$GLB,,, | Performed by: HOSPITALIST

## 2025-07-21 PROCEDURE — 1157F ADVNC CARE PLAN IN RCRD: CPT | Mod: CPTII,S$GLB,, | Performed by: HOSPITALIST

## 2025-07-21 PROCEDURE — G2211 COMPLEX E/M VISIT ADD ON: HCPCS | Mod: S$GLB,,, | Performed by: HOSPITALIST

## 2025-07-21 NOTE — PROGRESS NOTES
MEDICAL ONCOLOGY FOLLOW-UP VISIT.     Best Contact Phone Number(s): [phone] (home)      Cancer/Stage/TNM:    Cancer Staging   History of cholangiocarcinoma  Staging form: Intrahepatic Bile Duct, AJCC 8th Edition  - Pathologic: Stage II (pT2, pN0, cM0) - Unsigned       Reason for visit: History of IHCC sp resection and adjuvant chemotherapy    History of Present Illness: Rehana Whitten is a 70 y.o. female remote history of HBV who was found to have stage II pT2N0 intrahepatic cholangiocarcinoma in the setting of abdominal pain who underwent R0 resection 12/12/22. She completed 8 cycles of adjuvant capecitabine 07/2023. She presents to medical oncology clinic for routine surveillance.      Interval History:     CT torso 7/17/25 with LUIS EDUARDO. Note made of stable size but 'more conspicuous' 5mm JOANIE nodule. Also note made of avascular necrosis of the left femoral head - no pain and remains very active at the gym. Continues to have ongoing stressors at home - 's ALS is progressing.     No N/V/D. No CP, SOB or cough. No fevers or chills. Appetiet is good. No particular diarrhea. No other concerns.         Oncology History   History of cholangiocarcinoma   11/9/2022 Imaging Significant Findings    Abdominal US in the setting of abdominal pain shows a large heterogeneous mass in the right lobe, measures 7.2 x 5.3 x 5.9 cm.     11/9/2022 Imaging Significant Findings    CT a/p:  Irregular right hepatic lobe lesion demonstrating delayed central enhancement and overlying capsular retraction.  Capsular retraction can be seen in cholangiocarcinoma and sclerosing hepatic hemangiomas.  Differential includes metastatic disease and other primary hepatic neoplasms.     11/14/2022 Tumor Markers    CA 19-9: 19.1  AFP: 5.5     11/16/2022 Imaging Significant Findings    CT chest:  1. No specific CT evidence of metastatic disease within the chest.  2. Left lower lobe punctate micro nodules.  Attention on continued  surveillance imaging.  3. Other findings as above.     11/30/2022 Biopsy    Percutaneous biopsy of liver mass: Moderately differentiated adenocarcinoma. CK7+ Negative for CK20, CDX2, GATA3, TTF1, and PAX 8. Thought consistent with pancraetobiliary primary.     12/12/2022 Surgery    Resection of right hepatic lobe tumor. Pathology shows moderately differentiated adenocarcinoma up to 6.5cm in size c/w intrahepatic cholangiocarcinoma. Tumor was confined to the hepatic parenchyma with associated  LVI but no PNI. R0 resection with 6mm margins. 06/ LN involved. [id number]     1/13/2023 - 1/13/2023 Chemotherapy    Treatment Summary   Plan Name: OP CAPECITABINE 1250MG/M2 BID Q3W  Treatment Goal: Control  Status: Inactive  Start Date: 1/13/2023  End Date: 1/13/2023  Provider: Bennie Moore MD  Chemotherapy: capecitabine (XELODA) tablet 1,650 mg, 2,000 mg, Oral, 2 times daily, 1 of 1 cycle, Start date: 2/24/2023, End date: --     2/24/2023 -  Chemotherapy    C2D1 capectiabine; DR to 1500mg po bid       3/17/2023 -  Chemotherapy    C3D1 capecitabine 1650 po bid       4/7/2023 -  Chemotherapy    C4D1 capecitabine 1650 po bid       4/28/2023 -  Chemotherapy    C5 capecitabine 1650 po bid       5/15/2023 Imaging Significant Findings    5/15/23: CT torso:  - Postoperative changes from right hepatectomy, cholecystectomy, and portal lymphadenectomy for intrahepatic cholangiocarcinoma.  - No lymphadenopathy.  - New subcentimeter hypodensity in hepatic segment 4B, concerning for metastases.  - Unchanged pulmonary nodules.  - Other findings as described.     7/18/2023 Imaging Significant Findings    CT torso:  1. Postop change of right hepatectomy, cholecystectomy, and portal lymphadenectomy for intrahepatic cholangiocarcinoma.  2. Previously noted subcentimeter hypodensity in the hepatic IVB segment is not well visualized on today's examination.  No new focal hepatic lesions.  3. Stable bilateral pulmonary nodules.  4. Additional  findings, as above.        10/17/2023 Imaging Significant Findings    MRI Abdomen  Impression:   Postsurgical changes of cholecystectomy and right hepatectomy in this patient with history of cholangiocarcinoma.  No abnormal enhancing lesions to suggest residual or recurrent disease.    CT Chest  Impression:  1. Grossly stable pulmonary exam with multiple bilateral pulmonary nodules, the largest measuring 4 mm.      Imaging Significant Findings    4/17/24 CT Chest  Impression:  1. Grossly stable bilateral pulmonary nodules with the largest measuring 5 mm located in the right middle lobe.  2. No evidence of new pulmonary nodule or mass.  3. Postoperative changes of partial right hepatectomy with no focal lesion in this noncontrast study.  For more details please refer to MRI of the abdomen with contrast performed concomitantly.  4. Additional findings.  Please see the above discussion.    4/17/24 MR Abdomen  Impression:  Postsurgical change including cholecystectomy and right hepatectomy in this patient with history of cholangiocarcinoma.  No abnormal enhancing lesions to suggest residual or recurrent disease.     7/22/2024 Imaging Significant Findings    CT torso 7/22/24  Impression:  - In this patient with history of cholangiocarcinoma status post right hepatectomy, no suspicious lesion to suggest recurrence or metastasis.  - Stable segment 4 hypodense lesion.  This lesion described on prior MRI as benign, like atypical hemangioma or cyst.  - Stable 5 mm nodule in the right middle lobe.     1/25/2025 Imaging Significant Findings    1/25/25 CT Torso  Impression:  - In patient with history of cholangiocarcinoma status post right partial hepatectomy, no evidence of local recurrence or metastatic disease.  - Stable subcentimeter hypodensity in the right hepatic lobe, previously characterized as probable hemangioma.  No new focal hepatic lesions.  - Stable 4 mm pulmonary nodule in the right lung.  No new pulmonary  "nodules.            Physical Exam:   BP (!) 155/69   Pulse 74   Ht 5' 1" (1.549 m)   Wt 70 kg (154 lb 3.4 oz)   LMP  (LMP Unknown)   SpO2 97%   BMI 29.14 kg/m²      ECOG Performance Status: (foot note - ECOG PS provided by Eastern Cooperative Oncology Group) 0 - Asymptomatic    Physical Exam  Constitutional:       General: She is not in acute distress.     Appearance: She is normal weight.   HENT:      Head: Normocephalic.      Mouth/Throat:      Mouth: Mucous membranes are moist.      Pharynx: Oropharynx is clear.   Eyes:      General: No scleral icterus.     Conjunctiva/sclera: Conjunctivae normal.   Cardiovascular:      Rate and Rhythm: Normal rate and regular rhythm.      Heart sounds: No murmur heard.     No gallop.   Pulmonary:      Effort: Pulmonary effort is normal. No respiratory distress.      Breath sounds: Normal breath sounds. No wheezing.   Abdominal:      General: There is no distension.      Palpations: Abdomen is soft.   Musculoskeletal:         General: Normal range of motion.      Cervical back: Normal range of motion and neck supple.      Right lower leg: No edema.      Left lower leg: No edema.   Skin:     General: Skin is warm.      Coloration: Skin is not jaundiced.      Findings: No bruising or rash.   Neurological:      General: No focal deficit present.      Mental Status: She is alert and oriented to person, place, and time.      Motor: No weakness.   Psychiatric:         Mood and Affect: Mood normal.         Behavior: Behavior normal.         Thought Content: Thought content normal.           Labs:   No results found for this or any previous visit (from the past 48 hours).          Lab Results   Component Value Date     07/17/2025    K 3.4 (L) 07/17/2025    CREATININE 0.7 07/17/2025    WBC 6.47 07/17/2025    HGB 13.3 07/17/2025     07/17/2025    AST 16 07/17/2025    ALT 17 07/17/2025    ALKPHOS 107 07/17/2025    BILITOT 1.1 (H) 07/17/2025          Imaging:     CT Chest " Abdomen Pelvis With IV Contrast (XPD) Routine Oral Contrast  Narrative: EXAMINATION:  CT CHEST ABDOMEN PELVIS WITH IV CONTRAST (XPD)    CLINICAL HISTORY:  Metastatic disease evaluation; Personal history of malignant neoplasm of other digestive organs    TECHNIQUE:  Axial images of the chest, abdomen, and pelvis were acquired  after the use of 75 cc Tqip139 IV contrast and 450 cc of barium sulfate oral contrast.   Coronal and sagittal reconstructions were also obtained    COMPARISON:  CT Chest Abdomen Pelvis With IV Contrast from 01/25/2025.    FINDINGS:  Thoracic soft tissues: Normal thyroid.    Aorta: Thoracic aorta is normal in caliber and contour with no significant calcific atherosclerosis.    Heart: Normal in size. No pericardial effusion. No significant calcific coronary atherosclerosis.    Estella/Mediastinum: No significant mediastinal, hilar, or axillary lymphadenopathy    Lungs: Trachea and bronchi are patent.  There is mild bibasilar subsegmental atelectasis and biapical scarring, without consolidation or pleural fluid. Stable 0.3 cm calcified granuloma within the right upper lobe (series 6, image 191).  Stable 0.4 cm pulmonary nodule within the right upper lobe (series 6, image 241).  Stable left upper lobe nodule measuring 0.5 cm, previously 0.4 cm, but does appear more conspicuous (series 6, image 180)    Liver: Liver postsurgical changes are again visualized. Stable 0.7 cm hypodensity within the left lobe of the liver, segment 4B, too small to fully characterize.    Gallbladder: Surgically absent.    Bile Ducts: No evidence of dilated ducts.    Pancreas: No mass or peripancreatic fat stranding.    Spleen: Unremarkable.    Stomach and duodenum: Unremarkable.    Adrenals: Unremarkable.    Kidneys/ Ureters: Normal in size and location. Normal enhancement. No hydronephrosis or nephrolithiasis. No ureteral dilatation.    Bladder: No evidence of wall thickening.    Reproductive organs:  Unremarkable.    Bowel/Mesentery: Small bowel is normal in caliber with no evidence of obstruction.  No evidence of inflammation or wall thickening.  Colon demonstrates no focal wall thickening.    Lymph nodes: No lymphadenopathy    Abdominal wall:  Unremarkable.    Vasculature: No aneurysm. No significant calcific atherosclerosis.    Bones: No acute fracture. No suspicious osseous lesions.  There is motion artifact limiting full evaluation of the extrathoracic soft tissues.  There is additionally motion artifact within the pelvis.  Degenerative changes at C7-T1, unchanged from prior.  There is lucency of the left femoral head with a sclerotic border, likely avascular necrosis.  Impression: No convincing local recurrence or metastatic disease.    There is lucency of the left femoral head with a sclerotic border, likely avascular necrosis.    Similar size however more conspicuous left lung nodule.    Additional findings as above.    Electronically signed by resident: Mohsen Biswas  Date:    07/17/2025  Time:    15:29    Electronically signed by: Pavel Song MD  Date:    07/17/2025  Time:    16:57            Diagnoses:       1. History of cholangiocarcinoma                          Assessment and Plan:     1. History of cholangiocarcinoma  Overview:  Stage II (pT2N0) IHCC sp R0 resection 12/12/23. Started adjuvant capecitabine x6 months per [facility] 2/3/23. Did not tolerated 1250mg/m2 dose; DR down to 1000mg/m2. Copmleted treatment 07/2023. Note made of indeterminate subcentimeter hypodensity in segment 4b during mid treatment imaging. Not detectable on post treatment scans    Assessment & Plan:  - Remains LUIS EDUARDO  - FU 6 months scans and labs    Orders:  -     CT Chest Abdomen Pelvis With IV Contrast (XPD) Routine Oral Contrast; Future; Expected date: 07/21/2025  -     Creatinine, serum; Future; Expected date: 07/21/2025                          Route Chart for Scheduling    Med Onc Chart Routing      Follow up with  physician 6 months.   Follow up with [facility]    Infusion scheduling note    Injection scheduling note    Labs CBC, CMP and CA 19-9   Scheduling:  Preferred lab:  Lab interval:     Imaging CT chest abdomen pelvis      Pharmacy appointment    Other referrals                        Bennie Moore MD  Hematology/Oncology  [facility]

## 2025-07-22 ENCOUNTER — OFFICE VISIT (OUTPATIENT)
Dept: PRIMARY CARE CLINIC | Facility: CLINIC | Age: 70
End: 2025-07-22
Payer: MEDICARE

## 2025-07-22 ENCOUNTER — PATIENT MESSAGE (OUTPATIENT)
Dept: PRIMARY CARE CLINIC | Facility: CLINIC | Age: 70
End: 2025-07-22

## 2025-07-22 ENCOUNTER — TELEPHONE (OUTPATIENT)
Dept: PRIMARY CARE CLINIC | Facility: CLINIC | Age: 70
End: 2025-07-22

## 2025-07-22 VITALS — SYSTOLIC BLOOD PRESSURE: 130 MMHG | DIASTOLIC BLOOD PRESSURE: 72 MMHG

## 2025-07-22 DIAGNOSIS — T50.2X5A DIURETIC-INDUCED HYPOKALEMIA: ICD-10-CM

## 2025-07-22 DIAGNOSIS — F32.5 MAJOR DEPRESSIVE DISORDER IN REMISSION, UNSPECIFIED WHETHER RECURRENT: ICD-10-CM

## 2025-07-22 DIAGNOSIS — J84.10 LUNG GRANULOMA: ICD-10-CM

## 2025-07-22 DIAGNOSIS — E87.6 HYPOKALEMIA: ICD-10-CM

## 2025-07-22 DIAGNOSIS — I10 BENIGN ESSENTIAL HYPERTENSION: ICD-10-CM

## 2025-07-22 DIAGNOSIS — E89.0 POSTOPERATIVE HYPOTHYROIDISM: ICD-10-CM

## 2025-07-22 DIAGNOSIS — M87.052 AVASCULAR NECROSIS OF LEFT FEMUR: Primary | ICD-10-CM

## 2025-07-22 DIAGNOSIS — E03.9 HYPOTHYROIDISM, UNSPECIFIED TYPE: ICD-10-CM

## 2025-07-22 DIAGNOSIS — F41.9 ANXIETY: ICD-10-CM

## 2025-07-22 DIAGNOSIS — E78.5 HYPERLIPIDEMIA, UNSPECIFIED HYPERLIPIDEMIA TYPE: ICD-10-CM

## 2025-07-22 DIAGNOSIS — E87.6 DIURETIC-INDUCED HYPOKALEMIA: ICD-10-CM

## 2025-07-22 RX ORDER — POTASSIUM CHLORIDE 750 MG/1
10 TABLET, EXTENDED RELEASE ORAL DAILY
Qty: 100 TABLET | Refills: 3 | Status: SHIPPED | OUTPATIENT
Start: 2025-07-22 | End: 2026-07-22

## 2025-07-22 RX ORDER — LEVOTHYROXINE SODIUM 88 UG/1
88 TABLET ORAL DAILY
Qty: 100 TABLET | Refills: 3 | Status: SHIPPED | OUTPATIENT
Start: 2025-07-22

## 2025-07-22 RX ORDER — CITALOPRAM 20 MG/1
20 TABLET ORAL DAILY
Qty: 100 TABLET | Refills: 3 | Status: SHIPPED | OUTPATIENT
Start: 2025-07-22 | End: 2026-07-22

## 2025-07-22 RX ORDER — ROSUVASTATIN CALCIUM 40 MG/1
40 TABLET, COATED ORAL NIGHTLY
Qty: 100 TABLET | Refills: 3 | Status: SHIPPED | OUTPATIENT
Start: 2025-07-22

## 2025-07-22 RX ORDER — CANDESARTAN 8 MG/1
8 TABLET ORAL DAILY
Qty: 100 TABLET | Refills: 3 | Status: SHIPPED | OUTPATIENT
Start: 2025-07-22

## 2025-07-22 NOTE — PROGRESS NOTES
Audio Only Telehealth Visit     The patient location is: in the [place], [place]  The chief complaint leading to consultation is: 3 mo f/u  Visit type: Virtual visit with audio only (telephone)  Total time spent in medical discussion with patient: 15 minutes  Total time spent on date of the encounter:22 minutes       The reason for the audio only service rather than synchronous audio and video virtual visit was related to technical difficulties or patient preference/necessity.       Each patient to whom I provide medical services by telemedicine is:  (1) informed of the relationship between the physician and patient and the respective role of any other health care provider with respect to management of the patient; and (2) notified that they may decline to receive medical services by telemedicine and may withdraw from such care at any time. Patient verbally consented to receive this service via voice-only telephone call.       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   Intrahepatic cholangiocarcinoma s/p resection and chemo. Follows w/ Dr. Moore in H/O - LOV yesterday.  Surveillance CT C/A/P 7/17/25  No convincing local recurrence or metastatic disease.   Bones: No acute fracture. No suspicious osseous lesions. There is motion artifact limiting full evaluation of the extrathoracic soft tissues. There is additionally motion artifact within the pelvis. Degenerative changes at C7-T1, unchanged from prior. There is lucency of the left femoral head with a sclerotic border, likely avascular necrosis.   Similar size however more conspicuous left lung nodule--  Lungs: Trachea and bronchi are patent. There is mild bibasilar subsegmental atelectasis and biapical scarring, without consolidation or pleural fluid. Stable 0.3 cm calcified granuloma within the right upper lobe (series 6, image 191). Stable 0.4 cm pulmonary nodule within the right upper lobe (series 6, image 241). Stable left upper lobe nodule  measuring 0.5 cm, previously 0.4 cm, but does appear more conspicuous (series 6, image 180)    Pt was worried about the L hip AVN. She exercises a few times a week at the gym and wasn't sure if she needed to modify her exercise. No pain from exercising.     I did an e-consult to orthopedics and per Dr. Thapa:  Recommendation:  No treatment needed.  Likely incidental finding of some age indeterminate AVN of the left hip.  We had let the patient continued to do exercise as tolerated.  If she starts having more hip pain, then further evaluation with x-rays and possibly MRI would be warranted at that time.  If the patient is asymptomatic in the left hip, we would let her continue life and exercise as normal.  Additional future steps to consider:  X-ray, MRI if symptomatic    Labs from 7/17/25 reviewed. K mildly low at 3.4. Tbili 1.1. Cr 0.7, eGFR >60. CA 19-9 16.3. currently on Kdur 10mEq daily.  hypothyroidism - on synthroid 88mcg daily except 2 pills on Sundays. TSH 4.744 4/23/24 w/ normal FT4. Needs repeat TSH.     HTN - candesartan 8mg daily, chlorthalidone 25mg daily.   Hasn't checked her BPs at home. BP was high at heme/onc.    HLD - crestor 40mg daily.   LDL 4/23/25 80.    Assessment and plan:    Diagnoses and all orders for this visit:    Avascular necrosis of left femur - asymptomatic. Ok to continue her exercise. Should pain occur, can consider xrays/MRI.     Lung granuloma - asymptomatic. Cont w/ surveillance scans w/ H/O.     Benign essential hypertension - BP elevated in H/O. Pt reports BP was not repeated. Will get home BP reading and send me portal message. Cont current meds. Will check aldosterone/renin ratio.   -     Basic Metabolic Panel; Future    Hypokalemia - as above. Cont K.  -     Basic Metabolic Panel; Future  -     Magnesium; Future    Hypothyroidism, unspecified type - on synthroid 88mcg daily except 2 tabs on Sundays. Repeat TSH.  -     TSH; Future    Hyperlipidemia, unspecified  hyperlipidemia type - cont statin.     Reports doing well. 's ALS is stable, which is good. Will check w/ pharmacy to see if any refills needed. Pt will send up remote BP reading once she checks it at home.     F/u in about 3.5mo (mid Nov per pt) in person visit.     Bernie Valverde MD  Department of Internal Medicine - [facility] 65+  11:42 AM       This service was not originating from a related E/M service provided within the previous 7 days nor will  to an E/M service or procedure within the next 24 hours or my soonest available appointment.  Prevailing standard of care was able to be met in this audio-only visit.

## 2025-07-22 NOTE — TELEPHONE ENCOUNTER
----- Message from Bernie Valverde MD sent at 7/22/2025 11:39 AM CDT -----  Please schedule labs for Friday at [facility] at 10am.   She wants f/u w/ me in person 2nd week of November.   I asked her to check BP at home so we can do a remote reading. If BP >140/80, then please schedule nurse's visit to recheck BP.

## 2025-07-22 NOTE — TELEPHONE ENCOUNTER
----- Message from Bernie Valverde MD sent at 7/22/2025 11:39 AM CDT -----  Please schedule labs for Friday at [facility] at 10am.   She wants f/u w/ me in person 2nd week of November.   I asked her to check BP at home so we can do a remote reading. If BP >140/80, then please schedule nurse's visit to recheck BP.

## 2025-07-25 ENCOUNTER — LAB VISIT (OUTPATIENT)
Dept: LAB | Facility: HOSPITAL | Age: 70
End: 2025-07-25
Attending: HOSPITALIST
Payer: MEDICARE

## 2025-07-25 DIAGNOSIS — Z85.09 HISTORY OF CHOLANGIOCARCINOMA: ICD-10-CM

## 2025-07-25 DIAGNOSIS — I10 BENIGN ESSENTIAL HYPERTENSION: ICD-10-CM

## 2025-07-25 DIAGNOSIS — E03.9 HYPOTHYROIDISM, UNSPECIFIED TYPE: ICD-10-CM

## 2025-07-25 DIAGNOSIS — E87.6 HYPOKALEMIA: ICD-10-CM

## 2025-07-25 LAB
ANION GAP (OHS): 10 MMOL/L (ref 8–16)
BUN SERPL-MCNC: 20 MG/DL (ref 8–23)
CALCIUM SERPL-MCNC: 10.1 MG/DL (ref 8.7–10.5)
CHLORIDE SERPL-SCNC: 99 MMOL/L (ref 95–110)
CO2 SERPL-SCNC: 30 MMOL/L (ref 23–29)
CREAT SERPL-MCNC: 0.8 MG/DL (ref 0.5–1.4)
GFR SERPLBLD CREATININE-BSD FMLA CKD-EPI: >60 ML/MIN/1.73/M2
GLUCOSE SERPL-MCNC: 95 MG/DL (ref 70–110)
MAGNESIUM SERPL-MCNC: 1.9 MG/DL (ref 1.6–2.6)
POTASSIUM SERPL-SCNC: 4.1 MMOL/L (ref 3.5–5.1)
SODIUM SERPL-SCNC: 139 MMOL/L (ref 136–145)
TSH SERPL-ACNC: 2.01 UIU/ML (ref 0.4–4)

## 2025-07-25 PROCEDURE — 84244 ASSAY OF RENIN: CPT

## 2025-07-25 PROCEDURE — 80048 BASIC METABOLIC PNL TOTAL CA: CPT

## 2025-07-25 PROCEDURE — 83735 ASSAY OF MAGNESIUM: CPT

## 2025-07-25 PROCEDURE — 36415 COLL VENOUS BLD VENIPUNCTURE: CPT

## 2025-07-25 PROCEDURE — 84443 ASSAY THYROID STIM HORMONE: CPT

## 2025-07-28 ENCOUNTER — RESULTS FOLLOW-UP (OUTPATIENT)
Dept: PRIMARY CARE CLINIC | Facility: CLINIC | Age: 70
End: 2025-07-28
Payer: MEDICARE

## 2025-07-29 ENCOUNTER — PATIENT MESSAGE (OUTPATIENT)
Dept: PRIMARY CARE CLINIC | Facility: CLINIC | Age: 70
End: 2025-07-29
Payer: MEDICARE

## 2025-07-29 DIAGNOSIS — E89.0 POSTOPERATIVE HYPOTHYROIDISM: ICD-10-CM

## 2025-08-04 ENCOUNTER — TELEPHONE (OUTPATIENT)
Dept: PRIMARY CARE CLINIC | Facility: CLINIC | Age: 70
End: 2025-08-04
Payer: MEDICARE

## 2025-08-04 ENCOUNTER — PATIENT MESSAGE (OUTPATIENT)
Dept: PRIMARY CARE CLINIC | Facility: CLINIC | Age: 70
End: 2025-08-04
Payer: MEDICARE

## 2025-08-04 DIAGNOSIS — I10 ESSENTIAL HYPERTENSION: ICD-10-CM

## 2025-08-04 DIAGNOSIS — E87.6 HYPOKALEMIA: Primary | ICD-10-CM

## 2025-08-04 NOTE — TELEPHONE ENCOUNTER
Well unfortunately an isolated aldosterone level w/o the renin is useless. Please have pt repeat. Thanks!

## 2025-08-04 NOTE — TELEPHONE ENCOUNTER
I spoke with Nba at the main lab and she states they only collected enough blood to complete the renin. Ms. Rubio will need to repeat the lab.

## 2025-08-06 ENCOUNTER — LAB VISIT (OUTPATIENT)
Dept: LAB | Facility: HOSPITAL | Age: 70
End: 2025-08-06
Attending: INTERNAL MEDICINE
Payer: MEDICARE

## 2025-08-06 DIAGNOSIS — I10 ESSENTIAL HYPERTENSION: ICD-10-CM

## 2025-08-06 DIAGNOSIS — E87.6 HYPOKALEMIA: ICD-10-CM

## 2025-08-06 PROCEDURE — 36415 COLL VENOUS BLD VENIPUNCTURE: CPT

## 2025-08-06 PROCEDURE — 82088 ASSAY OF ALDOSTERONE: CPT

## 2025-08-09 LAB
ALDOST SERPL-MCNC: 12.8 NG/DL
ALDOST/RENIN PLAS-RTO: 0.9 RATIO
RENIN PLAS-CCNC: 13.9 NG/ML/HR

## 2025-08-21 ENCOUNTER — PATIENT MESSAGE (OUTPATIENT)
Dept: HEMATOLOGY/ONCOLOGY | Facility: CLINIC | Age: 70
End: 2025-08-21
Payer: MEDICARE

## 2025-08-28 ENCOUNTER — PATIENT MESSAGE (OUTPATIENT)
Dept: PRIMARY CARE CLINIC | Facility: CLINIC | Age: 70
End: 2025-08-28
Payer: MEDICARE

## 2025-08-28 DIAGNOSIS — F41.9 ANXIETY: Primary | ICD-10-CM

## 2025-08-29 ENCOUNTER — HOSPITAL ENCOUNTER (OUTPATIENT)
Dept: RADIOLOGY | Facility: HOSPITAL | Age: 70
Discharge: HOME | End: 2025-08-29
Attending: INTERNAL MEDICINE
Payer: MEDICARE

## 2025-09-04 ENCOUNTER — TELEPHONE (OUTPATIENT)
Dept: PRIMARY CARE CLINIC | Facility: CLINIC | Age: 70
End: 2025-09-04
Payer: MEDICARE

## (undated) DEVICE — PAD K-THERMIA 24IN X 60IN

## (undated) DEVICE — SET DECANTER MEDICHOICE

## (undated) DEVICE — SUT 4-0 12-30IN SILK

## (undated) DEVICE — TIP SONAPET IQ STANDARD 12CM

## (undated) DEVICE — CLIP LIGATION MEDIUM CLIPS 1

## (undated) DEVICE — LOOP VESSEL BLUE MAXI

## (undated) DEVICE — SUT PROLENE 5-0 24 C-1 BL

## (undated) DEVICE — ADHESIVE DERMABOND ADVANCED

## (undated) DEVICE — CLIP SPRING 12MM

## (undated) DEVICE — SUT SILK 2-0 STRANDS 30IN

## (undated) DEVICE — TIP YANKAUERS BULB NO VENT

## (undated) DEVICE — SOL NS 1000CC

## (undated) DEVICE — ELECTRODE BLADE E-Z CLEAN 4IN

## (undated) DEVICE — DRAPE STERI INSTRUMENT 1018

## (undated) DEVICE — PACK UNIVERSAL SPLIT II

## (undated) DEVICE — CART STAPLE RELD 45MM WHT

## (undated) DEVICE — DRAPE CORETEMP FLD WRM 56X62IN

## (undated) DEVICE — CASSETTE SONOPET IQ IRRIGATION

## (undated) DEVICE — STAPLER INT LINEAR ARTC 3.5-45

## (undated) DEVICE — BLADE 4IN EDGE INSULATED

## (undated) DEVICE — TRAY CATH FOL SIL URIMTR 16FR

## (undated) DEVICE — DRAIN CHANNEL ROUND 19FR

## (undated) DEVICE — SPONGE LAP 18X18 PREWASHED

## (undated) DEVICE — SUT SILK 3-0 STRANDS 30IN

## (undated) DEVICE — SUT PROLENE 6-0 BV-1 30IN

## (undated) DEVICE — FIBRILLAR ABS HEMOSTAT 4X4

## (undated) DEVICE — ELECTRODE REM PLYHSV RETURN 9

## (undated) DEVICE — DRAPE INCISE IOBAN 2 23X17IN

## (undated) DEVICE — SYR ONLY LUER LOCK 20CC

## (undated) DEVICE — CLIP SPRING 6MM

## (undated) DEVICE — DRESSING ADH ISLAND 3.6 X 14

## (undated) DEVICE — SUT PROLENE 4-0 SH BLU 36IN

## (undated) DEVICE — SEALER LIGASURE CRV 18.8CM

## (undated) DEVICE — SUT PROLENE 3-0 SH DA 36 BL

## (undated) DEVICE — TOWEL OR DISP STRL BLUE 4/PK

## (undated) DEVICE — SUT 1 36IN PDS II VIO MONO

## (undated) DEVICE — CLIPPER BLADE MOD 4406 (CAREF)

## (undated) DEVICE — SYR 10CC LUER LOCK

## (undated) DEVICE — TRAY PERIPHERAL VASCULAR OMC

## (undated) DEVICE — SUT 4/0 27IN PDS II VIO MO

## (undated) DEVICE — TUBING SUC UNIV W/CONN 12FT

## (undated) DEVICE — GOWN SURGICAL X-LARGE

## (undated) DEVICE — APPLICATOR CHLORAPREP ORN 26ML

## (undated) DEVICE — HEMOSTAT SURGICEL NU-KNIT 6X9